# Patient Record
Sex: MALE | Race: WHITE | NOT HISPANIC OR LATINO | Employment: FULL TIME | URBAN - METROPOLITAN AREA
[De-identification: names, ages, dates, MRNs, and addresses within clinical notes are randomized per-mention and may not be internally consistent; named-entity substitution may affect disease eponyms.]

---

## 2018-12-10 ENCOUNTER — OFFICE VISIT (OUTPATIENT)
Dept: FAMILY MEDICINE | Facility: CLINIC | Age: 48
End: 2018-12-10
Payer: COMMERCIAL

## 2018-12-10 VITALS
TEMPERATURE: 97.6 F | HEART RATE: 84 BPM | SYSTOLIC BLOOD PRESSURE: 118 MMHG | HEIGHT: 73 IN | DIASTOLIC BLOOD PRESSURE: 72 MMHG | WEIGHT: 221 LBS | BODY MASS INDEX: 29.29 KG/M2 | OXYGEN SATURATION: 93 %

## 2018-12-10 DIAGNOSIS — Z11.4 ENCOUNTER FOR SCREENING FOR HIV: ICD-10-CM

## 2018-12-10 DIAGNOSIS — Z13.1 SCREENING FOR DIABETES MELLITUS: ICD-10-CM

## 2018-12-10 DIAGNOSIS — Z72.0 TOBACCO ABUSE: ICD-10-CM

## 2018-12-10 DIAGNOSIS — Z00.01 ENCOUNTER FOR ROUTINE ADULT HEALTH EXAMINATION WITH ABNORMAL FINDINGS: Primary | ICD-10-CM

## 2018-12-10 DIAGNOSIS — Z12.11 SPECIAL SCREENING FOR MALIGNANT NEOPLASMS, COLON: ICD-10-CM

## 2018-12-10 DIAGNOSIS — N50.89 LUMP IN SCROTUM: ICD-10-CM

## 2018-12-10 DIAGNOSIS — Z13.6 CARDIOVASCULAR SCREENING; LDL GOAL LESS THAN 130: ICD-10-CM

## 2018-12-10 DIAGNOSIS — Z12.5 SCREENING FOR PROSTATE CANCER: ICD-10-CM

## 2018-12-10 DIAGNOSIS — E66.3 OVERWEIGHT (BMI 25.0-29.9): ICD-10-CM

## 2018-12-10 DIAGNOSIS — Z23 NEED FOR PNEUMOCOCCAL VACCINE: ICD-10-CM

## 2018-12-10 PROCEDURE — 99386 PREV VISIT NEW AGE 40-64: CPT | Mod: 25 | Performed by: PHYSICIAN ASSISTANT

## 2018-12-10 PROCEDURE — 90471 IMMUNIZATION ADMIN: CPT | Performed by: PHYSICIAN ASSISTANT

## 2018-12-10 PROCEDURE — 80061 LIPID PANEL: CPT | Performed by: PHYSICIAN ASSISTANT

## 2018-12-10 PROCEDURE — 80053 COMPREHEN METABOLIC PANEL: CPT | Performed by: PHYSICIAN ASSISTANT

## 2018-12-10 PROCEDURE — 90732 PPSV23 VACC 2 YRS+ SUBQ/IM: CPT | Performed by: PHYSICIAN ASSISTANT

## 2018-12-10 PROCEDURE — G0103 PSA SCREENING: HCPCS | Performed by: PHYSICIAN ASSISTANT

## 2018-12-10 PROCEDURE — 99214 OFFICE O/P EST MOD 30 MIN: CPT | Mod: 25 | Performed by: PHYSICIAN ASSISTANT

## 2018-12-10 PROCEDURE — 36415 COLL VENOUS BLD VENIPUNCTURE: CPT | Performed by: PHYSICIAN ASSISTANT

## 2018-12-10 PROCEDURE — 87389 HIV-1 AG W/HIV-1&-2 AB AG IA: CPT | Performed by: PHYSICIAN ASSISTANT

## 2018-12-10 RX ORDER — NICOTINE 21 MG/24HR
1 PATCH, TRANSDERMAL 24 HOURS TRANSDERMAL EVERY 24 HOURS
Qty: 30 PATCH | Refills: 0 | Status: SHIPPED | OUTPATIENT
Start: 2018-12-10 | End: 2019-01-09

## 2018-12-10 RX ORDER — NICOTINE 21 MG/24HR
1 PATCH, TRANSDERMAL 24 HOURS TRANSDERMAL EVERY 24 HOURS
Qty: 30 PATCH | Refills: 0 | Status: SHIPPED | OUTPATIENT
Start: 2018-12-10 | End: 2019-12-10

## 2018-12-10 ASSESSMENT — MIFFLIN-ST. JEOR: SCORE: 1916.33

## 2018-12-10 NOTE — PROGRESS NOTES
SUBJECTIVE:   CC: Giacomo Castro is an 48 year old male who presents for preventive health visit.     Healthy Habits:    Do you get at least three servings of calcium containing foods daily (dairy, green leafy vegetables, etc.)? yes    Amount of exercise or daily activities, outside of work: 4 day(s) per week    Problems taking medications regularly No    Medication side effects: No    Have you had an eye exam in the past two years? no    Do you see a dentist twice per year? yes    Do you have sleep apnea, excessive snoring or daytime drowsiness? no    New pt today.  [facility]    1) Lump on right testicle  - not painful. Onset about 6 months. Noticed incidentally. Maybe a dull itch in that sight. No swelling, dysuria, or hematuria. Not enlarging. No injury. No hx of vasectomy.     2) Smoker; interested in quitting. Was on chantix in the past and didn't like it made him feel. Brother is interested in quitting so they are planning to do this together.  No trial of the patches.      Today's PHQ-2 Score:   PHQ-2 ( 1999 Pfizer) 12/10/2018   Q1: Little interest or pleasure in doing things 0   Q2: Feeling down, depressed or hopeless 0   PHQ-2 Score 0       Abuse: Current or Past(Physical, Sexual or Emotional)- No  Do you feel safe in your environment? Yes    Social History     Tobacco Use     Smoking status: Current Every Day Smoker     Packs/day: 1.00     Years: 30.00     Pack years: 30.00     Types: Cigarettes     Smokeless tobacco: Never Used   Substance Use Topics     Alcohol use: Yes     If you drink alcohol do you typically have >3 drinks per day or >7 drinks per week? No                      Last PSA: No results found for: PSA    Reviewed orders with patient. Reviewed health maintenance and updated orders accordingly - Yes  BP Readings from Last 3 Encounters:   12/10/18 118/72    Wt Readings from Last 3 Encounters:   12/10/18 100.2 kg (221 lb)                  There is no problem list on file for this  "patient.    History reviewed. No pertinent surgical history.    Social History     Tobacco Use     Smoking status: Current Every Day Smoker     Packs/day: 1.00     Years: 30.00     Pack years: 30.00     Types: Cigarettes     Smokeless tobacco: Never Used   Substance Use Topics     Alcohol use: Yes     History reviewed. No pertinent family history.      No current outpatient medications on file.     Allergies   Allergen Reactions     Penicillins Hives       Reviewed and updated as needed this visit by clinical staff  Tobacco  Allergies  Meds  Med Hx  Surg Hx  Fam Hx  Soc Hx        Reviewed and updated as needed this visit by Provider  Tobacco  Allergies  Meds  Med Hx  Surg Hx  Fam Hx  Soc Hx           ROS:  CONSTITUTIONAL: NEGATIVE for fever, chills, change in weight  INTEGUMENTARY/SKIN: NEGATIVE for worrisome rashes, moles or lesions  EYES: NEGATIVE for vision changes or irritation  ENT: NEGATIVE for ear, mouth and throat problems  RESP: NEGATIVE for significant cough or SOB  CV: NEGATIVE for chest pain, palpitations or peripheral edema  GI: NEGATIVE for nausea, abdominal pain, heartburn, or change in bowel habits   male: + for R testicular lump. See notes in HPI.   MUSCULOSKELETAL: NEGATIVE for significant arthralgias or myalgia  NEURO: NEGATIVE for weakness, dizziness or paresthesias  PSYCHIATRIC: NEGATIVE for changes in mood or affect    OBJECTIVE:   /72 (BP Location: Right arm, Cuff Size: Adult Large)   Pulse 84   Temp 97.6  F (36.4  C) (Oral)   Ht 1.854 m (6' 1\")   Wt 100.2 kg (221 lb)   SpO2 93%   BMI 29.16 kg/m    EXAM:  GENERAL: healthy, alert and no distress  EYES: Eyes grossly normal to inspection, PERRL and conjunctivae and sclerae normal  HENT: ear canals and TM's normal, nose and mouth without ulcers or lesions  NECK: no adenopathy, no asymmetry, masses, or scars and thyroid normal to palpation  RESP: lungs clear to auscultation - no rales, rhonchi or wheezes  CV: regular rate " and rhythm, normal S1 S2, no S3 or S4, no murmur, click or rub, no peripheral edema and peripheral pulses strong  ABDOMEN: soft, nontender, no hepatosplenomegaly, no masses and bowel sounds normal   (male): normal male genitalia without lesions or urethral discharge, no hernia. He does point out a small lump along the inferior aspect of his R testicle that actually seems to be more apparent within the inferior epididymis. There is not overlying redness, swelling or tenderness. No obvious testicular mass.   MS: no gross musculoskeletal defects noted, no edema  SKIN: no suspicious lesions or rashes  NEURO: Normal strength and tone, mentation intact and speech normal  PSYCH: mentation appears normal, affect normal/bright    Diagnostic Test Results:  none     ASSESSMENT/PLAN:       ICD-10-CM    1. Encounter for routine adult health examination with abnormal findings Z00.01    2. Lump in scrotum N50.9 US Testicular & Scrotum w Doppler Ltd   3. Tobacco abuse Z72.0 nicotine (NICODERM CQ) 21 MG/24HR 24 hr patch     nicotine (NICODERM CQ) 7 MG/24HR 24 hr patch     nicotine (NICODERM CQ) 14 MG/24HR 24 hr patch   4. Overweight (BMI 25.0-29.9) E66.3    5. Need for pneumococcal vaccine Z23 PNEUMOCOCCAL VACCINE,ADULT,SQ OR IM   6. Special screening for malignant neoplasms, colon Z12.11 GASTROENTEROLOGY ADULT REF PROCEDURE ONLY [facility] [phone]; (per pt insurance)   7. CARDIOVASCULAR SCREENING; LDL GOAL LESS THAN 130 Z13.6 Lipid panel reflex to direct LDL Fasting   8. Screening for prostate cancer Z12.5 PSA, screen   9. Screening for diabetes mellitus Z13.1 Comprehensive metabolic panel   10. Encounter for screening for HIV Z11.4 HIV Antigen Antibody Combo   New pt transfer care from [facility].   Complete fasting labs and notify of results when available.   Pt also mentioned testicular lump for past 6 months. This seems to be more c/w epididymis and external to testicle, but will obtain ultrasound for further  "evaluation.  Long-term risks of smoking reviewed and encouraged to quit smoking along with brother as planned. Cessation options reviewed. Pt would like to try nicoderm patches.    COUNSELING:  Reviewed preventive health counseling, as reflected in patient instructions       Regular exercise       Healthy diet/nutrition       Immunizations    Vaccinated for: Pneumococcal             HIV screeninx in teen years, 1x in adult years, and at intervals if high risk       Colon cancer screening       Prostate cancer screening       One time pneumovax for smokers    BP Readings from Last 1 Encounters:   12/10/18 118/72     Estimated body mass index is 29.16 kg/m  as calculated from the following:    Height as of this encounter: 1.854 m (6' 1\").    Weight as of this encounter: 100.2 kg (221 lb).      Weight management plan: Discussed healthy diet and exercise guidelines     reports that he has been smoking cigarettes.  He has a 30.00 pack-year smoking history. he has never used smokeless tobacco.  Tobacco Cessation Action Plan: Pharmacotherapies : Nicotine patch    Counseling Resources:  ATP IV Guidelines  Pooled Cohorts Equation Calculator  FRAX Risk Assessment  ICSI Preventive Guidelines  Dietary Guidelines for Americans, 2010  USDA's MyPlate  ASA Prophylaxis  Lung CA Screening    Valarie Garcia PA-C  [facility]  "

## 2018-12-11 LAB
ALBUMIN SERPL-MCNC: 4.3 G/DL (ref 3.4–5)
ALP SERPL-CCNC: 63 U/L (ref 40–150)
ALT SERPL W P-5'-P-CCNC: 31 U/L (ref 0–70)
ANION GAP SERPL CALCULATED.3IONS-SCNC: 7 MMOL/L (ref 3–14)
AST SERPL W P-5'-P-CCNC: 32 U/L (ref 0–45)
BILIRUB SERPL-MCNC: 0.8 MG/DL (ref 0.2–1.3)
BUN SERPL-MCNC: 10 MG/DL (ref 7–30)
CALCIUM SERPL-MCNC: 9.4 MG/DL (ref 8.5–10.1)
CHLORIDE SERPL-SCNC: 109 MMOL/L (ref 94–109)
CHOLEST SERPL-MCNC: 219 MG/DL
CO2 SERPL-SCNC: 23 MMOL/L (ref 20–32)
CREAT SERPL-MCNC: 0.83 MG/DL (ref 0.66–1.25)
GFR SERPL CREATININE-BSD FRML MDRD: >90 ML/MIN/1.7M2
GLUCOSE SERPL-MCNC: 86 MG/DL (ref 70–99)
HDLC SERPL-MCNC: 40 MG/DL
HIV 1+2 AB+HIV1 P24 AG SERPL QL IA: NONREACTIVE
LDLC SERPL CALC-MCNC: 145 MG/DL
NONHDLC SERPL-MCNC: 179 MG/DL
POTASSIUM SERPL-SCNC: 4.3 MMOL/L (ref 3.4–5.3)
PROT SERPL-MCNC: 7.2 G/DL (ref 6.8–8.8)
PSA SERPL-ACNC: 0.96 UG/L (ref 0–4)
SODIUM SERPL-SCNC: 139 MMOL/L (ref 133–144)
TRIGL SERPL-MCNC: 169 MG/DL

## 2018-12-13 NOTE — RESULT ENCOUNTER NOTE
Please CALL patient with the following results and help schedule a follow-up telephone visit:    Dear Giacomo,    Your recent lab results are noted below:    -PSA (prostate specific antigen) test is normal.  This indicates a low likelihood of prostate cancer.  ADVISE: rechecking this in 1 year.  -LDL(bad) cholesterol level is elevated, and your triglycerides are elevated which can increase your heart disease risk.  A diet high in fat and simple carbohydrates, genetics and being overweight can contribute to this. ADVISE: exercising 150 minutes of aerobic exercise per week (30 minutes for 5 days per week or 50 minutes for 3 days per week are options), eating a low saturated fat/low carbohydrate diet, and omega-3 fatty acids (fish oil) 9492-6344 mg daily are helpful to improve this. We additionally should consider starting you on a medication to lower your cholesterol given current guidelines recommend medication if your 10-year heart disease risk assessment is >7.5% and yours was 9.5% as noted below. Please schedule a telephone visit to discuss this further.    The 10-year ASCVD risk score (Willardmena FRANCISCO Jr., et al., 2013) is: 9.5%    Values used to calculate the score:      Age: 48 years      Sex: Male      Is Non- : No      Diabetic: No      Tobacco smoker: Yes      Systolic Blood Pressure: 118 mmHg      Is BP treated: No      HDL Cholesterol: 40 mg/dL      Total Cholesterol: 219 mg/dL    -Liver and gallbladder tests are normal (ALT,AST, Alk phos, bilirubin), kidney function is normal (Cr, GFR), sodium is normal, potassium is normal, calcium is normal, glucose is normal.  -HIV test is normal.    For additional lab test information, labtestsonline.org is an excellent reference.  Please contact the clinic at [phone] with any further questions or concerns.      Thank you,      Valarie Garcia PA-C  [facility]

## 2018-12-14 ENCOUNTER — HOSPITAL ENCOUNTER (OUTPATIENT)
Facility: CLINIC | Age: 48
End: 2018-12-14
Attending: INTERNAL MEDICINE | Admitting: INTERNAL MEDICINE
Payer: COMMERCIAL

## 2018-12-19 ENCOUNTER — HOSPITAL ENCOUNTER (OUTPATIENT)
Dept: ULTRASOUND IMAGING | Facility: CLINIC | Age: 48
Discharge: HOME | End: 2018-12-19
Attending: PHYSICIAN ASSISTANT | Admitting: PHYSICIAN ASSISTANT
Payer: COMMERCIAL

## 2018-12-19 ENCOUNTER — TELEPHONE (OUTPATIENT)
Dept: FAMILY MEDICINE | Facility: CLINIC | Age: 48
End: 2018-12-19

## 2018-12-19 DIAGNOSIS — N50.89 LUMP IN SCROTUM: ICD-10-CM

## 2018-12-19 PROCEDURE — 76870 US EXAM SCROTUM: CPT

## 2018-12-19 RX ORDER — LIDOCAINE 40 MG/G
CREAM TOPICAL
Status: CANCELLED | OUTPATIENT
Start: 2018-12-19

## 2018-12-19 RX ORDER — ONDANSETRON 2 MG/ML
4 INJECTION INTRAMUSCULAR; INTRAVENOUS
Status: CANCELLED | OUTPATIENT
Start: 2018-12-19

## 2018-12-19 NOTE — TELEPHONE ENCOUNTER
Clinic Action Needed:Yes, please return call  Reason for Call: Giacomo returned call from clinic today.  I read him the note from KATIE Garcia regarding his lab results from 12/10/18. He would like to speak to someone regarding setting up a telephone visit with her.  He is also awaiting his US results from today.  Please return call to discuss further, thank you.   Routed to:  Triage Pool    Verena Dee, RAJANI  [facility]

## 2018-12-20 NOTE — TELEPHONE ENCOUNTER
Please see note from patient below. Please assist with scheduling telephone visit with THIAGO GOMEZ. Per result note, this was to discuss elevated cholesterol labs and possibly starting medication. US results are not yet available. Once reviewed by THIAGO GOMEZ we will contact him again with those results.  Tracy Akers RN, BSN  [facility]

## 2018-12-21 ENCOUNTER — VIRTUAL VISIT (OUTPATIENT)
Dept: FAMILY MEDICINE | Facility: CLINIC | Age: 48
End: 2018-12-21
Payer: COMMERCIAL

## 2018-12-21 DIAGNOSIS — E78.2 MIXED HYPERLIPIDEMIA: Primary | ICD-10-CM

## 2018-12-21 PROCEDURE — 98967 PH1 ASSMT&MGMT NQHP 11-20: CPT | Performed by: PHYSICIAN ASSISTANT

## 2018-12-21 NOTE — PROGRESS NOTES
"Giacomo Castro is a 48 year old male who is being evaluated via a telephone visit.      The patient has been notified of following (by M.A) Edna Merida MA      \"We have found that certain health care needs can be provided without the need for a physical exam.  This service lets us provide the care you need with a short phone conversation.  If a prescription is necessary we can send it directly to your pharmacy.  If lab work is needed we can place an order for that and you can then stop by our lab to have the test done at a later time.    This telephone visit will be conducted via 3 way call with the you (the patient) , the physician/provider, and a me all on the line at the same time.  This allows your physician/provider to have the phone conversation with you while I will be taking notes for your medical record.  We will have full access to your [place] medical record during this entire phone call.    Since this is like an office visit,  will bill your insurance company for this service.  Please check with your medical insurance if this type of telephone/virtual is covered . You may be responsible for the cost of this service if insurance coverage is denied.  The typical cost is $30 (10min), $59(11-20min) and $85 (21-30min)     If during the course of the call the physician/provider feels a telephone visit is not appropriate, you will not be charged for this service\"    Consent has been obtained for this service by care team member: yes.  See the scanned image in the medical record.    -Calling to discuss cholesterol an scrotal US results:  Component      Latest Ref Rng & Units 12/10/2018   Cholesterol      <200 mg/dL 219 (H)   Triglycerides      <150 mg/dL 169 (H)   HDL Cholesterol      >39 mg/dL 40   LDL Cholesterol Calculated      <100 mg/dL 145 (H)   Non HDL Cholesterol      <130 mg/dL 179 (H)     Results for orders placed or performed during the hospital encounter of 12/19/18   US Testicular & Scrotum w " Doppler Ltd    Narrative    TESTICULAR ULTRASOUND 12/19/2018 9:13 AM     HISTORY: Inferior epididymal lump for 6 months. No pain, not  enlarging. Lump in scrotum.    COMPARISON: None.    FINDINGS: There is homogeneous normal echotexture throughout the  bilateral testes. The right testicle measures 4.2 x 3.2 x 3.2 cm. A  small testicular appendage present inferiorly measuring 0.6 x 0.3 x  0.3 cm. The left testicle measures 4.5 x 3.2 x 3.3 cm. Both the right  and left epididymides are unremarkable. Doppler evaluation shows  normal arterial waveforms to the testes bilaterally. There is no  hydrocele. There is no varicocele. There is no mass or abnormal  calcifications.      Impression    IMPRESSION: Small right-sided testicular appendage. Otherwise  unremarkable testicular ultrasound.    DEENA TOSCANO MD     Brother and he have picked tomorrow as their quit date for smoking cessation.  We discussed 10-yr heart disease risk assessment score would warrant further treatment with statin therapy. Risks/benefits reviewed. He would like to re-assess in 6 months after a trial of life-style changes, but would be amenable to med management if still indicated at that time.    The 10-year ASCVD risk score (Carlos MARYAM Jr., et al., 2013) is: 9.5%    Values used to calculate the score:      Age: 48 years      Sex: Male      Is Non- : No      Diabetic: No      Tobacco smoker: Yes      Systolic Blood Pressure: 118 mmHg      Is BP treated: No      HDL Cholesterol: 40 mg/dL      Total Cholesterol: 219 mg/dL      Assessment/Plan:    ICD-10-CM    1. Mixed hyperlipidemia E78.2 Lipid panel reflex to direct LDL Fasting   Reviewed 10-yr ASCVD risk model with him and current guidelines to initiate statin therapy.  Pt declines medication at this time, but would like to focus on life-style changes first. He and brother are planning to quit smoking together and this certainly will decrease his CV risk. Recheck lipids in 6  months and can follow-up with another phone visit again at that time.  Pt in agreement with plan.     I have reviewed the note as documented above.  This accurately captures the substance of my conversation with the patient.  Total time of call between patient and provider was 16 minutes     Electronically Signed By: Valarie Garcia PA-C    ===================================================

## 2018-12-23 NOTE — RESULT ENCOUNTER NOTE
Result(s) was/were reviewed with patient during telephone visit 12/21.  Electronically Signed By: Valarie Garcia PA-C

## 2019-10-03 ENCOUNTER — HEALTH MAINTENANCE LETTER (OUTPATIENT)
Age: 49
End: 2019-10-03

## 2020-11-07 ENCOUNTER — HEALTH MAINTENANCE LETTER (OUTPATIENT)
Age: 50
End: 2020-11-07

## 2021-09-05 ENCOUNTER — HEALTH MAINTENANCE LETTER (OUTPATIENT)
Age: 51
End: 2021-09-05

## 2021-12-26 ENCOUNTER — HEALTH MAINTENANCE LETTER (OUTPATIENT)
Age: 51
End: 2021-12-26

## 2022-10-23 ENCOUNTER — HEALTH MAINTENANCE LETTER (OUTPATIENT)
Age: 52
End: 2022-10-23

## 2023-01-01 ENCOUNTER — HEALTH MAINTENANCE LETTER (OUTPATIENT)
Age: 53
End: 2023-01-01

## 2024-01-01 ENCOUNTER — APPOINTMENT (OUTPATIENT)
Dept: GENERAL RADIOLOGY | Facility: CLINIC | Age: 54
DRG: 001 | End: 2024-01-01
Attending: STUDENT IN AN ORGANIZED HEALTH CARE EDUCATION/TRAINING PROGRAM
Payer: COMMERCIAL

## 2024-01-01 ENCOUNTER — APPOINTMENT (OUTPATIENT)
Dept: GENERAL RADIOLOGY | Facility: CLINIC | Age: 54
DRG: 001 | End: 2024-01-01
Attending: PHYSICIAN ASSISTANT
Payer: COMMERCIAL

## 2024-01-01 ENCOUNTER — APPOINTMENT (OUTPATIENT)
Dept: GENERAL RADIOLOGY | Facility: CLINIC | Age: 54
DRG: 001 | End: 2024-01-01
Attending: SURGERY
Payer: COMMERCIAL

## 2024-01-01 ENCOUNTER — APPOINTMENT (OUTPATIENT)
Dept: GENERAL RADIOLOGY | Facility: CLINIC | Age: 54
DRG: 001 | End: 2024-01-01
Attending: NURSE PRACTITIONER
Payer: COMMERCIAL

## 2024-01-01 ENCOUNTER — ANESTHESIA (OUTPATIENT)
Dept: SURGERY | Facility: CLINIC | Age: 54
DRG: 001 | End: 2024-01-01
Payer: COMMERCIAL

## 2024-01-01 ENCOUNTER — APPOINTMENT (OUTPATIENT)
Dept: CT IMAGING | Facility: CLINIC | Age: 54
DRG: 001 | End: 2024-01-01
Attending: INTERNAL MEDICINE
Payer: COMMERCIAL

## 2024-01-01 ENCOUNTER — ANESTHESIA EVENT (OUTPATIENT)
Dept: SURGERY | Facility: CLINIC | Age: 54
DRG: 001 | End: 2024-01-01
Payer: COMMERCIAL

## 2024-01-01 ENCOUNTER — APPOINTMENT (OUTPATIENT)
Dept: CT IMAGING | Facility: CLINIC | Age: 54
DRG: 001 | End: 2024-01-01
Attending: STUDENT IN AN ORGANIZED HEALTH CARE EDUCATION/TRAINING PROGRAM
Payer: COMMERCIAL

## 2024-01-01 ENCOUNTER — HOSPITAL ENCOUNTER (INPATIENT)
Facility: CLINIC | Age: 54
LOS: 11 days | DRG: 001 | End: 2024-04-02
Attending: INTERNAL MEDICINE | Admitting: INTERNAL MEDICINE
Payer: COMMERCIAL

## 2024-01-01 ENCOUNTER — APPOINTMENT (OUTPATIENT)
Dept: CARDIOLOGY | Facility: CLINIC | Age: 54
DRG: 001 | End: 2024-01-01
Attending: STUDENT IN AN ORGANIZED HEALTH CARE EDUCATION/TRAINING PROGRAM
Payer: COMMERCIAL

## 2024-01-01 ENCOUNTER — TRANSFERRED RECORDS (OUTPATIENT)
Dept: HEALTH INFORMATION MANAGEMENT | Facility: CLINIC | Age: 54
End: 2024-01-01
Payer: COMMERCIAL

## 2024-01-01 ENCOUNTER — APPOINTMENT (OUTPATIENT)
Dept: CARDIOLOGY | Facility: CLINIC | Age: 54
DRG: 001 | End: 2024-01-01
Attending: INTERNAL MEDICINE
Payer: COMMERCIAL

## 2024-01-01 ENCOUNTER — PREP FOR PROCEDURE (OUTPATIENT)
Dept: CARDIOLOGY | Facility: CLINIC | Age: 54
End: 2024-01-01

## 2024-01-01 ENCOUNTER — APPOINTMENT (OUTPATIENT)
Dept: ULTRASOUND IMAGING | Facility: CLINIC | Age: 54
DRG: 001 | End: 2024-01-01
Attending: STUDENT IN AN ORGANIZED HEALTH CARE EDUCATION/TRAINING PROGRAM
Payer: COMMERCIAL

## 2024-01-01 ENCOUNTER — APPOINTMENT (OUTPATIENT)
Dept: GENERAL RADIOLOGY | Facility: CLINIC | Age: 54
DRG: 001 | End: 2024-01-01
Attending: INTERNAL MEDICINE
Payer: COMMERCIAL

## 2024-01-01 ENCOUNTER — CARE COORDINATION (OUTPATIENT)
Dept: CARDIOLOGY | Facility: CLINIC | Age: 54
End: 2024-01-01

## 2024-01-01 VITALS
WEIGHT: 261.47 LBS | BODY MASS INDEX: 32.51 KG/M2 | TEMPERATURE: 98.6 F | HEIGHT: 75 IN | DIASTOLIC BLOOD PRESSURE: 82 MMHG | RESPIRATION RATE: 14 BRPM | SYSTOLIC BLOOD PRESSURE: 85 MMHG | OXYGEN SATURATION: 95 %

## 2024-01-01 DIAGNOSIS — I21.11 ACUTE ST ELEVATION MYOCARDIAL INFARCTION (STEMI) INVOLVING RIGHT CORONARY ARTERY (H): ICD-10-CM

## 2024-01-01 DIAGNOSIS — I34.0 NONRHEUMATIC MITRAL VALVE REGURGITATION: ICD-10-CM

## 2024-01-01 DIAGNOSIS — Z95.2 S/P MVR (MITRAL VALVE REPLACEMENT): ICD-10-CM

## 2024-01-01 DIAGNOSIS — Z91.89 AT HIGH RISK FOR IMPAIRED SKIN INTEGRITY: ICD-10-CM

## 2024-01-01 DIAGNOSIS — I34.0 NONRHEUMATIC MITRAL VALVE REGURGITATION: Primary | ICD-10-CM

## 2024-01-01 DIAGNOSIS — I25.2 HISTORY OF ST ELEVATION MYOCARDIAL INFARCTION (STEMI): Primary | ICD-10-CM

## 2024-01-01 DIAGNOSIS — R57.0 CARDIOGENIC SHOCK (H): ICD-10-CM

## 2024-01-01 DIAGNOSIS — I34.0: ICD-10-CM

## 2024-01-01 DIAGNOSIS — I50.9 DECOMPENSATED HEART FAILURE (H): Primary | ICD-10-CM

## 2024-01-01 LAB
ABO/RH(D): NORMAL
ACANTHOCYTES BLD QL SMEAR: ABNORMAL
ACT BLD: 120 SECONDS (ref 74–150)
ACT BLD: 127 SECONDS (ref 74–150)
ACT BLD: 135 SECONDS (ref 74–150)
ACT BLD: 139 SECONDS (ref 74–150)
ACT BLD: 139 SECONDS (ref 74–150)
ACT BLD: 147 SECONDS (ref 74–150)
ACT BLD: 147 SECONDS (ref 74–150)
ACT BLD: 151 SECONDS (ref 74–150)
ACT BLD: 151 SECONDS (ref 74–150)
ACT BLD: 154 SECONDS (ref 74–150)
ACT BLD: 154 SECONDS (ref 74–150)
ACT BLD: 155 SECONDS (ref 74–150)
ACT BLD: 155 SECONDS (ref 74–150)
ACT BLD: 158 SECONDS (ref 74–150)
ACT BLD: 162 SECONDS (ref 74–150)
ACT BLD: 162 SECONDS (ref 74–150)
ACT BLD: 166 SECONDS (ref 74–150)
ACT BLD: 170 SECONDS (ref 74–150)
ACT BLD: 174 SECONDS (ref 74–150)
ACT BLD: 178 SECONDS (ref 74–150)
ACT BLD: 178 SECONDS (ref 74–150)
ACT BLD: 182 SECONDS (ref 74–150)
ACT BLD: 186 SECONDS (ref 74–150)
ACT BLD: 189 SECONDS (ref 74–150)
ACT BLD: 193 SECONDS (ref 74–150)
ACT BLD: 197 SECONDS (ref 74–150)
ACT BLD: 201 SECONDS (ref 74–150)
ACT BLD: 259 SECONDS (ref 74–150)
ALBUMIN SERPL BCG-MCNC: 1.7 G/DL (ref 3.5–5.2)
ALBUMIN SERPL BCG-MCNC: 1.7 G/DL (ref 3.5–5.2)
ALBUMIN SERPL BCG-MCNC: 1.8 G/DL (ref 3.5–5.2)
ALBUMIN SERPL BCG-MCNC: 1.9 G/DL (ref 3.5–5.2)
ALBUMIN SERPL BCG-MCNC: 2 G/DL (ref 3.5–5.2)
ALBUMIN SERPL BCG-MCNC: 2.1 G/DL (ref 3.5–5.2)
ALBUMIN SERPL BCG-MCNC: 2.2 G/DL (ref 3.5–5.2)
ALBUMIN SERPL BCG-MCNC: 2.7 G/DL (ref 3.5–5.2)
ALBUMIN SERPL BCG-MCNC: 2.7 G/DL (ref 3.5–5.2)
ALBUMIN SERPL BCG-MCNC: 2.8 G/DL (ref 3.5–5.2)
ALBUMIN SERPL BCG-MCNC: 2.9 G/DL (ref 3.5–5.2)
ALBUMIN SERPL BCG-MCNC: 3 G/DL (ref 3.5–5.2)
ALBUMIN SERPL BCG-MCNC: 3.1 G/DL (ref 3.5–5.2)
ALBUMIN SERPL BCG-MCNC: 3.2 G/DL (ref 3.5–5.2)
ALBUMIN SERPL BCG-MCNC: 3.3 G/DL (ref 3.5–5.2)
ALBUMIN SERPL BCG-MCNC: 3.4 G/DL (ref 3.5–5.2)
ALBUMIN UR-MCNC: 20 MG/DL
ALBUMIN UR-MCNC: 50 MG/DL
ALBUMIN UR-MCNC: NEGATIVE MG/DL
ALLEN'S TEST: ABNORMAL
ALLEN'S TEST: NORMAL
ALP SERPL-CCNC: 30 U/L (ref 40–150)
ALP SERPL-CCNC: 31 U/L (ref 40–150)
ALP SERPL-CCNC: 31 U/L (ref 40–150)
ALP SERPL-CCNC: 32 U/L (ref 40–150)
ALP SERPL-CCNC: 33 U/L (ref 40–150)
ALP SERPL-CCNC: 34 U/L (ref 40–150)
ALP SERPL-CCNC: 44 U/L (ref 40–150)
ALP SERPL-CCNC: 45 U/L (ref 40–150)
ALP SERPL-CCNC: 47 U/L (ref 40–150)
ALP SERPL-CCNC: 49 U/L (ref 40–150)
ALP SERPL-CCNC: 50 U/L (ref 40–150)
ALP SERPL-CCNC: 51 U/L (ref 40–150)
ALP SERPL-CCNC: 52 U/L (ref 40–150)
ALP SERPL-CCNC: 53 U/L (ref 40–150)
ALP SERPL-CCNC: 54 U/L (ref 40–150)
ALP SERPL-CCNC: 54 U/L (ref 40–150)
ALP SERPL-CCNC: 55 U/L (ref 40–150)
ALP SERPL-CCNC: 56 U/L (ref 40–150)
ALP SERPL-CCNC: 57 U/L (ref 40–150)
ALP SERPL-CCNC: 57 U/L (ref 40–150)
ALP SERPL-CCNC: 58 U/L (ref 40–150)
ALP SERPL-CCNC: 58 U/L (ref 40–150)
ALP SERPL-CCNC: 59 U/L (ref 40–150)
ALP SERPL-CCNC: 59 U/L (ref 40–150)
ALP SERPL-CCNC: 60 U/L (ref 40–150)
ALP SERPL-CCNC: 60 U/L (ref 40–150)
ALP SERPL-CCNC: 61 U/L (ref 40–150)
ALP SERPL-CCNC: 61 U/L (ref 40–150)
ALP SERPL-CCNC: 93 U/L (ref 40–150)
ALP SERPL-CCNC: 98 U/L (ref 40–150)
ALP SERPL-CCNC: ABNORMAL U/L
ALT SERPL W P-5'-P-CCNC: 123 U/L (ref 0–70)
ALT SERPL W P-5'-P-CCNC: 136 U/L (ref 0–70)
ALT SERPL W P-5'-P-CCNC: 143 U/L (ref 0–70)
ALT SERPL W P-5'-P-CCNC: 160 U/L (ref 0–70)
ALT SERPL W P-5'-P-CCNC: 165 U/L (ref 0–70)
ALT SERPL W P-5'-P-CCNC: 173 U/L (ref 0–70)
ALT SERPL W P-5'-P-CCNC: 179 U/L (ref 0–70)
ALT SERPL W P-5'-P-CCNC: 190 U/L (ref 0–70)
ALT SERPL W P-5'-P-CCNC: 190 U/L (ref 0–70)
ALT SERPL W P-5'-P-CCNC: 214 U/L (ref 0–70)
ALT SERPL W P-5'-P-CCNC: 234 U/L (ref 0–70)
ALT SERPL W P-5'-P-CCNC: 235 U/L (ref 0–70)
ALT SERPL W P-5'-P-CCNC: 261 U/L (ref 0–70)
ALT SERPL W P-5'-P-CCNC: 281 U/L (ref 0–70)
ALT SERPL W P-5'-P-CCNC: 298 U/L (ref 0–70)
ALT SERPL W P-5'-P-CCNC: 299 U/L (ref 0–70)
ALT SERPL W P-5'-P-CCNC: 303 U/L (ref 0–70)
ALT SERPL W P-5'-P-CCNC: 304 U/L (ref 0–70)
ALT SERPL W P-5'-P-CCNC: 306 U/L (ref 0–70)
ALT SERPL W P-5'-P-CCNC: 309 U/L (ref 0–70)
ALT SERPL W P-5'-P-CCNC: 318 U/L (ref 0–70)
ALT SERPL W P-5'-P-CCNC: 357 U/L (ref 0–70)
ALT SERPL W P-5'-P-CCNC: 368 U/L (ref 0–70)
ALT SERPL W P-5'-P-CCNC: 41 U/L (ref 0–70)
ALT SERPL W P-5'-P-CCNC: 41 U/L (ref 0–70)
ALT SERPL W P-5'-P-CCNC: 43 U/L (ref 0–70)
ALT SERPL W P-5'-P-CCNC: 45 U/L (ref 0–70)
ALT SERPL W P-5'-P-CCNC: 48 U/L (ref 0–70)
ALT SERPL W P-5'-P-CCNC: 50 U/L (ref 0–70)
ALT SERPL W P-5'-P-CCNC: 51 U/L (ref 0–70)
ALT SERPL W P-5'-P-CCNC: 52 U/L (ref 0–70)
ALT SERPL W P-5'-P-CCNC: 52 U/L (ref 0–70)
ALT SERPL W P-5'-P-CCNC: 54 U/L (ref 0–70)
ALT SERPL W P-5'-P-CCNC: 55 U/L (ref 0–70)
ALT SERPL W P-5'-P-CCNC: 55 U/L (ref 0–70)
ALT SERPL W P-5'-P-CCNC: 56 U/L (ref 0–70)
ALT SERPL W P-5'-P-CCNC: <5 U/L (ref 0–70)
ALT SERPL W P-5'-P-CCNC: ABNORMAL U/L
AMORPH CRY #/AREA URNS HPF: ABNORMAL /HPF
AMPHETAMINES UR QL SCN: ABNORMAL
ANION GAP SERPL CALCULATED.3IONS-SCNC: 10 MMOL/L (ref 7–15)
ANION GAP SERPL CALCULATED.3IONS-SCNC: 11 MMOL/L (ref 7–15)
ANION GAP SERPL CALCULATED.3IONS-SCNC: 12 MMOL/L (ref 7–15)
ANION GAP SERPL CALCULATED.3IONS-SCNC: 13 MMOL/L (ref 7–15)
ANION GAP SERPL CALCULATED.3IONS-SCNC: 14 MMOL/L (ref 7–15)
ANION GAP SERPL CALCULATED.3IONS-SCNC: 15 MMOL/L (ref 7–15)
ANION GAP SERPL CALCULATED.3IONS-SCNC: 17 MMOL/L (ref 7–15)
ANION GAP SERPL CALCULATED.3IONS-SCNC: 19 MMOL/L (ref 7–15)
ANION GAP SERPL CALCULATED.3IONS-SCNC: 19 MMOL/L (ref 7–15)
ANION GAP SERPL CALCULATED.3IONS-SCNC: 20 MMOL/L (ref 7–15)
ANION GAP SERPL CALCULATED.3IONS-SCNC: 21 MMOL/L (ref 7–15)
ANION GAP SERPL CALCULATED.3IONS-SCNC: 7 MMOL/L (ref 7–15)
ANION GAP SERPL CALCULATED.3IONS-SCNC: 8 MMOL/L (ref 7–15)
ANION GAP SERPL CALCULATED.3IONS-SCNC: 9 MMOL/L (ref 7–15)
ANION GAP SERPL CALCULATED.3IONS-SCNC: ABNORMAL MMOL/L
ANTIBODY SCREEN: NEGATIVE
APPEARANCE UR: ABNORMAL
APPEARANCE UR: ABNORMAL
APPEARANCE UR: CLEAR
APTT PPP: 117 SECONDS (ref 22–38)
APTT PPP: 145 SECONDS (ref 22–38)
APTT PPP: 29 SECONDS (ref 22–38)
APTT PPP: 32 SECONDS (ref 22–38)
APTT PPP: 35 SECONDS (ref 22–38)
APTT PPP: 40 SECONDS (ref 22–38)
APTT PPP: 41 SECONDS (ref 22–38)
APTT PPP: 42 SECONDS (ref 22–38)
APTT PPP: 43 SECONDS (ref 22–38)
APTT PPP: 44 SECONDS (ref 22–38)
APTT PPP: 45 SECONDS (ref 22–38)
APTT PPP: 47 SECONDS (ref 22–38)
APTT PPP: 47 SECONDS (ref 22–38)
APTT PPP: 48 SECONDS (ref 22–38)
APTT PPP: 49 SECONDS (ref 22–38)
APTT PPP: 50 SECONDS (ref 22–38)
APTT PPP: 53 SECONDS (ref 22–38)
APTT PPP: 53 SECONDS (ref 22–38)
APTT PPP: 56 SECONDS (ref 22–38)
APTT PPP: 56 SECONDS (ref 22–38)
APTT PPP: 58 SECONDS (ref 22–38)
APTT PPP: 59 SECONDS (ref 22–38)
APTT PPP: 60 SECONDS (ref 22–38)
APTT PPP: 60 SECONDS (ref 22–38)
APTT PPP: 63 SECONDS (ref 22–38)
APTT PPP: 63 SECONDS (ref 22–38)
APTT PPP: 67 SECONDS (ref 22–38)
APTT PPP: 69 SECONDS (ref 22–38)
APTT PPP: 74 SECONDS (ref 22–38)
APTT PPP: 76 SECONDS (ref 22–38)
APTT PPP: 77 SECONDS (ref 22–38)
APTT PPP: 77 SECONDS (ref 22–38)
APTT PPP: 78 SECONDS (ref 22–38)
APTT PPP: 78 SECONDS (ref 22–38)
APTT PPP: 79 SECONDS (ref 22–38)
APTT PPP: 80 SECONDS (ref 22–38)
APTT PPP: 81 SECONDS (ref 22–38)
APTT PPP: 83 SECONDS (ref 22–38)
APTT PPP: >240 SECONDS (ref 22–38)
AST SERPL W P-5'-P-CCNC: 101 U/L (ref 0–45)
AST SERPL W P-5'-P-CCNC: 103 U/L (ref 0–45)
AST SERPL W P-5'-P-CCNC: 104 U/L (ref 0–45)
AST SERPL W P-5'-P-CCNC: 108 U/L (ref 0–45)
AST SERPL W P-5'-P-CCNC: 109 U/L (ref 0–45)
AST SERPL W P-5'-P-CCNC: 112 U/L (ref 0–45)
AST SERPL W P-5'-P-CCNC: 113 U/L (ref 0–45)
AST SERPL W P-5'-P-CCNC: 114 U/L (ref 0–45)
AST SERPL W P-5'-P-CCNC: 116 U/L (ref 0–45)
AST SERPL W P-5'-P-CCNC: 120 U/L (ref 0–45)
AST SERPL W P-5'-P-CCNC: 122 U/L (ref 0–45)
AST SERPL W P-5'-P-CCNC: 123 U/L (ref 0–45)
AST SERPL W P-5'-P-CCNC: 126 U/L (ref 0–45)
AST SERPL W P-5'-P-CCNC: 131 U/L (ref 0–45)
AST SERPL W P-5'-P-CCNC: 132 U/L (ref 0–45)
AST SERPL W P-5'-P-CCNC: 140 U/L (ref 0–45)
AST SERPL W P-5'-P-CCNC: 148 U/L (ref 0–45)
AST SERPL W P-5'-P-CCNC: 153 U/L (ref 0–45)
AST SERPL W P-5'-P-CCNC: 160 U/L (ref 0–45)
AST SERPL W P-5'-P-CCNC: 171 U/L (ref 0–45)
AST SERPL W P-5'-P-CCNC: 171 U/L (ref 0–45)
AST SERPL W P-5'-P-CCNC: 172 U/L (ref 0–45)
AST SERPL W P-5'-P-CCNC: 173 U/L (ref 0–45)
AST SERPL W P-5'-P-CCNC: 181 U/L (ref 0–45)
AST SERPL W P-5'-P-CCNC: 185 U/L (ref 0–45)
AST SERPL W P-5'-P-CCNC: 187 U/L (ref 0–45)
AST SERPL W P-5'-P-CCNC: 190 U/L (ref 0–45)
AST SERPL W P-5'-P-CCNC: 197 U/L (ref 0–45)
AST SERPL W P-5'-P-CCNC: 200 U/L (ref 0–45)
AST SERPL W P-5'-P-CCNC: 210 U/L (ref 0–45)
AST SERPL W P-5'-P-CCNC: 212 U/L (ref 0–45)
AST SERPL W P-5'-P-CCNC: 233 U/L (ref 0–45)
AST SERPL W P-5'-P-CCNC: 262 U/L (ref 0–45)
AST SERPL W P-5'-P-CCNC: 322 U/L (ref 0–45)
AST SERPL W P-5'-P-CCNC: 389 U/L (ref 0–45)
AST SERPL W P-5'-P-CCNC: 400 U/L (ref 0–45)
AST SERPL W P-5'-P-CCNC: ABNORMAL U/L
AT III ACT/NOR PPP CHRO: 46 % (ref 85–135)
AT III ACT/NOR PPP CHRO: 49 % (ref 85–135)
AT III ACT/NOR PPP CHRO: 49 % (ref 85–135)
AT III ACT/NOR PPP CHRO: 50 % (ref 85–135)
AT III ACT/NOR PPP CHRO: 52 % (ref 85–135)
AT III ACT/NOR PPP CHRO: 52 % (ref 85–135)
AT III ACT/NOR PPP CHRO: 53 % (ref 85–135)
AT III ACT/NOR PPP CHRO: 54 % (ref 85–135)
AT III ACT/NOR PPP CHRO: 68 % (ref 85–135)
AT III ACT/NOR PPP CHRO: 72 % (ref 85–135)
ATRIAL RATE - MUSE: 112 BPM
ATRIAL RATE - MUSE: 117 BPM
ATRIAL RATE - MUSE: 57 BPM
ATRIAL RATE - MUSE: 58 BPM
ATRIAL RATE - MUSE: 64 BPM
ATRIAL RATE - MUSE: 68 BPM
ATRIAL RATE - MUSE: 69 BPM
ATRIAL RATE - MUSE: 82 BPM
ATRIAL RATE - MUSE: 94 BPM
ATRIAL RATE - MUSE: 98 BPM
ATRIAL RATE - MUSE: NORMAL BPM
AUER BODIES BLD QL SMEAR: ABNORMAL
BACTERIA #/AREA URNS HPF: ABNORMAL /HPF
BACTERIA ASPIRATE CULT: NO GROWTH
BACTERIA BLD CULT: NO GROWTH
BACTERIA SPT CULT: ABNORMAL
BACTERIA SPT CULT: NO GROWTH
BACTERIA UR CULT: NO GROWTH
BARBITURATES UR QL SCN: ABNORMAL
BASE EXCESS BLDA CALC-SCNC: -0.1 MMOL/L (ref -3–3)
BASE EXCESS BLDA CALC-SCNC: -0.2 MMOL/L (ref -3–3)
BASE EXCESS BLDA CALC-SCNC: -0.4 MMOL/L (ref -3–3)
BASE EXCESS BLDA CALC-SCNC: -0.5 MMOL/L (ref -3–3)
BASE EXCESS BLDA CALC-SCNC: -0.6 MMOL/L (ref -3–3)
BASE EXCESS BLDA CALC-SCNC: -0.6 MMOL/L (ref -3–3)
BASE EXCESS BLDA CALC-SCNC: -0.7 MMOL/L (ref -3–3)
BASE EXCESS BLDA CALC-SCNC: -0.9 MMOL/L (ref -3–3)
BASE EXCESS BLDA CALC-SCNC: -1 MMOL/L (ref -3–3)
BASE EXCESS BLDA CALC-SCNC: -1.1 MMOL/L (ref -3–3)
BASE EXCESS BLDA CALC-SCNC: -1.1 MMOL/L (ref -3–3)
BASE EXCESS BLDA CALC-SCNC: -1.2 MMOL/L (ref -3–3)
BASE EXCESS BLDA CALC-SCNC: -1.3 MMOL/L (ref -3–3)
BASE EXCESS BLDA CALC-SCNC: -1.4 MMOL/L (ref -3–3)
BASE EXCESS BLDA CALC-SCNC: -1.6 MMOL/L (ref -3–3)
BASE EXCESS BLDA CALC-SCNC: -1.7 MMOL/L (ref -3–3)
BASE EXCESS BLDA CALC-SCNC: -1.8 MMOL/L (ref -3–3)
BASE EXCESS BLDA CALC-SCNC: -2 MMOL/L (ref -3–3)
BASE EXCESS BLDA CALC-SCNC: -2.1 MMOL/L (ref -3–3)
BASE EXCESS BLDA CALC-SCNC: -2.1 MMOL/L (ref -3–3)
BASE EXCESS BLDA CALC-SCNC: -2.3 MMOL/L (ref -3–3)
BASE EXCESS BLDA CALC-SCNC: -2.5 MMOL/L (ref -3–3)
BASE EXCESS BLDA CALC-SCNC: -2.6 MMOL/L (ref -3–3)
BASE EXCESS BLDA CALC-SCNC: -2.7 MMOL/L (ref -3–3)
BASE EXCESS BLDA CALC-SCNC: -2.8 MMOL/L (ref -3–3)
BASE EXCESS BLDA CALC-SCNC: -3.1 MMOL/L (ref -3–3)
BASE EXCESS BLDA CALC-SCNC: -3.3 MMOL/L (ref -3–3)
BASE EXCESS BLDA CALC-SCNC: -3.5 MMOL/L (ref -3–3)
BASE EXCESS BLDA CALC-SCNC: -3.6 MMOL/L (ref -3–3)
BASE EXCESS BLDA CALC-SCNC: -3.7 MMOL/L (ref -3–3)
BASE EXCESS BLDA CALC-SCNC: -3.7 MMOL/L (ref -3–3)
BASE EXCESS BLDA CALC-SCNC: -3.9 MMOL/L (ref -3–3)
BASE EXCESS BLDA CALC-SCNC: -4 MMOL/L (ref -3–3)
BASE EXCESS BLDA CALC-SCNC: -4.9 MMOL/L (ref -3–3)
BASE EXCESS BLDA CALC-SCNC: -5.2 MMOL/L (ref -3–3)
BASE EXCESS BLDA CALC-SCNC: -5.2 MMOL/L (ref -3–3)
BASE EXCESS BLDA CALC-SCNC: -6 MMOL/L (ref -3–3)
BASE EXCESS BLDA CALC-SCNC: -7.2 MMOL/L (ref -3–3)
BASE EXCESS BLDA CALC-SCNC: -7.3 MMOL/L (ref -3–3)
BASE EXCESS BLDA CALC-SCNC: -7.3 MMOL/L (ref -3–3)
BASE EXCESS BLDA CALC-SCNC: -7.8 MMOL/L (ref -3–3)
BASE EXCESS BLDA CALC-SCNC: -7.8 MMOL/L (ref -3–3)
BASE EXCESS BLDA CALC-SCNC: -7.9 MMOL/L (ref -3–3)
BASE EXCESS BLDA CALC-SCNC: -8 MMOL/L (ref -3–3)
BASE EXCESS BLDA CALC-SCNC: -8.1 MMOL/L (ref -3–3)
BASE EXCESS BLDA CALC-SCNC: -8.2 MMOL/L (ref -3–3)
BASE EXCESS BLDA CALC-SCNC: -8.8 MMOL/L (ref -3–3)
BASE EXCESS BLDA CALC-SCNC: 0 MMOL/L (ref -3–3)
BASE EXCESS BLDA CALC-SCNC: 0.1 MMOL/L (ref -3–3)
BASE EXCESS BLDA CALC-SCNC: 0.2 MMOL/L (ref -3–3)
BASE EXCESS BLDA CALC-SCNC: 0.2 MMOL/L (ref -3–3)
BASE EXCESS BLDA CALC-SCNC: 0.3 MMOL/L (ref -3–3)
BASE EXCESS BLDA CALC-SCNC: 1 MMOL/L (ref -3–3)
BASE EXCESS BLDA CALC-SCNC: 1.1 MMOL/L (ref -3–3)
BASE EXCESS BLDA CALC-SCNC: 1.2 MMOL/L (ref -3–3)
BASE EXCESS BLDA CALC-SCNC: 1.4 MMOL/L (ref -3–3)
BASE EXCESS BLDA CALC-SCNC: 1.4 MMOL/L (ref -3–3)
BASE EXCESS BLDA CALC-SCNC: 1.5 MMOL/L (ref -3–3)
BASE EXCESS BLDA CALC-SCNC: 1.9 MMOL/L (ref -3–3)
BASE EXCESS BLDA CALC-SCNC: 10.3 MMOL/L (ref -3–3)
BASE EXCESS BLDA CALC-SCNC: 10.6 MMOL/L (ref -3–3)
BASE EXCESS BLDA CALC-SCNC: 10.7 MMOL/L (ref -3–3)
BASE EXCESS BLDA CALC-SCNC: 10.8 MMOL/L (ref -3–3)
BASE EXCESS BLDA CALC-SCNC: 11.1 MMOL/L (ref -3–3)
BASE EXCESS BLDA CALC-SCNC: 2.2 MMOL/L (ref -3–3)
BASE EXCESS BLDA CALC-SCNC: 2.3 MMOL/L (ref -3–3)
BASE EXCESS BLDA CALC-SCNC: 2.7 MMOL/L (ref -3–3)
BASE EXCESS BLDA CALC-SCNC: 2.8 MMOL/L (ref -3–3)
BASE EXCESS BLDA CALC-SCNC: 3.2 MMOL/L (ref -3–3)
BASE EXCESS BLDA CALC-SCNC: 3.3 MMOL/L (ref -3–3)
BASE EXCESS BLDA CALC-SCNC: 3.3 MMOL/L (ref -3–3)
BASE EXCESS BLDA CALC-SCNC: 3.4 MMOL/L (ref -3–3)
BASE EXCESS BLDA CALC-SCNC: 3.6 MMOL/L (ref -3–3)
BASE EXCESS BLDA CALC-SCNC: 3.6 MMOL/L (ref -3–3)
BASE EXCESS BLDA CALC-SCNC: 3.7 MMOL/L (ref -3–3)
BASE EXCESS BLDA CALC-SCNC: 3.8 MMOL/L (ref -3–3)
BASE EXCESS BLDA CALC-SCNC: 3.9 MMOL/L (ref -3–3)
BASE EXCESS BLDA CALC-SCNC: 4 MMOL/L (ref -3–3)
BASE EXCESS BLDA CALC-SCNC: 4 MMOL/L (ref -3–3)
BASE EXCESS BLDA CALC-SCNC: 4.1 MMOL/L (ref -3–3)
BASE EXCESS BLDA CALC-SCNC: 4.1 MMOL/L (ref -3–3)
BASE EXCESS BLDA CALC-SCNC: 4.3 MMOL/L (ref -3–3)
BASE EXCESS BLDA CALC-SCNC: 4.3 MMOL/L (ref -3–3)
BASE EXCESS BLDA CALC-SCNC: 4.5 MMOL/L (ref -3–3)
BASE EXCESS BLDA CALC-SCNC: 4.6 MMOL/L (ref -3–3)
BASE EXCESS BLDA CALC-SCNC: 4.6 MMOL/L (ref -3–3)
BASE EXCESS BLDA CALC-SCNC: 4.7 MMOL/L (ref -3–3)
BASE EXCESS BLDA CALC-SCNC: 4.7 MMOL/L (ref -3–3)
BASE EXCESS BLDA CALC-SCNC: 4.8 MMOL/L (ref -3–3)
BASE EXCESS BLDA CALC-SCNC: 4.9 MMOL/L (ref -3–3)
BASE EXCESS BLDA CALC-SCNC: 5.1 MMOL/L (ref -3–3)
BASE EXCESS BLDA CALC-SCNC: 5.3 MMOL/L (ref -3–3)
BASE EXCESS BLDA CALC-SCNC: 5.7 MMOL/L (ref -3–3)
BASE EXCESS BLDA CALC-SCNC: 5.9 MMOL/L (ref -3–3)
BASE EXCESS BLDA CALC-SCNC: 6 MMOL/L (ref -3–3)
BASE EXCESS BLDA CALC-SCNC: 6.1 MMOL/L (ref -3–3)
BASE EXCESS BLDA CALC-SCNC: 6.1 MMOL/L (ref -3–3)
BASE EXCESS BLDA CALC-SCNC: 6.2 MMOL/L (ref -3–3)
BASE EXCESS BLDA CALC-SCNC: 6.3 MMOL/L (ref -3–3)
BASE EXCESS BLDA CALC-SCNC: 6.7 MMOL/L (ref -3–3)
BASE EXCESS BLDA CALC-SCNC: 6.7 MMOL/L (ref -3–3)
BASE EXCESS BLDA CALC-SCNC: 7.1 MMOL/L (ref -3–3)
BASE EXCESS BLDA CALC-SCNC: 7.5 MMOL/L (ref -3–3)
BASE EXCESS BLDA CALC-SCNC: 7.6 MMOL/L (ref -3–3)
BASE EXCESS BLDA CALC-SCNC: 8.1 MMOL/L (ref -3–3)
BASE EXCESS BLDA CALC-SCNC: 8.3 MMOL/L (ref -3–3)
BASE EXCESS BLDA CALC-SCNC: 8.6 MMOL/L (ref -3–3)
BASE EXCESS BLDA CALC-SCNC: 8.8 MMOL/L (ref -3–3)
BASE EXCESS BLDA CALC-SCNC: 8.8 MMOL/L (ref -3–3)
BASE EXCESS BLDA CALC-SCNC: 9 MMOL/L (ref -3–3)
BASE EXCESS BLDA CALC-SCNC: 9 MMOL/L (ref -3–3)
BASE EXCESS BLDA CALC-SCNC: 9.3 MMOL/L (ref -3–3)
BASE EXCESS BLDA CALC-SCNC: 9.3 MMOL/L (ref -3–3)
BASE EXCESS BLDA CALC-SCNC: 9.4 MMOL/L (ref -3–3)
BASE EXCESS BLDA CALC-SCNC: 9.5 MMOL/L (ref -3–3)
BASE EXCESS BLDA CALC-SCNC: 9.6 MMOL/L (ref -3–3)
BASE EXCESS BLDA CALC-SCNC: 9.7 MMOL/L (ref -3–3)
BASE EXCESS BLDA CALC-SCNC: 9.7 MMOL/L (ref -3–3)
BASE EXCESS BLDA CALC-SCNC: 9.8 MMOL/L (ref -3–3)
BASE EXCESS BLDA CALC-SCNC: 9.8 MMOL/L (ref -3–3)
BASE EXCESS BLDA CALC-SCNC: ABNORMAL MMOL/L
BASE EXCESS BLDV CALC-SCNC: -0.1 MMOL/L (ref -3–3)
BASE EXCESS BLDV CALC-SCNC: -0.2 MMOL/L (ref -3–3)
BASE EXCESS BLDV CALC-SCNC: -0.5 MMOL/L (ref -3–3)
BASE EXCESS BLDV CALC-SCNC: -2.5 MMOL/L (ref -3–3)
BASE EXCESS BLDV CALC-SCNC: -2.8 MMOL/L (ref -3–3)
BASE EXCESS BLDV CALC-SCNC: -2.9 MMOL/L (ref -3–3)
BASE EXCESS BLDV CALC-SCNC: -3.1 MMOL/L (ref -3–3)
BASE EXCESS BLDV CALC-SCNC: -3.3 MMOL/L (ref -3–3)
BASE EXCESS BLDV CALC-SCNC: -4.6 MMOL/L (ref -3–3)
BASE EXCESS BLDV CALC-SCNC: -5 MMOL/L (ref -3–3)
BASE EXCESS BLDV CALC-SCNC: -6.7 MMOL/L (ref -3–3)
BASE EXCESS BLDV CALC-SCNC: -7 MMOL/L (ref -3–3)
BASE EXCESS BLDV CALC-SCNC: 0 MMOL/L (ref -3–3)
BASE EXCESS BLDV CALC-SCNC: 0.4 MMOL/L (ref -3–3)
BASE EXCESS BLDV CALC-SCNC: 0.7 MMOL/L (ref -3–3)
BASE EXCESS BLDV CALC-SCNC: 1 MMOL/L (ref -3–3)
BASE EXCESS BLDV CALC-SCNC: 11.4 MMOL/L (ref -3–3)
BASE EXCESS BLDV CALC-SCNC: 3.3 MMOL/L (ref -3–3)
BASE EXCESS BLDV CALC-SCNC: 3.6 MMOL/L (ref -3–3)
BASE EXCESS BLDV CALC-SCNC: 3.8 MMOL/L (ref -3–3)
BASE EXCESS BLDV CALC-SCNC: 4 MMOL/L (ref -3–3)
BASE EXCESS BLDV CALC-SCNC: 4.6 MMOL/L (ref -3–3)
BASE EXCESS BLDV CALC-SCNC: 5 MMOL/L (ref -3–3)
BASE EXCESS BLDV CALC-SCNC: 5.1 MMOL/L (ref -3–3)
BASE EXCESS BLDV CALC-SCNC: 5.4 MMOL/L (ref -3–3)
BASE EXCESS BLDV CALC-SCNC: 5.5 MMOL/L (ref -3–3)
BASE EXCESS BLDV CALC-SCNC: 6 MMOL/L (ref -3–3)
BASE EXCESS BLDV CALC-SCNC: 6.3 MMOL/L (ref -3–3)
BASE EXCESS BLDV CALC-SCNC: 6.7 MMOL/L (ref -3–3)
BASE EXCESS BLDV CALC-SCNC: 6.8 MMOL/L (ref -3–3)
BASE EXCESS BLDV CALC-SCNC: 6.9 MMOL/L (ref -3–3)
BASE EXCESS BLDV CALC-SCNC: 8.2 MMOL/L (ref -3–3)
BASE EXCESS BLDV CALC-SCNC: 8.8 MMOL/L (ref -3–3)
BASE EXCESS BLDV CALC-SCNC: 9.3 MMOL/L (ref -3–3)
BASE EXCESS BLDV CALC-SCNC: 9.8 MMOL/L (ref -3–3)
BASO STIPL BLD QL SMEAR: ABNORMAL
BASOPHILS # BLD AUTO: 0.1 10E3/UL (ref 0–0.2)
BASOPHILS # BLD AUTO: 0.2 10E3/UL (ref 0–0.2)
BASOPHILS # BLD AUTO: ABNORMAL 10*3/UL
BASOPHILS # BLD MANUAL: 0 10E3/UL (ref 0–0.2)
BASOPHILS # BLD MANUAL: 0.2 10E3/UL (ref 0–0.2)
BASOPHILS # BLD MANUAL: 0.3 10E3/UL (ref 0–0.2)
BASOPHILS # BLD MANUAL: 0.4 10E3/UL (ref 0–0.2)
BASOPHILS # BLD MANUAL: 0.8 10E3/UL (ref 0–0.2)
BASOPHILS NFR BLD AUTO: 1 %
BASOPHILS NFR BLD AUTO: ABNORMAL %
BASOPHILS NFR BLD MANUAL: 0 %
BASOPHILS NFR BLD MANUAL: 1 %
BASOPHILS NFR BLD MANUAL: 2 %
BASOPHILS NFR BLD MANUAL: 3 %
BENZODIAZ UR QL SCN: ABNORMAL
BILIRUB DIRECT SERPL-MCNC: 0.91 MG/DL (ref 0–0.3)
BILIRUB DIRECT SERPL-MCNC: NORMAL MG/DL
BILIRUB SERPL-MCNC: 0.5 MG/DL
BILIRUB SERPL-MCNC: 0.6 MG/DL
BILIRUB SERPL-MCNC: 0.7 MG/DL
BILIRUB SERPL-MCNC: 0.8 MG/DL
BILIRUB SERPL-MCNC: 0.8 MG/DL
BILIRUB SERPL-MCNC: 0.9 MG/DL
BILIRUB SERPL-MCNC: 1.2 MG/DL
BILIRUB SERPL-MCNC: 1.2 MG/DL
BILIRUB SERPL-MCNC: 1.4 MG/DL
BILIRUB SERPL-MCNC: 1.5 MG/DL
BILIRUB SERPL-MCNC: 1.5 MG/DL
BILIRUB SERPL-MCNC: 1.6 MG/DL
BILIRUB SERPL-MCNC: 1.6 MG/DL
BILIRUB SERPL-MCNC: 1.7 MG/DL
BILIRUB SERPL-MCNC: 1.7 MG/DL
BILIRUB SERPL-MCNC: 2.1 MG/DL
BILIRUB SERPL-MCNC: 2.1 MG/DL
BILIRUB SERPL-MCNC: 2.5 MG/DL
BILIRUB SERPL-MCNC: 2.6 MG/DL
BILIRUB SERPL-MCNC: 2.6 MG/DL
BILIRUB SERPL-MCNC: 2.8 MG/DL
BILIRUB SERPL-MCNC: 2.9 MG/DL
BILIRUB SERPL-MCNC: 2.9 MG/DL
BILIRUB SERPL-MCNC: 3 MG/DL
BILIRUB SERPL-MCNC: 3.1 MG/DL
BILIRUB SERPL-MCNC: 3.1 MG/DL
BILIRUB SERPL-MCNC: 3.2 MG/DL
BILIRUB SERPL-MCNC: 3.4 MG/DL
BILIRUB SERPL-MCNC: 3.7 MG/DL
BILIRUB SERPL-MCNC: 3.8 MG/DL
BILIRUB SERPL-MCNC: 3.8 MG/DL
BILIRUB SERPL-MCNC: 4.1 MG/DL
BILIRUB UR QL STRIP: NEGATIVE
BITE CELLS BLD QL SMEAR: ABNORMAL
BLAIMP ISLT/SPM QL: NOT DETECTED
BLAKPC ISLT/SPM QL: NOT DETECTED
BLAOXA-48 ISLT/SPM QL: NOT DETECTED
BLAVIM ISLT/SPM QL: NOT DETECTED
BLD PROD TYP BPU: NORMAL
BLISTER CELLS BLD QL SMEAR: ABNORMAL
BLOOD COMPONENT TYPE: NORMAL
BUN SERPL-MCNC: 18.9 MG/DL (ref 6–20)
BUN SERPL-MCNC: 19.6 MG/DL (ref 6–20)
BUN SERPL-MCNC: 19.6 MG/DL (ref 6–20)
BUN SERPL-MCNC: 21.3 MG/DL (ref 6–20)
BUN SERPL-MCNC: 22.2 MG/DL (ref 6–20)
BUN SERPL-MCNC: 22.4 MG/DL (ref 6–20)
BUN SERPL-MCNC: 23 MG/DL (ref 6–20)
BUN SERPL-MCNC: 23.2 MG/DL (ref 6–20)
BUN SERPL-MCNC: 23.2 MG/DL (ref 6–20)
BUN SERPL-MCNC: 23.8 MG/DL (ref 6–20)
BUN SERPL-MCNC: 24 MG/DL (ref 6–20)
BUN SERPL-MCNC: 24.9 MG/DL (ref 6–20)
BUN SERPL-MCNC: 26.3 MG/DL (ref 6–20)
BUN SERPL-MCNC: 27.4 MG/DL (ref 6–20)
BUN SERPL-MCNC: 27.9 MG/DL (ref 6–20)
BUN SERPL-MCNC: 28.6 MG/DL (ref 6–20)
BUN SERPL-MCNC: 28.9 MG/DL (ref 6–20)
BUN SERPL-MCNC: 29.2 MG/DL (ref 6–20)
BUN SERPL-MCNC: 29.4 MG/DL (ref 6–20)
BUN SERPL-MCNC: 29.5 MG/DL (ref 6–20)
BUN SERPL-MCNC: 30 MG/DL (ref 6–20)
BUN SERPL-MCNC: 30.3 MG/DL (ref 6–20)
BUN SERPL-MCNC: 31 MG/DL (ref 6–20)
BUN SERPL-MCNC: 31.6 MG/DL (ref 6–20)
BUN SERPL-MCNC: 31.7 MG/DL (ref 6–20)
BUN SERPL-MCNC: 32.2 MG/DL (ref 6–20)
BUN SERPL-MCNC: 35.2 MG/DL (ref 6–20)
BUN SERPL-MCNC: 38.6 MG/DL (ref 6–20)
BUN SERPL-MCNC: 41.8 MG/DL (ref 6–20)
BUN SERPL-MCNC: 43 MG/DL (ref 6–20)
BUN SERPL-MCNC: 43.6 MG/DL (ref 6–20)
BUN SERPL-MCNC: 43.6 MG/DL (ref 6–20)
BUN SERPL-MCNC: 43.8 MG/DL (ref 6–20)
BUN SERPL-MCNC: 43.8 MG/DL (ref 6–20)
BUN SERPL-MCNC: 43.9 MG/DL (ref 6–20)
BUN SERPL-MCNC: 43.9 MG/DL (ref 6–20)
BUN SERPL-MCNC: 44.2 MG/DL (ref 6–20)
BUN SERPL-MCNC: 44.3 MG/DL (ref 6–20)
BUN SERPL-MCNC: 44.5 MG/DL (ref 6–20)
BUN SERPL-MCNC: 44.9 MG/DL (ref 6–20)
BUN SERPL-MCNC: 45.3 MG/DL (ref 6–20)
BUN SERPL-MCNC: 45.3 MG/DL (ref 6–20)
BUN SERPL-MCNC: 45.9 MG/DL (ref 6–20)
BUN SERPL-MCNC: 45.9 MG/DL (ref 6–20)
BUN SERPL-MCNC: 46.5 MG/DL (ref 6–20)
BUN SERPL-MCNC: 47 MG/DL (ref 6–20)
BUN SERPL-MCNC: 47.2 MG/DL (ref 6–20)
BUN SERPL-MCNC: 48.8 MG/DL (ref 6–20)
BUN SERPL-MCNC: 49.8 MG/DL (ref 6–20)
BUN SERPL-MCNC: 53.1 MG/DL (ref 6–20)
BUN SERPL-MCNC: 54 MG/DL (ref 6–20)
BURR CELLS BLD QL SMEAR: ABNORMAL
BURR CELLS BLD QL SMEAR: SLIGHT
BZE UR QL SCN: ABNORMAL
C DIFF TOX B STL QL: NEGATIVE
C PNEUM DNA SPEC QL NAA+PROBE: NOT DETECTED
CA-I BLD-MCNC: 3.9 MG/DL (ref 4.4–5.2)
CA-I BLD-MCNC: 4 MG/DL (ref 4.4–5.2)
CA-I BLD-MCNC: 4 MG/DL (ref 4.4–5.2)
CA-I BLD-MCNC: 4.1 MG/DL (ref 4.4–5.2)
CA-I BLD-MCNC: 4.2 MG/DL (ref 4.4–5.2)
CA-I BLD-MCNC: 4.5 MG/DL (ref 4.4–5.2)
CA-I BLD-MCNC: 4.6 MG/DL (ref 4.4–5.2)
CA-I BLD-MCNC: 4.7 MG/DL (ref 4.4–5.2)
CA-I BLD-MCNC: 4.8 MG/DL (ref 4.4–5.2)
CA-I BLD-MCNC: 4.9 MG/DL (ref 4.4–5.2)
CA-I BLD-MCNC: 5.1 MG/DL (ref 4.4–5.2)
CA-I BLD-MCNC: 5.1 MG/DL (ref 4.4–5.2)
CA-I BLD-MCNC: 6.4 MG/DL (ref 4.4–5.2)
CALCIUM SERPL-MCNC: 7.7 MG/DL (ref 8.6–10)
CALCIUM SERPL-MCNC: 7.8 MG/DL (ref 8.6–10)
CALCIUM SERPL-MCNC: 7.8 MG/DL (ref 8.6–10)
CALCIUM SERPL-MCNC: 7.9 MG/DL (ref 8.6–10)
CALCIUM SERPL-MCNC: 8 MG/DL (ref 8.6–10)
CALCIUM SERPL-MCNC: 8.1 MG/DL (ref 8.6–10)
CALCIUM SERPL-MCNC: 8.2 MG/DL (ref 8.6–10)
CALCIUM SERPL-MCNC: 8.3 MG/DL (ref 8.6–10)
CALCIUM SERPL-MCNC: 8.4 MG/DL (ref 8.6–10)
CALCIUM SERPL-MCNC: 8.5 MG/DL (ref 8.6–10)
CALCIUM SERPL-MCNC: 8.6 MG/DL (ref 8.6–10)
CALCIUM SERPL-MCNC: 8.8 MG/DL (ref 8.6–10)
CALCIUM SERPL-MCNC: 8.9 MG/DL (ref 8.6–10)
CALCIUM SERPL-MCNC: 9 MG/DL (ref 8.6–10)
CALCIUM SERPL-MCNC: 9 MG/DL (ref 8.6–10)
CANNABINOIDS UR QL SCN: ABNORMAL
CAOX CRY #/AREA URNS HPF: ABNORMAL /HPF
CF REDUC 30M P MA P HEP LENFR BLD TEG: 0 % (ref 0–8)
CF REDUC 30M P MA P HEP LENFR BLD TEG: 0.1 % (ref 0–8)
CF REDUC 60M P MA P HEPASE LENFR BLD TEG: 0.2 % (ref 0–15)
CF REDUC 60M P MA P HEPASE LENFR BLD TEG: 1.8 % (ref 0–15)
CFT P HPASE BLD TEG: 1.2 MINUTE (ref 1–3)
CFT P HPASE BLD TEG: 1.4 MINUTE (ref 1–3)
CHLORIDE SERPL-SCNC: 100 MMOL/L (ref 98–107)
CHLORIDE SERPL-SCNC: 101 MMOL/L (ref 98–107)
CHLORIDE SERPL-SCNC: 101 MMOL/L (ref 98–107)
CHLORIDE SERPL-SCNC: 102 MMOL/L (ref 98–107)
CHLORIDE SERPL-SCNC: 103 MMOL/L (ref 98–107)
CHLORIDE SERPL-SCNC: 104 MMOL/L (ref 98–107)
CHLORIDE SERPL-SCNC: 105 MMOL/L (ref 98–107)
CHLORIDE SERPL-SCNC: 106 MMOL/L (ref 98–107)
CHLORIDE SERPL-SCNC: 106 MMOL/L (ref 98–107)
CHLORIDE SERPL-SCNC: 107 MMOL/L (ref 98–107)
CHLORIDE SERPL-SCNC: 108 MMOL/L (ref 98–107)
CHLORIDE SERPL-SCNC: 109 MMOL/L (ref 98–107)
CHLORIDE SERPL-SCNC: 110 MMOL/L (ref 98–107)
CHLORIDE SERPL-SCNC: 111 MMOL/L (ref 98–107)
CHLORIDE SERPL-SCNC: 112 MMOL/L (ref 98–107)
CI (COAGULATION INDEX)(Z): 1.1 (ref -3–3)
CI (COAGULATION INDEX)(Z): 1.9 (ref -3–3)
CK SERPL-CCNC: 326 U/L (ref 39–308)
CLOT ANGLE P HPASE BLD TEG: 70.5 DEGREES (ref 53–72)
CLOT ANGLE P HPASE BLD TEG: 71.4 DEGREES (ref 53–72)
CLOT INIT KAOL IND TO POST HEP NEUT TRTO: 1 {RATIO}
CLOT INIT KAOL IND TO POST HEP NEUT TRTO: 1.3 {RATIO}
CLOT INIT KAOL IND TO POST HEP NEUT TRTO: ABNORMAL {RATIO}
CLOT INIT KAOLIN IND BLD US: 122 SEC (ref 113–166)
CLOT INIT KAOLIN IND BLD US: 190 SEC (ref 113–166)
CLOT INIT KAOLIN IND BLD US: ABNORMAL S
CLOT INIT KAOLIN IND P HEP NEUT BLD US: 123 SEC (ref 103–153)
CLOT INIT KAOLIN IND P HEP NEUT BLD US: 147 SEC (ref 103–153)
CLOT INIT KAOLIN IND P HEP NEUT BLD US: 172 SEC (ref 103–153)
CLOT INIT P HPASE BLD TEG: 5.9 MINUTE (ref 5–10)
CLOT INIT P HPASE BLD TEG: 7.4 MINUTE (ref 5–10)
CLOT STIFF PLT CONT BLD CALC: 15.7 HPA (ref 11.9–29.8)
CLOT STIFF PLT CONT BLD CALC: 16 HPA (ref 11.9–29.8)
CLOT STIFF PLT CONT BLD CALC: 20.9 HPA (ref 11.9–29.8)
CLOT STIFF TF IND P HEP NEUT BLD US: 20.9 HPA (ref 13–33.2)
CLOT STIFF TF IND P HEP NEUT BLD US: 21.9 HPA (ref 13–33.2)
CLOT STIFF TF IND P HEP NEUT BLD US: 25.6 HPA (ref 13–33.2)
CLOT STIFF TF IND+IIB-IIIA INH P HEP NEU: 4.7 HPA (ref 1–3.7)
CLOT STIFF TF IND+IIB-IIIA INH P HEP NEU: 5.2 HPA (ref 1–3.7)
CLOT STIFF TF IND+IIB-IIIA INH P HEP NEU: 5.9 HPA (ref 1–3.7)
CLOT STRENGTH P HPASE BLD TEG: 11.8 KD/SC (ref 4.5–11)
CLOT STRENGTH P HPASE BLD TEG: 13 KD/SC (ref 4.5–11)
CODING SYSTEM: NORMAL
COHGB MFR BLD: 0.9 % (ref 0–2)
COHGB MFR BLD: 100 % (ref 96–97)
COHGB MFR BLD: 87.9 % (ref 96–97)
COHGB MFR BLD: 89.8 % (ref 96–97)
COHGB MFR BLD: 91.1 % (ref 96–97)
COHGB MFR BLD: 92.6 % (ref 96–97)
COHGB MFR BLD: 94 % (ref 96–97)
COHGB MFR BLD: 94.2 % (ref 96–97)
COHGB MFR BLD: 95 % (ref 96–97)
COHGB MFR BLD: 95.2 % (ref 96–97)
COHGB MFR BLD: 95.3 % (ref 96–97)
COHGB MFR BLD: 95.9 % (ref 96–97)
COHGB MFR BLD: 95.9 % (ref 96–97)
COHGB MFR BLD: 96 % (ref 96–97)
COHGB MFR BLD: 96.3 % (ref 96–97)
COHGB MFR BLD: 96.5 % (ref 96–97)
COHGB MFR BLD: 96.6 % (ref 96–97)
COHGB MFR BLD: 96.9 % (ref 96–97)
COHGB MFR BLD: 97 % (ref 96–97)
COHGB MFR BLD: 97.2 % (ref 96–97)
COHGB MFR BLD: 97.2 % (ref 96–97)
COHGB MFR BLD: 97.3 % (ref 96–97)
COHGB MFR BLD: 97.3 % (ref 96–97)
COHGB MFR BLD: 97.7 % (ref 96–97)
COHGB MFR BLD: 97.8 % (ref 96–97)
COHGB MFR BLD: 97.9 % (ref 96–97)
COHGB MFR BLD: 97.9 % (ref 96–97)
COHGB MFR BLD: 98.3 % (ref 96–97)
COHGB MFR BLD: 98.3 % (ref 96–97)
COHGB MFR BLD: 98.4 % (ref 96–97)
COHGB MFR BLD: 98.7 % (ref 96–97)
COHGB MFR BLD: 98.9 % (ref 96–97)
COHGB MFR BLD: 99 % (ref 96–97)
COHGB MFR BLD: 99.1 % (ref 96–97)
COHGB MFR BLD: 99.2 % (ref 96–97)
COHGB MFR BLD: 99.3 % (ref 96–97)
COHGB MFR BLD: 99.4 % (ref 96–97)
COHGB MFR BLD: 99.5 % (ref 96–97)
COHGB MFR BLD: 99.5 % (ref 96–97)
COHGB MFR BLD: 99.7 % (ref 96–97)
COHGB MFR BLD: 99.8 % (ref 96–97)
COHGB MFR BLD: 99.9 % (ref 96–97)
COHGB MFR BLD: >100 % (ref 96–97)
COLOR UR AUTO: ABNORMAL
CORTIS SERPL-MCNC: 26.7 UG/DL
CPB POCT: NO
CREAT SERPL-MCNC: 0.7 MG/DL (ref 0.67–1.17)
CREAT SERPL-MCNC: 0.74 MG/DL (ref 0.67–1.17)
CREAT SERPL-MCNC: 0.74 MG/DL (ref 0.67–1.17)
CREAT SERPL-MCNC: 0.75 MG/DL (ref 0.67–1.17)
CREAT SERPL-MCNC: 0.76 MG/DL (ref 0.67–1.17)
CREAT SERPL-MCNC: 0.78 MG/DL (ref 0.67–1.17)
CREAT SERPL-MCNC: 0.79 MG/DL (ref 0.67–1.17)
CREAT SERPL-MCNC: 0.8 MG/DL (ref 0.67–1.17)
CREAT SERPL-MCNC: 0.81 MG/DL (ref 0.67–1.17)
CREAT SERPL-MCNC: 0.83 MG/DL (ref 0.67–1.17)
CREAT SERPL-MCNC: 0.84 MG/DL (ref 0.67–1.17)
CREAT SERPL-MCNC: 0.85 MG/DL (ref 0.67–1.17)
CREAT SERPL-MCNC: 0.88 MG/DL (ref 0.67–1.17)
CREAT SERPL-MCNC: 0.88 MG/DL (ref 0.67–1.17)
CREAT SERPL-MCNC: 1 MG/DL (ref 0.67–1.17)
CREAT SERPL-MCNC: 1.03 MG/DL (ref 0.67–1.17)
CREAT SERPL-MCNC: 1.11 MG/DL (ref 0.67–1.17)
CREAT SERPL-MCNC: 1.11 MG/DL (ref 0.67–1.17)
CREAT SERPL-MCNC: 1.17 MG/DL (ref 0.67–1.17)
CREAT SERPL-MCNC: 1.18 MG/DL (ref 0.67–1.17)
CREAT SERPL-MCNC: 1.18 MG/DL (ref 0.67–1.17)
CREAT SERPL-MCNC: 1.19 MG/DL (ref 0.67–1.17)
CREAT SERPL-MCNC: 1.21 MG/DL (ref 0.67–1.17)
CREAT SERPL-MCNC: 1.24 MG/DL (ref 0.67–1.17)
CREAT SERPL-MCNC: 1.27 MG/DL (ref 0.67–1.17)
CREAT SERPL-MCNC: 1.28 MG/DL (ref 0.67–1.17)
CREAT SERPL-MCNC: 1.41 MG/DL (ref 0.67–1.17)
CREAT SERPL-MCNC: 1.57 MG/DL (ref 0.67–1.17)
CREAT SERPL-MCNC: 1.62 MG/DL (ref 0.67–1.17)
CREAT SERPL-MCNC: 1.73 MG/DL (ref 0.67–1.17)
CREAT SERPL-MCNC: 1.73 MG/DL (ref 0.67–1.17)
CREAT SERPL-MCNC: 1.74 MG/DL (ref 0.67–1.17)
CREAT SERPL-MCNC: 1.76 MG/DL (ref 0.67–1.17)
CREAT SERPL-MCNC: 1.79 MG/DL (ref 0.67–1.17)
CREAT SERPL-MCNC: 1.8 MG/DL (ref 0.67–1.17)
CREAT SERPL-MCNC: 1.8 MG/DL (ref 0.67–1.17)
CREAT SERPL-MCNC: 1.81 MG/DL (ref 0.67–1.17)
CREAT SERPL-MCNC: 1.84 MG/DL (ref 0.67–1.17)
CREAT SERPL-MCNC: 1.86 MG/DL (ref 0.67–1.17)
CREAT SERPL-MCNC: 1.86 MG/DL (ref 0.67–1.17)
CREAT SERPL-MCNC: 1.87 MG/DL (ref 0.67–1.17)
CREAT SERPL-MCNC: 1.87 MG/DL (ref 0.67–1.17)
CREAT SERPL-MCNC: 1.88 MG/DL (ref 0.67–1.17)
CREAT SERPL-MCNC: 1.93 MG/DL (ref 0.67–1.17)
CREAT SERPL-MCNC: 1.98 MG/DL (ref 0.67–1.17)
CREAT SERPL-MCNC: 2.01 MG/DL (ref 0.67–1.17)
CREAT SERPL-MCNC: 2.02 MG/DL (ref 0.67–1.17)
CREAT SERPL-MCNC: 2.17 MG/DL (ref 0.67–1.17)
CREAT SERPL-MCNC: 2.22 MG/DL (ref 0.67–1.17)
CROSSMATCH: NORMAL
CRP SERPL-MCNC: 102 MG/L
CRP SERPL-MCNC: 108 MG/L
CRP SERPL-MCNC: 110 MG/L
CRP SERPL-MCNC: 122 MG/L
CRP SERPL-MCNC: 134 MG/L
CRP SERPL-MCNC: 150 MG/L
CRP SERPL-MCNC: 357 MG/L
CRP SERPL-MCNC: 379 MG/L
CRP SERPL-MCNC: 390 MG/L
CRP SERPL-MCNC: 402 MG/L
CRP SERPL-MCNC: 471 MG/L
CRP SERPL-MCNC: 79.9 MG/L
D DIMER PPP FEU-MCNC: 10.8 UG/ML FEU (ref 0–0.5)
D DIMER PPP FEU-MCNC: 16.15 UG/ML FEU (ref 0–0.5)
D DIMER PPP FEU-MCNC: 17.59 UG/ML FEU (ref 0–0.5)
D DIMER PPP FEU-MCNC: 4.02 UG/ML FEU (ref 0–0.5)
D DIMER PPP FEU-MCNC: 4.15 UG/ML FEU (ref 0–0.5)
D DIMER PPP FEU-MCNC: 4.97 UG/ML FEU (ref 0–0.5)
D DIMER PPP FEU-MCNC: 5.81 UG/ML FEU (ref 0–0.5)
D DIMER PPP FEU-MCNC: 6.79 UG/ML FEU (ref 0–0.5)
D DIMER PPP FEU-MCNC: 7.85 UG/ML FEU (ref 0–0.5)
D DIMER PPP FEU-MCNC: 8.72 UG/ML FEU (ref 0–0.5)
D DIMER PPP FEU-MCNC: 8.77 UG/ML FEU (ref 0–0.5)
D DIMER PPP FEU-MCNC: >20 UG/ML FEU (ref 0–0.5)
DACRYOCYTES BLD QL SMEAR: ABNORMAL
DEPRECATED HCO3 PLAS-SCNC: 15 MMOL/L (ref 22–29)
DEPRECATED HCO3 PLAS-SCNC: 16 MMOL/L (ref 22–29)
DEPRECATED HCO3 PLAS-SCNC: 19 MMOL/L (ref 22–29)
DEPRECATED HCO3 PLAS-SCNC: 20 MMOL/L (ref 22–29)
DEPRECATED HCO3 PLAS-SCNC: 21 MMOL/L (ref 22–29)
DEPRECATED HCO3 PLAS-SCNC: 22 MMOL/L (ref 22–29)
DEPRECATED HCO3 PLAS-SCNC: 23 MMOL/L (ref 22–29)
DEPRECATED HCO3 PLAS-SCNC: 24 MMOL/L (ref 22–29)
DEPRECATED HCO3 PLAS-SCNC: 24 MMOL/L (ref 22–29)
DEPRECATED HCO3 PLAS-SCNC: 25 MMOL/L (ref 22–29)
DEPRECATED HCO3 PLAS-SCNC: 26 MMOL/L (ref 22–29)
DEPRECATED HCO3 PLAS-SCNC: 26 MMOL/L (ref 22–29)
DEPRECATED HCO3 PLAS-SCNC: 27 MMOL/L (ref 22–29)
DEPRECATED HCO3 PLAS-SCNC: 28 MMOL/L (ref 22–29)
DEPRECATED HCO3 PLAS-SCNC: 29 MMOL/L (ref 22–29)
DEPRECATED HCO3 PLAS-SCNC: 30 MMOL/L (ref 22–29)
DEPRECATED HCO3 PLAS-SCNC: 30 MMOL/L (ref 22–29)
DEPRECATED HCO3 PLAS-SCNC: 31 MMOL/L (ref 22–29)
DEPRECATED HCO3 PLAS-SCNC: 31 MMOL/L (ref 22–29)
DEPRECATED HCO3 PLAS-SCNC: 32 MMOL/L (ref 22–29)
DEPRECATED HCO3 PLAS-SCNC: ABNORMAL MMOL/L
DIASTOLIC BLOOD PRESSURE - MUSE: NORMAL MMHG
EGFRCR SERPLBLD CKD-EPI 2021: 34 ML/MIN/1.73M2
EGFRCR SERPLBLD CKD-EPI 2021: 35 ML/MIN/1.73M2
EGFRCR SERPLBLD CKD-EPI 2021: 38 ML/MIN/1.73M2
EGFRCR SERPLBLD CKD-EPI 2021: 38 ML/MIN/1.73M2
EGFRCR SERPLBLD CKD-EPI 2021: 39 ML/MIN/1.73M2
EGFRCR SERPLBLD CKD-EPI 2021: 40 ML/MIN/1.73M2
EGFRCR SERPLBLD CKD-EPI 2021: 41 ML/MIN/1.73M2
EGFRCR SERPLBLD CKD-EPI 2021: 42 ML/MIN/1.73M2
EGFRCR SERPLBLD CKD-EPI 2021: 43 ML/MIN/1.73M2
EGFRCR SERPLBLD CKD-EPI 2021: 44 ML/MIN/1.73M2
EGFRCR SERPLBLD CKD-EPI 2021: 45 ML/MIN/1.73M2
EGFRCR SERPLBLD CKD-EPI 2021: 45 ML/MIN/1.73M2
EGFRCR SERPLBLD CKD-EPI 2021: 46 ML/MIN/1.73M2
EGFRCR SERPLBLD CKD-EPI 2021: 46 ML/MIN/1.73M2
EGFRCR SERPLBLD CKD-EPI 2021: 50 ML/MIN/1.73M2
EGFRCR SERPLBLD CKD-EPI 2021: 51 ML/MIN/1.73M2
EGFRCR SERPLBLD CKD-EPI 2021: 58 ML/MIN/1.73M2
EGFRCR SERPLBLD CKD-EPI 2021: 66 ML/MIN/1.73M2
EGFRCR SERPLBLD CKD-EPI 2021: 66 ML/MIN/1.73M2
EGFRCR SERPLBLD CKD-EPI 2021: 68 ML/MIN/1.73M2
EGFRCR SERPLBLD CKD-EPI 2021: 70 ML/MIN/1.73M2
EGFRCR SERPLBLD CKD-EPI 2021: 72 ML/MIN/1.73M2
EGFRCR SERPLBLD CKD-EPI 2021: 73 ML/MIN/1.73M2
EGFRCR SERPLBLD CKD-EPI 2021: 78 ML/MIN/1.73M2
EGFRCR SERPLBLD CKD-EPI 2021: 78 ML/MIN/1.73M2
EGFRCR SERPLBLD CKD-EPI 2021: 85 ML/MIN/1.73M2
EGFRCR SERPLBLD CKD-EPI 2021: 88 ML/MIN/1.73M2
EGFRCR SERPLBLD CKD-EPI 2021: >90 ML/MIN/1.73M2
ELLIPTOCYTES BLD QL SMEAR: ABNORMAL
EOSINOPHIL # BLD AUTO: 0.2 10E3/UL (ref 0–0.7)
EOSINOPHIL # BLD AUTO: 0.3 10E3/UL (ref 0–0.7)
EOSINOPHIL # BLD AUTO: 0.4 10E3/UL (ref 0–0.7)
EOSINOPHIL # BLD AUTO: ABNORMAL 10*3/UL
EOSINOPHIL # BLD MANUAL: 0 10E3/UL (ref 0–0.7)
EOSINOPHIL # BLD MANUAL: 0.2 10E3/UL (ref 0–0.7)
EOSINOPHIL # BLD MANUAL: 0.3 10E3/UL (ref 0–0.7)
EOSINOPHIL # BLD MANUAL: 0.3 10E3/UL (ref 0–0.7)
EOSINOPHIL # BLD MANUAL: 0.8 10E3/UL (ref 0–0.7)
EOSINOPHIL NFR BLD AUTO: 1 %
EOSINOPHIL NFR BLD AUTO: 2 %
EOSINOPHIL NFR BLD AUTO: 3 %
EOSINOPHIL NFR BLD AUTO: ABNORMAL %
EOSINOPHIL NFR BLD MANUAL: 0 %
EOSINOPHIL NFR BLD MANUAL: 1 %
EOSINOPHIL NFR BLD MANUAL: 2 %
ERYTHROCYTE [DISTWIDTH] IN BLOOD BY AUTOMATED COUNT: 12.7 % (ref 10–15)
ERYTHROCYTE [DISTWIDTH] IN BLOOD BY AUTOMATED COUNT: 12.7 % (ref 10–15)
ERYTHROCYTE [DISTWIDTH] IN BLOOD BY AUTOMATED COUNT: 12.8 % (ref 10–15)
ERYTHROCYTE [DISTWIDTH] IN BLOOD BY AUTOMATED COUNT: 12.8 % (ref 10–15)
ERYTHROCYTE [DISTWIDTH] IN BLOOD BY AUTOMATED COUNT: 12.9 % (ref 10–15)
ERYTHROCYTE [DISTWIDTH] IN BLOOD BY AUTOMATED COUNT: 12.9 % (ref 10–15)
ERYTHROCYTE [DISTWIDTH] IN BLOOD BY AUTOMATED COUNT: 13.1 % (ref 10–15)
ERYTHROCYTE [DISTWIDTH] IN BLOOD BY AUTOMATED COUNT: 13.2 % (ref 10–15)
ERYTHROCYTE [DISTWIDTH] IN BLOOD BY AUTOMATED COUNT: 13.3 % (ref 10–15)
ERYTHROCYTE [DISTWIDTH] IN BLOOD BY AUTOMATED COUNT: 13.7 % (ref 10–15)
ERYTHROCYTE [DISTWIDTH] IN BLOOD BY AUTOMATED COUNT: 13.7 % (ref 10–15)
ERYTHROCYTE [DISTWIDTH] IN BLOOD BY AUTOMATED COUNT: 13.8 % (ref 10–15)
ERYTHROCYTE [DISTWIDTH] IN BLOOD BY AUTOMATED COUNT: 13.9 % (ref 10–15)
ERYTHROCYTE [DISTWIDTH] IN BLOOD BY AUTOMATED COUNT: 13.9 % (ref 10–15)
ERYTHROCYTE [DISTWIDTH] IN BLOOD BY AUTOMATED COUNT: 14.1 % (ref 10–15)
ERYTHROCYTE [DISTWIDTH] IN BLOOD BY AUTOMATED COUNT: 14.3 % (ref 10–15)
ERYTHROCYTE [DISTWIDTH] IN BLOOD BY AUTOMATED COUNT: 14.4 % (ref 10–15)
ERYTHROCYTE [DISTWIDTH] IN BLOOD BY AUTOMATED COUNT: 14.4 % (ref 10–15)
ERYTHROCYTE [DISTWIDTH] IN BLOOD BY AUTOMATED COUNT: 14.5 % (ref 10–15)
ERYTHROCYTE [DISTWIDTH] IN BLOOD BY AUTOMATED COUNT: 15.7 % (ref 10–15)
ERYTHROCYTE [DISTWIDTH] IN BLOOD BY AUTOMATED COUNT: 16 % (ref 10–15)
ERYTHROCYTE [DISTWIDTH] IN BLOOD BY AUTOMATED COUNT: 16.5 % (ref 10–15)
ERYTHROCYTE [DISTWIDTH] IN BLOOD BY AUTOMATED COUNT: 17 % (ref 10–15)
ERYTHROCYTE [DISTWIDTH] IN BLOOD BY AUTOMATED COUNT: 17.5 % (ref 10–15)
ERYTHROCYTE [DISTWIDTH] IN BLOOD BY AUTOMATED COUNT: 18.1 % (ref 10–15)
ERYTHROCYTE [DISTWIDTH] IN BLOOD BY AUTOMATED COUNT: 18.3 % (ref 10–15)
ERYTHROCYTE [DISTWIDTH] IN BLOOD BY AUTOMATED COUNT: 18.5 % (ref 10–15)
ERYTHROCYTE [DISTWIDTH] IN BLOOD BY AUTOMATED COUNT: 18.6 % (ref 10–15)
ERYTHROCYTE [DISTWIDTH] IN BLOOD BY AUTOMATED COUNT: 18.8 % (ref 10–15)
ERYTHROCYTE [DISTWIDTH] IN BLOOD BY AUTOMATED COUNT: 18.8 % (ref 10–15)
ERYTHROCYTE [DISTWIDTH] IN BLOOD BY AUTOMATED COUNT: 18.9 % (ref 10–15)
ERYTHROCYTE [DISTWIDTH] IN BLOOD BY AUTOMATED COUNT: 19 % (ref 10–15)
ERYTHROCYTE [DISTWIDTH] IN BLOOD BY AUTOMATED COUNT: 19.1 % (ref 10–15)
ERYTHROCYTE [DISTWIDTH] IN BLOOD BY AUTOMATED COUNT: 19.4 % (ref 10–15)
ERYTHROCYTE [DISTWIDTH] IN BLOOD BY AUTOMATED COUNT: 19.4 % (ref 10–15)
ERYTHROCYTE [DISTWIDTH] IN BLOOD BY AUTOMATED COUNT: 19.6 % (ref 10–15)
ERYTHROCYTE [DISTWIDTH] IN BLOOD BY AUTOMATED COUNT: 19.7 % (ref 10–15)
ERYTHROCYTE [DISTWIDTH] IN BLOOD BY AUTOMATED COUNT: 19.8 % (ref 10–15)
ERYTHROCYTE [DISTWIDTH] IN BLOOD BY AUTOMATED COUNT: 19.9 % (ref 10–15)
ERYTHROCYTE [DISTWIDTH] IN BLOOD BY AUTOMATED COUNT: 19.9 % (ref 10–15)
ERYTHROCYTE [DISTWIDTH] IN BLOOD BY AUTOMATED COUNT: 20 % (ref 10–15)
ERYTHROCYTE [SEDIMENTATION RATE] IN BLOOD BY WESTERGREN METHOD: 12 MM/HR (ref 0–20)
ERYTHROCYTE [SEDIMENTATION RATE] IN BLOOD BY WESTERGREN METHOD: 19 MM/HR (ref 0–20)
ERYTHROCYTE [SEDIMENTATION RATE] IN BLOOD BY WESTERGREN METHOD: 26 MM/HR (ref 0–20)
ERYTHROCYTE [SEDIMENTATION RATE] IN BLOOD BY WESTERGREN METHOD: 27 MM/HR (ref 0–20)
ERYTHROCYTE [SEDIMENTATION RATE] IN BLOOD BY WESTERGREN METHOD: 27 MM/HR (ref 0–20)
ERYTHROCYTE [SEDIMENTATION RATE] IN BLOOD BY WESTERGREN METHOD: 28 MM/HR (ref 0–20)
ERYTHROCYTE [SEDIMENTATION RATE] IN BLOOD BY WESTERGREN METHOD: 29 MM/HR (ref 0–20)
ERYTHROCYTE [SEDIMENTATION RATE] IN BLOOD BY WESTERGREN METHOD: 31 MM/HR (ref 0–20)
ERYTHROCYTE [SEDIMENTATION RATE] IN BLOOD BY WESTERGREN METHOD: 38 MM/HR (ref 0–20)
ERYTHROCYTE [SEDIMENTATION RATE] IN BLOOD BY WESTERGREN METHOD: 40 MM/HR (ref 0–20)
ERYTHROCYTE [SEDIMENTATION RATE] IN BLOOD BY WESTERGREN METHOD: 42 MM/HR (ref 0–20)
FENTANYL UR QL: ABNORMAL
FIBRINOGEN PPP-MCNC: 265 MG/DL (ref 170–490)
FIBRINOGEN PPP-MCNC: 324 MG/DL (ref 170–490)
FIBRINOGEN PPP-MCNC: 331 MG/DL (ref 170–490)
FIBRINOGEN PPP-MCNC: 387 MG/DL (ref 170–490)
FIBRINOGEN PPP-MCNC: 393 MG/DL (ref 170–490)
FIBRINOGEN PPP-MCNC: 394 MG/DL (ref 170–490)
FIBRINOGEN PPP-MCNC: 418 MG/DL (ref 170–490)
FIBRINOGEN PPP-MCNC: 420 MG/DL (ref 170–490)
FIBRINOGEN PPP-MCNC: 426 MG/DL (ref 170–490)
FIBRINOGEN PPP-MCNC: 445 MG/DL (ref 170–490)
FIBRINOGEN PPP-MCNC: 454 MG/DL (ref 170–490)
FIBRINOGEN PPP-MCNC: 474 MG/DL (ref 170–490)
FIBRINOGEN PPP-MCNC: 476 MG/DL (ref 170–490)
FIBRINOGEN PPP-MCNC: 493 MG/DL (ref 170–490)
FIBRINOGEN PPP-MCNC: 493 MG/DL (ref 170–490)
FIBRINOGEN PPP-MCNC: 508 MG/DL (ref 170–490)
FIBRINOGEN PPP-MCNC: 532 MG/DL (ref 170–490)
FIBRINOGEN PPP-MCNC: 536 MG/DL (ref 170–490)
FIBRINOGEN PPP-MCNC: 553 MG/DL (ref 170–490)
FIBRINOGEN PPP-MCNC: 554 MG/DL (ref 170–490)
FIBRINOGEN PPP-MCNC: 558 MG/DL (ref 170–490)
FIBRINOGEN PPP-MCNC: 592 MG/DL (ref 170–490)
FIBRINOGEN PPP-MCNC: 603 MG/DL (ref 170–490)
FIBRINOGEN PPP-MCNC: 620 MG/DL (ref 170–490)
FIBRINOGEN PPP-MCNC: 639 MG/DL (ref 170–490)
FLUAV H1 2009 PAND RNA SPEC QL NAA+PROBE: NOT DETECTED
FLUAV H1 RNA SPEC QL NAA+PROBE: NOT DETECTED
FLUAV H3 RNA SPEC QL NAA+PROBE: NOT DETECTED
FLUAV RNA SPEC QL NAA+PROBE: NOT DETECTED
FLUBV RNA SPEC QL NAA+PROBE: NOT DETECTED
FRAGMENTS BLD QL SMEAR: ABNORMAL
FRAGMENTS BLD QL SMEAR: ABNORMAL
FRAGMENTS BLD QL SMEAR: SLIGHT
GLUCOSE BLD-MCNC: 100 MG/DL (ref 70–99)
GLUCOSE BLD-MCNC: 102 MG/DL (ref 70–99)
GLUCOSE BLD-MCNC: 103 MG/DL (ref 70–99)
GLUCOSE BLD-MCNC: 105 MG/DL (ref 70–99)
GLUCOSE BLD-MCNC: 105 MG/DL (ref 70–99)
GLUCOSE BLD-MCNC: 116 MG/DL (ref 70–99)
GLUCOSE BLD-MCNC: 117 MG/DL (ref 70–99)
GLUCOSE BLD-MCNC: 119 MG/DL (ref 70–99)
GLUCOSE BLD-MCNC: 119 MG/DL (ref 70–99)
GLUCOSE BLD-MCNC: 120 MG/DL (ref 70–99)
GLUCOSE BLD-MCNC: 122 MG/DL (ref 70–99)
GLUCOSE BLD-MCNC: 124 MG/DL (ref 70–99)
GLUCOSE BLD-MCNC: 125 MG/DL (ref 70–99)
GLUCOSE BLD-MCNC: 125 MG/DL (ref 70–99)
GLUCOSE BLD-MCNC: 126 MG/DL (ref 70–99)
GLUCOSE BLD-MCNC: 132 MG/DL (ref 70–99)
GLUCOSE BLD-MCNC: 133 MG/DL (ref 70–99)
GLUCOSE BLD-MCNC: 135 MG/DL (ref 70–99)
GLUCOSE BLD-MCNC: 138 MG/DL (ref 70–99)
GLUCOSE BLD-MCNC: 142 MG/DL (ref 70–99)
GLUCOSE BLD-MCNC: 143 MG/DL (ref 70–99)
GLUCOSE BLD-MCNC: 144 MG/DL (ref 70–99)
GLUCOSE BLD-MCNC: 144 MG/DL (ref 70–99)
GLUCOSE BLD-MCNC: 145 MG/DL (ref 70–99)
GLUCOSE BLD-MCNC: 146 MG/DL (ref 70–99)
GLUCOSE BLD-MCNC: 147 MG/DL (ref 70–99)
GLUCOSE BLD-MCNC: 147 MG/DL (ref 70–99)
GLUCOSE BLD-MCNC: 149 MG/DL (ref 70–99)
GLUCOSE BLD-MCNC: 150 MG/DL (ref 70–99)
GLUCOSE BLD-MCNC: 150 MG/DL (ref 70–99)
GLUCOSE BLD-MCNC: 152 MG/DL (ref 70–99)
GLUCOSE BLD-MCNC: 152 MG/DL (ref 70–99)
GLUCOSE BLD-MCNC: 153 MG/DL (ref 70–99)
GLUCOSE BLD-MCNC: 154 MG/DL (ref 70–99)
GLUCOSE BLD-MCNC: 155 MG/DL (ref 70–99)
GLUCOSE BLD-MCNC: 156 MG/DL (ref 70–99)
GLUCOSE BLD-MCNC: 158 MG/DL (ref 70–99)
GLUCOSE BLD-MCNC: 158 MG/DL (ref 70–99)
GLUCOSE BLD-MCNC: 159 MG/DL (ref 70–99)
GLUCOSE BLD-MCNC: 160 MG/DL (ref 70–99)
GLUCOSE BLD-MCNC: 162 MG/DL (ref 70–99)
GLUCOSE BLD-MCNC: 162 MG/DL (ref 70–99)
GLUCOSE BLD-MCNC: 165 MG/DL (ref 70–99)
GLUCOSE BLD-MCNC: 168 MG/DL (ref 70–99)
GLUCOSE BLD-MCNC: 169 MG/DL (ref 70–99)
GLUCOSE BLD-MCNC: 169 MG/DL (ref 70–99)
GLUCOSE BLD-MCNC: 173 MG/DL (ref 70–99)
GLUCOSE BLD-MCNC: 179 MG/DL (ref 70–99)
GLUCOSE BLD-MCNC: 182 MG/DL (ref 70–99)
GLUCOSE BLD-MCNC: 96 MG/DL (ref 70–99)
GLUCOSE BLDC GLUCOMTR-MCNC: 100 MG/DL (ref 70–99)
GLUCOSE BLDC GLUCOMTR-MCNC: 103 MG/DL (ref 70–99)
GLUCOSE BLDC GLUCOMTR-MCNC: 103 MG/DL (ref 70–99)
GLUCOSE BLDC GLUCOMTR-MCNC: 105 MG/DL (ref 70–99)
GLUCOSE BLDC GLUCOMTR-MCNC: 107 MG/DL (ref 70–99)
GLUCOSE BLDC GLUCOMTR-MCNC: 110 MG/DL (ref 70–99)
GLUCOSE BLDC GLUCOMTR-MCNC: 112 MG/DL (ref 70–99)
GLUCOSE BLDC GLUCOMTR-MCNC: 114 MG/DL (ref 70–99)
GLUCOSE BLDC GLUCOMTR-MCNC: 116 MG/DL (ref 70–99)
GLUCOSE BLDC GLUCOMTR-MCNC: 118 MG/DL (ref 70–99)
GLUCOSE BLDC GLUCOMTR-MCNC: 121 MG/DL (ref 70–99)
GLUCOSE BLDC GLUCOMTR-MCNC: 122 MG/DL (ref 70–99)
GLUCOSE BLDC GLUCOMTR-MCNC: 122 MG/DL (ref 70–99)
GLUCOSE BLDC GLUCOMTR-MCNC: 124 MG/DL (ref 70–99)
GLUCOSE BLDC GLUCOMTR-MCNC: 125 MG/DL (ref 70–99)
GLUCOSE BLDC GLUCOMTR-MCNC: 126 MG/DL (ref 70–99)
GLUCOSE BLDC GLUCOMTR-MCNC: 126 MG/DL (ref 70–99)
GLUCOSE BLDC GLUCOMTR-MCNC: 127 MG/DL (ref 70–99)
GLUCOSE BLDC GLUCOMTR-MCNC: 127 MG/DL (ref 70–99)
GLUCOSE BLDC GLUCOMTR-MCNC: 128 MG/DL (ref 70–99)
GLUCOSE BLDC GLUCOMTR-MCNC: 130 MG/DL (ref 70–99)
GLUCOSE BLDC GLUCOMTR-MCNC: 130 MG/DL (ref 70–99)
GLUCOSE BLDC GLUCOMTR-MCNC: 131 MG/DL (ref 70–99)
GLUCOSE BLDC GLUCOMTR-MCNC: 131 MG/DL (ref 70–99)
GLUCOSE BLDC GLUCOMTR-MCNC: 133 MG/DL (ref 70–99)
GLUCOSE BLDC GLUCOMTR-MCNC: 134 MG/DL (ref 70–99)
GLUCOSE BLDC GLUCOMTR-MCNC: 135 MG/DL (ref 70–99)
GLUCOSE BLDC GLUCOMTR-MCNC: 136 MG/DL (ref 70–99)
GLUCOSE BLDC GLUCOMTR-MCNC: 136 MG/DL (ref 70–99)
GLUCOSE BLDC GLUCOMTR-MCNC: 137 MG/DL (ref 70–99)
GLUCOSE BLDC GLUCOMTR-MCNC: 138 MG/DL (ref 70–99)
GLUCOSE BLDC GLUCOMTR-MCNC: 139 MG/DL (ref 70–99)
GLUCOSE BLDC GLUCOMTR-MCNC: 141 MG/DL (ref 70–99)
GLUCOSE BLDC GLUCOMTR-MCNC: 142 MG/DL (ref 70–99)
GLUCOSE BLDC GLUCOMTR-MCNC: 142 MG/DL (ref 70–99)
GLUCOSE BLDC GLUCOMTR-MCNC: 143 MG/DL (ref 70–99)
GLUCOSE BLDC GLUCOMTR-MCNC: 144 MG/DL (ref 70–99)
GLUCOSE BLDC GLUCOMTR-MCNC: 145 MG/DL (ref 70–99)
GLUCOSE BLDC GLUCOMTR-MCNC: 145 MG/DL (ref 70–99)
GLUCOSE BLDC GLUCOMTR-MCNC: 146 MG/DL (ref 70–99)
GLUCOSE BLDC GLUCOMTR-MCNC: 146 MG/DL (ref 70–99)
GLUCOSE BLDC GLUCOMTR-MCNC: 147 MG/DL (ref 70–99)
GLUCOSE BLDC GLUCOMTR-MCNC: 148 MG/DL (ref 70–99)
GLUCOSE BLDC GLUCOMTR-MCNC: 148 MG/DL (ref 70–99)
GLUCOSE BLDC GLUCOMTR-MCNC: 149 MG/DL (ref 70–99)
GLUCOSE BLDC GLUCOMTR-MCNC: 150 MG/DL (ref 70–99)
GLUCOSE BLDC GLUCOMTR-MCNC: 151 MG/DL (ref 70–99)
GLUCOSE BLDC GLUCOMTR-MCNC: 152 MG/DL (ref 70–99)
GLUCOSE BLDC GLUCOMTR-MCNC: 153 MG/DL (ref 70–99)
GLUCOSE BLDC GLUCOMTR-MCNC: 154 MG/DL (ref 70–99)
GLUCOSE BLDC GLUCOMTR-MCNC: 154 MG/DL (ref 70–99)
GLUCOSE BLDC GLUCOMTR-MCNC: 155 MG/DL (ref 70–99)
GLUCOSE BLDC GLUCOMTR-MCNC: 155 MG/DL (ref 70–99)
GLUCOSE BLDC GLUCOMTR-MCNC: 156 MG/DL (ref 70–99)
GLUCOSE BLDC GLUCOMTR-MCNC: 157 MG/DL (ref 70–99)
GLUCOSE BLDC GLUCOMTR-MCNC: 157 MG/DL (ref 70–99)
GLUCOSE BLDC GLUCOMTR-MCNC: 158 MG/DL (ref 70–99)
GLUCOSE BLDC GLUCOMTR-MCNC: 159 MG/DL (ref 70–99)
GLUCOSE BLDC GLUCOMTR-MCNC: 159 MG/DL (ref 70–99)
GLUCOSE BLDC GLUCOMTR-MCNC: 160 MG/DL (ref 70–99)
GLUCOSE BLDC GLUCOMTR-MCNC: 160 MG/DL (ref 70–99)
GLUCOSE BLDC GLUCOMTR-MCNC: 161 MG/DL (ref 70–99)
GLUCOSE BLDC GLUCOMTR-MCNC: 162 MG/DL (ref 70–99)
GLUCOSE BLDC GLUCOMTR-MCNC: 165 MG/DL (ref 70–99)
GLUCOSE BLDC GLUCOMTR-MCNC: 167 MG/DL (ref 70–99)
GLUCOSE BLDC GLUCOMTR-MCNC: 169 MG/DL (ref 70–99)
GLUCOSE BLDC GLUCOMTR-MCNC: 181 MG/DL (ref 70–99)
GLUCOSE BLDC GLUCOMTR-MCNC: 191 MG/DL (ref 70–99)
GLUCOSE BLDC GLUCOMTR-MCNC: 216 MG/DL (ref 70–99)
GLUCOSE BLDC GLUCOMTR-MCNC: 237 MG/DL (ref 70–99)
GLUCOSE BLDC GLUCOMTR-MCNC: 76 MG/DL (ref 70–99)
GLUCOSE BLDC GLUCOMTR-MCNC: 91 MG/DL (ref 70–99)
GLUCOSE BLDC GLUCOMTR-MCNC: 94 MG/DL (ref 70–99)
GLUCOSE BLDC GLUCOMTR-MCNC: 95 MG/DL (ref 70–99)
GLUCOSE BLDC GLUCOMTR-MCNC: 97 MG/DL (ref 70–99)
GLUCOSE BLDC GLUCOMTR-MCNC: 97 MG/DL (ref 70–99)
GLUCOSE BLDC GLUCOMTR-MCNC: 98 MG/DL (ref 70–99)
GLUCOSE SERPL-MCNC: 109 MG/DL (ref 70–99)
GLUCOSE SERPL-MCNC: 122 MG/DL (ref 70–99)
GLUCOSE SERPL-MCNC: 124 MG/DL (ref 70–99)
GLUCOSE SERPL-MCNC: 124 MG/DL (ref 70–99)
GLUCOSE SERPL-MCNC: 125 MG/DL (ref 70–99)
GLUCOSE SERPL-MCNC: 126 MG/DL (ref 70–99)
GLUCOSE SERPL-MCNC: 129 MG/DL (ref 70–99)
GLUCOSE SERPL-MCNC: 133 MG/DL (ref 70–99)
GLUCOSE SERPL-MCNC: 134 MG/DL (ref 70–99)
GLUCOSE SERPL-MCNC: 137 MG/DL (ref 70–99)
GLUCOSE SERPL-MCNC: 139 MG/DL (ref 70–99)
GLUCOSE SERPL-MCNC: 142 MG/DL (ref 70–99)
GLUCOSE SERPL-MCNC: 142 MG/DL (ref 70–99)
GLUCOSE SERPL-MCNC: 145 MG/DL (ref 70–99)
GLUCOSE SERPL-MCNC: 146 MG/DL (ref 70–99)
GLUCOSE SERPL-MCNC: 146 MG/DL (ref 70–99)
GLUCOSE SERPL-MCNC: 147 MG/DL (ref 70–99)
GLUCOSE SERPL-MCNC: 148 MG/DL (ref 70–99)
GLUCOSE SERPL-MCNC: 149 MG/DL (ref 70–99)
GLUCOSE SERPL-MCNC: 149 MG/DL (ref 70–99)
GLUCOSE SERPL-MCNC: 150 MG/DL (ref 70–99)
GLUCOSE SERPL-MCNC: 151 MG/DL (ref 70–99)
GLUCOSE SERPL-MCNC: 152 MG/DL (ref 70–99)
GLUCOSE SERPL-MCNC: 152 MG/DL (ref 70–99)
GLUCOSE SERPL-MCNC: 153 MG/DL (ref 70–99)
GLUCOSE SERPL-MCNC: 154 MG/DL (ref 70–99)
GLUCOSE SERPL-MCNC: 154 MG/DL (ref 70–99)
GLUCOSE SERPL-MCNC: 155 MG/DL (ref 70–99)
GLUCOSE SERPL-MCNC: 158 MG/DL (ref 70–99)
GLUCOSE SERPL-MCNC: 160 MG/DL (ref 70–99)
GLUCOSE SERPL-MCNC: 161 MG/DL (ref 70–99)
GLUCOSE SERPL-MCNC: 166 MG/DL (ref 70–99)
GLUCOSE SERPL-MCNC: 169 MG/DL (ref 70–99)
GLUCOSE SERPL-MCNC: 171 MG/DL (ref 70–99)
GLUCOSE SERPL-MCNC: 175 MG/DL (ref 70–99)
GLUCOSE SERPL-MCNC: 180 MG/DL (ref 70–99)
GLUCOSE SERPL-MCNC: 180 MG/DL (ref 70–99)
GLUCOSE SERPL-MCNC: 181 MG/DL (ref 70–99)
GLUCOSE SERPL-MCNC: 184 MG/DL (ref 70–99)
GLUCOSE UR STRIP-MCNC: NEGATIVE MG/DL
GRAM STAIN RESULT: ABNORMAL
GRAM STAIN RESULT: ABNORMAL
GRAM STAIN RESULT: NORMAL
HADV DNA SPEC QL NAA+PROBE: NOT DETECTED
HBA1C MFR BLD: 5.9 %
HCO3 BLD-SCNC: 17 MMOL/L (ref 21–28)
HCO3 BLD-SCNC: 17 MMOL/L (ref 21–28)
HCO3 BLD-SCNC: 18 MMOL/L (ref 21–28)
HCO3 BLD-SCNC: 19 MMOL/L (ref 21–28)
HCO3 BLD-SCNC: 20 MMOL/L (ref 21–28)
HCO3 BLD-SCNC: 21 MMOL/L (ref 21–28)
HCO3 BLD-SCNC: 22 MMOL/L (ref 21–28)
HCO3 BLD-SCNC: 23 MMOL/L (ref 21–28)
HCO3 BLD-SCNC: 24 MMOL/L (ref 21–28)
HCO3 BLD-SCNC: 25 MMOL/L (ref 21–28)
HCO3 BLD-SCNC: 26 MMOL/L (ref 21–28)
HCO3 BLD-SCNC: 27 MMOL/L (ref 21–28)
HCO3 BLD-SCNC: 28 MMOL/L (ref 21–28)
HCO3 BLD-SCNC: 29 MMOL/L (ref 21–28)
HCO3 BLD-SCNC: 30 MMOL/L (ref 21–28)
HCO3 BLD-SCNC: 31 MMOL/L (ref 21–28)
HCO3 BLD-SCNC: 32 MMOL/L (ref 21–28)
HCO3 BLD-SCNC: 33 MMOL/L (ref 21–28)
HCO3 BLD-SCNC: 34 MMOL/L (ref 21–28)
HCO3 BLD-SCNC: 35 MMOL/L (ref 21–28)
HCO3 BLD-SCNC: 36 MMOL/L (ref 21–28)
HCO3 BLDA-SCNC: 19 MMOL/L (ref 21–28)
HCO3 BLDA-SCNC: 20 MMOL/L (ref 21–28)
HCO3 BLDA-SCNC: 20 MMOL/L (ref 21–28)
HCO3 BLDA-SCNC: 21 MMOL/L (ref 21–28)
HCO3 BLDA-SCNC: 22 MMOL/L (ref 21–28)
HCO3 BLDA-SCNC: 24 MMOL/L (ref 21–28)
HCO3 BLDA-SCNC: 25 MMOL/L (ref 21–28)
HCO3 BLDA-SCNC: 26 MMOL/L (ref 21–28)
HCO3 BLDA-SCNC: 27 MMOL/L (ref 21–28)
HCO3 BLDA-SCNC: 28 MMOL/L (ref 21–28)
HCO3 BLDA-SCNC: 28 MMOL/L (ref 21–28)
HCO3 BLDA-SCNC: 30 MMOL/L (ref 21–28)
HCO3 BLDA-SCNC: 31 MMOL/L (ref 21–28)
HCO3 BLDA-SCNC: 32 MMOL/L (ref 21–28)
HCO3 BLDA-SCNC: 33 MMOL/L (ref 21–28)
HCO3 BLDA-SCNC: 34 MMOL/L (ref 21–28)
HCO3 BLDA-SCNC: 35 MMOL/L (ref 21–28)
HCO3 BLDA-SCNC: 36 MMOL/L (ref 21–28)
HCO3 BLDA-SCNC: 36 MMOL/L (ref 21–28)
HCO3 BLDV-SCNC: 20 MMOL/L (ref 21–28)
HCO3 BLDV-SCNC: 20 MMOL/L (ref 21–28)
HCO3 BLDV-SCNC: 21 MMOL/L (ref 21–28)
HCO3 BLDV-SCNC: 22 MMOL/L (ref 21–28)
HCO3 BLDV-SCNC: 23 MMOL/L (ref 21–28)
HCO3 BLDV-SCNC: 24 MMOL/L (ref 21–28)
HCO3 BLDV-SCNC: 25 MMOL/L (ref 21–28)
HCO3 BLDV-SCNC: 26 MMOL/L (ref 21–28)
HCO3 BLDV-SCNC: 27 MMOL/L (ref 21–28)
HCO3 BLDV-SCNC: 28 MMOL/L (ref 21–28)
HCO3 BLDV-SCNC: 29 MMOL/L (ref 21–28)
HCO3 BLDV-SCNC: 30 MMOL/L (ref 21–28)
HCO3 BLDV-SCNC: 31 MMOL/L (ref 21–28)
HCO3 BLDV-SCNC: 32 MMOL/L (ref 21–28)
HCO3 BLDV-SCNC: 33 MMOL/L (ref 21–28)
HCO3 BLDV-SCNC: 34 MMOL/L (ref 21–28)
HCO3 BLDV-SCNC: 35 MMOL/L (ref 21–28)
HCO3 BLDV-SCNC: 35 MMOL/L (ref 21–28)
HCO3 BLDV-SCNC: 36 MMOL/L (ref 21–28)
HCO3 BLDV-SCNC: 36 MMOL/L (ref 21–28)
HCO3 BLDV-SCNC: 37 MMOL/L (ref 21–28)
HCO3 BLDV-SCNC: 37 MMOL/L (ref 21–28)
HCOV PNL SPEC NAA+PROBE: NOT DETECTED
HCT VFR BLD AUTO: 19.9 % (ref 40–53)
HCT VFR BLD AUTO: 20 % (ref 40–53)
HCT VFR BLD AUTO: 20.1 % (ref 40–53)
HCT VFR BLD AUTO: 20.7 % (ref 40–53)
HCT VFR BLD AUTO: 21 % (ref 40–53)
HCT VFR BLD AUTO: 21.3 % (ref 40–53)
HCT VFR BLD AUTO: 21.4 % (ref 40–53)
HCT VFR BLD AUTO: 21.6 % (ref 40–53)
HCT VFR BLD AUTO: 21.6 % (ref 40–53)
HCT VFR BLD AUTO: 21.9 % (ref 40–53)
HCT VFR BLD AUTO: 22.1 % (ref 40–53)
HCT VFR BLD AUTO: 22.4 % (ref 40–53)
HCT VFR BLD AUTO: 22.7 % (ref 40–53)
HCT VFR BLD AUTO: 22.8 % (ref 40–53)
HCT VFR BLD AUTO: 22.8 % (ref 40–53)
HCT VFR BLD AUTO: 22.9 % (ref 40–53)
HCT VFR BLD AUTO: 23 % (ref 40–53)
HCT VFR BLD AUTO: 23.8 % (ref 40–53)
HCT VFR BLD AUTO: 24 % (ref 40–53)
HCT VFR BLD AUTO: 24.3 % (ref 40–53)
HCT VFR BLD AUTO: 24.4 % (ref 40–53)
HCT VFR BLD AUTO: 24.6 % (ref 40–53)
HCT VFR BLD AUTO: 24.6 % (ref 40–53)
HCT VFR BLD AUTO: 24.8 % (ref 40–53)
HCT VFR BLD AUTO: 25.2 % (ref 40–53)
HCT VFR BLD AUTO: 26.2 % (ref 40–53)
HCT VFR BLD AUTO: 26.2 % (ref 40–53)
HCT VFR BLD AUTO: 31.8 % (ref 40–53)
HCT VFR BLD AUTO: 31.9 % (ref 40–53)
HCT VFR BLD AUTO: 31.9 % (ref 40–53)
HCT VFR BLD AUTO: 32.1 % (ref 40–53)
HCT VFR BLD AUTO: 32.3 % (ref 40–53)
HCT VFR BLD AUTO: 32.6 % (ref 40–53)
HCT VFR BLD AUTO: 32.8 % (ref 40–53)
HCT VFR BLD AUTO: 33.1 % (ref 40–53)
HCT VFR BLD AUTO: 33.1 % (ref 40–53)
HCT VFR BLD AUTO: 33.4 % (ref 40–53)
HCT VFR BLD AUTO: 33.5 % (ref 40–53)
HCT VFR BLD AUTO: 33.8 % (ref 40–53)
HCT VFR BLD AUTO: 33.9 % (ref 40–53)
HCT VFR BLD AUTO: 34.3 % (ref 40–53)
HCT VFR BLD AUTO: 35.9 % (ref 40–53)
HCT VFR BLD AUTO: 37.1 % (ref 40–53)
HCT VFR BLD AUTO: 37.1 % (ref 40–53)
HCT VFR BLD AUTO: 37.4 % (ref 40–53)
HCT VFR BLD AUTO: 38 % (ref 40–53)
HCT VFR BLD AUTO: 39.1 % (ref 40–53)
HCT VFR BLD AUTO: 39.1 % (ref 40–53)
HCT VFR BLD CALC: 19 % (ref 40–53)
HCT VFR BLD CALC: 20 % (ref 40–53)
HCT VFR BLD CALC: 21 % (ref 40–53)
HCT VFR BLD CALC: 23 % (ref 40–53)
HGB BLD-MCNC: 10.1 G/DL (ref 13.3–17.7)
HGB BLD-MCNC: 10.3 G/DL (ref 13.3–17.7)
HGB BLD-MCNC: 10.4 G/DL (ref 13.3–17.7)
HGB BLD-MCNC: 10.5 G/DL (ref 13.3–17.7)
HGB BLD-MCNC: 10.7 G/DL (ref 13.3–17.7)
HGB BLD-MCNC: 10.8 G/DL (ref 13.3–17.7)
HGB BLD-MCNC: 10.8 G/DL (ref 13.3–17.7)
HGB BLD-MCNC: 10.9 G/DL (ref 13.3–17.7)
HGB BLD-MCNC: 10.9 G/DL (ref 13.3–17.7)
HGB BLD-MCNC: 11 G/DL (ref 13.3–17.7)
HGB BLD-MCNC: 11 G/DL (ref 13.3–17.7)
HGB BLD-MCNC: 11.2 G/DL (ref 13.3–17.7)
HGB BLD-MCNC: 11.6 G/DL (ref 13.3–17.7)
HGB BLD-MCNC: 11.7 G/DL (ref 13.3–17.7)
HGB BLD-MCNC: 11.9 G/DL (ref 13.3–17.7)
HGB BLD-MCNC: 12.2 G/DL (ref 13.3–17.7)
HGB BLD-MCNC: 12.7 G/DL (ref 13.3–17.7)
HGB BLD-MCNC: 12.7 G/DL (ref 13.3–17.7)
HGB BLD-MCNC: 12.9 G/DL (ref 13.3–17.7)
HGB BLD-MCNC: 13 G/DL (ref 13.3–17.7)
HGB BLD-MCNC: 13.1 G/DL (ref 13.3–17.7)
HGB BLD-MCNC: 13.2 G/DL (ref 13.3–17.7)
HGB BLD-MCNC: 13.6 G/DL (ref 13.3–17.7)
HGB BLD-MCNC: 13.6 G/DL (ref 13.3–17.7)
HGB BLD-MCNC: 6.5 G/DL (ref 13.3–17.7)
HGB BLD-MCNC: 6.8 G/DL (ref 13.3–17.7)
HGB BLD-MCNC: 7.1 G/DL (ref 13.3–17.7)
HGB BLD-MCNC: 7.1 G/DL (ref 13.3–17.7)
HGB BLD-MCNC: 7.2 G/DL (ref 13.3–17.7)
HGB BLD-MCNC: 7.3 G/DL (ref 13.3–17.7)
HGB BLD-MCNC: 7.3 G/DL (ref 13.3–17.7)
HGB BLD-MCNC: 7.4 G/DL (ref 13.3–17.7)
HGB BLD-MCNC: 7.5 G/DL (ref 13.3–17.7)
HGB BLD-MCNC: 7.6 G/DL (ref 13.3–17.7)
HGB BLD-MCNC: 7.7 G/DL (ref 13.3–17.7)
HGB BLD-MCNC: 7.8 G/DL (ref 13.3–17.7)
HGB BLD-MCNC: 7.9 G/DL (ref 13.3–17.7)
HGB BLD-MCNC: 7.9 G/DL (ref 13.3–17.7)
HGB BLD-MCNC: 8 G/DL (ref 13.3–17.7)
HGB BLD-MCNC: 8.1 G/DL (ref 13.3–17.7)
HGB BLD-MCNC: 8.1 G/DL (ref 13.3–17.7)
HGB BLD-MCNC: 8.2 G/DL (ref 13.3–17.7)
HGB BLD-MCNC: 8.2 G/DL (ref 13.3–17.7)
HGB BLD-MCNC: 8.3 G/DL (ref 13.3–17.7)
HGB BLD-MCNC: 8.4 G/DL (ref 13.3–17.7)
HGB BLD-MCNC: 8.5 G/DL (ref 13.3–17.7)
HGB BLD-MCNC: 8.6 G/DL (ref 13.3–17.7)
HGB BLD-MCNC: 8.7 G/DL (ref 13.3–17.7)
HGB BLD-MCNC: 8.8 G/DL (ref 13.3–17.7)
HGB BLD-MCNC: 8.9 G/DL (ref 13.3–17.7)
HGB BLD-MCNC: 9 G/DL (ref 13.3–17.7)
HGB BLD-MCNC: 9.9 G/DL (ref 13.3–17.7)
HGB BLD-MCNC: ABNORMAL G/DL
HGB C CRYSTALS: ABNORMAL
HGB FREE PLAS-MCNC: 100 MG/DL
HGB FREE PLAS-MCNC: 110 MG/DL
HGB FREE PLAS-MCNC: 30 MG/DL
HGB FREE PLAS-MCNC: 300 MG/DL
HGB FREE PLAS-MCNC: 40 MG/DL
HGB FREE PLAS-MCNC: 690 MG/DL
HGB FREE PLAS-MCNC: 850 MG/DL
HGB FREE PLAS-MCNC: 90 MG/DL
HGB FREE PLAS-MCNC: <30 MG/DL
HGB FREE PLAS-MCNC: <30 MG/DL
HGB UR QL STRIP: ABNORMAL
HMPV RNA SPEC QL NAA+PROBE: NOT DETECTED
HOLD SPECIMEN HIT: NORMAL
HOWELL-JOLLY BOD BLD QL SMEAR: ABNORMAL
HPIV1 RNA SPEC QL NAA+PROBE: NOT DETECTED
HPIV2 RNA SPEC QL NAA+PROBE: NOT DETECTED
HPIV3 RNA SPEC QL NAA+PROBE: NOT DETECTED
HPIV4 RNA SPEC QL NAA+PROBE: NOT DETECTED
HYALINE CASTS: 5 /LPF
HYALINE CASTS: 6 /LPF
IMM GRANULOCYTES # BLD: 0.4 10E3/UL
IMM GRANULOCYTES # BLD: 0.5 10E3/UL
IMM GRANULOCYTES # BLD: 0.5 10E3/UL
IMM GRANULOCYTES # BLD: 0.6 10E3/UL
IMM GRANULOCYTES # BLD: 0.6 10E3/UL
IMM GRANULOCYTES # BLD: 1 10E3/UL
IMM GRANULOCYTES # BLD: ABNORMAL 10*3/UL
IMM GRANULOCYTES NFR BLD: 3 %
IMM GRANULOCYTES NFR BLD: 4 %
IMM GRANULOCYTES NFR BLD: ABNORMAL %
INR PPP: 1.34 (ref 0.85–1.15)
INR PPP: 1.47 (ref 0.85–1.15)
INR PPP: 1.5 (ref 0.85–1.15)
INR PPP: 1.55 (ref 0.85–1.15)
INR PPP: 1.56 (ref 0.85–1.15)
INR PPP: 1.57 (ref 0.85–1.15)
INR PPP: 1.6 (ref 0.85–1.15)
INR PPP: 1.61 (ref 0.85–1.15)
INR PPP: 1.64 (ref 0.85–1.15)
INR PPP: 1.65 (ref 0.85–1.15)
INR PPP: 1.66 (ref 0.85–1.15)
INR PPP: 1.67 (ref 0.85–1.15)
INR PPP: 1.69 (ref 0.85–1.15)
INR PPP: 1.7 (ref 0.85–1.15)
INR PPP: 1.71 (ref 0.85–1.15)
INR PPP: 1.73 (ref 0.85–1.15)
INR PPP: 1.75 (ref 0.85–1.15)
INR PPP: 1.82 (ref 0.85–1.15)
INR PPP: 1.86 (ref 0.85–1.15)
INR PPP: 1.86 (ref 0.85–1.15)
INR PPP: 1.87 (ref 0.85–1.15)
INR PPP: 1.88 (ref 0.85–1.15)
INR PPP: 1.89 (ref 0.85–1.15)
INR PPP: 1.89 (ref 0.85–1.15)
INR PPP: 1.9 (ref 0.85–1.15)
INR PPP: 1.9 (ref 0.85–1.15)
INR PPP: 1.91 (ref 0.85–1.15)
INR PPP: 1.93 (ref 0.85–1.15)
INR PPP: 1.96 (ref 0.85–1.15)
INR PPP: 1.98 (ref 0.85–1.15)
INR PPP: 2.04 (ref 0.85–1.15)
INR PPP: 2.1 (ref 0.85–1.15)
INR PPP: 2.12 (ref 0.85–1.15)
INR PPP: 2.44 (ref 0.85–1.15)
INR PPP: 2.54 (ref 0.85–1.15)
INR PPP: 2.85 (ref 0.85–1.15)
INR PPP: 2.9 (ref 0.85–1.15)
INR PPP: 2.96 (ref 0.85–1.15)
INR PPP: 3.3 (ref 0.85–1.15)
INR PPP: 3.57 (ref 0.85–1.15)
INR PPP: 4.01 (ref 0.85–1.15)
INR PPP: 4.45 (ref 0.85–1.15)
INR PPP: 5.02 (ref 0.85–1.15)
INTERPRETATION ECG - MUSE: NORMAL
INTERPRETATION TEGPIA: NORMAL
ISSUE DATE AND TIME: NORMAL
KETONES UR STRIP-MCNC: NEGATIVE MG/DL
LACTATE BLD-SCNC: 1.7 MMOL/L (ref 0.7–2)
LACTATE BLD-SCNC: 1.7 MMOL/L (ref 0.7–2)
LACTATE BLD-SCNC: 1.9 MMOL/L (ref 0.7–2)
LACTATE BLD-SCNC: 2 MMOL/L (ref 0.7–2)
LACTATE BLD-SCNC: 2.1 MMOL/L (ref 0.7–2)
LACTATE BLD-SCNC: 2.1 MMOL/L (ref 0.7–2)
LACTATE BLD-SCNC: 2.6 MMOL/L (ref 0.7–2)
LACTATE BLD-SCNC: 2.7 MMOL/L (ref 0.7–2)
LACTATE BLD-SCNC: 3.2 MMOL/L (ref 0.7–2)
LACTATE BLD-SCNC: 3.7 MMOL/L (ref 0.7–2)
LACTATE BLD-SCNC: 3.9 MMOL/L (ref 0.7–2)
LACTATE BLD-SCNC: 3.9 MMOL/L (ref 0.7–2)
LACTATE BLD-SCNC: 4.7 MMOL/L (ref 0.7–2)
LACTATE BLD-SCNC: 4.8 MMOL/L (ref 0.7–2)
LACTATE BLD-SCNC: 5 MMOL/L (ref 0.7–2)
LACTATE SERPL-SCNC: 0.9 MMOL/L (ref 0.7–2)
LACTATE SERPL-SCNC: 0.9 MMOL/L (ref 0.7–2)
LACTATE SERPL-SCNC: 1 MMOL/L (ref 0.7–2)
LACTATE SERPL-SCNC: 1.2 MMOL/L (ref 0.7–2)
LACTATE SERPL-SCNC: 1.3 MMOL/L (ref 0.7–2)
LACTATE SERPL-SCNC: 1.4 MMOL/L (ref 0.7–2)
LACTATE SERPL-SCNC: 1.5 MMOL/L (ref 0.7–2)
LACTATE SERPL-SCNC: 1.5 MMOL/L (ref 0.7–2)
LACTATE SERPL-SCNC: 1.7 MMOL/L (ref 0.7–2)
LACTATE SERPL-SCNC: 1.8 MMOL/L (ref 0.7–2)
LACTATE SERPL-SCNC: 1.8 MMOL/L (ref 0.7–2)
LACTATE SERPL-SCNC: 1.9 MMOL/L (ref 0.7–2)
LACTATE SERPL-SCNC: 2.1 MMOL/L (ref 0.7–2)
LACTATE SERPL-SCNC: 2.1 MMOL/L (ref 0.7–2)
LACTATE SERPL-SCNC: 2.3 MMOL/L (ref 0.7–2)
LACTATE SERPL-SCNC: 2.4 MMOL/L (ref 0.7–2)
LACTATE SERPL-SCNC: 2.6 MMOL/L (ref 0.7–2)
LACTATE SERPL-SCNC: 2.6 MMOL/L (ref 0.7–2)
LACTATE SERPL-SCNC: 2.8 MMOL/L (ref 0.7–2)
LACTATE SERPL-SCNC: 2.9 MMOL/L (ref 0.7–2)
LACTATE SERPL-SCNC: 2.9 MMOL/L (ref 0.7–2)
LACTATE SERPL-SCNC: 3 MMOL/L (ref 0.7–2)
LACTATE SERPL-SCNC: 3.1 MMOL/L (ref 0.7–2)
LACTATE SERPL-SCNC: 3.1 MMOL/L (ref 0.7–2)
LACTATE SERPL-SCNC: 3.2 MMOL/L (ref 0.7–2)
LACTATE SERPL-SCNC: 3.3 MMOL/L (ref 0.7–2)
LACTATE SERPL-SCNC: 3.4 MMOL/L (ref 0.7–2)
LACTATE SERPL-SCNC: 3.5 MMOL/L (ref 0.7–2)
LACTATE SERPL-SCNC: 3.6 MMOL/L (ref 0.7–2)
LACTATE SERPL-SCNC: 3.7 MMOL/L (ref 0.7–2)
LACTATE SERPL-SCNC: 3.8 MMOL/L (ref 0.7–2)
LACTATE SERPL-SCNC: 3.9 MMOL/L (ref 0.7–2)
LACTATE SERPL-SCNC: 3.9 MMOL/L (ref 0.7–2)
LACTATE SERPL-SCNC: 4 MMOL/L (ref 0.7–2)
LACTATE SERPL-SCNC: 4 MMOL/L (ref 0.7–2)
LACTATE SERPL-SCNC: 4.1 MMOL/L (ref 0.7–2)
LACTATE SERPL-SCNC: 4.2 MMOL/L (ref 0.7–2)
LACTATE SERPL-SCNC: 4.3 MMOL/L (ref 0.7–2)
LACTATE SERPL-SCNC: 4.3 MMOL/L (ref 0.7–2)
LACTATE SERPL-SCNC: 5 MMOL/L (ref 0.7–2)
LACTATE SERPL-SCNC: 5 MMOL/L (ref 0.7–2)
LACTATE SERPL-SCNC: 5.2 MMOL/L (ref 0.7–2)
LACTATE SERPL-SCNC: 5.3 MMOL/L (ref 0.7–2)
LACTATE SERPL-SCNC: 5.5 MMOL/L (ref 0.7–2)
LACTATE SERPL-SCNC: 5.6 MMOL/L (ref 0.7–2)
LACTATE SERPL-SCNC: 5.7 MMOL/L (ref 0.7–2)
LACTATE SERPL-SCNC: 5.7 MMOL/L (ref 0.7–2)
LACTATE SERPL-SCNC: 5.8 MMOL/L (ref 0.7–2)
LACTATE SERPL-SCNC: 6.3 MMOL/L (ref 0.7–2)
LACTATE SERPL-SCNC: 6.8 MMOL/L (ref 0.7–2)
LACTATE SERPL-SCNC: 6.9 MMOL/L (ref 0.7–2)
LACTATE SERPL-SCNC: 8 MMOL/L (ref 0.7–2)
LACTATE SERPL-SCNC: 8.1 MMOL/L (ref 0.7–2)
LACTATE SERPL-SCNC: 8.4 MMOL/L (ref 0.7–2)
LACTATE SERPL-SCNC: 8.6 MMOL/L (ref 0.7–2)
LACTATE SERPL-SCNC: 8.7 MMOL/L (ref 0.7–2)
LACTATE SERPL-SCNC: 8.9 MMOL/L (ref 0.7–2)
LACTATE SERPL-SCNC: 9 MMOL/L (ref 0.7–2)
LACTATE SERPL-SCNC: 9.1 MMOL/L (ref 0.7–2)
LDH SERPL L TO P-CCNC: 1135 U/L (ref 0–250)
LDH SERPL L TO P-CCNC: 1391 U/L (ref 0–250)
LDH SERPL L TO P-CCNC: 431 U/L (ref 0–250)
LDH SERPL L TO P-CCNC: 464 U/L (ref 0–250)
LDH SERPL L TO P-CCNC: 636 U/L (ref 0–250)
LDH SERPL L TO P-CCNC: 738 U/L (ref 0–250)
LDH SERPL L TO P-CCNC: 775 U/L (ref 0–250)
LDH SERPL L TO P-CCNC: 883 U/L (ref 0–250)
LDH SERPL L TO P-CCNC: NORMAL U/L
LEUKOCYTE ESTERASE UR QL STRIP: ABNORMAL
LEUKOCYTE ESTERASE UR QL STRIP: NEGATIVE
LEUKOCYTE ESTERASE UR QL STRIP: NEGATIVE
LVEF ECHO: NORMAL
LYMPHOCYTES # BLD AUTO: 1.4 10E3/UL (ref 0.8–5.3)
LYMPHOCYTES # BLD AUTO: 1.4 10E3/UL (ref 0.8–5.3)
LYMPHOCYTES # BLD AUTO: 1.7 10E3/UL (ref 0.8–5.3)
LYMPHOCYTES # BLD AUTO: 1.9 10E3/UL (ref 0.8–5.3)
LYMPHOCYTES # BLD AUTO: 2.1 10E3/UL (ref 0.8–5.3)
LYMPHOCYTES # BLD AUTO: 2.9 10E3/UL (ref 0.8–5.3)
LYMPHOCYTES # BLD AUTO: ABNORMAL 10*3/UL
LYMPHOCYTES # BLD MANUAL: 0 10E3/UL (ref 0.8–5.3)
LYMPHOCYTES # BLD MANUAL: 0.3 10E3/UL (ref 0.8–5.3)
LYMPHOCYTES # BLD MANUAL: 0.8 10E3/UL (ref 0.8–5.3)
LYMPHOCYTES # BLD MANUAL: 0.8 10E3/UL (ref 0.8–5.3)
LYMPHOCYTES # BLD MANUAL: 0.9 10E3/UL (ref 0.8–5.3)
LYMPHOCYTES # BLD MANUAL: 1 10E3/UL (ref 0.8–5.3)
LYMPHOCYTES # BLD MANUAL: 1.2 10E3/UL (ref 0.8–5.3)
LYMPHOCYTES # BLD MANUAL: 1.6 10E3/UL (ref 0.8–5.3)
LYMPHOCYTES # BLD MANUAL: 2.2 10E3/UL (ref 0.8–5.3)
LYMPHOCYTES # BLD MANUAL: 2.2 10E3/UL (ref 0.8–5.3)
LYMPHOCYTES # BLD MANUAL: 2.7 10E3/UL (ref 0.8–5.3)
LYMPHOCYTES # BLD MANUAL: 2.8 10E3/UL (ref 0.8–5.3)
LYMPHOCYTES # BLD MANUAL: 3.7 10E3/UL (ref 0.8–5.3)
LYMPHOCYTES # BLD MANUAL: 3.8 10E3/UL (ref 0.8–5.3)
LYMPHOCYTES # BLD MANUAL: 4.1 10E3/UL (ref 0.8–5.3)
LYMPHOCYTES # BLD MANUAL: 5.2 10E3/UL (ref 0.8–5.3)
LYMPHOCYTES # BLD MANUAL: 6.2 10E3/UL (ref 0.8–5.3)
LYMPHOCYTES NFR BLD AUTO: 10 %
LYMPHOCYTES NFR BLD AUTO: 11 %
LYMPHOCYTES NFR BLD AUTO: 12 %
LYMPHOCYTES NFR BLD AUTO: 12 %
LYMPHOCYTES NFR BLD AUTO: 8 %
LYMPHOCYTES NFR BLD AUTO: 9 %
LYMPHOCYTES NFR BLD AUTO: ABNORMAL %
LYMPHOCYTES NFR BLD MANUAL: 0 %
LYMPHOCYTES NFR BLD MANUAL: 10 %
LYMPHOCYTES NFR BLD MANUAL: 12 %
LYMPHOCYTES NFR BLD MANUAL: 12 %
LYMPHOCYTES NFR BLD MANUAL: 15 %
LYMPHOCYTES NFR BLD MANUAL: 15 %
LYMPHOCYTES NFR BLD MANUAL: 2 %
LYMPHOCYTES NFR BLD MANUAL: 20 %
LYMPHOCYTES NFR BLD MANUAL: 3 %
LYMPHOCYTES NFR BLD MANUAL: 3 %
LYMPHOCYTES NFR BLD MANUAL: 4 %
LYMPHOCYTES NFR BLD MANUAL: 9 %
M PNEUMO DNA SPEC QL NAA+PROBE: NOT DETECTED
MAGNESIUM SERPL-MCNC: 1.8 MG/DL (ref 1.7–2.3)
MAGNESIUM SERPL-MCNC: 2 MG/DL (ref 1.7–2.3)
MAGNESIUM SERPL-MCNC: 2.1 MG/DL (ref 1.7–2.3)
MAGNESIUM SERPL-MCNC: 2.1 MG/DL (ref 1.7–2.3)
MAGNESIUM SERPL-MCNC: 2.2 MG/DL (ref 1.7–2.3)
MAGNESIUM SERPL-MCNC: 2.3 MG/DL (ref 1.7–2.3)
MAGNESIUM SERPL-MCNC: 2.4 MG/DL (ref 1.7–2.3)
MAGNESIUM SERPL-MCNC: 2.5 MG/DL (ref 1.7–2.3)
MAGNESIUM SERPL-MCNC: 2.6 MG/DL (ref 1.7–2.3)
MAGNESIUM SERPL-MCNC: 2.7 MG/DL (ref 1.7–2.3)
MCF P HPASE BLD TEG: 70.2 MM (ref 50–70)
MCF P HPASE BLD TEG: 72.2 MM (ref 50–70)
MCH RBC QN AUTO: 29.9 PG (ref 26.5–33)
MCH RBC QN AUTO: 30 PG (ref 26.5–33)
MCH RBC QN AUTO: 30.3 PG (ref 26.5–33)
MCH RBC QN AUTO: 30.5 PG (ref 26.5–33)
MCH RBC QN AUTO: 30.5 PG (ref 26.5–33)
MCH RBC QN AUTO: 30.8 PG (ref 26.5–33)
MCH RBC QN AUTO: 30.9 PG (ref 26.5–33)
MCH RBC QN AUTO: 30.9 PG (ref 26.5–33)
MCH RBC QN AUTO: 31 PG (ref 26.5–33)
MCH RBC QN AUTO: 31.1 PG (ref 26.5–33)
MCH RBC QN AUTO: 31.2 PG (ref 26.5–33)
MCH RBC QN AUTO: 31.2 PG (ref 26.5–33)
MCH RBC QN AUTO: 31.3 PG (ref 26.5–33)
MCH RBC QN AUTO: 31.6 PG (ref 26.5–33)
MCH RBC QN AUTO: 31.6 PG (ref 26.5–33)
MCH RBC QN AUTO: 31.7 PG (ref 26.5–33)
MCH RBC QN AUTO: 31.8 PG (ref 26.5–33)
MCH RBC QN AUTO: 31.8 PG (ref 26.5–33)
MCH RBC QN AUTO: 32 PG (ref 26.5–33)
MCH RBC QN AUTO: 32.1 PG (ref 26.5–33)
MCH RBC QN AUTO: 32.2 PG (ref 26.5–33)
MCH RBC QN AUTO: 32.3 PG (ref 26.5–33)
MCH RBC QN AUTO: 32.4 PG (ref 26.5–33)
MCH RBC QN AUTO: 32.6 PG (ref 26.5–33)
MCH RBC QN AUTO: 32.7 PG (ref 26.5–33)
MCH RBC QN AUTO: 33 PG (ref 26.5–33)
MCH RBC QN AUTO: 33 PG (ref 26.5–33)
MCH RBC QN AUTO: 33.1 PG (ref 26.5–33)
MCH RBC QN AUTO: 33.2 PG (ref 26.5–33)
MCH RBC QN AUTO: 33.2 PG (ref 26.5–33)
MCH RBC QN AUTO: 33.3 PG (ref 26.5–33)
MCH RBC QN AUTO: 33.3 PG (ref 26.5–33)
MCH RBC QN AUTO: 33.6 PG (ref 26.5–33)
MCH RBC QN AUTO: 33.6 PG (ref 26.5–33)
MCH RBC QN AUTO: ABNORMAL PG
MCHC RBC AUTO-ENTMCNC: 32.2 G/DL (ref 31.5–36.5)
MCHC RBC AUTO-ENTMCNC: 32.3 G/DL (ref 31.5–36.5)
MCHC RBC AUTO-ENTMCNC: 32.3 G/DL (ref 31.5–36.5)
MCHC RBC AUTO-ENTMCNC: 32.5 G/DL (ref 31.5–36.5)
MCHC RBC AUTO-ENTMCNC: 32.6 G/DL (ref 31.5–36.5)
MCHC RBC AUTO-ENTMCNC: 32.7 G/DL (ref 31.5–36.5)
MCHC RBC AUTO-ENTMCNC: 32.8 G/DL (ref 31.5–36.5)
MCHC RBC AUTO-ENTMCNC: 32.9 G/DL (ref 31.5–36.5)
MCHC RBC AUTO-ENTMCNC: 33 G/DL (ref 31.5–36.5)
MCHC RBC AUTO-ENTMCNC: 33.1 G/DL (ref 31.5–36.5)
MCHC RBC AUTO-ENTMCNC: 33.3 G/DL (ref 31.5–36.5)
MCHC RBC AUTO-ENTMCNC: 33.3 G/DL (ref 31.5–36.5)
MCHC RBC AUTO-ENTMCNC: 33.4 G/DL (ref 31.5–36.5)
MCHC RBC AUTO-ENTMCNC: 33.4 G/DL (ref 31.5–36.5)
MCHC RBC AUTO-ENTMCNC: 33.5 G/DL (ref 31.5–36.5)
MCHC RBC AUTO-ENTMCNC: 33.6 G/DL (ref 31.5–36.5)
MCHC RBC AUTO-ENTMCNC: 33.6 G/DL (ref 31.5–36.5)
MCHC RBC AUTO-ENTMCNC: 33.7 G/DL (ref 31.5–36.5)
MCHC RBC AUTO-ENTMCNC: 33.8 G/DL (ref 31.5–36.5)
MCHC RBC AUTO-ENTMCNC: 34 G/DL (ref 31.5–36.5)
MCHC RBC AUTO-ENTMCNC: 34 G/DL (ref 31.5–36.5)
MCHC RBC AUTO-ENTMCNC: 34.1 G/DL (ref 31.5–36.5)
MCHC RBC AUTO-ENTMCNC: 34.1 G/DL (ref 31.5–36.5)
MCHC RBC AUTO-ENTMCNC: 34.2 G/DL (ref 31.5–36.5)
MCHC RBC AUTO-ENTMCNC: 34.2 G/DL (ref 31.5–36.5)
MCHC RBC AUTO-ENTMCNC: 34.4 G/DL (ref 31.5–36.5)
MCHC RBC AUTO-ENTMCNC: 34.4 G/DL (ref 31.5–36.5)
MCHC RBC AUTO-ENTMCNC: 34.5 G/DL (ref 31.5–36.5)
MCHC RBC AUTO-ENTMCNC: 34.5 G/DL (ref 31.5–36.5)
MCHC RBC AUTO-ENTMCNC: 34.7 G/DL (ref 31.5–36.5)
MCHC RBC AUTO-ENTMCNC: 34.8 G/DL (ref 31.5–36.5)
MCHC RBC AUTO-ENTMCNC: 35.5 G/DL (ref 31.5–36.5)
MCHC RBC AUTO-ENTMCNC: 35.7 G/DL (ref 31.5–36.5)
MCHC RBC AUTO-ENTMCNC: 36.8 G/DL (ref 31.5–36.5)
MCHC RBC AUTO-ENTMCNC: 37.4 G/DL (ref 31.5–36.5)
MCHC RBC AUTO-ENTMCNC: ABNORMAL G/DL
MCV RBC AUTO: 100 FL (ref 78–100)
MCV RBC AUTO: 84 FL (ref 78–100)
MCV RBC AUTO: 85 FL (ref 78–100)
MCV RBC AUTO: 86 FL (ref 78–100)
MCV RBC AUTO: 87 FL (ref 78–100)
MCV RBC AUTO: 90 FL (ref 78–100)
MCV RBC AUTO: 91 FL (ref 78–100)
MCV RBC AUTO: 91 FL (ref 78–100)
MCV RBC AUTO: 92 FL (ref 78–100)
MCV RBC AUTO: 92 FL (ref 78–100)
MCV RBC AUTO: 93 FL (ref 78–100)
MCV RBC AUTO: 94 FL (ref 78–100)
MCV RBC AUTO: 95 FL (ref 78–100)
MCV RBC AUTO: 95 FL (ref 78–100)
MCV RBC AUTO: 96 FL (ref 78–100)
MCV RBC AUTO: 97 FL (ref 78–100)
MCV RBC AUTO: 97 FL (ref 78–100)
MCV RBC AUTO: 98 FL (ref 78–100)
MCV RBC AUTO: 99 FL (ref 78–100)
METAMYELOCYTES # BLD MANUAL: 0.2 10E3/UL
METAMYELOCYTES # BLD MANUAL: 0.3 10E3/UL
METAMYELOCYTES # BLD MANUAL: 0.4 10E3/UL
METAMYELOCYTES # BLD MANUAL: 0.5 10E3/UL
METAMYELOCYTES # BLD MANUAL: 0.5 10E3/UL
METAMYELOCYTES # BLD MANUAL: 0.8 10E3/UL
METAMYELOCYTES # BLD MANUAL: 2.4 10E3/UL
METAMYELOCYTES # BLD MANUAL: 2.9 10E3/UL
METAMYELOCYTES # BLD MANUAL: 4.2 10E3/UL
METAMYELOCYTES NFR BLD MANUAL: 1 %
METAMYELOCYTES NFR BLD MANUAL: 2 %
METAMYELOCYTES NFR BLD MANUAL: 2 %
METAMYELOCYTES NFR BLD MANUAL: 3 %
METAMYELOCYTES NFR BLD MANUAL: 7 %
METAMYELOCYTES NFR BLD MANUAL: 8 %
METAMYELOCYTES NFR BLD MANUAL: 8 %
METHGB MFR BLD: 0.1 % (ref 0–3)
MONOCYTES # BLD AUTO: 1.2 10E3/UL (ref 0–1.3)
MONOCYTES # BLD AUTO: 1.2 10E3/UL (ref 0–1.3)
MONOCYTES # BLD AUTO: 1.4 10E3/UL (ref 0–1.3)
MONOCYTES # BLD AUTO: 1.4 10E3/UL (ref 0–1.3)
MONOCYTES # BLD AUTO: 1.5 10E3/UL (ref 0–1.3)
MONOCYTES # BLD AUTO: 1.8 10E3/UL (ref 0–1.3)
MONOCYTES # BLD AUTO: ABNORMAL 10*3/UL
MONOCYTES # BLD MANUAL: 0 10E3/UL (ref 0–1.3)
MONOCYTES # BLD MANUAL: 0.3 10E3/UL (ref 0–1.3)
MONOCYTES # BLD MANUAL: 0.6 10E3/UL (ref 0–1.3)
MONOCYTES # BLD MANUAL: 0.7 10E3/UL (ref 0–1.3)
MONOCYTES # BLD MANUAL: 0.8 10E3/UL (ref 0–1.3)
MONOCYTES # BLD MANUAL: 1 10E3/UL (ref 0–1.3)
MONOCYTES # BLD MANUAL: 1.2 10E3/UL (ref 0–1.3)
MONOCYTES # BLD MANUAL: 1.3 10E3/UL (ref 0–1.3)
MONOCYTES # BLD MANUAL: 1.4 10E3/UL (ref 0–1.3)
MONOCYTES # BLD MANUAL: 1.5 10E3/UL (ref 0–1.3)
MONOCYTES # BLD MANUAL: 1.9 10E3/UL (ref 0–1.3)
MONOCYTES # BLD MANUAL: 2 10E3/UL (ref 0–1.3)
MONOCYTES # BLD MANUAL: 2.2 10E3/UL (ref 0–1.3)
MONOCYTES # BLD MANUAL: 3.2 10E3/UL (ref 0–1.3)
MONOCYTES # BLD MANUAL: 4.1 10E3/UL (ref 0–1.3)
MONOCYTES NFR BLD AUTO: 7 %
MONOCYTES NFR BLD AUTO: 7 %
MONOCYTES NFR BLD AUTO: 8 %
MONOCYTES NFR BLD AUTO: 9 %
MONOCYTES NFR BLD AUTO: ABNORMAL %
MONOCYTES NFR BLD MANUAL: 0 %
MONOCYTES NFR BLD MANUAL: 1 %
MONOCYTES NFR BLD MANUAL: 10 %
MONOCYTES NFR BLD MANUAL: 3 %
MONOCYTES NFR BLD MANUAL: 4 %
MONOCYTES NFR BLD MANUAL: 5 %
MONOCYTES NFR BLD MANUAL: 5 %
MONOCYTES NFR BLD MANUAL: 6 %
MONOCYTES NFR BLD MANUAL: 7 %
MONOCYTES NFR BLD MANUAL: 7 %
MONOCYTES NFR BLD MANUAL: 8 %
MRSA DNA SPEC QL NAA+PROBE: NEGATIVE
MRSA DNA SPEC QL NAA+PROBE: NEGATIVE
MUCOUS THREADS #/AREA URNS LPF: PRESENT /LPF
MYELOCYTES # BLD MANUAL: 0.2 10E3/UL
MYELOCYTES # BLD MANUAL: 0.2 10E3/UL
MYELOCYTES # BLD MANUAL: 0.3 10E3/UL
MYELOCYTES # BLD MANUAL: 0.3 10E3/UL
MYELOCYTES # BLD MANUAL: 0.6 10E3/UL
MYELOCYTES # BLD MANUAL: 0.8 10E3/UL
MYELOCYTES # BLD MANUAL: 1.6 10E3/UL
MYELOCYTES # BLD MANUAL: 2.6 10E3/UL
MYELOCYTES # BLD MANUAL: 4.3 10E3/UL
MYELOCYTES # BLD MANUAL: 4.7 10E3/UL
MYELOCYTES # BLD MANUAL: 7 10E3/UL
MYELOCYTES NFR BLD MANUAL: 1 %
MYELOCYTES NFR BLD MANUAL: 14 %
MYELOCYTES NFR BLD MANUAL: 17 %
MYELOCYTES NFR BLD MANUAL: 2 %
MYELOCYTES NFR BLD MANUAL: 2 %
MYELOCYTES NFR BLD MANUAL: 3 %
MYELOCYTES NFR BLD MANUAL: 5 %
MYELOCYTES NFR BLD MANUAL: 6 %
MYELOCYTES NFR BLD MANUAL: 9 %
NDM TARGET DNA: NOT DETECTED
NEUTROPHILS # BLD AUTO: 12.7 10E3/UL (ref 1.6–8.3)
NEUTROPHILS # BLD AUTO: 12.9 10E3/UL (ref 1.6–8.3)
NEUTROPHILS # BLD AUTO: 12.9 10E3/UL (ref 1.6–8.3)
NEUTROPHILS # BLD AUTO: 13 10E3/UL (ref 1.6–8.3)
NEUTROPHILS # BLD AUTO: 13.1 10E3/UL (ref 1.6–8.3)
NEUTROPHILS # BLD AUTO: 17.1 10E3/UL (ref 1.6–8.3)
NEUTROPHILS # BLD AUTO: ABNORMAL 10*3/UL
NEUTROPHILS # BLD MANUAL: 14.4 10E3/UL (ref 1.6–8.3)
NEUTROPHILS # BLD MANUAL: 15 10E3/UL (ref 1.6–8.3)
NEUTROPHILS # BLD MANUAL: 18.9 10E3/UL (ref 1.6–8.3)
NEUTROPHILS # BLD MANUAL: 18.9 10E3/UL (ref 1.6–8.3)
NEUTROPHILS # BLD MANUAL: 19.2 10E3/UL (ref 1.6–8.3)
NEUTROPHILS # BLD MANUAL: 19.4 10E3/UL (ref 1.6–8.3)
NEUTROPHILS # BLD MANUAL: 19.8 10E3/UL (ref 1.6–8.3)
NEUTROPHILS # BLD MANUAL: 20.4 10E3/UL (ref 1.6–8.3)
NEUTROPHILS # BLD MANUAL: 20.4 10E3/UL (ref 1.6–8.3)
NEUTROPHILS # BLD MANUAL: 22.2 10E3/UL (ref 1.6–8.3)
NEUTROPHILS # BLD MANUAL: 22.2 10E3/UL (ref 1.6–8.3)
NEUTROPHILS # BLD MANUAL: 22.9 10E3/UL (ref 1.6–8.3)
NEUTROPHILS # BLD MANUAL: 24.3 10E3/UL (ref 1.6–8.3)
NEUTROPHILS # BLD MANUAL: 25.3 10E3/UL (ref 1.6–8.3)
NEUTROPHILS # BLD MANUAL: 25.9 10E3/UL (ref 1.6–8.3)
NEUTROPHILS # BLD MANUAL: 38.9 10E3/UL (ref 1.6–8.3)
NEUTROPHILS # BLD MANUAL: 39.5 10E3/UL (ref 1.6–8.3)
NEUTROPHILS NFR BLD AUTO: 73 %
NEUTROPHILS NFR BLD AUTO: 74 %
NEUTROPHILS NFR BLD AUTO: 74 %
NEUTROPHILS NFR BLD AUTO: 77 %
NEUTROPHILS NFR BLD AUTO: 78 %
NEUTROPHILS NFR BLD AUTO: 78 %
NEUTROPHILS NFR BLD AUTO: ABNORMAL %
NEUTROPHILS NFR BLD MANUAL: 54 %
NEUTROPHILS NFR BLD MANUAL: 65 %
NEUTROPHILS NFR BLD MANUAL: 72 %
NEUTROPHILS NFR BLD MANUAL: 74 %
NEUTROPHILS NFR BLD MANUAL: 75 %
NEUTROPHILS NFR BLD MANUAL: 76 %
NEUTROPHILS NFR BLD MANUAL: 77 %
NEUTROPHILS NFR BLD MANUAL: 77 %
NEUTROPHILS NFR BLD MANUAL: 80 %
NEUTROPHILS NFR BLD MANUAL: 84 %
NEUTROPHILS NFR BLD MANUAL: 88 %
NEUTROPHILS NFR BLD MANUAL: 89 %
NEUTROPHILS NFR BLD MANUAL: 90 %
NEUTROPHILS NFR BLD MANUAL: 92 %
NEUTROPHILS NFR BLD MANUAL: 93 %
NEUTS HYPERSEG BLD QL SMEAR: ABNORMAL
NITRATE UR QL: NEGATIVE
NRBC # BLD AUTO: 0 10E3/UL
NRBC # BLD AUTO: 0.1 10E3/UL
NRBC # BLD AUTO: 0.2 10E3/UL
NRBC # BLD AUTO: 0.3 10E3/UL
NRBC # BLD AUTO: 0.3 10E3/UL
NRBC # BLD AUTO: 0.5 10E3/UL
NRBC # BLD AUTO: 0.6 10E3/UL
NRBC # BLD AUTO: 0.8 10E3/UL
NRBC # BLD AUTO: 0.9 10E3/UL
NRBC # BLD AUTO: 1 10E3/UL
NRBC # BLD AUTO: 11.9 10E3/UL
NRBC # BLD AUTO: 2.2 10E3/UL
NRBC # BLD AUTO: 2.5 10E3/UL
NRBC # BLD AUTO: 3.3 10E3/UL
NRBC # BLD AUTO: 4.4 10E3/UL
NRBC # BLD AUTO: 5 10E3/UL
NRBC # BLD AUTO: 6.7 10E3/UL
NRBC # BLD AUTO: 8 10E3/UL
NRBC # BLD AUTO: 8.4 10E3/UL
NRBC # BLD AUTO: 9.2 10E3/UL
NRBC # BLD AUTO: 9.5 10E3/UL
NRBC BLD AUTO-RTO: 0 /100
NRBC BLD AUTO-RTO: 1 /100
NRBC BLD AUTO-RTO: 16 /100
NRBC BLD AUTO-RTO: 17 /100
NRBC BLD AUTO-RTO: 17 /100
NRBC BLD AUTO-RTO: 18 /100
NRBC BLD AUTO-RTO: 2 /100
NRBC BLD AUTO-RTO: 20 /100
NRBC BLD AUTO-RTO: 3 /100
NRBC BLD AUTO-RTO: 8 /100
NRBC BLD MANUAL-RTO: 1 %
NRBC BLD MANUAL-RTO: 11 %
NRBC BLD MANUAL-RTO: 13 %
NRBC BLD MANUAL-RTO: 18 %
NRBC BLD MANUAL-RTO: 2 %
NRBC BLD MANUAL-RTO: 29 %
NRBC BLD MANUAL-RTO: 3 %
NRBC BLD MANUAL-RTO: 4 %
NRBC BLD MANUAL-RTO: 9 %
NSE SERPL IA-MCNC: 20 NG/ML
NSE SERPL IA-MCNC: 24.8 NG/ML
NSE SERPL IA-MCNC: 33.4 NG/ML
NT-PROBNP SERPL-MCNC: 3980 PG/ML (ref 0–900)
O2/TOTAL GAS SETTING VFR VENT: 100 %
O2/TOTAL GAS SETTING VFR VENT: 40 %
O2/TOTAL GAS SETTING VFR VENT: 45 %
O2/TOTAL GAS SETTING VFR VENT: 50 %
O2/TOTAL GAS SETTING VFR VENT: 60 %
O2/TOTAL GAS SETTING VFR VENT: 70 %
O2/TOTAL GAS SETTING VFR VENT: 75 %
O2/TOTAL GAS SETTING VFR VENT: 80 %
O2/TOTAL GAS SETTING VFR VENT: 85 %
O2/TOTAL GAS SETTING VFR VENT: 90 %
O2/TOTAL GAS SETTING VFR VENT: 92 %
OPIATES UR QL SCN: ABNORMAL
OXYHGB MFR BLDA: 100 % (ref 75–100)
OXYHGB MFR BLDA: 88 % (ref 75–100)
OXYHGB MFR BLDA: 88 % (ref 75–100)
OXYHGB MFR BLDA: 89 % (ref 75–100)
OXYHGB MFR BLDA: 91 % (ref 75–100)
OXYHGB MFR BLDA: 94 % (ref 75–100)
OXYHGB MFR BLDA: 95 % (ref 75–100)
OXYHGB MFR BLDA: 95 % (ref 92–100)
OXYHGB MFR BLDA: 96 % (ref 75–100)
OXYHGB MFR BLDA: 96 % (ref 75–100)
OXYHGB MFR BLDA: 96 % (ref 92–100)
OXYHGB MFR BLDA: 96 % (ref 92–100)
OXYHGB MFR BLDA: 97 % (ref 92–100)
OXYHGB MFR BLDA: 98 % (ref 75–100)
OXYHGB MFR BLDA: 98 % (ref 75–100)
OXYHGB MFR BLDA: 98 % (ref 92–100)
OXYHGB MFR BLDA: 99 % (ref 75–100)
OXYHGB MFR BLDV: 40 %
OXYHGB MFR BLDV: 42 %
OXYHGB MFR BLDV: 43 %
OXYHGB MFR BLDV: 45 %
OXYHGB MFR BLDV: 48 %
OXYHGB MFR BLDV: 48 %
OXYHGB MFR BLDV: 48 % (ref 70–75)
OXYHGB MFR BLDV: 49 % (ref 70–75)
OXYHGB MFR BLDV: 50 %
OXYHGB MFR BLDV: 53 %
OXYHGB MFR BLDV: 54 %
OXYHGB MFR BLDV: 55 % (ref 70–75)
OXYHGB MFR BLDV: 57 %
OXYHGB MFR BLDV: 57 % (ref 70–75)
OXYHGB MFR BLDV: 58 %
OXYHGB MFR BLDV: 58 % (ref 70–75)
OXYHGB MFR BLDV: 60 % (ref 70–75)
OXYHGB MFR BLDV: 61 % (ref 70–75)
OXYHGB MFR BLDV: 62 % (ref 70–75)
OXYHGB MFR BLDV: 64 % (ref 70–75)
OXYHGB MFR BLDV: 64 % (ref 70–75)
OXYHGB MFR BLDV: 65 %
OXYHGB MFR BLDV: 65 %
OXYHGB MFR BLDV: 66 % (ref 70–75)
OXYHGB MFR BLDV: 68 %
OXYHGB MFR BLDV: 69 %
OXYHGB MFR BLDV: 69 %
OXYHGB MFR BLDV: 70 % (ref 70–75)
OXYHGB MFR BLDV: 71 %
OXYHGB MFR BLDV: 71 %
OXYHGB MFR BLDV: 71 % (ref 70–75)
OXYHGB MFR BLDV: 74 %
OXYHGB MFR BLDV: 76 % (ref 70–75)
OXYHGB MFR BLDV: 88 %
OXYHGB MFR BLDV: 89 % (ref 70–75)
P AXIS - MUSE: 44 DEGREES
P AXIS - MUSE: 44 DEGREES
P AXIS - MUSE: 50 DEGREES
P AXIS - MUSE: 51 DEGREES
P AXIS - MUSE: 53 DEGREES
P AXIS - MUSE: 54 DEGREES
P AXIS - MUSE: 64 DEGREES
P AXIS - MUSE: 65 DEGREES
P AXIS - MUSE: NORMAL DEGREES
PA AA BLD-ACNC: 45 %
PA ADP BLD-ACNC: 28 %
PATH REPORT.COMMENTS IMP SPEC: NORMAL
PATH REPORT.COMMENTS IMP SPEC: NORMAL
PATH REPORT.FINAL DX SPEC: NORMAL
PATH REPORT.GROSS SPEC: NORMAL
PATH REPORT.MICROSCOPIC SPEC OTHER STN: NORMAL
PATH REPORT.RELEVANT HX SPEC: NORMAL
PCO2 BLD: 31 MM HG (ref 35–45)
PCO2 BLD: 32 MM HG (ref 35–45)
PCO2 BLD: 34 MM HG (ref 35–45)
PCO2 BLD: 35 MM HG (ref 35–45)
PCO2 BLD: 36 MM HG (ref 35–45)
PCO2 BLD: 37 MM HG (ref 35–45)
PCO2 BLD: 38 MM HG (ref 35–45)
PCO2 BLD: 39 MM HG (ref 35–45)
PCO2 BLD: 40 MM HG (ref 35–45)
PCO2 BLD: 41 MM HG (ref 35–45)
PCO2 BLD: 42 MM HG (ref 35–45)
PCO2 BLD: 43 MM HG (ref 35–45)
PCO2 BLD: 44 MM HG (ref 35–45)
PCO2 BLD: 45 MM HG (ref 35–45)
PCO2 BLD: 46 MM HG (ref 35–45)
PCO2 BLD: 47 MM HG (ref 35–45)
PCO2 BLD: 48 MM HG (ref 35–45)
PCO2 BLD: 49 MM HG (ref 35–45)
PCO2 BLD: 50 MM HG (ref 35–45)
PCO2 BLD: 50 MM HG (ref 35–45)
PCO2 BLD: 51 MM HG (ref 35–45)
PCO2 BLD: 51 MM HG (ref 35–45)
PCO2 BLD: 52 MM HG (ref 35–45)
PCO2 BLD: 53 MM HG (ref 35–45)
PCO2 BLD: 53 MM HG (ref 35–45)
PCO2 BLD: 54 MM HG (ref 35–45)
PCO2 BLD: 55 MM HG (ref 35–45)
PCO2 BLD: 58 MM HG (ref 35–45)
PCO2 BLDA: 34 MM HG (ref 35–45)
PCO2 BLDA: 36 MM HG (ref 35–45)
PCO2 BLDA: 37 MM HG (ref 35–45)
PCO2 BLDA: 37 MM HG (ref 35–45)
PCO2 BLDA: 38 MM HG (ref 35–45)
PCO2 BLDA: 38 MM HG (ref 35–45)
PCO2 BLDA: 39 MM HG (ref 35–45)
PCO2 BLDA: 39 MM HG (ref 35–45)
PCO2 BLDA: 40 MM HG (ref 35–45)
PCO2 BLDA: 40 MM HG (ref 35–45)
PCO2 BLDA: 41 MM HG (ref 35–45)
PCO2 BLDA: 42 MM HG (ref 35–45)
PCO2 BLDA: 43 MM HG (ref 35–45)
PCO2 BLDA: 44 MM HG (ref 35–45)
PCO2 BLDA: 44 MM HG (ref 35–45)
PCO2 BLDA: 45 MM HG (ref 35–45)
PCO2 BLDA: 45 MM HG (ref 35–45)
PCO2 BLDA: 47 MM HG (ref 35–45)
PCO2 BLDA: 49 MM HG (ref 35–45)
PCO2 BLDA: 50 MM HG (ref 35–45)
PCO2 BLDA: 51 MM HG (ref 35–45)
PCO2 BLDA: 51 MM HG (ref 35–45)
PCO2 BLDA: 54 MM HG (ref 35–45)
PCO2 BLDA: 59 MM HG (ref 35–45)
PCO2 BLDA: 59 MM HG (ref 35–45)
PCO2 BLDA: 65 MM HG (ref 35–45)
PCO2 BLDA: 67 MM HG (ref 35–45)
PCO2 BLDA: 69 MM HG (ref 35–45)
PCO2 BLDV: 40 MM HG (ref 40–50)
PCO2 BLDV: 42 MM HG (ref 40–50)
PCO2 BLDV: 42 MM HG (ref 40–50)
PCO2 BLDV: 43 MM HG (ref 40–50)
PCO2 BLDV: 44 MM HG (ref 40–50)
PCO2 BLDV: 45 MM HG (ref 40–50)
PCO2 BLDV: 46 MM HG (ref 40–50)
PCO2 BLDV: 46 MM HG (ref 40–50)
PCO2 BLDV: 47 MM HG (ref 40–50)
PCO2 BLDV: 48 MM HG (ref 40–50)
PCO2 BLDV: 49 MM HG (ref 40–50)
PCO2 BLDV: 50 MM HG (ref 40–50)
PCO2 BLDV: 52 MM HG (ref 40–50)
PCO2 BLDV: 53 MM HG (ref 40–50)
PCO2 BLDV: 55 MM HG (ref 40–50)
PCO2 BLDV: 55 MM HG (ref 40–50)
PCO2 BLDV: 57 MM HG (ref 40–50)
PCO2 BLDV: 57 MM HG (ref 40–50)
PCO2 BLDV: 58 MM HG (ref 40–50)
PCO2 BLDV: 60 MM HG (ref 40–50)
PCO2 BLDV: 61 MM HG (ref 40–50)
PCO2 BLDV: 64 MM HG (ref 40–50)
PCO2 BLDV: 67 MM HG (ref 40–50)
PCO2 BLDV: 70 MM HG (ref 40–50)
PCO2 BLDV: 71 MM HG (ref 40–50)
PCO2 BLDV: 71 MM HG (ref 40–50)
PCP QUAL URINE (ROCHE): ABNORMAL
PEEP: 10 CM H2O
PEEP: 12 CM H2O
PEEP: 14 CM H2O
PEEP: 14 CM H2O
PEEP: 5 CM H2O
PEEP: 8 CM H2O
PF4 HEPARIN CMPLX AB SER QL: POSITIVE
PH BLD: 7.28 [PH] (ref 7.35–7.45)
PH BLD: 7.29 [PH] (ref 7.35–7.45)
PH BLD: 7.29 [PH] (ref 7.35–7.45)
PH BLD: 7.3 [PH] (ref 7.35–7.45)
PH BLD: 7.3 [PH] (ref 7.35–7.45)
PH BLD: 7.31 [PH] (ref 7.35–7.45)
PH BLD: 7.32 [PH] (ref 7.35–7.45)
PH BLD: 7.33 [PH] (ref 7.35–7.45)
PH BLD: 7.34 [PH] (ref 7.35–7.45)
PH BLD: 7.35 [PH] (ref 7.35–7.45)
PH BLD: 7.36 [PH] (ref 7.35–7.45)
PH BLD: 7.37 [PH] (ref 7.35–7.45)
PH BLD: 7.38 [PH] (ref 7.35–7.45)
PH BLD: 7.39 [PH] (ref 7.35–7.45)
PH BLD: 7.4 [PH] (ref 7.35–7.45)
PH BLD: 7.41 [PH] (ref 7.35–7.45)
PH BLD: 7.42 [PH] (ref 7.35–7.45)
PH BLD: 7.43 [PH] (ref 7.35–7.45)
PH BLD: 7.44 [PH] (ref 7.35–7.45)
PH BLD: 7.45 [PH] (ref 7.35–7.45)
PH BLD: 7.46 [PH] (ref 7.35–7.45)
PH BLD: 7.47 [PH] (ref 7.35–7.45)
PH BLD: 7.47 [PH] (ref 7.35–7.45)
PH BLD: 7.48 [PH] (ref 7.35–7.45)
PH BLD: 7.49 [PH] (ref 7.35–7.45)
PH BLD: 7.5 [PH] (ref 7.35–7.45)
PH BLD: 7.5 [PH] (ref 7.35–7.45)
PH BLD: 7.51 [PH] (ref 7.35–7.45)
PH BLD: 7.52 [PH] (ref 7.35–7.45)
PH BLD: 7.53 [PH] (ref 7.35–7.45)
PH BLD: 7.55 [PH] (ref 7.35–7.45)
PH BLDA: 7.29 [PH] (ref 7.35–7.45)
PH BLDA: 7.3 [PH] (ref 7.35–7.45)
PH BLDA: 7.31 [PH] (ref 7.35–7.45)
PH BLDA: 7.31 [PH] (ref 7.35–7.45)
PH BLDA: 7.32 [PH] (ref 7.35–7.45)
PH BLDA: 7.33 [PH] (ref 7.35–7.45)
PH BLDA: 7.34 [PH] (ref 7.35–7.45)
PH BLDA: 7.35 [PH] (ref 7.35–7.45)
PH BLDA: 7.35 [PH] (ref 7.35–7.45)
PH BLDA: 7.36 [PH] (ref 7.35–7.45)
PH BLDA: 7.36 [PH] (ref 7.35–7.45)
PH BLDA: 7.37 [PH] (ref 7.35–7.45)
PH BLDA: 7.37 [PH] (ref 7.35–7.45)
PH BLDA: 7.38 [PH] (ref 7.35–7.45)
PH BLDA: 7.39 [PH] (ref 7.35–7.45)
PH BLDA: 7.4 [PH] (ref 7.35–7.45)
PH BLDA: 7.4 [PH] (ref 7.35–7.45)
PH BLDA: 7.41 [PH] (ref 7.35–7.45)
PH BLDA: 7.43 [PH] (ref 7.35–7.45)
PH BLDA: 7.43 [PH] (ref 7.35–7.45)
PH BLDA: 7.45 [PH] (ref 7.35–7.45)
PH BLDA: 7.45 [PH] (ref 7.35–7.45)
PH BLDA: 7.47 [PH] (ref 7.35–7.45)
PH BLDA: 7.47 [PH] (ref 7.35–7.45)
PH BLDA: 7.48 [PH] (ref 7.35–7.45)
PH BLDA: 7.48 [PH] (ref 7.35–7.45)
PH BLDA: 7.49 [PH] (ref 7.35–7.45)
PH BLDA: 7.53 [PH] (ref 7.35–7.45)
PH BLDA: 7.53 [PH] (ref 7.35–7.45)
PH BLDV: 7.24 [PH] (ref 7.32–7.43)
PH BLDV: 7.24 [PH] (ref 7.32–7.43)
PH BLDV: 7.25 [PH] (ref 7.32–7.43)
PH BLDV: 7.26 [PH] (ref 7.32–7.43)
PH BLDV: 7.27 [PH] (ref 7.32–7.43)
PH BLDV: 7.29 [PH] (ref 7.32–7.43)
PH BLDV: 7.3 [PH] (ref 7.32–7.43)
PH BLDV: 7.31 [PH] (ref 7.32–7.43)
PH BLDV: 7.32 [PH] (ref 7.32–7.43)
PH BLDV: 7.32 [PH] (ref 7.32–7.43)
PH BLDV: 7.33 [PH] (ref 7.32–7.43)
PH BLDV: 7.33 [PH] (ref 7.32–7.43)
PH BLDV: 7.34 [PH] (ref 7.32–7.43)
PH BLDV: 7.35 [PH] (ref 7.32–7.43)
PH BLDV: 7.35 [PH] (ref 7.32–7.43)
PH BLDV: 7.36 [PH] (ref 7.32–7.43)
PH BLDV: 7.37 [PH] (ref 7.32–7.43)
PH BLDV: 7.38 [PH] (ref 7.32–7.43)
PH BLDV: 7.39 [PH] (ref 7.32–7.43)
PH BLDV: 7.41 [PH] (ref 7.32–7.43)
PH BLDV: 7.41 [PH] (ref 7.32–7.43)
PH BLDV: 7.42 [PH] (ref 7.32–7.43)
PH BLDV: 7.43 [PH] (ref 7.32–7.43)
PH BLDV: 7.44 [PH] (ref 7.32–7.43)
PH BLDV: 7.44 [PH] (ref 7.32–7.43)
PH BLDV: 7.47 [PH] (ref 7.32–7.43)
PH BLDV: 7.48 [PH] (ref 7.32–7.43)
PH UR STRIP: 5 [PH] (ref 5–7)
PHOSPHATE SERPL-MCNC: 1.6 MG/DL (ref 2.5–4.5)
PHOSPHATE SERPL-MCNC: 2.1 MG/DL (ref 2.5–4.5)
PHOSPHATE SERPL-MCNC: 2.2 MG/DL (ref 2.5–4.5)
PHOSPHATE SERPL-MCNC: 2.3 MG/DL (ref 2.5–4.5)
PHOSPHATE SERPL-MCNC: 2.4 MG/DL (ref 2.5–4.5)
PHOSPHATE SERPL-MCNC: 2.5 MG/DL (ref 2.5–4.5)
PHOSPHATE SERPL-MCNC: 2.6 MG/DL (ref 2.5–4.5)
PHOSPHATE SERPL-MCNC: 2.6 MG/DL (ref 2.5–4.5)
PHOSPHATE SERPL-MCNC: 2.7 MG/DL (ref 2.5–4.5)
PHOSPHATE SERPL-MCNC: 2.7 MG/DL (ref 2.5–4.5)
PHOSPHATE SERPL-MCNC: 2.9 MG/DL (ref 2.5–4.5)
PHOSPHATE SERPL-MCNC: 2.9 MG/DL (ref 2.5–4.5)
PHOSPHATE SERPL-MCNC: 3 MG/DL (ref 2.5–4.5)
PHOSPHATE SERPL-MCNC: 3.2 MG/DL (ref 2.5–4.5)
PHOSPHATE SERPL-MCNC: 3.4 MG/DL (ref 2.5–4.5)
PHOSPHATE SERPL-MCNC: 3.6 MG/DL (ref 2.5–4.5)
PHOSPHATE SERPL-MCNC: 3.7 MG/DL (ref 2.5–4.5)
PHOSPHATE SERPL-MCNC: 3.8 MG/DL (ref 2.5–4.5)
PHOSPHATE SERPL-MCNC: 3.9 MG/DL (ref 2.5–4.5)
PHOSPHATE SERPL-MCNC: 3.9 MG/DL (ref 2.5–4.5)
PHOSPHATE SERPL-MCNC: 4 MG/DL (ref 2.5–4.5)
PHOSPHATE SERPL-MCNC: 4 MG/DL (ref 2.5–4.5)
PHOSPHATE SERPL-MCNC: 4.2 MG/DL (ref 2.5–4.5)
PHOSPHATE SERPL-MCNC: 4.2 MG/DL (ref 2.5–4.5)
PHOSPHATE SERPL-MCNC: 4.3 MG/DL (ref 2.5–4.5)
PHOSPHATE SERPL-MCNC: 4.3 MG/DL (ref 2.5–4.5)
PHOSPHATE SERPL-MCNC: 4.6 MG/DL (ref 2.5–4.5)
PHOSPHATE SERPL-MCNC: 4.7 MG/DL (ref 2.5–4.5)
PHOSPHATE SERPL-MCNC: 4.8 MG/DL (ref 2.5–4.5)
PHOSPHATE SERPL-MCNC: ABNORMAL MG/DL
PHOSPHATE SERPL-MCNC: NORMAL MG/DL
PHOTO IMAGE: NORMAL
PLAT MORPH BLD: ABNORMAL
PLATELET # BLD AUTO: 105 10E3/UL (ref 150–450)
PLATELET # BLD AUTO: 114 10E3/UL (ref 150–450)
PLATELET # BLD AUTO: 123 10E3/UL (ref 150–450)
PLATELET # BLD AUTO: 130 10E3/UL (ref 150–450)
PLATELET # BLD AUTO: 134 10E3/UL (ref 150–450)
PLATELET # BLD AUTO: 141 10E3/UL (ref 150–450)
PLATELET # BLD AUTO: 147 10E3/UL (ref 150–450)
PLATELET # BLD AUTO: 155 10E3/UL (ref 150–450)
PLATELET # BLD AUTO: 166 10E3/UL (ref 150–450)
PLATELET # BLD AUTO: 172 10E3/UL (ref 150–450)
PLATELET # BLD AUTO: 184 10E3/UL (ref 150–450)
PLATELET # BLD AUTO: 195 10E3/UL (ref 150–450)
PLATELET # BLD AUTO: 219 10E3/UL (ref 150–450)
PLATELET # BLD AUTO: 256 10E3/UL (ref 150–450)
PLATELET # BLD AUTO: 285 10E3/UL (ref 150–450)
PLATELET # BLD AUTO: 295 10E3/UL (ref 150–450)
PLATELET # BLD AUTO: 336 10E3/UL (ref 150–450)
PLATELET # BLD AUTO: 389 10E3/UL (ref 150–450)
PLATELET # BLD AUTO: 39 10E3/UL (ref 150–450)
PLATELET # BLD AUTO: 48 10E3/UL (ref 150–450)
PLATELET # BLD AUTO: 481 10E3/UL (ref 150–450)
PLATELET # BLD AUTO: 487 10E3/UL (ref 150–450)
PLATELET # BLD AUTO: 49 10E3/UL (ref 150–450)
PLATELET # BLD AUTO: 491 10E3/UL (ref 150–450)
PLATELET # BLD AUTO: 499 10E3/UL (ref 150–450)
PLATELET # BLD AUTO: 50 10E3/UL (ref 150–450)
PLATELET # BLD AUTO: 51 10E3/UL (ref 150–450)
PLATELET # BLD AUTO: 510 10E3/UL (ref 150–450)
PLATELET # BLD AUTO: 510 10E3/UL (ref 150–450)
PLATELET # BLD AUTO: 513 10E3/UL (ref 150–450)
PLATELET # BLD AUTO: 57 10E3/UL (ref 150–450)
PLATELET # BLD AUTO: 57 10E3/UL (ref 150–450)
PLATELET # BLD AUTO: 59 10E3/UL (ref 150–450)
PLATELET # BLD AUTO: 59 10E3/UL (ref 150–450)
PLATELET # BLD AUTO: 63 10E3/UL (ref 150–450)
PLATELET # BLD AUTO: 64 10E3/UL (ref 150–450)
PLATELET # BLD AUTO: 67 10E3/UL (ref 150–450)
PLATELET # BLD AUTO: 67 10E3/UL (ref 150–450)
PLATELET # BLD AUTO: 71 10E3/UL (ref 150–450)
PLATELET # BLD AUTO: 73 10E3/UL (ref 150–450)
PLATELET # BLD AUTO: 73 10E3/UL (ref 150–450)
PLATELET # BLD AUTO: 75 10E3/UL (ref 150–450)
PLATELET # BLD AUTO: 75 10E3/UL (ref 150–450)
PLATELET # BLD AUTO: 77 10E3/UL (ref 150–450)
PLATELET # BLD AUTO: 80 10E3/UL (ref 150–450)
PLATELET # BLD AUTO: 88 10E3/UL (ref 150–450)
PO2 BLD: 102 MM HG (ref 80–105)
PO2 BLD: 104 MM HG (ref 80–105)
PO2 BLD: 105 MM HG (ref 80–105)
PO2 BLD: 106 MM HG (ref 80–105)
PO2 BLD: 106 MM HG (ref 80–105)
PO2 BLD: 107 MM HG (ref 80–105)
PO2 BLD: 109 MM HG (ref 80–105)
PO2 BLD: 110 MM HG (ref 80–105)
PO2 BLD: 112 MM HG (ref 80–105)
PO2 BLD: 112 MM HG (ref 80–105)
PO2 BLD: 116 MM HG (ref 80–105)
PO2 BLD: 118 MM HG (ref 80–105)
PO2 BLD: 119 MM HG (ref 80–105)
PO2 BLD: 120 MM HG (ref 80–105)
PO2 BLD: 121 MM HG (ref 80–105)
PO2 BLD: 124 MM HG (ref 80–105)
PO2 BLD: 125 MM HG (ref 80–105)
PO2 BLD: 128 MM HG (ref 80–105)
PO2 BLD: 130 MM HG (ref 80–105)
PO2 BLD: 131 MM HG (ref 80–105)
PO2 BLD: 131 MM HG (ref 80–105)
PO2 BLD: 132 MM HG (ref 80–105)
PO2 BLD: 135 MM HG (ref 80–105)
PO2 BLD: 137 MM HG (ref 80–105)
PO2 BLD: 138 MM HG (ref 80–105)
PO2 BLD: 138 MM HG (ref 80–105)
PO2 BLD: 139 MM HG (ref 80–105)
PO2 BLD: 144 MM HG (ref 80–105)
PO2 BLD: 144 MM HG (ref 80–105)
PO2 BLD: 146 MM HG (ref 80–105)
PO2 BLD: 152 MM HG (ref 80–105)
PO2 BLD: 157 MM HG (ref 80–105)
PO2 BLD: 165 MM HG (ref 80–105)
PO2 BLD: 168 MM HG (ref 80–105)
PO2 BLD: 173 MM HG (ref 80–105)
PO2 BLD: 177 MM HG (ref 80–105)
PO2 BLD: 179 MM HG (ref 80–105)
PO2 BLD: 190 MM HG (ref 80–105)
PO2 BLD: 203 MM HG (ref 80–105)
PO2 BLD: 206 MM HG (ref 80–105)
PO2 BLD: 211 MM HG (ref 80–105)
PO2 BLD: 218 MM HG (ref 80–105)
PO2 BLD: 220 MM HG (ref 80–105)
PO2 BLD: 224 MM HG (ref 80–105)
PO2 BLD: 224 MM HG (ref 80–105)
PO2 BLD: 227 MM HG (ref 80–105)
PO2 BLD: 227 MM HG (ref 80–105)
PO2 BLD: 229 MM HG (ref 80–105)
PO2 BLD: 231 MM HG (ref 80–105)
PO2 BLD: 235 MM HG (ref 80–105)
PO2 BLD: 238 MM HG (ref 80–105)
PO2 BLD: 239 MM HG (ref 80–105)
PO2 BLD: 244 MM HG (ref 80–105)
PO2 BLD: 244 MM HG (ref 80–105)
PO2 BLD: 245 MM HG (ref 80–105)
PO2 BLD: 246 MM HG (ref 80–105)
PO2 BLD: 248 MM HG (ref 80–105)
PO2 BLD: 249 MM HG (ref 80–105)
PO2 BLD: 249 MM HG (ref 80–105)
PO2 BLD: 252 MM HG (ref 80–105)
PO2 BLD: 253 MM HG (ref 80–105)
PO2 BLD: 255 MM HG (ref 80–105)
PO2 BLD: 256 MM HG (ref 80–105)
PO2 BLD: 264 MM HG (ref 80–105)
PO2 BLD: 269 MM HG (ref 80–105)
PO2 BLD: 271 MM HG (ref 80–105)
PO2 BLD: 279 MM HG (ref 80–105)
PO2 BLD: 281 MM HG (ref 80–105)
PO2 BLD: 283 MM HG (ref 80–105)
PO2 BLD: 298 MM HG (ref 80–105)
PO2 BLD: 299 MM HG (ref 80–105)
PO2 BLD: 304 MM HG (ref 80–105)
PO2 BLD: 305 MM HG (ref 80–105)
PO2 BLD: 314 MM HG (ref 80–105)
PO2 BLD: 320 MM HG (ref 80–105)
PO2 BLD: 321 MM HG (ref 80–105)
PO2 BLD: 335 MM HG (ref 80–105)
PO2 BLD: 374 MM HG (ref 80–105)
PO2 BLD: 376 MM HG (ref 80–105)
PO2 BLD: 51 MM HG (ref 80–105)
PO2 BLD: 55 MM HG (ref 80–105)
PO2 BLD: 59 MM HG (ref 80–105)
PO2 BLD: 61 MM HG (ref 80–105)
PO2 BLD: 64 MM HG (ref 80–105)
PO2 BLD: 67 MM HG (ref 80–105)
PO2 BLD: 68 MM HG (ref 80–105)
PO2 BLD: 70 MM HG (ref 80–105)
PO2 BLD: 70 MM HG (ref 80–105)
PO2 BLD: 72 MM HG (ref 80–105)
PO2 BLD: 74 MM HG (ref 80–105)
PO2 BLD: 77 MM HG (ref 80–105)
PO2 BLD: 79 MM HG (ref 80–105)
PO2 BLD: 81 MM HG (ref 80–105)
PO2 BLD: 81 MM HG (ref 80–105)
PO2 BLD: 82 MM HG (ref 80–105)
PO2 BLD: 83 MM HG (ref 80–105)
PO2 BLD: 85 MM HG (ref 80–105)
PO2 BLD: 85 MM HG (ref 80–105)
PO2 BLD: 87 MM HG (ref 80–105)
PO2 BLD: 87 MM HG (ref 80–105)
PO2 BLD: 88 MM HG (ref 80–105)
PO2 BLD: 89 MM HG (ref 80–105)
PO2 BLD: 91 MM HG (ref 80–105)
PO2 BLD: 91 MM HG (ref 80–105)
PO2 BLD: 94 MM HG (ref 80–105)
PO2 BLD: 94 MM HG (ref 80–105)
PO2 BLD: 96 MM HG (ref 80–105)
PO2 BLD: 98 MM HG (ref 80–105)
PO2 BLD: 99 MM HG (ref 80–105)
PO2 BLDA: 130 MM HG (ref 80–105)
PO2 BLDA: 134 MM HG (ref 80–105)
PO2 BLDA: 137 MM HG (ref 80–105)
PO2 BLDA: 141 MM HG (ref 80–105)
PO2 BLDA: 194 MM HG (ref 80–105)
PO2 BLDA: 195 MM HG (ref 80–105)
PO2 BLDA: 196 MM HG (ref 80–105)
PO2 BLDA: 206 MM HG (ref 80–105)
PO2 BLDA: 225 MM HG (ref 80–105)
PO2 BLDA: 231 MM HG (ref 80–105)
PO2 BLDA: 232 MM HG (ref 80–105)
PO2 BLDA: 235 MM HG (ref 80–105)
PO2 BLDA: 239 MM HG (ref 80–105)
PO2 BLDA: 263 MM HG (ref 80–105)
PO2 BLDA: 281 MM HG (ref 80–105)
PO2 BLDA: 284 MM HG (ref 80–105)
PO2 BLDA: 297 MM HG (ref 80–105)
PO2 BLDA: 298 MM HG (ref 80–105)
PO2 BLDA: 299 MM HG (ref 80–105)
PO2 BLDA: 300 MM HG (ref 80–105)
PO2 BLDA: 310 MM HG (ref 80–105)
PO2 BLDA: 321 MM HG (ref 80–105)
PO2 BLDA: 339 MM HG (ref 80–105)
PO2 BLDA: 341 MM HG (ref 80–105)
PO2 BLDA: 343 MM HG (ref 80–105)
PO2 BLDA: 352 MM HG (ref 80–105)
PO2 BLDA: 357 MM HG (ref 80–105)
PO2 BLDA: 357 MM HG (ref 80–105)
PO2 BLDA: 372 MM HG (ref 80–105)
PO2 BLDA: 394 MM HG (ref 80–105)
PO2 BLDA: 415 MM HG (ref 80–105)
PO2 BLDA: 417 MM HG (ref 80–105)
PO2 BLDA: 438 MM HG (ref 80–105)
PO2 BLDA: 447 MM HG (ref 80–105)
PO2 BLDA: 466 MM HG (ref 80–105)
PO2 BLDA: 483 MM HG (ref 80–105)
PO2 BLDA: 498 MM HG (ref 80–105)
PO2 BLDA: 68 MM HG (ref 80–105)
PO2 BLDA: 99 MM HG (ref 80–105)
PO2 BLDV: 101 MM HG (ref 25–47)
PO2 BLDV: 23 MM HG (ref 25–47)
PO2 BLDV: 25 MM HG (ref 25–47)
PO2 BLDV: 25 MM HG (ref 25–47)
PO2 BLDV: 26 MM HG (ref 25–47)
PO2 BLDV: 26 MM HG (ref 25–47)
PO2 BLDV: 27 MM HG (ref 25–47)
PO2 BLDV: 28 MM HG (ref 25–47)
PO2 BLDV: 29 MM HG (ref 25–47)
PO2 BLDV: 29 MM HG (ref 25–47)
PO2 BLDV: 30 MM HG (ref 25–47)
PO2 BLDV: 31 MM HG (ref 25–47)
PO2 BLDV: 31 MM HG (ref 25–47)
PO2 BLDV: 32 MM HG (ref 25–47)
PO2 BLDV: 33 MM HG (ref 25–47)
PO2 BLDV: 34 MM HG (ref 25–47)
PO2 BLDV: 37 MM HG (ref 25–47)
PO2 BLDV: 37 MM HG (ref 25–47)
PO2 BLDV: 38 MM HG (ref 25–47)
PO2 BLDV: 39 MM HG (ref 25–47)
PO2 BLDV: 41 MM HG (ref 25–47)
PO2 BLDV: 42 MM HG (ref 25–47)
PO2 BLDV: 43 MM HG (ref 25–47)
PO2 BLDV: 44 MM HG (ref 25–47)
PO2 BLDV: 44 MM HG (ref 25–47)
PO2 BLDV: 45 MM HG (ref 25–47)
PO2 BLDV: 45 MM HG (ref 25–47)
PO2 BLDV: 58 MM HG (ref 25–47)
PO2 BLDV: 61 MM HG (ref 25–47)
POLYCHROMASIA BLD QL SMEAR: ABNORMAL
POLYCHROMASIA BLD QL SMEAR: SLIGHT
POTASSIUM BLD-SCNC: 3.2 MMOL/L (ref 3.4–5.3)
POTASSIUM BLD-SCNC: 3.3 MMOL/L (ref 3.4–5.3)
POTASSIUM BLD-SCNC: 3.6 MMOL/L (ref 3.4–5.3)
POTASSIUM BLD-SCNC: 3.6 MMOL/L (ref 3.4–5.3)
POTASSIUM BLD-SCNC: 3.8 MMOL/L (ref 3.4–5.3)
POTASSIUM BLD-SCNC: 4 MMOL/L (ref 3.4–5.3)
POTASSIUM BLD-SCNC: 4 MMOL/L (ref 3.4–5.3)
POTASSIUM BLD-SCNC: 4.1 MMOL/L (ref 3.4–5.3)
POTASSIUM BLD-SCNC: 4.2 MMOL/L (ref 3.4–5.3)
POTASSIUM BLD-SCNC: 4.3 MMOL/L (ref 3.4–5.3)
POTASSIUM BLD-SCNC: 4.6 MMOL/L (ref 3.4–5.3)
POTASSIUM BLD-SCNC: 4.6 MMOL/L (ref 3.4–5.3)
POTASSIUM BLD-SCNC: 4.7 MMOL/L (ref 3.4–5.3)
POTASSIUM BLD-SCNC: 4.7 MMOL/L (ref 3.4–5.3)
POTASSIUM BLD-SCNC: 4.8 MMOL/L (ref 3.4–5.3)
POTASSIUM BLD-SCNC: 4.9 MMOL/L (ref 3.4–5.3)
POTASSIUM BLD-SCNC: 5 MMOL/L (ref 3.4–5.3)
POTASSIUM BLD-SCNC: 5.2 MMOL/L (ref 3.4–5.3)
POTASSIUM BLD-SCNC: 5.3 MMOL/L (ref 3.4–5.3)
POTASSIUM BLD-SCNC: 5.3 MMOL/L (ref 3.4–5.3)
POTASSIUM BLD-SCNC: 5.5 MMOL/L (ref 3.4–5.3)
POTASSIUM SERPL-SCNC: 3 MMOL/L (ref 3.4–5.3)
POTASSIUM SERPL-SCNC: 3.4 MMOL/L (ref 3.4–5.3)
POTASSIUM SERPL-SCNC: 3.7 MMOL/L (ref 3.4–5.3)
POTASSIUM SERPL-SCNC: 3.8 MMOL/L (ref 3.4–5.3)
POTASSIUM SERPL-SCNC: 3.9 MMOL/L (ref 3.4–5.3)
POTASSIUM SERPL-SCNC: 4 MMOL/L (ref 3.4–5.3)
POTASSIUM SERPL-SCNC: 4.1 MMOL/L (ref 3.4–5.3)
POTASSIUM SERPL-SCNC: 4.2 MMOL/L (ref 3.4–5.3)
POTASSIUM SERPL-SCNC: 4.3 MMOL/L (ref 3.4–5.3)
POTASSIUM SERPL-SCNC: 4.4 MMOL/L (ref 3.4–5.3)
POTASSIUM SERPL-SCNC: 4.5 MMOL/L (ref 3.4–5.3)
POTASSIUM SERPL-SCNC: 4.6 MMOL/L (ref 3.4–5.3)
POTASSIUM SERPL-SCNC: 4.7 MMOL/L (ref 3.4–5.3)
POTASSIUM SERPL-SCNC: 4.8 MMOL/L (ref 3.4–5.3)
POTASSIUM SERPL-SCNC: 4.9 MMOL/L (ref 3.4–5.3)
POTASSIUM SERPL-SCNC: 4.9 MMOL/L (ref 3.4–5.3)
POTASSIUM SERPL-SCNC: 5 MMOL/L (ref 3.4–5.3)
POTASSIUM SERPL-SCNC: 5.1 MMOL/L (ref 3.4–5.3)
POTASSIUM SERPL-SCNC: 5.2 MMOL/L (ref 3.4–5.3)
POTASSIUM SERPL-SCNC: 5.4 MMOL/L (ref 3.4–5.3)
POTASSIUM SERPL-SCNC: 5.5 MMOL/L (ref 3.4–5.3)
POTASSIUM SERPL-SCNC: 5.5 MMOL/L (ref 3.4–5.3)
PR INTERVAL - MUSE: 172 MS
PR INTERVAL - MUSE: 174 MS
PR INTERVAL - MUSE: 180 MS
PR INTERVAL - MUSE: 180 MS
PR INTERVAL - MUSE: 184 MS
PR INTERVAL - MUSE: 186 MS
PR INTERVAL - MUSE: 192 MS
PR INTERVAL - MUSE: 192 MS
PR INTERVAL - MUSE: NORMAL MS
PREALB SERPL-MCNC: 6.2 MG/DL (ref 20–40)
PROCALCITONIN SERPL IA-MCNC: 0.09 NG/ML
PROCALCITONIN SERPL IA-MCNC: 0.12 NG/ML
PROCALCITONIN SERPL IA-MCNC: 0.17 NG/ML
PROMYELOCYTES # BLD MANUAL: 0.2 10E3/UL
PROMYELOCYTES # BLD MANUAL: 0.3 10E3/UL
PROMYELOCYTES # BLD MANUAL: 0.3 10E3/UL
PROMYELOCYTES # BLD MANUAL: 0.4 10E3/UL
PROMYELOCYTES # BLD MANUAL: 1 10E3/UL
PROMYELOCYTES NFR BLD MANUAL: 1 %
PROMYELOCYTES NFR BLD MANUAL: 2 %
PROT SERPL-MCNC: 3.2 G/DL (ref 6.4–8.3)
PROT SERPL-MCNC: 3.7 G/DL (ref 6.4–8.3)
PROT SERPL-MCNC: 3.9 G/DL (ref 6.4–8.3)
PROT SERPL-MCNC: 4.1 G/DL (ref 6.4–8.3)
PROT SERPL-MCNC: 4.2 G/DL (ref 6.4–8.3)
PROT SERPL-MCNC: 4.3 G/DL (ref 6.4–8.3)
PROT SERPL-MCNC: 4.3 G/DL (ref 6.4–8.3)
PROT SERPL-MCNC: 4.4 G/DL (ref 6.4–8.3)
PROT SERPL-MCNC: 4.5 G/DL (ref 6.4–8.3)
PROT SERPL-MCNC: 4.6 G/DL (ref 6.4–8.3)
PROT SERPL-MCNC: 4.6 G/DL (ref 6.4–8.3)
PROT SERPL-MCNC: 4.7 G/DL (ref 6.4–8.3)
PROT SERPL-MCNC: 4.8 G/DL (ref 6.4–8.3)
PROT SERPL-MCNC: 5.1 G/DL (ref 6.4–8.3)
PROT SERPL-MCNC: 5.1 G/DL (ref 6.4–8.3)
PROT SERPL-MCNC: 5.2 G/DL (ref 6.4–8.3)
PROT SERPL-MCNC: 5.3 G/DL (ref 6.4–8.3)
PROT SERPL-MCNC: 5.3 G/DL (ref 6.4–8.3)
PROT SERPL-MCNC: 5.4 G/DL (ref 6.4–8.3)
PROT SERPL-MCNC: 5.6 G/DL (ref 6.4–8.3)
PROT SERPL-MCNC: 5.6 G/DL (ref 6.4–8.3)
PROT SERPL-MCNC: 5.7 G/DL (ref 6.4–8.3)
PROT SERPL-MCNC: 5.7 G/DL (ref 6.4–8.3)
PROT SERPL-MCNC: 5.9 G/DL (ref 6.4–8.3)
PROT SERPL-MCNC: 5.9 G/DL (ref 6.4–8.3)
PROT SERPL-MCNC: 6 G/DL (ref 6.4–8.3)
PROT SERPL-MCNC: 6.1 G/DL (ref 6.4–8.3)
PROT SERPL-MCNC: 6.2 G/DL (ref 6.4–8.3)
PROT SERPL-MCNC: 6.3 G/DL (ref 6.4–8.3)
PROT SERPL-MCNC: 6.3 G/DL (ref 6.4–8.3)
PROT SERPL-MCNC: 6.4 G/DL (ref 6.4–8.3)
PROT SERPL-MCNC: ABNORMAL G/DL
QRS DURATION - MUSE: 102 MS
QRS DURATION - MUSE: 102 MS
QRS DURATION - MUSE: 122 MS
QRS DURATION - MUSE: 150 MS
QRS DURATION - MUSE: 160 MS
QRS DURATION - MUSE: 86 MS
QRS DURATION - MUSE: 86 MS
QRS DURATION - MUSE: 88 MS
QRS DURATION - MUSE: 88 MS
QRS DURATION - MUSE: 96 MS
QRS DURATION - MUSE: 98 MS
QT - MUSE: 316 MS
QT - MUSE: 336 MS
QT - MUSE: 388 MS
QT - MUSE: 410 MS
QT - MUSE: 422 MS
QT - MUSE: 518 MS
QT - MUSE: 520 MS
QT - MUSE: 524 MS
QT - MUSE: 528 MS
QT - MUSE: 532 MS
QT - MUSE: 622 MS
QTC - MUSE: 440 MS
QTC - MUSE: 458 MS
QTC - MUSE: 466 MS
QTC - MUSE: 523 MS
QTC - MUSE: 527 MS
QTC - MUSE: 536 MS
QTC - MUSE: 557 MS
QTC - MUSE: 570 MS
QTC - MUSE: 597 MS
QTC - MUSE: 605 MS
QTC - MUSE: 608 MS
R AXIS - MUSE: -47 DEGREES
R AXIS - MUSE: -47 DEGREES
R AXIS - MUSE: -48 DEGREES
R AXIS - MUSE: -51 DEGREES
R AXIS - MUSE: -52 DEGREES
R AXIS - MUSE: -52 DEGREES
R AXIS - MUSE: -64 DEGREES
R AXIS - MUSE: -65 DEGREES
R AXIS - MUSE: -71 DEGREES
R AXIS - MUSE: 104 DEGREES
R AXIS - MUSE: 79 DEGREES
RADIOLOGIST FLAGS: ABNORMAL
RADIOLOGIST FLAGS: ABNORMAL
RBC # BLD AUTO: 2.17 10E6/UL (ref 4.4–5.9)
RBC # BLD AUTO: 2.28 10E6/UL (ref 4.4–5.9)
RBC # BLD AUTO: 2.33 10E6/UL (ref 4.4–5.9)
RBC # BLD AUTO: 2.36 10E6/UL (ref 4.4–5.9)
RBC # BLD AUTO: 2.37 10E6/UL (ref 4.4–5.9)
RBC # BLD AUTO: 2.37 10E6/UL (ref 4.4–5.9)
RBC # BLD AUTO: 2.42 10E6/UL (ref 4.4–5.9)
RBC # BLD AUTO: 2.43 10E6/UL (ref 4.4–5.9)
RBC # BLD AUTO: 2.46 10E6/UL (ref 4.4–5.9)
RBC # BLD AUTO: 2.47 10E6/UL (ref 4.4–5.9)
RBC # BLD AUTO: 2.47 10E6/UL (ref 4.4–5.9)
RBC # BLD AUTO: 2.48 10E6/UL (ref 4.4–5.9)
RBC # BLD AUTO: 2.49 10E6/UL (ref 4.4–5.9)
RBC # BLD AUTO: 2.49 10E6/UL (ref 4.4–5.9)
RBC # BLD AUTO: 2.5 10E6/UL (ref 4.4–5.9)
RBC # BLD AUTO: 2.53 10E6/UL (ref 4.4–5.9)
RBC # BLD AUTO: 2.53 10E6/UL (ref 4.4–5.9)
RBC # BLD AUTO: 2.59 10E6/UL (ref 4.4–5.9)
RBC # BLD AUTO: 2.62 10E6/UL (ref 4.4–5.9)
RBC # BLD AUTO: 2.64 10E6/UL (ref 4.4–5.9)
RBC # BLD AUTO: 2.67 10E6/UL (ref 4.4–5.9)
RBC # BLD AUTO: 2.69 10E6/UL (ref 4.4–5.9)
RBC # BLD AUTO: 2.78 10E6/UL (ref 4.4–5.9)
RBC # BLD AUTO: 2.81 10E6/UL (ref 4.4–5.9)
RBC # BLD AUTO: 2.81 10E6/UL (ref 4.4–5.9)
RBC # BLD AUTO: 2.82 10E6/UL (ref 4.4–5.9)
RBC # BLD AUTO: 2.85 10E6/UL (ref 4.4–5.9)
RBC # BLD AUTO: 3.18 10E6/UL (ref 4.4–5.9)
RBC # BLD AUTO: 3.18 10E6/UL (ref 4.4–5.9)
RBC # BLD AUTO: 3.22 10E6/UL (ref 4.4–5.9)
RBC # BLD AUTO: 3.31 10E6/UL (ref 4.4–5.9)
RBC # BLD AUTO: 3.31 10E6/UL (ref 4.4–5.9)
RBC # BLD AUTO: 3.33 10E6/UL (ref 4.4–5.9)
RBC # BLD AUTO: 3.33 10E6/UL (ref 4.4–5.9)
RBC # BLD AUTO: 3.34 10E6/UL (ref 4.4–5.9)
RBC # BLD AUTO: 3.39 10E6/UL (ref 4.4–5.9)
RBC # BLD AUTO: 3.43 10E6/UL (ref 4.4–5.9)
RBC # BLD AUTO: 3.48 10E6/UL (ref 4.4–5.9)
RBC # BLD AUTO: 3.59 10E6/UL (ref 4.4–5.9)
RBC # BLD AUTO: 3.76 10E6/UL (ref 4.4–5.9)
RBC # BLD AUTO: 3.84 10E6/UL (ref 4.4–5.9)
RBC # BLD AUTO: 3.94 10E6/UL (ref 4.4–5.9)
RBC # BLD AUTO: 3.98 10E6/UL (ref 4.4–5.9)
RBC # BLD AUTO: 4.02 10E6/UL (ref 4.4–5.9)
RBC # BLD AUTO: 4.09 10E6/UL (ref 4.4–5.9)
RBC # BLD AUTO: 4.09 10E6/UL (ref 4.4–5.9)
RBC AGGLUT BLD QL: ABNORMAL
RBC MORPH BLD: ABNORMAL
RBC URINE: 4 /HPF
RBC URINE: 88 /HPF
RBC URINE: >182 /HPF
ROULEAUX BLD QL SMEAR: ABNORMAL
RSV RNA SPEC QL NAA+PROBE: NOT DETECTED
RSV RNA SPEC QL NAA+PROBE: NOT DETECTED
RV+EV RNA SPEC QL NAA+PROBE: NOT DETECTED
S100 CA BINDING PROTEIN B SER-MCNC: 44 NG/L
S100 CA BINDING PROTEIN B SER-MCNC: 61 NG/L
S100 CA BINDING PROTEIN B SER-MCNC: 67 NG/L
SA TARGET DNA: NEGATIVE
SA TARGET DNA: NEGATIVE
SAO2 % BLDA: 100 % (ref 92–100)
SAO2 % BLDA: 100 % (ref 96–97)
SAO2 % BLDA: 84 % (ref 92–100)
SAO2 % BLDA: 85 % (ref 92–100)
SAO2 % BLDA: 86 % (ref 92–100)
SAO2 % BLDA: 88 % (ref 92–100)
SAO2 % BLDA: 89 % (ref 92–100)
SAO2 % BLDA: 90 % (ref 92–100)
SAO2 % BLDA: 91 % (ref 92–100)
SAO2 % BLDA: 92 % (ref 92–100)
SAO2 % BLDA: 93 % (ref 92–100)
SAO2 % BLDA: 94 % (ref 92–100)
SAO2 % BLDA: 95 % (ref 92–100)
SAO2 % BLDA: 95 % (ref 96–97)
SAO2 % BLDA: 96 % (ref 92–100)
SAO2 % BLDA: 97 % (ref 92–100)
SAO2 % BLDA: 98 % (ref 92–100)
SAO2 % BLDA: 99 % (ref 92–100)
SAO2 % BLDA: 99.7 % (ref 96–97)
SAO2 % BLDA: >100 % (ref 96–97)
SAO2 % BLDV: 31 % (ref 70–75)
SAO2 % BLDV: 46.3 % (ref 70–75)
SAO2 % BLDV: 46.9 % (ref 70–75)
SAO2 % BLDV: 48.3 % (ref 70–75)
SAO2 % BLDV: 48.8 % (ref 70–75)
SAO2 % BLDV: 49.5 % (ref 70–75)
SAO2 % BLDV: 49.7 % (ref 70–75)
SAO2 % BLDV: 51.1 % (ref 70–75)
SAO2 % BLDV: 52.5 % (ref 70–75)
SAO2 % BLDV: 52.8 % (ref 70–75)
SAO2 % BLDV: 55.5 % (ref 70–75)
SAO2 % BLDV: 56 % (ref 70–75)
SAO2 % BLDV: 58.2 % (ref 70–75)
SAO2 % BLDV: 58.5 % (ref 70–75)
SAO2 % BLDV: 59.8 % (ref 70–75)
SAO2 % BLDV: 60.5 % (ref 70–75)
SAO2 % BLDV: 60.9 % (ref 70–75)
SAO2 % BLDV: 61.1 % (ref 70–75)
SAO2 % BLDV: 62 % (ref 70–75)
SAO2 % BLDV: 62.9 % (ref 70–75)
SAO2 % BLDV: 65.8 % (ref 70–75)
SAO2 % BLDV: 66.6 % (ref 70–75)
SAO2 % BLDV: 67 % (ref 70–75)
SAO2 % BLDV: 67 % (ref 70–75)
SAO2 % BLDV: 67.5 % (ref 70–75)
SAO2 % BLDV: 69.1 % (ref 70–75)
SAO2 % BLDV: 69.8 % (ref 70–75)
SAO2 % BLDV: 70.6 % (ref 70–75)
SAO2 % BLDV: 70.9 % (ref 70–75)
SAO2 % BLDV: 71.7 % (ref 70–75)
SAO2 % BLDV: 72 % (ref 70–75)
SAO2 % BLDV: 72.5 % (ref 70–75)
SAO2 % BLDV: 75.6 % (ref 70–75)
SAO2 % BLDV: 77 % (ref 70–75)
SAO2 % BLDV: 89.3 % (ref 70–75)
SAO2 % BLDV: 97 % (ref 70–75)
SARS-COV-2 RNA RESP QL NAA+PROBE: NEGATIVE
SARS-COV-2 RNA RESP QL NAA+PROBE: NEGATIVE
SICKLE CELLS BLD QL SMEAR: ABNORMAL
SMUDGE CELLS BLD QL SMEAR: ABNORMAL
SODIUM BLD-SCNC: 135 MMOL/L (ref 135–145)
SODIUM BLD-SCNC: 136 MMOL/L (ref 135–145)
SODIUM BLD-SCNC: 140 MMOL/L (ref 135–145)
SODIUM BLD-SCNC: 142 MMOL/L (ref 135–145)
SODIUM BLD-SCNC: 143 MMOL/L (ref 135–145)
SODIUM BLD-SCNC: 144 MMOL/L (ref 135–145)
SODIUM BLD-SCNC: 145 MMOL/L (ref 135–145)
SODIUM BLD-SCNC: 145 MMOL/L (ref 135–145)
SODIUM SERPL-SCNC: 135 MMOL/L (ref 135–145)
SODIUM SERPL-SCNC: 136 MMOL/L (ref 135–145)
SODIUM SERPL-SCNC: 137 MMOL/L (ref 135–145)
SODIUM SERPL-SCNC: 138 MMOL/L (ref 135–145)
SODIUM SERPL-SCNC: 139 MMOL/L (ref 135–145)
SODIUM SERPL-SCNC: 140 MMOL/L (ref 135–145)
SODIUM SERPL-SCNC: 141 MMOL/L (ref 135–145)
SODIUM SERPL-SCNC: 142 MMOL/L (ref 135–145)
SODIUM SERPL-SCNC: 143 MMOL/L (ref 135–145)
SODIUM SERPL-SCNC: 144 MMOL/L (ref 135–145)
SODIUM SERPL-SCNC: 145 MMOL/L (ref 135–145)
SODIUM SERPL-SCNC: 146 MMOL/L (ref 135–145)
SP GR UR STRIP: 1.01 (ref 1–1.03)
SP GR UR STRIP: 1.01 (ref 1–1.03)
SP GR UR STRIP: 1.03 (ref 1–1.03)
SPECIMEN EXPIRATION DATE: NORMAL
SPHEROCYTES BLD QL SMEAR: ABNORMAL
SQUAMOUS EPITHELIAL: 1 /HPF
SQUAMOUS EPITHELIAL: <1 /HPF
STOMATOCYTES BLD QL SMEAR: ABNORMAL
SYSTOLIC BLOOD PRESSURE - MUSE: NORMAL MMHG
T AXIS - MUSE: -18 DEGREES
T AXIS - MUSE: -23 DEGREES
T AXIS - MUSE: -30 DEGREES
T AXIS - MUSE: -38 DEGREES
T AXIS - MUSE: -51 DEGREES
T AXIS - MUSE: -56 DEGREES
T AXIS - MUSE: -67 DEGREES
T AXIS - MUSE: -72 DEGREES
T AXIS - MUSE: -80 DEGREES
T AXIS - MUSE: 37 DEGREES
T AXIS - MUSE: 54 DEGREES
TARGETS BLD QL SMEAR: ABNORMAL
TARGETS BLD QL SMEAR: SLIGHT
TARGETS BLD QL SMEAR: SLIGHT
TOXIC GRANULES BLD QL SMEAR: ABNORMAL
TRANSITIONAL EPI: 2 /HPF
TRANSITIONAL EPI: <1 /HPF
TRIGL SERPL-MCNC: 124 MG/DL
TRIGL SERPL-MCNC: 304 MG/DL
TROPONIN T SERPL HS-MCNC: 1002 NG/L
TROPONIN T SERPL HS-MCNC: 1329 NG/L
TROPONIN T SERPL HS-MCNC: 1386 NG/L
TROPONIN T SERPL HS-MCNC: 1422 NG/L
TROPONIN T SERPL HS-MCNC: 1606 NG/L
TROPONIN T SERPL HS-MCNC: 1637 NG/L
TROPONIN T SERPL HS-MCNC: 1657 NG/L
TROPONIN T SERPL HS-MCNC: 1688 NG/L
TROPONIN T SERPL HS-MCNC: 641 NG/L
TROPONIN T SERPL HS-MCNC: 741 NG/L
TROPONIN T SERPL HS-MCNC: 781 NG/L
TROPONIN T SERPL HS-MCNC: 784 NG/L
TROPONIN T SERPL HS-MCNC: 784 NG/L
TROPONIN T SERPL HS-MCNC: 844 NG/L
TROPONIN T SERPL HS-MCNC: 864 NG/L
TROPONIN T SERPL HS-MCNC: 866 NG/L
TROPONIN T SERPL HS-MCNC: 888 NG/L
TROPONIN T SERPL HS-MCNC: 895 NG/L
TROPONIN T SERPL HS-MCNC: 916 NG/L
TROPONIN T SERPL HS-MCNC: 919 NG/L
TROPONIN T SERPL HS-MCNC: 934 NG/L
UFH PPP CHRO-ACNC: 0.1 IU/ML
UFH PPP CHRO-ACNC: 0.14 IU/ML
UFH PPP CHRO-ACNC: 0.16 IU/ML
UFH PPP CHRO-ACNC: 0.19 IU/ML
UFH PPP CHRO-ACNC: 0.2 IU/ML
UFH PPP CHRO-ACNC: 0.3 IU/ML
UFH PPP CHRO-ACNC: 0.33 IU/ML
UFH PPP CHRO-ACNC: 0.35 IU/ML
UFH PPP CHRO-ACNC: 0.68 IU/ML
UFH PPP CHRO-ACNC: <0.1 IU/ML
UFH PPP CHRO-ACNC: >1.1 IU/ML
UNIT ABO/RH: NORMAL
UNIT NUMBER: NORMAL
UNIT STATUS: NORMAL
UNIT TYPE ISBT: 600
UNIT TYPE ISBT: 600
UNIT TYPE ISBT: 6200
UROBILINOGEN UR STRIP-MCNC: NORMAL MG/DL
VANCOMYCIN SERPL-MCNC: 18.3 UG/ML
VANCOMYCIN SERPL-MCNC: <4 UG/ML
VARIANT LYMPHS BLD QL SMEAR: ABNORMAL
VENTRICULAR RATE- MUSE: 112 BPM
VENTRICULAR RATE- MUSE: 117 BPM
VENTRICULAR RATE- MUSE: 57 BPM
VENTRICULAR RATE- MUSE: 64 BPM
VENTRICULAR RATE- MUSE: 68 BPM
VENTRICULAR RATE- MUSE: 69 BPM
VENTRICULAR RATE- MUSE: 80 BPM
VENTRICULAR RATE- MUSE: 80 BPM
VENTRICULAR RATE- MUSE: 87 BPM
VENTRICULAR RATE- MUSE: 94 BPM
VENTRICULAR RATE- MUSE: 98 BPM
WBC # BLD AUTO: 15.4 10E3/UL (ref 4–11)
WBC # BLD AUTO: 15.8 10E3/UL (ref 4–11)
WBC # BLD AUTO: 16.4 10E3/UL (ref 4–11)
WBC # BLD AUTO: 16.6 10E3/UL (ref 4–11)
WBC # BLD AUTO: 16.7 10E3/UL (ref 4–11)
WBC # BLD AUTO: 16.9 10E3/UL (ref 4–11)
WBC # BLD AUTO: 17 10E3/UL (ref 4–11)
WBC # BLD AUTO: 17.1 10E3/UL (ref 4–11)
WBC # BLD AUTO: 17.5 10E3/UL (ref 4–11)
WBC # BLD AUTO: 17.6 10E3/UL (ref 4–11)
WBC # BLD AUTO: 17.6 10E3/UL (ref 4–11)
WBC # BLD AUTO: 17.7 10E3/UL (ref 4–11)
WBC # BLD AUTO: 18 10E3/UL (ref 4–11)
WBC # BLD AUTO: 18.2 10E3/UL (ref 4–11)
WBC # BLD AUTO: 18.7 10E3/UL (ref 4–11)
WBC # BLD AUTO: 21.2 10E3/UL (ref 4–11)
WBC # BLD AUTO: 22.1 10E3/UL (ref 4–11)
WBC # BLD AUTO: 22.2 10E3/UL (ref 4–11)
WBC # BLD AUTO: 22.6 10E3/UL (ref 4–11)
WBC # BLD AUTO: 23.2 10E3/UL (ref 4–11)
WBC # BLD AUTO: 24.3 10E3/UL (ref 4–11)
WBC # BLD AUTO: 24.6 10E3/UL (ref 4–11)
WBC # BLD AUTO: 25.3 10E3/UL (ref 4–11)
WBC # BLD AUTO: 25.3 10E3/UL (ref 4–11)
WBC # BLD AUTO: 26.2 10E3/UL (ref 4–11)
WBC # BLD AUTO: 26.3 10E3/UL (ref 4–11)
WBC # BLD AUTO: 27.1 10E3/UL (ref 4–11)
WBC # BLD AUTO: 27.2 10E3/UL (ref 4–11)
WBC # BLD AUTO: 27.6 10E3/UL (ref 4–11)
WBC # BLD AUTO: 27.7 10E3/UL (ref 4–11)
WBC # BLD AUTO: 27.7 10E3/UL (ref 4–11)
WBC # BLD AUTO: 27.8 10E3/UL (ref 4–11)
WBC # BLD AUTO: 27.8 10E3/UL (ref 4–11)
WBC # BLD AUTO: 28.1 10E3/UL (ref 4–11)
WBC # BLD AUTO: 28.8 10E3/UL (ref 4–11)
WBC # BLD AUTO: 29.1 10E3/UL (ref 4–11)
WBC # BLD AUTO: 30 10E3/UL (ref 4–11)
WBC # BLD AUTO: 30.4 10E3/UL (ref 4–11)
WBC # BLD AUTO: 37 10E3/UL (ref 4–11)
WBC # BLD AUTO: 41.2 10E3/UL (ref 4–11)
WBC # BLD AUTO: 43.2 10E3/UL (ref 4–11)
WBC # BLD AUTO: 47.8 10E3/UL (ref 4–11)
WBC # BLD AUTO: 50.7 10E3/UL (ref 4–11)
WBC # BLD AUTO: 51.3 10E3/UL (ref 4–11)
WBC # BLD AUTO: 52.5 10E3/UL (ref 4–11)
WBC # BLD AUTO: 54.4 10E3/UL (ref 4–11)
WBC URINE: 4 /HPF
WBC URINE: 6 /HPF
WBC URINE: 66 /HPF

## 2024-01-01 PROCEDURE — 250N000011 HC RX IP 250 OP 636

## 2024-01-01 PROCEDURE — 82805 BLOOD GASES W/O2 SATURATION: CPT | Performed by: STUDENT IN AN ORGANIZED HEALTH CARE EDUCATION/TRAINING PROGRAM

## 2024-01-01 PROCEDURE — 250N000009 HC RX 250: Performed by: STUDENT IN AN ORGANIZED HEALTH CARE EDUCATION/TRAINING PROGRAM

## 2024-01-01 PROCEDURE — 85014 HEMATOCRIT: CPT | Performed by: STUDENT IN AN ORGANIZED HEALTH CARE EDUCATION/TRAINING PROGRAM

## 2024-01-01 PROCEDURE — 200N000002 HC R&B ICU UMMC

## 2024-01-01 PROCEDURE — 82947 ASSAY GLUCOSE BLOOD QUANT: CPT | Performed by: STUDENT IN AN ORGANIZED HEALTH CARE EDUCATION/TRAINING PROGRAM

## 2024-01-01 PROCEDURE — 85384 FIBRINOGEN ACTIVITY: CPT | Performed by: STUDENT IN AN ORGANIZED HEALTH CARE EDUCATION/TRAINING PROGRAM

## 2024-01-01 PROCEDURE — 85379 FIBRIN DEGRADATION QUANT: CPT | Performed by: STUDENT IN AN ORGANIZED HEALTH CARE EDUCATION/TRAINING PROGRAM

## 2024-01-01 PROCEDURE — 94003 VENT MGMT INPAT SUBQ DAY: CPT

## 2024-01-01 PROCEDURE — 71045 X-RAY EXAM CHEST 1 VIEW: CPT | Mod: 26 | Performed by: STUDENT IN AN ORGANIZED HEALTH CARE EDUCATION/TRAINING PROGRAM

## 2024-01-01 PROCEDURE — 83735 ASSAY OF MAGNESIUM: CPT | Performed by: STUDENT IN AN ORGANIZED HEALTH CARE EDUCATION/TRAINING PROGRAM

## 2024-01-01 PROCEDURE — 33967 INSERT I-AORT PERCUT DEVICE: CPT | Performed by: INTERNAL MEDICINE

## 2024-01-01 PROCEDURE — 250N000009 HC RX 250: Performed by: INTERNAL MEDICINE

## 2024-01-01 PROCEDURE — 87493 C DIFF AMPLIFIED PROBE: CPT

## 2024-01-01 PROCEDURE — 82330 ASSAY OF CALCIUM: CPT | Performed by: STUDENT IN AN ORGANIZED HEALTH CARE EDUCATION/TRAINING PROGRAM

## 2024-01-01 PROCEDURE — 250N000011 HC RX IP 250 OP 636: Mod: JZ | Performed by: INTERNAL MEDICINE

## 2024-01-01 PROCEDURE — 85520 HEPARIN ASSAY: CPT | Performed by: STUDENT IN AN ORGANIZED HEALTH CARE EDUCATION/TRAINING PROGRAM

## 2024-01-01 PROCEDURE — 272N000057 HC CATH BALLOON IABP

## 2024-01-01 PROCEDURE — 85396 CLOTTING ASSAY WHOLE BLOOD: CPT | Performed by: STUDENT IN AN ORGANIZED HEALTH CARE EDUCATION/TRAINING PROGRAM

## 2024-01-01 PROCEDURE — 87070 CULTURE OTHR SPECIMN AEROBIC: CPT

## 2024-01-01 PROCEDURE — 85730 THROMBOPLASTIN TIME PARTIAL: CPT | Performed by: STUDENT IN AN ORGANIZED HEALTH CARE EDUCATION/TRAINING PROGRAM

## 2024-01-01 PROCEDURE — 258N000003 HC RX IP 258 OP 636: Performed by: STUDENT IN AN ORGANIZED HEALTH CARE EDUCATION/TRAINING PROGRAM

## 2024-01-01 PROCEDURE — P9016 RBC LEUKOCYTES REDUCED: HCPCS | Performed by: STUDENT IN AN ORGANIZED HEALTH CARE EDUCATION/TRAINING PROGRAM

## 2024-01-01 PROCEDURE — 83615 LACTATE (LD) (LDH) ENZYME: CPT | Performed by: STUDENT IN AN ORGANIZED HEALTH CARE EDUCATION/TRAINING PROGRAM

## 2024-01-01 PROCEDURE — 99232 SBSQ HOSP IP/OBS MODERATE 35: CPT | Mod: 24 | Performed by: INTERNAL MEDICINE

## 2024-01-01 PROCEDURE — 85347 COAGULATION TIME ACTIVATED: CPT

## 2024-01-01 PROCEDURE — 80053 COMPREHEN METABOLIC PANEL: CPT | Performed by: STUDENT IN AN ORGANIZED HEALTH CARE EDUCATION/TRAINING PROGRAM

## 2024-01-01 PROCEDURE — 33986 ECMO/ECLS RMVL CTR CANNULA: CPT | Performed by: STUDENT IN AN ORGANIZED HEALTH CARE EDUCATION/TRAINING PROGRAM

## 2024-01-01 PROCEDURE — 84155 ASSAY OF PROTEIN SERUM: CPT | Performed by: STUDENT IN AN ORGANIZED HEALTH CARE EDUCATION/TRAINING PROGRAM

## 2024-01-01 PROCEDURE — 85027 COMPLETE CBC AUTOMATED: CPT | Performed by: STUDENT IN AN ORGANIZED HEALTH CARE EDUCATION/TRAINING PROGRAM

## 2024-01-01 PROCEDURE — 250N000009 HC RX 250

## 2024-01-01 PROCEDURE — 85300 ANTITHROMBIN III ACTIVITY: CPT | Performed by: STUDENT IN AN ORGANIZED HEALTH CARE EDUCATION/TRAINING PROGRAM

## 2024-01-01 PROCEDURE — 82247 BILIRUBIN TOTAL: CPT | Performed by: STUDENT IN AN ORGANIZED HEALTH CARE EDUCATION/TRAINING PROGRAM

## 2024-01-01 PROCEDURE — 87106 FUNGI IDENTIFICATION YEAST: CPT | Performed by: STUDENT IN AN ORGANIZED HEALTH CARE EDUCATION/TRAINING PROGRAM

## 2024-01-01 PROCEDURE — 258N000003 HC RX IP 258 OP 636

## 2024-01-01 PROCEDURE — 250N000011 HC RX IP 250 OP 636: Performed by: PHYSICIAN ASSISTANT

## 2024-01-01 PROCEDURE — 86900 BLOOD TYPING SEROLOGIC ABO: CPT | Performed by: STUDENT IN AN ORGANIZED HEALTH CARE EDUCATION/TRAINING PROGRAM

## 2024-01-01 PROCEDURE — 370N000017 HC ANESTHESIA TECHNICAL FEE, PER MIN: Performed by: STUDENT IN AN ORGANIZED HEALTH CARE EDUCATION/TRAINING PROGRAM

## 2024-01-01 PROCEDURE — 999N000157 HC STATISTIC RCP TIME EA 10 MIN

## 2024-01-01 PROCEDURE — 99291 CRITICAL CARE FIRST HOUR: CPT | Mod: 24 | Performed by: INTERNAL MEDICINE

## 2024-01-01 PROCEDURE — 33949 ECMO/ECLS DAILY MGMT ARTERY: CPT

## 2024-01-01 PROCEDURE — 272N000088 HC PUMP APP ADULT PERFUSION: Performed by: STUDENT IN AN ORGANIZED HEALTH CARE EDUCATION/TRAINING PROGRAM

## 2024-01-01 PROCEDURE — C9113 INJ PANTOPRAZOLE SODIUM, VIA: HCPCS

## 2024-01-01 PROCEDURE — 999N000065 XR CHEST PORT 1 VIEW

## 2024-01-01 PROCEDURE — 85730 THROMBOPLASTIN TIME PARTIAL: CPT | Performed by: INTERNAL MEDICINE

## 2024-01-01 PROCEDURE — C1768 GRAFT, VASCULAR: HCPCS | Performed by: STUDENT IN AN ORGANIZED HEALTH CARE EDUCATION/TRAINING PROGRAM

## 2024-01-01 PROCEDURE — 272N000001 HC OR GENERAL SUPPLY STERILE: Performed by: INTERNAL MEDICINE

## 2024-01-01 PROCEDURE — 82805 BLOOD GASES W/O2 SATURATION: CPT

## 2024-01-01 PROCEDURE — 83051 HEMOGLOBIN PLASMA: CPT | Performed by: STUDENT IN AN ORGANIZED HEALTH CARE EDUCATION/TRAINING PROGRAM

## 2024-01-01 PROCEDURE — 82330 ASSAY OF CALCIUM: CPT | Performed by: INTERNAL MEDICINE

## 2024-01-01 PROCEDURE — 93010 ELECTROCARDIOGRAM REPORT: CPT | Mod: 76 | Performed by: INTERNAL MEDICINE

## 2024-01-01 PROCEDURE — 0WU80JZ SUPPLEMENT CHEST WALL WITH SYNTHETIC SUBSTITUTE, OPEN APPROACH: ICD-10-PCS | Performed by: STUDENT IN AN ORGANIZED HEALTH CARE EDUCATION/TRAINING PROGRAM

## 2024-01-01 PROCEDURE — 5A02210 ASSISTANCE WITH CARDIAC OUTPUT USING BALLOON PUMP, CONTINUOUS: ICD-10-PCS | Performed by: INTERNAL MEDICINE

## 2024-01-01 PROCEDURE — 84100 ASSAY OF PHOSPHORUS: CPT | Performed by: INTERNAL MEDICINE

## 2024-01-01 PROCEDURE — 83605 ASSAY OF LACTIC ACID: CPT | Performed by: STUDENT IN AN ORGANIZED HEALTH CARE EDUCATION/TRAINING PROGRAM

## 2024-01-01 PROCEDURE — 250N000011 HC RX IP 250 OP 636: Performed by: STUDENT IN AN ORGANIZED HEALTH CARE EDUCATION/TRAINING PROGRAM

## 2024-01-01 PROCEDURE — 85025 COMPLETE CBC W/AUTO DIFF WBC: CPT | Performed by: STUDENT IN AN ORGANIZED HEALTH CARE EDUCATION/TRAINING PROGRAM

## 2024-01-01 PROCEDURE — 86900 BLOOD TYPING SEROLOGIC ABO: CPT | Performed by: PHYSICIAN ASSISTANT

## 2024-01-01 PROCEDURE — 85730 THROMBOPLASTIN TIME PARTIAL: CPT

## 2024-01-01 PROCEDURE — 85610 PROTHROMBIN TIME: CPT | Performed by: STUDENT IN AN ORGANIZED HEALTH CARE EDUCATION/TRAINING PROGRAM

## 2024-01-01 PROCEDURE — 85027 COMPLETE CBC AUTOMATED: CPT | Performed by: INTERNAL MEDICINE

## 2024-01-01 PROCEDURE — 81001 URINALYSIS AUTO W/SCOPE: CPT | Performed by: STUDENT IN AN ORGANIZED HEALTH CARE EDUCATION/TRAINING PROGRAM

## 2024-01-01 PROCEDURE — 258N000003 HC RX IP 258 OP 636: Performed by: PHYSICIAN ASSISTANT

## 2024-01-01 PROCEDURE — 250N000013 HC RX MED GY IP 250 OP 250 PS 637: Performed by: STUDENT IN AN ORGANIZED HEALTH CARE EDUCATION/TRAINING PROGRAM

## 2024-01-01 PROCEDURE — 85652 RBC SED RATE AUTOMATED: CPT | Performed by: STUDENT IN AN ORGANIZED HEALTH CARE EDUCATION/TRAINING PROGRAM

## 2024-01-01 PROCEDURE — 86140 C-REACTIVE PROTEIN: CPT | Performed by: STUDENT IN AN ORGANIZED HEALTH CARE EDUCATION/TRAINING PROGRAM

## 2024-01-01 PROCEDURE — 250N000012 HC RX MED GY IP 250 OP 636 PS 637

## 2024-01-01 PROCEDURE — P9037 PLATE PHERES LEUKOREDU IRRAD: HCPCS

## 2024-01-01 PROCEDURE — 84100 ASSAY OF PHOSPHORUS: CPT | Performed by: STUDENT IN AN ORGANIZED HEALTH CARE EDUCATION/TRAINING PROGRAM

## 2024-01-01 PROCEDURE — 93451 RIGHT HEART CATH: CPT | Mod: 26 | Performed by: INTERNAL MEDICINE

## 2024-01-01 PROCEDURE — 83735 ASSAY OF MAGNESIUM: CPT | Performed by: SURGERY

## 2024-01-01 PROCEDURE — 87641 MR-STAPH DNA AMP PROBE: CPT

## 2024-01-01 PROCEDURE — 85048 AUTOMATED LEUKOCYTE COUNT: CPT | Performed by: STUDENT IN AN ORGANIZED HEALTH CARE EDUCATION/TRAINING PROGRAM

## 2024-01-01 PROCEDURE — 999N000065 XR ABDOMEN PORT 1 VIEW

## 2024-01-01 PROCEDURE — 87640 STAPH A DNA AMP PROBE: CPT

## 2024-01-01 PROCEDURE — 87635 SARS-COV-2 COVID-19 AMP PRB: CPT | Performed by: STUDENT IN AN ORGANIZED HEALTH CARE EDUCATION/TRAINING PROGRAM

## 2024-01-01 PROCEDURE — 250N000011 HC RX IP 250 OP 636: Performed by: INTERNAL MEDICINE

## 2024-01-01 PROCEDURE — 85610 PROTHROMBIN TIME: CPT | Performed by: INTERNAL MEDICINE

## 2024-01-01 PROCEDURE — P9016 RBC LEUKOCYTES REDUCED: HCPCS

## 2024-01-01 PROCEDURE — 85384 FIBRINOGEN ACTIVITY: CPT | Performed by: PHYSICIAN ASSISTANT

## 2024-01-01 PROCEDURE — 250N000013 HC RX MED GY IP 250 OP 250 PS 637

## 2024-01-01 PROCEDURE — 258N000003 HC RX IP 258 OP 636: Performed by: INTERNAL MEDICINE

## 2024-01-01 PROCEDURE — 250N000011 HC RX IP 250 OP 636: Mod: JZ

## 2024-01-01 PROCEDURE — 71045 X-RAY EXAM CHEST 1 VIEW: CPT

## 2024-01-01 PROCEDURE — 85007 BL SMEAR W/DIFF WBC COUNT: CPT | Performed by: STUDENT IN AN ORGANIZED HEALTH CARE EDUCATION/TRAINING PROGRAM

## 2024-01-01 PROCEDURE — 33986 ECMO/ECLS RMVL CTR CANNULA: CPT | Performed by: NURSE ANESTHETIST, CERTIFIED REGISTERED

## 2024-01-01 PROCEDURE — 85396 CLOTTING ASSAY WHOLE BLOOD: CPT

## 2024-01-01 PROCEDURE — 33949 ECMO/ECLS DAILY MGMT ARTERY: CPT | Performed by: INTERNAL MEDICINE

## 2024-01-01 PROCEDURE — 93325 DOPPLER ECHO COLOR FLOW MAPG: CPT

## 2024-01-01 PROCEDURE — 5A1522G EXTRACORPOREAL OXYGENATION, MEMBRANE, PERIPHERAL VENO-ARTERIAL: ICD-10-PCS | Performed by: STUDENT IN AN ORGANIZED HEALTH CARE EDUCATION/TRAINING PROGRAM

## 2024-01-01 PROCEDURE — 83605 ASSAY OF LACTIC ACID: CPT | Performed by: SURGERY

## 2024-01-01 PROCEDURE — 71045 X-RAY EXAM CHEST 1 VIEW: CPT | Mod: 26 | Performed by: RADIOLOGY

## 2024-01-01 PROCEDURE — 250N000011 HC RX IP 250 OP 636: Performed by: NURSE PRACTITIONER

## 2024-01-01 PROCEDURE — 82330 ASSAY OF CALCIUM: CPT

## 2024-01-01 PROCEDURE — 84134 ASSAY OF PREALBUMIN: CPT

## 2024-01-01 PROCEDURE — 99292 CRITICAL CARE ADDL 30 MIN: CPT | Mod: 24 | Performed by: INTERNAL MEDICINE

## 2024-01-01 PROCEDURE — 272N000001 HC OR GENERAL SUPPLY STERILE: Performed by: STUDENT IN AN ORGANIZED HEALTH CARE EDUCATION/TRAINING PROGRAM

## 2024-01-01 PROCEDURE — 93306 TTE W/DOPPLER COMPLETE: CPT

## 2024-01-01 PROCEDURE — 5A1955Z RESPIRATORY VENTILATION, GREATER THAN 96 CONSECUTIVE HOURS: ICD-10-PCS | Performed by: INTERNAL MEDICINE

## 2024-01-01 PROCEDURE — C1769 GUIDE WIRE: HCPCS | Performed by: INTERNAL MEDICINE

## 2024-01-01 PROCEDURE — 90947 DIALYSIS REPEATED EVAL: CPT

## 2024-01-01 PROCEDURE — 250N000013 HC RX MED GY IP 250 OP 250 PS 637: Performed by: SURGERY

## 2024-01-01 PROCEDURE — 272N000085 HC PACK CELL SAVER CSP: Performed by: STUDENT IN AN ORGANIZED HEALTH CARE EDUCATION/TRAINING PROGRAM

## 2024-01-01 PROCEDURE — 74018 RADEX ABDOMEN 1 VIEW: CPT | Mod: 26 | Performed by: STUDENT IN AN ORGANIZED HEALTH CARE EDUCATION/TRAINING PROGRAM

## 2024-01-01 PROCEDURE — 99255 IP/OBS CONSLTJ NEW/EST HI 80: CPT | Performed by: PHYSICIAN ASSISTANT

## 2024-01-01 PROCEDURE — 82330 ASSAY OF CALCIUM: CPT | Performed by: SURGERY

## 2024-01-01 PROCEDURE — 85018 HEMOGLOBIN: CPT | Performed by: STUDENT IN AN ORGANIZED HEALTH CARE EDUCATION/TRAINING PROGRAM

## 2024-01-01 PROCEDURE — 99152 MOD SED SAME PHYS/QHP 5/>YRS: CPT | Performed by: INTERNAL MEDICINE

## 2024-01-01 PROCEDURE — 93320 DOPPLER ECHO COMPLETE: CPT | Mod: 26 | Performed by: INTERNAL MEDICINE

## 2024-01-01 PROCEDURE — 87205 SMEAR GRAM STAIN: CPT

## 2024-01-01 PROCEDURE — 36415 COLL VENOUS BLD VENIPUNCTURE: CPT | Performed by: INTERNAL MEDICINE

## 2024-01-01 PROCEDURE — 360N000076 HC SURGERY LEVEL 3, PER MIN: Performed by: STUDENT IN AN ORGANIZED HEALTH CARE EDUCATION/TRAINING PROGRAM

## 2024-01-01 PROCEDURE — 71250 CT THORAX DX C-: CPT

## 2024-01-01 PROCEDURE — 86316 IMMUNOASSAY TUMOR OTHER: CPT | Performed by: STUDENT IN AN ORGANIZED HEALTH CARE EDUCATION/TRAINING PROGRAM

## 2024-01-01 PROCEDURE — 86923 COMPATIBILITY TEST ELECTRIC: CPT | Performed by: STUDENT IN AN ORGANIZED HEALTH CARE EDUCATION/TRAINING PROGRAM

## 2024-01-01 PROCEDURE — 86022 PLATELET ANTIBODIES: CPT

## 2024-01-01 PROCEDURE — 87635 SARS-COV-2 COVID-19 AMP PRB: CPT

## 2024-01-01 PROCEDURE — 82248 BILIRUBIN DIRECT: CPT | Performed by: INTERNAL MEDICINE

## 2024-01-01 PROCEDURE — 84450 TRANSFERASE (AST) (SGOT): CPT | Performed by: STUDENT IN AN ORGANIZED HEALTH CARE EDUCATION/TRAINING PROGRAM

## 2024-01-01 PROCEDURE — 86923 COMPATIBILITY TEST ELECTRIC: CPT

## 2024-01-01 PROCEDURE — 250N000009 HC RX 250: Performed by: PHYSICIAN ASSISTANT

## 2024-01-01 PROCEDURE — 4A023N6 MEASUREMENT OF CARDIAC SAMPLING AND PRESSURE, RIGHT HEART, PERCUTANEOUS APPROACH: ICD-10-PCS | Performed by: INTERNAL MEDICINE

## 2024-01-01 PROCEDURE — C1894 INTRO/SHEATH, NON-LASER: HCPCS | Performed by: INTERNAL MEDICINE

## 2024-01-01 PROCEDURE — 250N000009 HC RX 250: Performed by: SURGERY

## 2024-01-01 PROCEDURE — 82040 ASSAY OF SERUM ALBUMIN: CPT | Performed by: STUDENT IN AN ORGANIZED HEALTH CARE EDUCATION/TRAINING PROGRAM

## 2024-01-01 PROCEDURE — 999N000185 HC STATISTIC TRANSPORT TIME EA 15 MIN

## 2024-01-01 PROCEDURE — X2HX0F9 INSERTION OF CONDUIT TO SHORT-TERM EXTERNAL HEART ASSIST SYSTEM INTO THORACIC AORTA, ASCENDING, OPEN APPROACH, NEW TECHNOLOGY GROUP 9: ICD-10-PCS | Performed by: SURGERY

## 2024-01-01 PROCEDURE — 87040 BLOOD CULTURE FOR BACTERIA: CPT | Performed by: INTERNAL MEDICINE

## 2024-01-01 PROCEDURE — 99291 CRITICAL CARE FIRST HOUR: CPT | Mod: GC | Performed by: INTERNAL MEDICINE

## 2024-01-01 PROCEDURE — 5A1522G EXTRACORPOREAL OXYGENATION, MEMBRANE, PERIPHERAL VENO-ARTERIAL: ICD-10-PCS | Performed by: INTERNAL MEDICINE

## 2024-01-01 PROCEDURE — 272N000231 HC CANNULA, VENOUS CENTRAL

## 2024-01-01 PROCEDURE — 999N000063 XR CHEST PORT 1 VIEW

## 2024-01-01 PROCEDURE — 33949 ECMO/ECLS DAILY MGMT ARTERY: CPT | Performed by: STUDENT IN AN ORGANIZED HEALTH CARE EDUCATION/TRAINING PROGRAM

## 2024-01-01 PROCEDURE — 250N000024 HC ISOFLURANE, PER MIN: Performed by: STUDENT IN AN ORGANIZED HEALTH CARE EDUCATION/TRAINING PROGRAM

## 2024-01-01 PROCEDURE — 93005 ELECTROCARDIOGRAM TRACING: CPT

## 2024-01-01 PROCEDURE — 02RG08Z REPLACEMENT OF MITRAL VALVE WITH ZOOPLASTIC TISSUE, OPEN APPROACH: ICD-10-PCS | Performed by: STUDENT IN AN ORGANIZED HEALTH CARE EDUCATION/TRAINING PROGRAM

## 2024-01-01 PROCEDURE — 82375 ASSAY CARBOXYHB QUANT: CPT

## 2024-01-01 PROCEDURE — 84478 ASSAY OF TRIGLYCERIDES: CPT

## 2024-01-01 PROCEDURE — 83735 ASSAY OF MAGNESIUM: CPT | Performed by: INTERNAL MEDICINE

## 2024-01-01 PROCEDURE — 250N000011 HC RX IP 250 OP 636: Mod: JZ | Performed by: PHYSICIAN ASSISTANT

## 2024-01-01 PROCEDURE — 99291 CRITICAL CARE FIRST HOUR: CPT | Mod: FS | Performed by: NURSE PRACTITIONER

## 2024-01-01 PROCEDURE — 99291 CRITICAL CARE FIRST HOUR: CPT

## 2024-01-01 PROCEDURE — 85384 FIBRINOGEN ACTIVITY: CPT

## 2024-01-01 PROCEDURE — 99233 SBSQ HOSP IP/OBS HIGH 50: CPT | Mod: 24 | Performed by: STUDENT IN AN ORGANIZED HEALTH CARE EDUCATION/TRAINING PROGRAM

## 2024-01-01 PROCEDURE — 93306 TTE W/DOPPLER COMPLETE: CPT | Mod: 26 | Performed by: INTERNAL MEDICINE

## 2024-01-01 PROCEDURE — 999N000075 HC STATISTIC IABP MONITORING

## 2024-01-01 PROCEDURE — 88305 TISSUE EXAM BY PATHOLOGIST: CPT | Mod: TC | Performed by: STUDENT IN AN ORGANIZED HEALTH CARE EDUCATION/TRAINING PROGRAM

## 2024-01-01 PROCEDURE — 33947 ECMO/ECLS INITIATION ARTERY: CPT | Performed by: STUDENT IN AN ORGANIZED HEALTH CARE EDUCATION/TRAINING PROGRAM

## 2024-01-01 PROCEDURE — 99291 CRITICAL CARE FIRST HOUR: CPT | Mod: 24 | Performed by: NURSE PRACTITIONER

## 2024-01-01 PROCEDURE — 250N000011 HC RX IP 250 OP 636: Mod: JZ | Performed by: STUDENT IN AN ORGANIZED HEALTH CARE EDUCATION/TRAINING PROGRAM

## 2024-01-01 PROCEDURE — 83880 ASSAY OF NATRIURETIC PEPTIDE: CPT | Performed by: STUDENT IN AN ORGANIZED HEALTH CARE EDUCATION/TRAINING PROGRAM

## 2024-01-01 PROCEDURE — 360N000087 HC SURGERY LEVEL 7 W/ FLUORO, PER MIN: Performed by: STUDENT IN AN ORGANIZED HEALTH CARE EDUCATION/TRAINING PROGRAM

## 2024-01-01 PROCEDURE — 80048 BASIC METABOLIC PNL TOTAL CA: CPT | Performed by: INTERNAL MEDICINE

## 2024-01-01 PROCEDURE — 80202 ASSAY OF VANCOMYCIN: CPT | Performed by: STUDENT IN AN ORGANIZED HEALTH CARE EDUCATION/TRAINING PROGRAM

## 2024-01-01 PROCEDURE — 93925 LOWER EXTREMITY STUDY: CPT | Mod: 26 | Performed by: RADIOLOGY

## 2024-01-01 PROCEDURE — 410N000004: Performed by: STUDENT IN AN ORGANIZED HEALTH CARE EDUCATION/TRAINING PROGRAM

## 2024-01-01 PROCEDURE — 84484 ASSAY OF TROPONIN QUANT: CPT | Performed by: STUDENT IN AN ORGANIZED HEALTH CARE EDUCATION/TRAINING PROGRAM

## 2024-01-01 PROCEDURE — 70450 CT HEAD/BRAIN W/O DYE: CPT

## 2024-01-01 PROCEDURE — 85041 AUTOMATED RBC COUNT: CPT | Performed by: STUDENT IN AN ORGANIZED HEALTH CARE EDUCATION/TRAINING PROGRAM

## 2024-01-01 PROCEDURE — 99223 1ST HOSP IP/OBS HIGH 75: CPT | Mod: 25 | Performed by: INTERNAL MEDICINE

## 2024-01-01 PROCEDURE — 93925 LOWER EXTREMITY STUDY: CPT

## 2024-01-01 PROCEDURE — 5A1D90Z PERFORMANCE OF URINARY FILTRATION, CONTINUOUS, GREATER THAN 18 HOURS PER DAY: ICD-10-PCS | Performed by: INTERNAL MEDICINE

## 2024-01-01 PROCEDURE — 85041 AUTOMATED RBC COUNT: CPT | Performed by: INTERNAL MEDICINE

## 2024-01-01 PROCEDURE — 99291 CRITICAL CARE FIRST HOUR: CPT | Mod: 25 | Performed by: INTERNAL MEDICINE

## 2024-01-01 PROCEDURE — 87086 URINE CULTURE/COLONY COUNT: CPT

## 2024-01-01 PROCEDURE — 87040 BLOOD CULTURE FOR BACTERIA: CPT | Performed by: PHYSICIAN ASSISTANT

## 2024-01-01 PROCEDURE — G0463 HOSPITAL OUTPT CLINIC VISIT: HCPCS

## 2024-01-01 PROCEDURE — 86900 BLOOD TYPING SEROLOGIC ABO: CPT

## 2024-01-01 PROCEDURE — 4A1239Z MONITORING OF CARDIAC OUTPUT, PERCUTANEOUS APPROACH: ICD-10-PCS | Performed by: INTERNAL MEDICINE

## 2024-01-01 PROCEDURE — 84145 PROCALCITONIN (PCT): CPT | Performed by: STUDENT IN AN ORGANIZED HEALTH CARE EDUCATION/TRAINING PROGRAM

## 2024-01-01 PROCEDURE — 82550 ASSAY OF CK (CPK): CPT | Performed by: INTERNAL MEDICINE

## 2024-01-01 PROCEDURE — C1760 CLOSURE DEV, VASC: HCPCS | Performed by: STUDENT IN AN ORGANIZED HEALTH CARE EDUCATION/TRAINING PROGRAM

## 2024-01-01 PROCEDURE — 84132 ASSAY OF SERUM POTASSIUM: CPT | Performed by: INTERNAL MEDICINE

## 2024-01-01 PROCEDURE — 85730 THROMBOPLASTIN TIME PARTIAL: CPT | Performed by: PHYSICIAN ASSISTANT

## 2024-01-01 PROCEDURE — 87040 BLOOD CULTURE FOR BACTERIA: CPT | Performed by: STUDENT IN AN ORGANIZED HEALTH CARE EDUCATION/TRAINING PROGRAM

## 2024-01-01 PROCEDURE — 71260 CT THORAX DX C+: CPT | Mod: 26 | Performed by: RADIOLOGY

## 2024-01-01 PROCEDURE — 74018 RADEX ABDOMEN 1 VIEW: CPT | Mod: 26 | Performed by: RADIOLOGY

## 2024-01-01 PROCEDURE — 99291 CRITICAL CARE FIRST HOUR: CPT | Mod: 25 | Performed by: NURSE PRACTITIONER

## 2024-01-01 PROCEDURE — P9059 PLASMA, FRZ BETWEEN 8-24HOUR: HCPCS | Performed by: STUDENT IN AN ORGANIZED HEALTH CARE EDUCATION/TRAINING PROGRAM

## 2024-01-01 PROCEDURE — 82962 GLUCOSE BLOOD TEST: CPT

## 2024-01-01 PROCEDURE — 02HA0RZ INSERTION OF SHORT-TERM EXTERNAL HEART ASSIST SYSTEM INTO HEART, OPEN APPROACH: ICD-10-PCS | Performed by: SURGERY

## 2024-01-01 PROCEDURE — 82040 ASSAY OF SERUM ALBUMIN: CPT

## 2024-01-01 PROCEDURE — 87798 DETECT AGENT NOS DNA AMP: CPT | Performed by: INTERNAL MEDICINE

## 2024-01-01 PROCEDURE — 99292 CRITICAL CARE ADDL 30 MIN: CPT | Mod: 24 | Performed by: NURSE PRACTITIONER

## 2024-01-01 PROCEDURE — 99233 SBSQ HOSP IP/OBS HIGH 50: CPT | Mod: 24 | Performed by: INTERNAL MEDICINE

## 2024-01-01 PROCEDURE — 85018 HEMOGLOBIN: CPT | Performed by: SURGERY

## 2024-01-01 PROCEDURE — 410N000003 HC PER-PERFUSION 1ST 30 MIN: Performed by: INTERNAL MEDICINE

## 2024-01-01 PROCEDURE — 258N000001 HC RX 258: Performed by: STUDENT IN AN ORGANIZED HEALTH CARE EDUCATION/TRAINING PROGRAM

## 2024-01-01 PROCEDURE — 272N000237 HC CARDIOHELP CIRCUIT

## 2024-01-01 PROCEDURE — 33990 INSJ PERQ VAD L HRT ARTERIAL: CPT | Performed by: ANESTHESIOLOGY

## 2024-01-01 PROCEDURE — 33947 ECMO/ECLS INITIATION ARTERY: CPT

## 2024-01-01 PROCEDURE — 71260 CT THORAX DX C+: CPT

## 2024-01-01 PROCEDURE — 999N000122

## 2024-01-01 PROCEDURE — 74177 CT ABD & PELVIS W/CONTRAST: CPT | Mod: 26 | Performed by: RADIOLOGY

## 2024-01-01 PROCEDURE — P9047 ALBUMIN (HUMAN), 25%, 50ML: HCPCS

## 2024-01-01 PROCEDURE — 5A1221Z PERFORMANCE OF CARDIAC OUTPUT, CONTINUOUS: ICD-10-PCS | Performed by: STUDENT IN AN ORGANIZED HEALTH CARE EDUCATION/TRAINING PROGRAM

## 2024-01-01 PROCEDURE — 3E043XZ INTRODUCTION OF VASOPRESSOR INTO CENTRAL VEIN, PERCUTANEOUS APPROACH: ICD-10-PCS | Performed by: INTERNAL MEDICINE

## 2024-01-01 PROCEDURE — 272N000555 HC SENSOR NIRS OXIMETER, ADULT

## 2024-01-01 PROCEDURE — 87581 M.PNEUMON DNA AMP PROBE: CPT

## 2024-01-01 PROCEDURE — C1887 CATHETER, GUIDING: HCPCS | Performed by: STUDENT IN AN ORGANIZED HEALTH CARE EDUCATION/TRAINING PROGRAM

## 2024-01-01 PROCEDURE — 93308 TTE F-UP OR LMTD: CPT | Mod: 26 | Performed by: STUDENT IN AN ORGANIZED HEALTH CARE EDUCATION/TRAINING PROGRAM

## 2024-01-01 PROCEDURE — 99291 CRITICAL CARE FIRST HOUR: CPT | Mod: 25

## 2024-01-01 PROCEDURE — 99222 1ST HOSP IP/OBS MODERATE 55: CPT | Mod: FS | Performed by: NURSE PRACTITIONER

## 2024-01-01 PROCEDURE — 93010 ELECTROCARDIOGRAM REPORT: CPT | Performed by: INTERNAL MEDICINE

## 2024-01-01 PROCEDURE — 80307 DRUG TEST PRSMV CHEM ANLYZR: CPT | Performed by: STUDENT IN AN ORGANIZED HEALTH CARE EDUCATION/TRAINING PROGRAM

## 2024-01-01 PROCEDURE — 87205 SMEAR GRAM STAIN: CPT | Performed by: STUDENT IN AN ORGANIZED HEALTH CARE EDUCATION/TRAINING PROGRAM

## 2024-01-01 PROCEDURE — 410N000003 HC PER-PERFUSION 1ST 30 MIN: Performed by: STUDENT IN AN ORGANIZED HEALTH CARE EDUCATION/TRAINING PROGRAM

## 2024-01-01 PROCEDURE — 258N000001 HC RX 258

## 2024-01-01 PROCEDURE — C1763 CONN TISS, NON-HUMAN: HCPCS | Performed by: STUDENT IN AN ORGANIZED HEALTH CARE EDUCATION/TRAINING PROGRAM

## 2024-01-01 PROCEDURE — 33430 REPLACEMENT OF MITRAL VALVE: CPT | Mod: GC | Performed by: STUDENT IN AN ORGANIZED HEALTH CARE EDUCATION/TRAINING PROGRAM

## 2024-01-01 PROCEDURE — 85018 HEMOGLOBIN: CPT

## 2024-01-01 PROCEDURE — 250N000013 HC RX MED GY IP 250 OP 250 PS 637: Performed by: NURSE PRACTITIONER

## 2024-01-01 PROCEDURE — 258N000003 HC RX IP 258 OP 636: Performed by: NURSE PRACTITIONER

## 2024-01-01 PROCEDURE — 272N000002 HC OR SUPPLY OTHER OPNP: Performed by: STUDENT IN AN ORGANIZED HEALTH CARE EDUCATION/TRAINING PROGRAM

## 2024-01-01 PROCEDURE — 250N000009 HC RX 250: Performed by: NURSE ANESTHETIST, CERTIFIED REGISTERED

## 2024-01-01 PROCEDURE — 84100 ASSAY OF PHOSPHORUS: CPT | Performed by: SURGERY

## 2024-01-01 PROCEDURE — 82803 BLOOD GASES ANY COMBINATION: CPT

## 2024-01-01 PROCEDURE — 82805 BLOOD GASES W/O2 SATURATION: CPT | Performed by: INTERNAL MEDICINE

## 2024-01-01 PROCEDURE — 36415 COLL VENOUS BLD VENIPUNCTURE: CPT | Performed by: STUDENT IN AN ORGANIZED HEALTH CARE EDUCATION/TRAINING PROGRAM

## 2024-01-01 PROCEDURE — 99222 1ST HOSP IP/OBS MODERATE 55: CPT | Mod: 25 | Performed by: INTERNAL MEDICINE

## 2024-01-01 PROCEDURE — 999N000077 HC STATISTIC INSERT IABP

## 2024-01-01 PROCEDURE — 85520 HEPARIN ASSAY: CPT | Performed by: INTERNAL MEDICINE

## 2024-01-01 PROCEDURE — 93325 DOPPLER ECHO COLOR FLOW MAPG: CPT | Mod: 26 | Performed by: INTERNAL MEDICINE

## 2024-01-01 PROCEDURE — 88305 TISSUE EXAM BY PATHOLOGIST: CPT | Mod: 26 | Performed by: STUDENT IN AN ORGANIZED HEALTH CARE EDUCATION/TRAINING PROGRAM

## 2024-01-01 PROCEDURE — 84478 ASSAY OF TRIGLYCERIDES: CPT | Performed by: STUDENT IN AN ORGANIZED HEALTH CARE EDUCATION/TRAINING PROGRAM

## 2024-01-01 PROCEDURE — 71045 X-RAY EXAM CHEST 1 VIEW: CPT | Mod: 77

## 2024-01-01 PROCEDURE — 02UX0JZ SUPPLEMENT THORACIC AORTA, ASCENDING/ARCH WITH SYNTHETIC SUBSTITUTE, OPEN APPROACH: ICD-10-PCS | Performed by: SURGERY

## 2024-01-01 PROCEDURE — 31622 DX BRONCHOSCOPE/WASH: CPT

## 2024-01-01 PROCEDURE — 99153 MOD SED SAME PHYS/QHP EA: CPT | Performed by: INTERNAL MEDICINE

## 2024-01-01 PROCEDURE — 85014 HEMATOCRIT: CPT | Performed by: SURGERY

## 2024-01-01 PROCEDURE — 83050 HGB METHEMOGLOBIN QUAN: CPT

## 2024-01-01 PROCEDURE — 99232 SBSQ HOSP IP/OBS MODERATE 35: CPT | Performed by: PHYSICIAN ASSISTANT

## 2024-01-01 PROCEDURE — 33952 ECMO/ECLS INSJ PRPH CANNULA: CPT | Performed by: INTERNAL MEDICINE

## 2024-01-01 PROCEDURE — P9037 PLATE PHERES LEUKOREDU IRRAD: HCPCS | Performed by: STUDENT IN AN ORGANIZED HEALTH CARE EDUCATION/TRAINING PROGRAM

## 2024-01-01 PROCEDURE — 99292 CRITICAL CARE ADDL 30 MIN: CPT | Mod: 25 | Performed by: NURSE PRACTITIONER

## 2024-01-01 PROCEDURE — 999N000259 HC STATISTIC EXTUBATION

## 2024-01-01 PROCEDURE — 04PY03Z REMOVAL OF INFUSION DEVICE FROM LOWER ARTERY, OPEN APPROACH: ICD-10-PCS | Performed by: STUDENT IN AN ORGANIZED HEALTH CARE EDUCATION/TRAINING PROGRAM

## 2024-01-01 PROCEDURE — 84155 ASSAY OF PROTEIN SERUM: CPT | Performed by: SURGERY

## 2024-01-01 PROCEDURE — 2894A VOIDCORRECT: CPT | Mod: 24

## 2024-01-01 PROCEDURE — 33335 INSERT MAJOR VESSEL GRAFT: CPT | Mod: GC | Performed by: STUDENT IN AN ORGANIZED HEALTH CARE EDUCATION/TRAINING PROGRAM

## 2024-01-01 PROCEDURE — 86140 C-REACTIVE PROTEIN: CPT

## 2024-01-01 PROCEDURE — 84132 ASSAY OF SERUM POTASSIUM: CPT

## 2024-01-01 PROCEDURE — 94640 AIRWAY INHALATION TREATMENT: CPT | Mod: 76

## 2024-01-01 PROCEDURE — 93306 TTE W/DOPPLER COMPLETE: CPT | Mod: 26 | Performed by: STUDENT IN AN ORGANIZED HEALTH CARE EDUCATION/TRAINING PROGRAM

## 2024-01-01 PROCEDURE — 84155 ASSAY OF PROTEIN SERUM: CPT | Performed by: PHYSICIAN ASSISTANT

## 2024-01-01 PROCEDURE — 83605 ASSAY OF LACTIC ACID: CPT

## 2024-01-01 PROCEDURE — 0B978ZZ DRAINAGE OF LEFT MAIN BRONCHUS, VIA NATURAL OR ARTIFICIAL OPENING ENDOSCOPIC: ICD-10-PCS | Performed by: INTERNAL MEDICINE

## 2024-01-01 PROCEDURE — 85520 HEPARIN ASSAY: CPT | Performed by: PHYSICIAN ASSISTANT

## 2024-01-01 PROCEDURE — 83036 HEMOGLOBIN GLYCOSYLATED A1C: CPT

## 2024-01-01 PROCEDURE — C1751 CATH, INF, PER/CENT/MIDLINE: HCPCS | Performed by: INTERNAL MEDICINE

## 2024-01-01 PROCEDURE — 99207 PR NO CHARGE LOS: CPT | Performed by: STUDENT IN AN ORGANIZED HEALTH CARE EDUCATION/TRAINING PROGRAM

## 2024-01-01 PROCEDURE — 33968 REMOVE AORTIC ASSIST DEVICE: CPT

## 2024-01-01 PROCEDURE — 93321 DOPPLER ECHO F-UP/LMTD STD: CPT | Mod: 26 | Performed by: STUDENT IN AN ORGANIZED HEALTH CARE EDUCATION/TRAINING PROGRAM

## 2024-01-01 PROCEDURE — 99233 SBSQ HOSP IP/OBS HIGH 50: CPT | Performed by: PHYSICIAN ASSISTANT

## 2024-01-01 PROCEDURE — 99207 INSERT ARTERIAL LINE: CPT | Performed by: STUDENT IN AN ORGANIZED HEALTH CARE EDUCATION/TRAINING PROGRAM

## 2024-01-01 PROCEDURE — 84155 ASSAY OF PROTEIN SERUM: CPT

## 2024-01-01 PROCEDURE — 99292 CRITICAL CARE ADDL 30 MIN: CPT | Mod: 25 | Performed by: INTERNAL MEDICINE

## 2024-01-01 PROCEDURE — 84450 TRANSFERASE (AST) (SGOT): CPT | Performed by: SURGERY

## 2024-01-01 PROCEDURE — 87070 CULTURE OTHR SPECIMN AEROBIC: CPT | Performed by: STUDENT IN AN ORGANIZED HEALTH CARE EDUCATION/TRAINING PROGRAM

## 2024-01-01 PROCEDURE — C1898 LEAD, PMKR, OTHER THAN TRANS: HCPCS | Performed by: STUDENT IN AN ORGANIZED HEALTH CARE EDUCATION/TRAINING PROGRAM

## 2024-01-01 PROCEDURE — A7035 POS AIRWAY PRESS HEADGEAR: HCPCS

## 2024-01-01 PROCEDURE — 80202 ASSAY OF VANCOMYCIN: CPT | Performed by: INTERNAL MEDICINE

## 2024-01-01 PROCEDURE — 81001 URINALYSIS AUTO W/SCOPE: CPT

## 2024-01-01 PROCEDURE — 74176 CT ABD & PELVIS W/O CONTRAST: CPT | Mod: 26 | Performed by: RADIOLOGY

## 2024-01-01 PROCEDURE — 02UL08Z SUPPLEMENT LEFT VENTRICLE WITH ZOOPLASTIC TISSUE, OPEN APPROACH: ICD-10-PCS | Performed by: SURGERY

## 2024-01-01 PROCEDURE — C1889 IMPLANT/INSERT DEVICE, NOC: HCPCS | Performed by: STUDENT IN AN ORGANIZED HEALTH CARE EDUCATION/TRAINING PROGRAM

## 2024-01-01 PROCEDURE — 99233 SBSQ HOSP IP/OBS HIGH 50: CPT | Mod: 24 | Performed by: PHYSICIAN ASSISTANT

## 2024-01-01 PROCEDURE — 85384 FIBRINOGEN ACTIVITY: CPT | Performed by: INTERNAL MEDICINE

## 2024-01-01 PROCEDURE — 99207 PR NO CHARGE LOS: CPT | Mod: GC | Performed by: INTERNAL MEDICINE

## 2024-01-01 PROCEDURE — 99291 CRITICAL CARE FIRST HOUR: CPT | Performed by: STUDENT IN AN ORGANIZED HEALTH CARE EDUCATION/TRAINING PROGRAM

## 2024-01-01 PROCEDURE — 85610 PROTHROMBIN TIME: CPT | Performed by: PHYSICIAN ASSISTANT

## 2024-01-01 PROCEDURE — 93312 ECHO TRANSESOPHAGEAL: CPT | Mod: 26 | Performed by: INTERNAL MEDICINE

## 2024-01-01 PROCEDURE — 99292 CRITICAL CARE ADDL 30 MIN: CPT | Mod: 25

## 2024-01-01 PROCEDURE — 85049 AUTOMATED PLATELET COUNT: CPT | Performed by: STUDENT IN AN ORGANIZED HEALTH CARE EDUCATION/TRAINING PROGRAM

## 2024-01-01 PROCEDURE — 71250 CT THORAX DX C-: CPT | Mod: 26 | Performed by: RADIOLOGY

## 2024-01-01 PROCEDURE — 82310 ASSAY OF CALCIUM: CPT | Performed by: STUDENT IN AN ORGANIZED HEALTH CARE EDUCATION/TRAINING PROGRAM

## 2024-01-01 PROCEDURE — C1769 GUIDE WIRE: HCPCS | Performed by: STUDENT IN AN ORGANIZED HEALTH CARE EDUCATION/TRAINING PROGRAM

## 2024-01-01 PROCEDURE — 0B938ZZ DRAINAGE OF RIGHT MAIN BRONCHUS, VIA NATURAL OR ARTIFICIAL OPENING ENDOSCOPIC: ICD-10-PCS | Performed by: INTERNAL MEDICINE

## 2024-01-01 PROCEDURE — 272N000225 HC CANNULA, ARTERIAL CENTRAL

## 2024-01-01 PROCEDURE — 82565 ASSAY OF CREATININE: CPT | Performed by: INTERNAL MEDICINE

## 2024-01-01 PROCEDURE — 06PY03Z REMOVAL OF INFUSION DEVICE FROM LOWER VEIN, OPEN APPROACH: ICD-10-PCS | Performed by: STUDENT IN AN ORGANIZED HEALTH CARE EDUCATION/TRAINING PROGRAM

## 2024-01-01 PROCEDURE — 36415 COLL VENOUS BLD VENIPUNCTURE: CPT | Performed by: PHYSICIAN ASSISTANT

## 2024-01-01 PROCEDURE — 94002 VENT MGMT INPAT INIT DAY: CPT

## 2024-01-01 PROCEDURE — 70450 CT HEAD/BRAIN W/O DYE: CPT | Mod: 26 | Performed by: STUDENT IN AN ORGANIZED HEALTH CARE EDUCATION/TRAINING PROGRAM

## 2024-01-01 PROCEDURE — 93451 RIGHT HEART CATH: CPT | Performed by: INTERNAL MEDICINE

## 2024-01-01 PROCEDURE — 33990 INSJ PERQ VAD L HRT ARTERIAL: CPT | Mod: GC | Performed by: STUDENT IN AN ORGANIZED HEALTH CARE EDUCATION/TRAINING PROGRAM

## 2024-01-01 PROCEDURE — 5A1522H EXTRACORPOREAL OXYGENATION, MEMBRANE, PERIPHERAL VENO-VENOUS: ICD-10-PCS | Performed by: STUDENT IN AN ORGANIZED HEALTH CARE EDUCATION/TRAINING PROGRAM

## 2024-01-01 PROCEDURE — 70450 CT HEAD/BRAIN W/O DYE: CPT | Mod: 26 | Performed by: RADIOLOGY

## 2024-01-01 PROCEDURE — 93010 ELECTROCARDIOGRAM REPORT: CPT | Mod: XU | Performed by: INTERNAL MEDICINE

## 2024-01-01 PROCEDURE — 83605 ASSAY OF LACTIC ACID: CPT | Performed by: PHYSICIAN ASSISTANT

## 2024-01-01 PROCEDURE — 33947 ECMO/ECLS INITIATION ARTERY: CPT | Performed by: INTERNAL MEDICINE

## 2024-01-01 PROCEDURE — 02HP32Z INSERTION OF MONITORING DEVICE INTO PULMONARY TRUNK, PERCUTANEOUS APPROACH: ICD-10-PCS | Performed by: INTERNAL MEDICINE

## 2024-01-01 PROCEDURE — 82533 TOTAL CORTISOL: CPT | Performed by: STUDENT IN AN ORGANIZED HEALTH CARE EDUCATION/TRAINING PROGRAM

## 2024-01-01 PROCEDURE — 93325 DOPPLER ECHO COLOR FLOW MAPG: CPT | Mod: 26 | Performed by: STUDENT IN AN ORGANIZED HEALTH CARE EDUCATION/TRAINING PROGRAM

## 2024-01-01 DEVICE — GRAFT PERICARDIUM 6X8CM BOVINE E6P8: Type: IMPLANTABLE DEVICE | Site: HEART | Status: FUNCTIONAL

## 2024-01-01 DEVICE — IMPLANTABLE DEVICE: Type: IMPLANTABLE DEVICE | Site: CHEST | Status: FUNCTIONAL

## 2024-01-01 DEVICE — GELWEAVE GELATIN IMPREGNATED WOVEN VASCULAR PROSTHESIS STRAIGHT
Type: IMPLANTABLE DEVICE | Site: CHEST | Status: FUNCTIONAL
Brand: GELWEAVE™

## 2024-01-01 DEVICE — GRAFT PATCH VASC XENOSURE BIOLOGIC 8CMX14CM E8P14: Type: IMPLANTABLE DEVICE | Site: HEART | Status: FUNCTIONAL

## 2024-01-01 RX ORDER — HEPARIN SODIUM 1000 [USP'U]/ML
INJECTION, SOLUTION INTRAVENOUS; SUBCUTANEOUS
Status: DISCONTINUED | OUTPATIENT
Start: 2024-01-01 | End: 2024-01-01 | Stop reason: HOSPADM

## 2024-01-01 RX ORDER — BUMETANIDE 0.25 MG/ML
0.5 INJECTION INTRAMUSCULAR; INTRAVENOUS ONCE
Status: CANCELLED | OUTPATIENT
Start: 2024-01-01 | End: 2024-01-01

## 2024-01-01 RX ORDER — CALCIUM GLUCONATE 94 MG/ML
1 INJECTION, SOLUTION INTRAVENOUS ONCE
Status: CANCELLED | OUTPATIENT
Start: 2024-01-01 | End: 2024-01-01

## 2024-01-01 RX ORDER — MAGNESIUM SULFATE HEPTAHYDRATE 40 MG/ML
4 INJECTION, SOLUTION INTRAVENOUS EVERY 4 HOURS PRN
Status: DISCONTINUED | OUTPATIENT
Start: 2024-01-01 | End: 2024-01-01

## 2024-01-01 RX ORDER — NICOTINE POLACRILEX 4 MG
15-30 LOZENGE BUCCAL
Status: CANCELLED | OUTPATIENT
Start: 2024-01-01

## 2024-01-01 RX ORDER — MAGNESIUM SULFATE HEPTAHYDRATE 40 MG/ML
2 INJECTION, SOLUTION INTRAVENOUS EVERY 8 HOURS PRN
Status: DISCONTINUED | OUTPATIENT
Start: 2024-01-01 | End: 2024-01-01

## 2024-01-01 RX ORDER — LIDOCAINE HYDROCHLORIDE 20 MG/ML
INJECTION, SOLUTION INFILTRATION; PERINEURAL
Status: COMPLETED | OUTPATIENT
Start: 2024-01-01 | End: 2024-01-01

## 2024-01-01 RX ORDER — BUMETANIDE 0.25 MG/ML
4 INJECTION INTRAMUSCULAR; INTRAVENOUS ONCE
Status: COMPLETED | OUTPATIENT
Start: 2024-01-01 | End: 2024-01-01

## 2024-01-01 RX ORDER — POLYETHYLENE GLYCOL 3350 17 G/17G
17 POWDER, FOR SOLUTION ORAL 3 TIMES DAILY
Status: DISCONTINUED | OUTPATIENT
Start: 2024-01-01 | End: 2024-01-01

## 2024-01-01 RX ORDER — VANCOMYCIN HYDROCHLORIDE 1 G/20ML
INJECTION, POWDER, LYOPHILIZED, FOR SOLUTION INTRAVENOUS PRN
Status: DISCONTINUED | OUTPATIENT
Start: 2024-01-01 | End: 2024-01-01 | Stop reason: HOSPADM

## 2024-01-01 RX ORDER — POTASSIUM CHLORIDE 29.8 MG/ML
20 INJECTION INTRAVENOUS ONCE
Status: DISCONTINUED | OUTPATIENT
Start: 2024-01-01 | End: 2024-01-01 | Stop reason: HOSPADM

## 2024-01-01 RX ORDER — MAGNESIUM SULFATE HEPTAHYDRATE 40 MG/ML
2 INJECTION, SOLUTION INTRAVENOUS DAILY PRN
Status: DISCONTINUED | OUTPATIENT
Start: 2024-01-01 | End: 2024-01-01

## 2024-01-01 RX ORDER — ONDANSETRON 2 MG/ML
4 INJECTION INTRAMUSCULAR; INTRAVENOUS EVERY 6 HOURS PRN
Status: DISCONTINUED | OUTPATIENT
Start: 2024-01-01 | End: 2024-01-01 | Stop reason: HOSPADM

## 2024-01-01 RX ORDER — GLYCOPYRROLATE 0.2 MG/ML
0.2 INJECTION, SOLUTION INTRAMUSCULAR; INTRAVENOUS ONCE
Status: COMPLETED | OUTPATIENT
Start: 2024-01-01 | End: 2024-01-01

## 2024-01-01 RX ORDER — ASPIRIN 81 MG/1
81 TABLET, CHEWABLE ORAL DAILY
Status: DISCONTINUED | OUTPATIENT
Start: 2024-01-01 | End: 2024-01-01 | Stop reason: HOSPADM

## 2024-01-01 RX ORDER — VASOPRESSIN IN 0.9 % NACL 2 UNIT/2ML
SYRINGE (ML) INTRAVENOUS PRN
Status: DISCONTINUED | OUTPATIENT
Start: 2024-01-01 | End: 2024-01-01

## 2024-01-01 RX ORDER — HEPARIN SODIUM 10000 [USP'U]/100ML
100-1000 INJECTION, SOLUTION INTRAVENOUS CONTINUOUS PRN
Status: DISCONTINUED | OUTPATIENT
Start: 2024-01-01 | End: 2024-01-01 | Stop reason: HOSPADM

## 2024-01-01 RX ORDER — GABAPENTIN 300 MG/1
300 CAPSULE ORAL
Status: DISCONTINUED | OUTPATIENT
Start: 2024-01-01 | End: 2024-01-01 | Stop reason: HOSPADM

## 2024-01-01 RX ORDER — MILRINONE LACTATE 0.2 MG/ML
0.5 INJECTION, SOLUTION INTRAVENOUS CONTINUOUS
Status: DISCONTINUED | OUTPATIENT
Start: 2024-01-01 | End: 2024-01-01

## 2024-01-01 RX ORDER — HEPARIN SODIUM 10000 [USP'U]/100ML
10-50 INJECTION, SOLUTION INTRAVENOUS CONTINUOUS
Status: DISCONTINUED | OUTPATIENT
Start: 2024-01-01 | End: 2024-01-01

## 2024-01-01 RX ORDER — NALOXONE HYDROCHLORIDE 0.4 MG/ML
0.2 INJECTION, SOLUTION INTRAMUSCULAR; INTRAVENOUS; SUBCUTANEOUS
Status: DISCONTINUED | OUTPATIENT
Start: 2024-01-01 | End: 2024-01-01 | Stop reason: HOSPADM

## 2024-01-01 RX ORDER — GUAIFENESIN 600 MG/1
15 TABLET, EXTENDED RELEASE ORAL DAILY
Status: DISCONTINUED | OUTPATIENT
Start: 2024-01-01 | End: 2024-01-01 | Stop reason: HOSPADM

## 2024-01-01 RX ORDER — FUROSEMIDE 10 MG/ML
INJECTION INTRAMUSCULAR; INTRAVENOUS
Status: DISCONTINUED
Start: 2024-01-01 | End: 2024-01-01 | Stop reason: HOSPADM

## 2024-01-01 RX ORDER — POTASSIUM CHLORIDE 29.8 MG/ML
20 INJECTION INTRAVENOUS
Status: COMPLETED | OUTPATIENT
Start: 2024-01-01 | End: 2024-01-01

## 2024-01-01 RX ORDER — DEXTROSE MONOHYDRATE 25 G/50ML
25-50 INJECTION, SOLUTION INTRAVENOUS
Status: DISCONTINUED | OUTPATIENT
Start: 2024-01-01 | End: 2024-01-01

## 2024-01-01 RX ORDER — CALCIUM CHLORIDE, MAGNESIUM CHLORIDE, SODIUM CHLORIDE, SODIUM BICARBONATE, POTASSIUM CHLORIDE AND SODIUM PHOSPHATE DIBASIC DIHYDRATE 3.68; 3.05; 6.34; 3.09; .314; .187 G/L; G/L; G/L; G/L; G/L; G/L
INJECTION INTRAVENOUS CONTINUOUS
Status: DISCONTINUED | OUTPATIENT
Start: 2024-01-01 | End: 2024-01-01

## 2024-01-01 RX ORDER — POTASSIUM PHOS IN 0.9 % NACL 15MMOL/250
15 PLASTIC BAG, INJECTION (ML) INTRAVENOUS
Status: COMPLETED | OUTPATIENT
Start: 2024-01-01 | End: 2024-01-01

## 2024-01-01 RX ORDER — ONDANSETRON 4 MG/1
4 TABLET, ORALLY DISINTEGRATING ORAL EVERY 6 HOURS PRN
Status: DISCONTINUED | OUTPATIENT
Start: 2024-01-01 | End: 2024-01-01 | Stop reason: HOSPADM

## 2024-01-01 RX ORDER — CEFEPIME HYDROCHLORIDE 2 G/1
2 INJECTION, POWDER, FOR SOLUTION INTRAVENOUS EVERY 8 HOURS
Status: DISCONTINUED | OUTPATIENT
Start: 2024-01-01 | End: 2024-01-01

## 2024-01-01 RX ORDER — FIBRINOGEN (HUMAN) 700-1300MG
1350 KIT INTRAVENOUS ONCE
Status: COMPLETED | OUTPATIENT
Start: 2024-01-01 | End: 2024-01-01

## 2024-01-01 RX ORDER — DEXMEDETOMIDINE HYDROCHLORIDE 4 UG/ML
.2-.7 INJECTION, SOLUTION INTRAVENOUS CONTINUOUS
Status: DISCONTINUED | OUTPATIENT
Start: 2024-01-01 | End: 2024-01-01

## 2024-01-01 RX ORDER — FENTANYL CITRATE 50 UG/ML
INJECTION, SOLUTION INTRAMUSCULAR; INTRAVENOUS
Status: DISCONTINUED | OUTPATIENT
Start: 2024-01-01 | End: 2024-01-01 | Stop reason: HOSPADM

## 2024-01-01 RX ORDER — PANTOPRAZOLE SODIUM 40 MG/1
40 TABLET, DELAYED RELEASE ORAL DAILY
Status: DISCONTINUED | OUTPATIENT
Start: 2024-01-01 | End: 2024-01-01

## 2024-01-01 RX ORDER — NICOTINE POLACRILEX 4 MG
15-30 LOZENGE BUCCAL
Status: DISCONTINUED | OUTPATIENT
Start: 2024-01-01 | End: 2024-01-01

## 2024-01-01 RX ORDER — SENNOSIDES 8.6 MG
8.6 TABLET ORAL 2 TIMES DAILY PRN
Status: DISCONTINUED | OUTPATIENT
Start: 2024-01-01 | End: 2024-01-01

## 2024-01-01 RX ORDER — PHENYLEPHRINE HCL IN 0.9% NACL 50MG/250ML
.1-6 PLASTIC BAG, INJECTION (ML) INTRAVENOUS CONTINUOUS
Status: DISCONTINUED | OUTPATIENT
Start: 2024-01-01 | End: 2024-01-01 | Stop reason: HOSPADM

## 2024-01-01 RX ORDER — FENTANYL CITRATE 50 UG/ML
INJECTION, SOLUTION INTRAMUSCULAR; INTRAVENOUS PRN
Status: DISCONTINUED | OUTPATIENT
Start: 2024-01-01 | End: 2024-01-01

## 2024-01-01 RX ORDER — ASPIRIN 81 MG/1
81 TABLET, CHEWABLE ORAL
Status: DISCONTINUED | OUTPATIENT
Start: 2024-01-01 | End: 2024-01-01 | Stop reason: HOSPADM

## 2024-01-01 RX ORDER — DEXTROSE MONOHYDRATE 25 G/50ML
25-50 INJECTION, SOLUTION INTRAVENOUS
Status: DISCONTINUED | OUTPATIENT
Start: 2024-01-01 | End: 2024-01-01 | Stop reason: HOSPADM

## 2024-01-01 RX ORDER — FIBRINOGEN (HUMAN) 700-1300MG
1350 KIT INTRAVENOUS ONCE
Status: DISCONTINUED | OUTPATIENT
Start: 2024-01-01 | End: 2024-01-01

## 2024-01-01 RX ORDER — CALCIUM GLUCONATE 20 MG/ML
2 INJECTION, SOLUTION INTRAVENOUS EVERY 8 HOURS PRN
Status: DISCONTINUED | OUTPATIENT
Start: 2024-01-01 | End: 2024-01-01

## 2024-01-01 RX ORDER — CLINDAMYCIN PHOSPHATE 900 MG/50ML
900 INJECTION, SOLUTION INTRAVENOUS EVERY 8 HOURS
Status: DISCONTINUED | OUTPATIENT
Start: 2024-01-01 | End: 2024-01-01

## 2024-01-01 RX ORDER — DEXTROSE MONOHYDRATE 25 G/50ML
25-50 INJECTION, SOLUTION INTRAVENOUS
Status: CANCELLED | OUTPATIENT
Start: 2024-01-01

## 2024-01-01 RX ORDER — NOREPINEPHRINE BITARTRATE 0.06 MG/ML
.01-.1 INJECTION, SOLUTION INTRAVENOUS CONTINUOUS
Status: DISCONTINUED | OUTPATIENT
Start: 2024-01-01 | End: 2024-01-01

## 2024-01-01 RX ORDER — METRONIDAZOLE 500 MG/100ML
500 INJECTION, SOLUTION INTRAVENOUS EVERY 12 HOURS
Status: DISCONTINUED | OUTPATIENT
Start: 2024-01-01 | End: 2024-01-01 | Stop reason: HOSPADM

## 2024-01-01 RX ORDER — MAGNESIUM SULFATE HEPTAHYDRATE 40 MG/ML
2 INJECTION, SOLUTION INTRAVENOUS ONCE
Status: COMPLETED | OUTPATIENT
Start: 2024-01-01 | End: 2024-01-01

## 2024-01-01 RX ORDER — OXYCODONE HYDROCHLORIDE 5 MG/1
5 TABLET ORAL EVERY 4 HOURS PRN
Status: DISCONTINUED | OUTPATIENT
Start: 2024-01-01 | End: 2024-01-01 | Stop reason: HOSPADM

## 2024-01-01 RX ORDER — SENNOSIDES 8.6 MG
8.6 TABLET ORAL 2 TIMES DAILY
Status: DISCONTINUED | OUTPATIENT
Start: 2024-01-01 | End: 2024-01-01

## 2024-01-01 RX ORDER — POTASSIUM CHLORIDE 29.8 MG/ML
20 INJECTION INTRAVENOUS
Status: DISCONTINUED | OUTPATIENT
Start: 2024-01-01 | End: 2024-01-01

## 2024-01-01 RX ORDER — ASPIRIN 81 MG/1
162 TABLET, CHEWABLE ORAL
Status: DISCONTINUED | OUTPATIENT
Start: 2024-01-01 | End: 2024-01-01 | Stop reason: HOSPADM

## 2024-01-01 RX ORDER — HYDROMORPHONE HCL IN WATER/PF 6 MG/30 ML
0.4 PATIENT CONTROLLED ANALGESIA SYRINGE INTRAVENOUS
Status: DISCONTINUED | OUTPATIENT
Start: 2024-01-01 | End: 2024-01-01 | Stop reason: HOSPADM

## 2024-01-01 RX ORDER — CHLORHEXIDINE GLUCONATE ORAL RINSE 1.2 MG/ML
10 SOLUTION DENTAL ONCE
Status: DISCONTINUED | OUTPATIENT
Start: 2024-01-01 | End: 2024-01-01 | Stop reason: HOSPADM

## 2024-01-01 RX ORDER — CHLORHEXIDINE GLUCONATE ORAL RINSE 1.2 MG/ML
15 SOLUTION DENTAL 2 TIMES DAILY
Status: DISCONTINUED | OUTPATIENT
Start: 2024-01-01 | End: 2024-01-01 | Stop reason: HOSPADM

## 2024-01-01 RX ORDER — LIDOCAINE 40 MG/G
CREAM TOPICAL
Status: DISCONTINUED | OUTPATIENT
Start: 2024-01-01 | End: 2024-01-01 | Stop reason: HOSPADM

## 2024-01-01 RX ORDER — CALCIUM GLUCONATE 20 MG/ML
1 INJECTION, SOLUTION INTRAVENOUS
Status: DISCONTINUED | OUTPATIENT
Start: 2024-01-01 | End: 2024-01-01

## 2024-01-01 RX ORDER — MAGNESIUM CARB/ALUMINUM HYDROX 105-160MG
296 TABLET,CHEWABLE ORAL ONCE
Status: COMPLETED | OUTPATIENT
Start: 2024-01-01 | End: 2024-01-01

## 2024-01-01 RX ORDER — HYDRALAZINE HYDROCHLORIDE 20 MG/ML
10 INJECTION INTRAMUSCULAR; INTRAVENOUS EVERY 30 MIN PRN
Status: DISCONTINUED | OUTPATIENT
Start: 2024-01-01 | End: 2024-01-01 | Stop reason: HOSPADM

## 2024-01-01 RX ORDER — POTASSIUM CHLORIDE 29.8 MG/ML
20 INJECTION INTRAVENOUS ONCE
Status: COMPLETED | OUTPATIENT
Start: 2024-01-01 | End: 2024-01-01

## 2024-01-01 RX ORDER — CHLOROTHIAZIDE SODIUM 500 MG/1
1000 INJECTION INTRAVENOUS ONCE
Status: COMPLETED | OUTPATIENT
Start: 2024-01-01 | End: 2024-01-01

## 2024-01-01 RX ORDER — GLYCOPYRROLATE 0.2 MG/ML
0.1 INJECTION, SOLUTION INTRAMUSCULAR; INTRAVENOUS EVERY 4 HOURS PRN
Status: DISCONTINUED | OUTPATIENT
Start: 2024-01-01 | End: 2024-01-01 | Stop reason: HOSPADM

## 2024-01-01 RX ORDER — NOREPINEPHRINE BITARTRATE 0.06 MG/ML
.01-.6 INJECTION, SOLUTION INTRAVENOUS CONTINUOUS
Status: DISCONTINUED | OUTPATIENT
Start: 2024-01-01 | End: 2024-01-01 | Stop reason: HOSPADM

## 2024-01-01 RX ORDER — AMOXICILLIN 250 MG
1 CAPSULE ORAL 2 TIMES DAILY
Status: DISCONTINUED | OUTPATIENT
Start: 2024-01-01 | End: 2024-01-01

## 2024-01-01 RX ORDER — MIDAZOLAM HCL IN 0.9 % NACL/PF 1 MG/ML
1-8 PLASTIC BAG, INJECTION (ML) INTRAVENOUS CONTINUOUS
Status: DISCONTINUED | OUTPATIENT
Start: 2024-01-01 | End: 2024-01-01

## 2024-01-01 RX ORDER — CALCIUM CHLORIDE, MAGNESIUM CHLORIDE, DEXTROSE MONOHYDRATE, LACTIC ACID, SODIUM CHLORIDE, SODIUM BICARBONATE AND POTASSIUM CHLORIDE 5.15; 2.03; 22; 5.4; 6.46; 3.09; .157 G/L; G/L; G/L; G/L; G/L; G/L; G/L
8 INJECTION INTRAVENOUS CONTINUOUS
Status: DISCONTINUED | OUTPATIENT
Start: 2024-01-01 | End: 2024-01-01

## 2024-01-01 RX ORDER — HEPARIN SODIUM 1000 [USP'U]/ML
INJECTION, SOLUTION INTRAVENOUS; SUBCUTANEOUS PRN
Status: DISCONTINUED | OUTPATIENT
Start: 2024-01-01 | End: 2024-01-01

## 2024-01-01 RX ORDER — BISACODYL 10 MG
10 SUPPOSITORY, RECTAL RECTAL DAILY PRN
Status: DISCONTINUED | OUTPATIENT
Start: 2024-01-01 | End: 2024-01-01 | Stop reason: HOSPADM

## 2024-01-01 RX ORDER — ACETAMINOPHEN 325 MG/1
975 TABLET ORAL ONCE
Status: COMPLETED | OUTPATIENT
Start: 2024-01-01 | End: 2024-01-01

## 2024-01-01 RX ORDER — CEFEPIME HYDROCHLORIDE 2 G/1
2 INJECTION, POWDER, FOR SOLUTION INTRAVENOUS EVERY 8 HOURS
Status: DISPENSED | OUTPATIENT
Start: 2024-01-01 | End: 2024-01-01

## 2024-01-01 RX ORDER — ATROPINE SULFATE 10 MG/ML
1 SOLUTION/ DROPS OPHTHALMIC
Status: DISCONTINUED | OUTPATIENT
Start: 2024-01-01 | End: 2024-01-01 | Stop reason: HOSPADM

## 2024-01-01 RX ORDER — ARGATROBAN 1 MG/ML
350 INJECTION, SOLUTION INTRAVENOUS
Status: DISCONTINUED | OUTPATIENT
Start: 2024-01-01 | End: 2024-01-01 | Stop reason: HOSPADM

## 2024-01-01 RX ORDER — LIDOCAINE HYDROCHLORIDE 10 MG/ML
INJECTION, SOLUTION EPIDURAL; INFILTRATION; INTRACAUDAL; PERINEURAL
Status: COMPLETED
Start: 2024-01-01 | End: 2024-01-01

## 2024-01-01 RX ORDER — BUMETANIDE 0.25 MG/ML
4 INJECTION INTRAMUSCULAR; INTRAVENOUS ONCE
Status: DISCONTINUED | OUTPATIENT
Start: 2024-01-01 | End: 2024-01-01

## 2024-01-01 RX ORDER — ACETAMINOPHEN 325 MG/1
650 TABLET ORAL EVERY 4 HOURS PRN
Status: DISCONTINUED | OUTPATIENT
Start: 2024-01-01 | End: 2024-01-01 | Stop reason: HOSPADM

## 2024-01-01 RX ORDER — ACETAZOLAMIDE 500 MG/5ML
500 INJECTION, POWDER, LYOPHILIZED, FOR SOLUTION INTRAVENOUS ONCE
Qty: 5 ML | Refills: 0 | Status: COMPLETED | OUTPATIENT
Start: 2024-01-01 | End: 2024-01-01

## 2024-01-01 RX ORDER — CALCIUM CHLORIDE, MAGNESIUM CHLORIDE, DEXTROSE MONOHYDRATE, LACTIC ACID, SODIUM CHLORIDE, SODIUM BICARBONATE AND POTASSIUM CHLORIDE 5.15; 2.03; 22; 5.4; 6.46; 3.09; .157 G/L; G/L; G/L; G/L; G/L; G/L; G/L
17.5 INJECTION INTRAVENOUS CONTINUOUS
Status: DISCONTINUED | OUTPATIENT
Start: 2024-01-01 | End: 2024-01-01

## 2024-01-01 RX ORDER — IOPAMIDOL 755 MG/ML
135 INJECTION, SOLUTION INTRAVASCULAR ONCE
Status: COMPLETED | OUTPATIENT
Start: 2024-01-01 | End: 2024-01-01

## 2024-01-01 RX ORDER — CEFEPIME HYDROCHLORIDE 2 G/1
2 INJECTION, POWDER, FOR SOLUTION INTRAVENOUS EVERY 12 HOURS
Status: DISCONTINUED | OUTPATIENT
Start: 2024-01-01 | End: 2024-01-01

## 2024-01-01 RX ORDER — VANCOMYCIN HYDROCHLORIDE 1 G/200ML
1000 INJECTION, SOLUTION INTRAVENOUS EVERY 12 HOURS
Status: DISCONTINUED | OUTPATIENT
Start: 2024-01-01 | End: 2024-01-01

## 2024-01-01 RX ORDER — SODIUM CHLORIDE, SODIUM GLUCONATE, SODIUM ACETATE, POTASSIUM CHLORIDE AND MAGNESIUM CHLORIDE 526; 502; 368; 37; 30 MG/100ML; MG/100ML; MG/100ML; MG/100ML; MG/100ML
INJECTION, SOLUTION INTRAVENOUS CONTINUOUS PRN
Status: DISCONTINUED | OUTPATIENT
Start: 2024-01-01 | End: 2024-01-01

## 2024-01-01 RX ORDER — NALOXONE HYDROCHLORIDE 0.4 MG/ML
0.4 INJECTION, SOLUTION INTRAMUSCULAR; INTRAVENOUS; SUBCUTANEOUS
Status: DISCONTINUED | OUTPATIENT
Start: 2024-01-01 | End: 2024-01-01 | Stop reason: HOSPADM

## 2024-01-01 RX ORDER — DEXTROSE MONOHYDRATE 100 MG/ML
INJECTION, SOLUTION INTRAVENOUS CONTINUOUS
Status: DISCONTINUED | OUTPATIENT
Start: 2024-01-01 | End: 2024-01-01 | Stop reason: HOSPADM

## 2024-01-01 RX ORDER — ARGATROBAN 1 MG/ML
150 INJECTION, SOLUTION INTRAVENOUS
Status: DISCONTINUED | OUTPATIENT
Start: 2024-01-01 | End: 2024-01-01 | Stop reason: HOSPADM

## 2024-01-01 RX ORDER — SENNOSIDES 8.6 MG
8.6 TABLET ORAL 2 TIMES DAILY PRN
Status: DISCONTINUED | OUTPATIENT
Start: 2024-01-01 | End: 2024-01-01 | Stop reason: HOSPADM

## 2024-01-01 RX ORDER — DEXTROSE MONOHYDRATE 100 MG/ML
INJECTION, SOLUTION INTRAVENOUS CONTINUOUS PRN
Status: DISCONTINUED | OUTPATIENT
Start: 2024-01-01 | End: 2024-01-01 | Stop reason: HOSPADM

## 2024-01-01 RX ORDER — CALCIUM GLUCONATE 20 MG/ML
2 INJECTION, SOLUTION INTRAVENOUS
Status: DISCONTINUED | OUTPATIENT
Start: 2024-01-01 | End: 2024-01-01

## 2024-01-01 RX ORDER — PROPOFOL 10 MG/ML
5-75 INJECTION, EMULSION INTRAVENOUS CONTINUOUS
Status: DISCONTINUED | OUTPATIENT
Start: 2024-01-01 | End: 2024-01-01

## 2024-01-01 RX ORDER — GABAPENTIN 100 MG/1
100 CAPSULE ORAL 3 TIMES DAILY
Status: DISCONTINUED | OUTPATIENT
Start: 2024-01-01 | End: 2024-01-01 | Stop reason: HOSPADM

## 2024-01-01 RX ORDER — PROTAMINE SULFATE 10 MG/ML
INJECTION, SOLUTION INTRAVENOUS PRN
Status: DISCONTINUED | OUTPATIENT
Start: 2024-01-01 | End: 2024-01-01

## 2024-01-01 RX ORDER — HEPARIN SODIUM 200 [USP'U]/100ML
3 INJECTION, SOLUTION INTRAVENOUS CONTINUOUS
Status: DISCONTINUED | OUTPATIENT
Start: 2024-01-01 | End: 2024-01-01

## 2024-01-01 RX ORDER — AMIODARONE HYDROCHLORIDE 200 MG/1
400 TABLET ORAL DAILY
Status: DISCONTINUED | OUTPATIENT
Start: 2024-01-01 | End: 2024-01-01 | Stop reason: HOSPADM

## 2024-01-01 RX ORDER — CEFEPIME HYDROCHLORIDE 2 G/1
2 INJECTION, POWDER, FOR SOLUTION INTRAVENOUS EVERY 8 HOURS
Status: DISCONTINUED | OUTPATIENT
Start: 2024-01-01 | End: 2024-01-01 | Stop reason: HOSPADM

## 2024-01-01 RX ORDER — CHLOROTHIAZIDE SODIUM 500 MG/1
500 INJECTION INTRAVENOUS ONCE
Status: DISCONTINUED | OUTPATIENT
Start: 2024-01-01 | End: 2024-01-01

## 2024-01-01 RX ORDER — ACETAMINOPHEN 325 MG/1
975 TABLET ORAL EVERY 8 HOURS
Qty: 27 TABLET | Refills: 0 | Status: DISPENSED | OUTPATIENT
Start: 2024-01-01 | End: 2024-01-01

## 2024-01-01 RX ORDER — HEPARIN SODIUM (PORCINE) LOCK FLUSH IV SOLN 100 UNIT/ML 100 UNIT/ML
5-10 SOLUTION INTRAVENOUS EVERY 30 MIN PRN
Status: DISCONTINUED | OUTPATIENT
Start: 2024-01-01 | End: 2024-01-01

## 2024-01-01 RX ORDER — HEPARIN SODIUM 10000 [USP'U]/100ML
600 INJECTION, SOLUTION INTRAVENOUS CONTINUOUS
Status: DISCONTINUED | OUTPATIENT
Start: 2024-01-01 | End: 2024-01-01

## 2024-01-01 RX ORDER — POTASSIUM CHLORIDE 29.8 MG/ML
20 INJECTION INTRAVENOUS EVERY 8 HOURS PRN
Status: DISCONTINUED | OUTPATIENT
Start: 2024-01-01 | End: 2024-01-01

## 2024-01-01 RX ORDER — BISACODYL 10 MG
10 SUPPOSITORY, RECTAL RECTAL ONCE
Status: COMPLETED | OUTPATIENT
Start: 2024-01-01 | End: 2024-01-01

## 2024-01-01 RX ORDER — OXYCODONE HYDROCHLORIDE 10 MG/1
10 TABLET ORAL EVERY 4 HOURS PRN
Status: DISCONTINUED | OUTPATIENT
Start: 2024-01-01 | End: 2024-01-01 | Stop reason: HOSPADM

## 2024-01-01 RX ORDER — PROPOFOL 10 MG/ML
5-75 INJECTION, EMULSION INTRAVENOUS CONTINUOUS
Status: DISCONTINUED | OUTPATIENT
Start: 2024-01-01 | End: 2024-01-01 | Stop reason: HOSPADM

## 2024-01-01 RX ORDER — PIPERACILLIN SODIUM, TAZOBACTAM SODIUM 4; .5 G/20ML; G/20ML
4.5 INJECTION, POWDER, LYOPHILIZED, FOR SOLUTION INTRAVENOUS EVERY 8 HOURS
Status: DISCONTINUED | OUTPATIENT
Start: 2024-01-01 | End: 2024-01-01

## 2024-01-01 RX ORDER — PROCHLORPERAZINE MALEATE 5 MG
10 TABLET ORAL EVERY 6 HOURS PRN
Status: ACTIVE | OUTPATIENT
Start: 2024-01-01 | End: 2024-01-01

## 2024-01-01 RX ORDER — POLYETHYLENE GLYCOL 3350 17 G/17G
17 POWDER, FOR SOLUTION ORAL DAILY
Status: DISCONTINUED | OUTPATIENT
Start: 2024-01-01 | End: 2024-01-01

## 2024-01-01 RX ORDER — LIDOCAINE HYDROCHLORIDE ANHYDROUS AND DEXTROSE MONOHYDRATE .8; 5 G/100ML; G/100ML
1-4 INJECTION, SOLUTION INTRAVENOUS CONTINUOUS
Status: DISCONTINUED | OUTPATIENT
Start: 2024-01-01 | End: 2024-01-01 | Stop reason: HOSPADM

## 2024-01-01 RX ORDER — CHLOROTHIAZIDE SODIUM 500 MG/1
500 INJECTION INTRAVENOUS ONCE
Status: COMPLETED | OUTPATIENT
Start: 2024-01-01 | End: 2024-01-01

## 2024-01-01 RX ORDER — EPINEPHRINE 0.1 MG/ML
INJECTION INTRAVENOUS PRN
Status: DISCONTINUED | OUTPATIENT
Start: 2024-01-01 | End: 2024-01-01

## 2024-01-01 RX ORDER — ASPIRIN 81 MG/1
81 TABLET, CHEWABLE ORAL DAILY
Status: DISCONTINUED | OUTPATIENT
Start: 2024-01-01 | End: 2024-01-01

## 2024-01-01 RX ORDER — BUMETANIDE 0.25 MG/ML
4 INJECTION INTRAMUSCULAR; INTRAVENOUS EVERY 12 HOURS
Status: DISCONTINUED | OUTPATIENT
Start: 2024-01-01 | End: 2024-01-01

## 2024-01-01 RX ORDER — CALCIUM GLUCONATE 94 MG/ML
1 INJECTION, SOLUTION INTRAVENOUS ONCE
Status: COMPLETED | OUTPATIENT
Start: 2024-01-01 | End: 2024-01-01

## 2024-01-01 RX ORDER — ACETAMINOPHEN 325 MG/1
650 TABLET ORAL ONCE
Status: COMPLETED | OUTPATIENT
Start: 2024-01-01 | End: 2024-01-01

## 2024-01-01 RX ORDER — NOREPINEPHRINE BITARTRATE 0.06 MG/ML
.01-.6 INJECTION, SOLUTION INTRAVENOUS CONTINUOUS
Status: DISCONTINUED | OUTPATIENT
Start: 2024-01-01 | End: 2024-01-01

## 2024-01-01 RX ORDER — DEXMEDETOMIDINE HYDROCHLORIDE 4 UG/ML
.1-1.2 INJECTION, SOLUTION INTRAVENOUS CONTINUOUS
Status: DISCONTINUED | OUTPATIENT
Start: 2024-01-01 | End: 2024-01-01 | Stop reason: HOSPADM

## 2024-01-01 RX ORDER — CALCIUM CHLORIDE, MAGNESIUM CHLORIDE, DEXTROSE MONOHYDRATE, LACTIC ACID, SODIUM CHLORIDE, SODIUM BICARBONATE AND POTASSIUM CHLORIDE 5.15; 2.03; 22; 5.4; 6.46; 3.09; .157 G/L; G/L; G/L; G/L; G/L; G/L; G/L
16 INJECTION INTRAVENOUS CONTINUOUS
Status: DISCONTINUED | OUTPATIENT
Start: 2024-01-01 | End: 2024-01-01

## 2024-01-01 RX ORDER — LIDOCAINE HYDROCHLORIDE 20 MG/ML
5 SOLUTION OROPHARYNGEAL ONCE
Status: DISCONTINUED | OUTPATIENT
Start: 2024-01-01 | End: 2024-01-01

## 2024-01-01 RX ORDER — DEXTROSE MONOHYDRATE 25 G/50ML
25 INJECTION, SOLUTION INTRAVENOUS ONCE
Status: COMPLETED | OUTPATIENT
Start: 2024-01-01 | End: 2024-01-01

## 2024-01-01 RX ORDER — METRONIDAZOLE 500 MG/100ML
500 INJECTION, SOLUTION INTRAVENOUS EVERY 8 HOURS
Status: COMPLETED | OUTPATIENT
Start: 2024-01-01 | End: 2024-01-01

## 2024-01-01 RX ORDER — MEROPENEM 1 G/1
1 INJECTION, POWDER, FOR SOLUTION INTRAVENOUS EVERY 8 HOURS
Status: DISCONTINUED | OUTPATIENT
Start: 2024-01-01 | End: 2024-01-01

## 2024-01-01 RX ORDER — DEXTROSE MONOHYDRATE 25 G/50ML
25 INJECTION, SOLUTION INTRAVENOUS ONCE
Status: CANCELLED | OUTPATIENT
Start: 2024-01-01 | End: 2024-01-01

## 2024-01-01 RX ORDER — NICOTINE POLACRILEX 4 MG
15-30 LOZENGE BUCCAL
Status: DISCONTINUED | OUTPATIENT
Start: 2024-01-01 | End: 2024-01-01 | Stop reason: HOSPADM

## 2024-01-01 RX ORDER — CALCIUM CHLORIDE 100 MG/ML
INJECTION INTRAVENOUS; INTRAVENTRICULAR PRN
Status: DISCONTINUED | OUTPATIENT
Start: 2024-01-01 | End: 2024-01-01

## 2024-01-01 RX ORDER — LIDOCAINE 4 G/G
2 PATCH TOPICAL EVERY 24 HOURS
Status: DISCONTINUED | OUTPATIENT
Start: 2024-01-01 | End: 2024-01-01

## 2024-01-01 RX ORDER — AMINO ACIDS/PROTEIN HYDROLYS 11G-40/45
2 LIQUID IN PACKET (ML) ORAL 3 TIMES DAILY
Status: DISCONTINUED | OUTPATIENT
Start: 2024-01-01 | End: 2024-01-01

## 2024-01-01 RX ORDER — HYDROMORPHONE HCL IN WATER/PF 6 MG/30 ML
0.2 PATIENT CONTROLLED ANALGESIA SYRINGE INTRAVENOUS
Status: DISCONTINUED | OUTPATIENT
Start: 2024-01-01 | End: 2024-01-01 | Stop reason: HOSPADM

## 2024-01-01 RX ORDER — CLINDAMYCIN PHOSPHATE 900 MG/50ML
900 INJECTION, SOLUTION INTRAVENOUS
Status: COMPLETED | OUTPATIENT
Start: 2024-01-01 | End: 2024-01-01

## 2024-01-01 RX ORDER — PROPOFOL 10 MG/ML
INJECTION, EMULSION INTRAVENOUS CONTINUOUS PRN
Status: DISCONTINUED | OUTPATIENT
Start: 2024-01-01 | End: 2024-01-01

## 2024-01-01 RX ORDER — POLYETHYLENE GLYCOL 3350 17 G/17G
17 POWDER, FOR SOLUTION ORAL DAILY PRN
Status: DISCONTINUED | OUTPATIENT
Start: 2024-01-01 | End: 2024-01-01 | Stop reason: HOSPADM

## 2024-01-01 RX ORDER — CLINDAMYCIN PHOSPHATE 900 MG/50ML
900 INJECTION, SOLUTION INTRAVENOUS SEE ADMIN INSTRUCTIONS
Status: DISCONTINUED | OUTPATIENT
Start: 2024-01-01 | End: 2024-01-01 | Stop reason: HOSPADM

## 2024-01-01 RX ORDER — HEPARIN SODIUM 5000 [USP'U]/.5ML
5000 INJECTION, SOLUTION INTRAVENOUS; SUBCUTANEOUS EVERY 8 HOURS
Status: DISCONTINUED | OUTPATIENT
Start: 2024-01-01 | End: 2024-01-01

## 2024-01-01 RX ORDER — CALCIUM CHLORIDE, MAGNESIUM CHLORIDE, SODIUM CHLORIDE, SODIUM BICARBONATE, POTASSIUM CHLORIDE AND SODIUM PHOSPHATE DIBASIC DIHYDRATE 3.68; 3.05; 6.34; 3.09; .314; .187 G/L; G/L; G/L; G/L; G/L; G/L
12.5 INJECTION INTRAVENOUS CONTINUOUS
Status: DISCONTINUED | OUTPATIENT
Start: 2024-01-01 | End: 2024-01-01

## 2024-01-01 RX ORDER — HEPARIN SODIUM 10000 [USP'U]/100ML
0-5000 INJECTION, SOLUTION INTRAVENOUS CONTINUOUS
Status: DISCONTINUED | OUTPATIENT
Start: 2024-01-01 | End: 2024-01-01

## 2024-01-01 RX ORDER — SODIUM CHLORIDE, SODIUM LACTATE, POTASSIUM CHLORIDE, CALCIUM CHLORIDE 600; 310; 30; 20 MG/100ML; MG/100ML; MG/100ML; MG/100ML
INJECTION, SOLUTION INTRAVENOUS CONTINUOUS PRN
Status: DISCONTINUED | OUTPATIENT
Start: 2024-01-01 | End: 2024-01-01

## 2024-01-01 RX ORDER — FAMOTIDINE 20 MG/1
20 TABLET, FILM COATED ORAL
Status: DISCONTINUED | OUTPATIENT
Start: 2024-01-01 | End: 2024-01-01 | Stop reason: HOSPADM

## 2024-01-01 RX ORDER — NITROGLYCERIN 10 MG/100ML
INJECTION INTRAVENOUS PRN
Status: DISCONTINUED | OUTPATIENT
Start: 2024-01-01 | End: 2024-01-01

## 2024-01-01 RX ADMIN — METRONIDAZOLE 500 MG: 5 INJECTION, SOLUTION INTRAVENOUS at 17:54

## 2024-01-01 RX ADMIN — CEFEPIME HYDROCHLORIDE 2 G: 2 INJECTION, POWDER, FOR SOLUTION INTRAVENOUS at 05:24

## 2024-01-01 RX ADMIN — MIDAZOLAM HYDROCHLORIDE 2 MG: 1 INJECTION, SOLUTION INTRAMUSCULAR; INTRAVENOUS at 15:59

## 2024-01-01 RX ADMIN — GABAPENTIN 100 MG: 100 CAPSULE ORAL at 20:16

## 2024-01-01 RX ADMIN — VANCOMYCIN HYDROCHLORIDE 2000 MG: 1 INJECTION, POWDER, LYOPHILIZED, FOR SOLUTION INTRAVENOUS at 23:48

## 2024-01-01 RX ADMIN — POTASSIUM CHLORIDE 20 MEQ: 29.8 INJECTION, SOLUTION INTRAVENOUS at 13:16

## 2024-01-01 RX ADMIN — Medication 2 PACKET: at 20:36

## 2024-01-01 RX ADMIN — FENTANYL CITRATE 150 MCG: 50 INJECTION INTRAMUSCULAR; INTRAVENOUS at 14:11

## 2024-01-01 RX ADMIN — INSULIN ASPART 1 UNITS: 100 INJECTION, SOLUTION INTRAVENOUS; SUBCUTANEOUS at 12:04

## 2024-01-01 RX ADMIN — Medication 15 ML: at 07:54

## 2024-01-01 RX ADMIN — INSULIN ASPART 1 UNITS: 100 INJECTION, SOLUTION INTRAVENOUS; SUBCUTANEOUS at 08:39

## 2024-01-01 RX ADMIN — GABAPENTIN 100 MG: 100 CAPSULE ORAL at 14:07

## 2024-01-01 RX ADMIN — ASPIRIN 81 MG CHEWABLE TABLET 81 MG: 81 TABLET CHEWABLE at 07:53

## 2024-01-01 RX ADMIN — CALCIUM CHLORIDE, MAGNESIUM CHLORIDE, SODIUM CHLORIDE, SODIUM BICARBONATE, POTASSIUM CHLORIDE AND SODIUM PHOSPHATE DIBASIC DIHYDRATE 12.5 ML/KG/HR: 3.68; 3.05; 6.34; 3.09; .314; .187 INJECTION INTRAVENOUS at 16:55

## 2024-01-01 RX ADMIN — Medication 2 PACKET: at 14:07

## 2024-01-01 RX ADMIN — SODIUM CHLORIDE 0.5 MG/MIN: 9 INJECTION, SOLUTION INTRAVENOUS at 20:55

## 2024-01-01 RX ADMIN — MEROPENEM 1 G: 1 INJECTION, POWDER, FOR SOLUTION INTRAVENOUS at 01:10

## 2024-01-01 RX ADMIN — HYDROCORTISONE SODIUM SUCCINATE 100 MG: 100 INJECTION, POWDER, FOR SOLUTION INTRAMUSCULAR; INTRAVENOUS at 18:11

## 2024-01-01 RX ADMIN — Medication 3 UNITS/HR: at 14:51

## 2024-01-01 RX ADMIN — SODIUM CHLORIDE, POTASSIUM CHLORIDE, SODIUM LACTATE AND CALCIUM CHLORIDE 500 ML: 600; 310; 30; 20 INJECTION, SOLUTION INTRAVENOUS at 22:55

## 2024-01-01 RX ADMIN — CALCIUM CHLORIDE, MAGNESIUM CHLORIDE, SODIUM CHLORIDE, SODIUM BICARBONATE, POTASSIUM CHLORIDE AND SODIUM PHOSPHATE DIBASIC DIHYDRATE 12.5 ML/KG/HR: 3.68; 3.05; 6.34; 3.09; .314; .187 INJECTION INTRAVENOUS at 19:54

## 2024-01-01 RX ADMIN — MINERAL OIL AND WHITE PETROLATUM: 150; 830 OINTMENT OPHTHALMIC at 08:02

## 2024-01-01 RX ADMIN — POTASSIUM CHLORIDE 20 MEQ: 29.8 INJECTION, SOLUTION INTRAVENOUS at 17:33

## 2024-01-01 RX ADMIN — GABAPENTIN 100 MG: 100 CAPSULE ORAL at 21:09

## 2024-01-01 RX ADMIN — POTASSIUM CHLORIDE 20 MEQ: 29.8 INJECTION, SOLUTION INTRAVENOUS at 18:53

## 2024-01-01 RX ADMIN — BIVALIRUDIN 0.08 MG/KG/HR: 250 INJECTION, POWDER, LYOPHILIZED, FOR SOLUTION INTRAVENOUS at 02:20

## 2024-01-01 RX ADMIN — NOREPINEPHRINE BITARTRATE 0.18 MCG/KG/MIN: 0.06 INJECTION, SOLUTION INTRAVENOUS at 10:21

## 2024-01-01 RX ADMIN — ASPIRIN 81 MG CHEWABLE TABLET 81 MG: 81 TABLET CHEWABLE at 09:03

## 2024-01-01 RX ADMIN — SODIUM ZIRCONIUM CYCLOSILICATE 10 G: 10 POWDER, FOR SUSPENSION ORAL at 14:41

## 2024-01-01 RX ADMIN — HYDROCORTISONE SODIUM SUCCINATE 50 MG: 100 INJECTION, POWDER, FOR SOLUTION INTRAMUSCULAR; INTRAVENOUS at 19:35

## 2024-01-01 RX ADMIN — HYDROMORPHONE HYDROCHLORIDE 0.5 MG: 1 INJECTION, SOLUTION INTRAMUSCULAR; INTRAVENOUS; SUBCUTANEOUS at 13:21

## 2024-01-01 RX ADMIN — METRONIDAZOLE 500 MG: 5 INJECTION, SOLUTION INTRAVENOUS at 18:15

## 2024-01-01 RX ADMIN — METRONIDAZOLE 500 MG: 500 INJECTION, SOLUTION INTRAVENOUS at 07:32

## 2024-01-01 RX ADMIN — Medication 50 MG: at 09:30

## 2024-01-01 RX ADMIN — LIDOCAINE HYDROCHLORIDE 30 MG: 10 INJECTION, SOLUTION EPIDURAL; INFILTRATION; INTRACAUDAL; PERINEURAL at 14:59

## 2024-01-01 RX ADMIN — CALCIUM CHLORIDE, MAGNESIUM CHLORIDE, DEXTROSE MONOHYDRATE, LACTIC ACID, SODIUM CHLORIDE, SODIUM BICARBONATE AND POTASSIUM CHLORIDE 16 ML/KG/HR: 5.15; 2.03; 22; 5.4; 6.46; 3.09; .157 INJECTION INTRAVENOUS at 11:39

## 2024-01-01 RX ADMIN — Medication 2 PACKET: at 14:32

## 2024-01-01 RX ADMIN — SODIUM BICARBONATE 9.6 ML/HR: 84 INJECTION, SOLUTION INTRAVENOUS at 20:04

## 2024-01-01 RX ADMIN — Medication 50 MCG/HR: at 01:46

## 2024-01-01 RX ADMIN — MIDAZOLAM HYDROCHLORIDE 2 MG: 1 INJECTION, SOLUTION INTRAMUSCULAR; INTRAVENOUS at 16:29

## 2024-01-01 RX ADMIN — CEFEPIME HYDROCHLORIDE 2 G: 2 INJECTION, POWDER, FOR SOLUTION INTRAVENOUS at 08:59

## 2024-01-01 RX ADMIN — Medication 2 PACKET: at 07:32

## 2024-01-01 RX ADMIN — POTASSIUM CHLORIDE 20 MEQ: 29.8 INJECTION, SOLUTION INTRAVENOUS at 07:48

## 2024-01-01 RX ADMIN — DEXTROSE MONOHYDRATE 25 G: 25 INJECTION, SOLUTION INTRAVENOUS at 13:30

## 2024-01-01 RX ADMIN — SUGAMMADEX 200 MG: 100 INJECTION, SOLUTION INTRAVENOUS at 11:09

## 2024-01-01 RX ADMIN — SODIUM CHLORIDE, POTASSIUM CHLORIDE, SODIUM LACTATE AND CALCIUM CHLORIDE 500 ML: 600; 310; 30; 20 INJECTION, SOLUTION INTRAVENOUS at 03:18

## 2024-01-01 RX ADMIN — Medication 50 MG: at 16:06

## 2024-01-01 RX ADMIN — CEFEPIME HYDROCHLORIDE 2 G: 2 INJECTION, POWDER, FOR SOLUTION INTRAVENOUS at 07:32

## 2024-01-01 RX ADMIN — SENNOSIDES 8.6 MG: 8.6 TABLET, FILM COATED ORAL at 20:37

## 2024-01-01 RX ADMIN — BIVALIRUDIN 0.08 MG/KG/HR: 250 INJECTION, POWDER, LYOPHILIZED, FOR SOLUTION INTRAVENOUS at 03:08

## 2024-01-01 RX ADMIN — HYDROCORTISONE SODIUM SUCCINATE 50 MG: 100 INJECTION, POWDER, FOR SOLUTION INTRAMUSCULAR; INTRAVENOUS at 00:52

## 2024-01-01 RX ADMIN — PANTOPRAZOLE SODIUM 40 MG: 40 INJECTION, POWDER, FOR SOLUTION INTRAVENOUS at 07:54

## 2024-01-01 RX ADMIN — CALCIUM CHLORIDE, MAGNESIUM CHLORIDE, DEXTROSE MONOHYDRATE, LACTIC ACID, SODIUM CHLORIDE, SODIUM BICARBONATE AND POTASSIUM CHLORIDE 8 ML/KG/HR: 5.15; 2.03; 22; 5.4; 6.46; 3.09; .157 INJECTION INTRAVENOUS at 03:10

## 2024-01-01 RX ADMIN — FENTANYL CITRATE 250 MCG: 50 INJECTION INTRAMUSCULAR; INTRAVENOUS at 08:04

## 2024-01-01 RX ADMIN — HEPARIN SODIUM 845 UNITS/HR: 10000 INJECTION, SOLUTION INTRAVENOUS at 19:41

## 2024-01-01 RX ADMIN — MAGNESIUM SULFATE HEPTAHYDRATE 2 G: 2 INJECTION, SOLUTION INTRAVENOUS at 12:35

## 2024-01-01 RX ADMIN — ASPIRIN 81 MG CHEWABLE TABLET 81 MG: 81 TABLET CHEWABLE at 17:00

## 2024-01-01 RX ADMIN — HEPARIN SODIUM 3 ML/HR: 200 INJECTION, SOLUTION INTRAVENOUS at 17:32

## 2024-01-01 RX ADMIN — CEFEPIME HYDROCHLORIDE 2 G: 2 INJECTION, POWDER, FOR SOLUTION INTRAVENOUS at 22:36

## 2024-01-01 RX ADMIN — Medication 40 MG: at 09:03

## 2024-01-01 RX ADMIN — CALCIUM CHLORIDE, MAGNESIUM CHLORIDE, DEXTROSE MONOHYDRATE, LACTIC ACID, SODIUM CHLORIDE, SODIUM BICARBONATE AND POTASSIUM CHLORIDE 12.5 ML/KG/HR: 5.15; 2.03; 22; 5.4; 6.46; 3.09; .157 INJECTION INTRAVENOUS at 19:27

## 2024-01-01 RX ADMIN — MILRINONE LACTATE IN DEXTROSE 0.5 MCG/KG/MIN: 200 INJECTION, SOLUTION INTRAVENOUS at 22:53

## 2024-01-01 RX ADMIN — MILRINONE LACTATE IN DEXTROSE 0.5 MCG/KG/MIN: 200 INJECTION, SOLUTION INTRAVENOUS at 10:47

## 2024-01-01 RX ADMIN — CALCIUM CHLORIDE INJECTION 0.5 G: 100 INJECTION, SOLUTION INTRAVENOUS at 13:18

## 2024-01-01 RX ADMIN — MIDAZOLAM 3 MG: 1 INJECTION INTRAMUSCULAR; INTRAVENOUS at 07:25

## 2024-01-01 RX ADMIN — MEROPENEM 1 G: 1 INJECTION, POWDER, FOR SOLUTION INTRAVENOUS at 01:09

## 2024-01-01 RX ADMIN — PROPOFOL 50 MCG/KG/MIN: 10 INJECTION, EMULSION INTRAVENOUS at 18:47

## 2024-01-01 RX ADMIN — POLYETHYLENE GLYCOL 3350 17 G: 17 POWDER, FOR SOLUTION ORAL at 20:16

## 2024-01-01 RX ADMIN — VASOPRESSIN 2.4 UNITS/HR: 20 INJECTION, SOLUTION INTRAMUSCULAR; SUBCUTANEOUS at 03:08

## 2024-01-01 RX ADMIN — CHLORHEXIDINE GLUCONATE 0.12% ORAL RINSE 15 ML: 1.2 LIQUID ORAL at 07:53

## 2024-01-01 RX ADMIN — ACETAMINOPHEN 975 MG: 325 TABLET, FILM COATED ORAL at 05:24

## 2024-01-01 RX ADMIN — MEROPENEM 1 G: 1 INJECTION, POWDER, FOR SOLUTION INTRAVENOUS at 04:45

## 2024-01-01 RX ADMIN — CALCIUM CHLORIDE, MAGNESIUM CHLORIDE, DEXTROSE MONOHYDRATE, LACTIC ACID, SODIUM CHLORIDE, SODIUM BICARBONATE AND POTASSIUM CHLORIDE 12.5 ML/KG/HR: 5.15; 2.03; 22; 5.4; 6.46; 3.09; .157 INJECTION INTRAVENOUS at 23:48

## 2024-01-01 RX ADMIN — EPINEPHRINE 0.08 MCG/KG/MIN: 1 INJECTION INTRAMUSCULAR; INTRAVENOUS; SUBCUTANEOUS at 16:05

## 2024-01-01 RX ADMIN — SENNOSIDES 8.6 MG: 8.6 TABLET, FILM COATED ORAL at 09:58

## 2024-01-01 RX ADMIN — CALCIUM CHLORIDE, MAGNESIUM CHLORIDE, SODIUM CHLORIDE, SODIUM BICARBONATE, POTASSIUM CHLORIDE AND SODIUM PHOSPHATE DIBASIC DIHYDRATE: 3.68; 3.05; 6.34; 3.09; .314; .187 INJECTION INTRAVENOUS at 20:22

## 2024-01-01 RX ADMIN — POTASSIUM PHOSPHATE, MONOBASIC POTASSIUM PHOSPHATE, DIBASIC 15 MMOL: 224; 236 INJECTION, SOLUTION, CONCENTRATE INTRAVENOUS at 02:17

## 2024-01-01 RX ADMIN — MILRINONE LACTATE IN DEXTROSE 0.5 MCG/KG/MIN: 200 INJECTION, SOLUTION INTRAVENOUS at 11:00

## 2024-01-01 RX ADMIN — MILRINONE LACTATE IN DEXTROSE 0.5 MCG/KG/MIN: 200 INJECTION, SOLUTION INTRAVENOUS at 02:17

## 2024-01-01 RX ADMIN — CALCIUM CHLORIDE, MAGNESIUM CHLORIDE, SODIUM CHLORIDE, SODIUM BICARBONATE, POTASSIUM CHLORIDE AND SODIUM PHOSPHATE DIBASIC DIHYDRATE 12.5 ML/KG/HR: 3.68; 3.05; 6.34; 3.09; .314; .187 INJECTION INTRAVENOUS at 22:11

## 2024-01-01 RX ADMIN — POTASSIUM CHLORIDE 20 MEQ: 29.8 INJECTION, SOLUTION INTRAVENOUS at 20:03

## 2024-01-01 RX ADMIN — FIBRINOGEN (HUMAN) 1350 MG: KIT INTRAVENOUS at 14:44

## 2024-01-01 RX ADMIN — SODIUM CHLORIDE 0.5 MG/MIN: 9 INJECTION, SOLUTION INTRAVENOUS at 03:07

## 2024-01-01 RX ADMIN — SODIUM CHLORIDE, POTASSIUM CHLORIDE, SODIUM LACTATE AND CALCIUM CHLORIDE 750 ML: 600; 310; 30; 20 INJECTION, SOLUTION INTRAVENOUS at 06:00

## 2024-01-01 RX ADMIN — Medication 2 UNITS/HR: at 08:26

## 2024-01-01 RX ADMIN — MIDAZOLAM HYDROCHLORIDE 4 MG/HR: 1 INJECTION, SOLUTION INTRAVENOUS at 10:58

## 2024-01-01 RX ADMIN — Medication 50 MG: at 08:13

## 2024-01-01 RX ADMIN — MINERAL OIL AND WHITE PETROLATUM: 150; 830 OINTMENT OPHTHALMIC at 07:31

## 2024-01-01 RX ADMIN — ANGIOTENSIN II 20 NG/KG/MIN: 2.5 INJECTION INTRAVENOUS at 08:25

## 2024-01-01 RX ADMIN — METRONIDAZOLE 500 MG: 5 INJECTION, SOLUTION INTRAVENOUS at 17:44

## 2024-01-01 RX ADMIN — SODIUM CHLORIDE, POTASSIUM CHLORIDE, SODIUM LACTATE AND CALCIUM CHLORIDE 250 ML: 600; 310; 30; 20 INJECTION, SOLUTION INTRAVENOUS at 05:23

## 2024-01-01 RX ADMIN — EPINEPHRINE 40 MCG: 0.1 INJECTION INTRACARDIAC; INTRAVENOUS at 10:11

## 2024-01-01 RX ADMIN — GABAPENTIN 100 MG: 100 CAPSULE ORAL at 08:00

## 2024-01-01 RX ADMIN — MAGNESIUM CITRATE 296 ML: 1.75 LIQUID ORAL at 00:26

## 2024-01-01 RX ADMIN — HEPARIN SODIUM 1500 UNITS/HR: 10000 INJECTION, SOLUTION INTRAVENOUS at 13:50

## 2024-01-01 RX ADMIN — MIDAZOLAM HYDROCHLORIDE 4 MG: 1 INJECTION, SOLUTION INTRAMUSCULAR; INTRAVENOUS at 13:41

## 2024-01-01 RX ADMIN — BUMETANIDE 4 MG: 0.25 INJECTION, SOLUTION INTRAMUSCULAR; INTRAVENOUS at 03:43

## 2024-01-01 RX ADMIN — CALCIUM CHLORIDE, MAGNESIUM CHLORIDE, DEXTROSE MONOHYDRATE, LACTIC ACID, SODIUM CHLORIDE, SODIUM BICARBONATE AND POTASSIUM CHLORIDE 16 ML/KG/HR: 5.15; 2.03; 22; 5.4; 6.46; 3.09; .157 INJECTION INTRAVENOUS at 14:38

## 2024-01-01 RX ADMIN — SODIUM CHLORIDE 1 MG/MIN: 9 INJECTION, SOLUTION INTRAVENOUS at 17:29

## 2024-01-01 RX ADMIN — ASPIRIN 81 MG CHEWABLE TABLET 81 MG: 81 TABLET CHEWABLE at 07:44

## 2024-01-01 RX ADMIN — PROPOFOL 10 MCG/KG/MIN: 10 INJECTION, EMULSION INTRAVENOUS at 02:21

## 2024-01-01 RX ADMIN — Medication 100 MCG/HR: at 06:18

## 2024-01-01 RX ADMIN — GABAPENTIN 100 MG: 100 CAPSULE ORAL at 07:44

## 2024-01-01 RX ADMIN — CALCIUM CHLORIDE, MAGNESIUM CHLORIDE, DEXTROSE MONOHYDRATE, LACTIC ACID, SODIUM CHLORIDE, SODIUM BICARBONATE AND POTASSIUM CHLORIDE 8 ML/KG/HR: 5.15; 2.03; 22; 5.4; 6.46; 3.09; .157 INJECTION INTRAVENOUS at 22:15

## 2024-01-01 RX ADMIN — AMIODARONE HYDROCHLORIDE 400 MG: 200 TABLET ORAL at 09:03

## 2024-01-01 RX ADMIN — CHLOROTHIAZIDE SODIUM 500 MG: 500 INJECTION, POWDER, LYOPHILIZED, FOR SOLUTION INTRAVENOUS at 04:00

## 2024-01-01 RX ADMIN — CEFEPIME HYDROCHLORIDE 2 G: 2 INJECTION, POWDER, FOR SOLUTION INTRAVENOUS at 11:36

## 2024-01-01 RX ADMIN — HEPARIN SODIUM 600 UNITS/HR: 10000 INJECTION, SOLUTION INTRAVENOUS at 00:27

## 2024-01-01 RX ADMIN — METRONIDAZOLE 500 MG: 5 INJECTION, SOLUTION INTRAVENOUS at 18:11

## 2024-01-01 RX ADMIN — MICAFUNGIN SODIUM 150 MG: 50 INJECTION, POWDER, LYOPHILIZED, FOR SOLUTION INTRAVENOUS at 20:35

## 2024-01-01 RX ADMIN — METRONIDAZOLE 500 MG: 5 INJECTION, SOLUTION INTRAVENOUS at 09:44

## 2024-01-01 RX ADMIN — CEFEPIME HYDROCHLORIDE 2 G: 2 INJECTION, POWDER, FOR SOLUTION INTRAVENOUS at 05:35

## 2024-01-01 RX ADMIN — MINERAL OIL AND WHITE PETROLATUM: 150; 830 OINTMENT OPHTHALMIC at 16:15

## 2024-01-01 RX ADMIN — MEROPENEM 1 G: 1 INJECTION, POWDER, FOR SOLUTION INTRAVENOUS at 17:53

## 2024-01-01 RX ADMIN — CHLORHEXIDINE GLUCONATE 0.12% ORAL RINSE 15 ML: 1.2 LIQUID ORAL at 07:32

## 2024-01-01 RX ADMIN — SODIUM CHLORIDE 0.5 MG/MIN: 9 INJECTION, SOLUTION INTRAVENOUS at 05:05

## 2024-01-01 RX ADMIN — CEFEPIME HYDROCHLORIDE 2 G: 2 INJECTION, POWDER, FOR SOLUTION INTRAVENOUS at 22:37

## 2024-01-01 RX ADMIN — NOREPINEPHRINE BITARTRATE 0.3 MCG/KG/MIN: 0.06 INJECTION, SOLUTION INTRAVENOUS at 02:18

## 2024-01-01 RX ADMIN — CEFEPIME HYDROCHLORIDE 2 G: 2 INJECTION, POWDER, FOR SOLUTION INTRAVENOUS at 13:49

## 2024-01-01 RX ADMIN — CALCIUM CHLORIDE, MAGNESIUM CHLORIDE, SODIUM CHLORIDE, SODIUM BICARBONATE, POTASSIUM CHLORIDE AND SODIUM PHOSPHATE DIBASIC DIHYDRATE 12.5 ML/KG/HR: 3.68; 3.05; 6.34; 3.09; .314; .187 INJECTION INTRAVENOUS at 02:35

## 2024-01-01 RX ADMIN — BUMETANIDE 4 MG: 0.25 INJECTION INTRAMUSCULAR; INTRAVENOUS at 07:58

## 2024-01-01 RX ADMIN — HYDROCORTISONE SODIUM SUCCINATE 50 MG: 100 INJECTION, POWDER, FOR SOLUTION INTRAMUSCULAR; INTRAVENOUS at 09:02

## 2024-01-01 RX ADMIN — CHLORHEXIDINE GLUCONATE 0.12% ORAL RINSE 15 ML: 1.2 LIQUID ORAL at 07:54

## 2024-01-01 RX ADMIN — HEPARIN SODIUM 2500 UNITS/HR: 10000 INJECTION, SOLUTION INTRAVENOUS at 07:22

## 2024-01-01 RX ADMIN — MIDAZOLAM HYDROCHLORIDE 8 MG/HR: 1 INJECTION, SOLUTION INTRAVENOUS at 00:08

## 2024-01-01 RX ADMIN — POTASSIUM PHOSPHATE, MONOBASIC POTASSIUM PHOSPHATE, DIBASIC 15 MMOL: 224; 236 INJECTION, SOLUTION, CONCENTRATE INTRAVENOUS at 23:36

## 2024-01-01 RX ADMIN — HYDROCORTISONE SODIUM SUCCINATE 100 MG: 100 INJECTION, POWDER, FOR SOLUTION INTRAMUSCULAR; INTRAVENOUS at 10:54

## 2024-01-01 RX ADMIN — CALCIUM CHLORIDE INJECTION 0.5 G: 100 INJECTION, SOLUTION INTRAVENOUS at 15:36

## 2024-01-01 RX ADMIN — CLINDAMYCIN PHOSPHATE 900 MG: 900 INJECTION, SOLUTION INTRAVENOUS at 13:28

## 2024-01-01 RX ADMIN — CALCIUM CHLORIDE, MAGNESIUM CHLORIDE, SODIUM CHLORIDE, SODIUM BICARBONATE, POTASSIUM CHLORIDE AND SODIUM PHOSPHATE DIBASIC DIHYDRATE 12.5 ML/KG/HR: 3.68; 3.05; 6.34; 3.09; .314; .187 INJECTION INTRAVENOUS at 12:44

## 2024-01-01 RX ADMIN — CALCIUM CHLORIDE, MAGNESIUM CHLORIDE, DEXTROSE MONOHYDRATE, LACTIC ACID, SODIUM CHLORIDE, SODIUM BICARBONATE AND POTASSIUM CHLORIDE 12.5 ML/KG/HR: 5.15; 2.03; 22; 5.4; 6.46; 3.09; .157 INJECTION INTRAVENOUS at 02:35

## 2024-01-01 RX ADMIN — CALCIUM CHLORIDE, MAGNESIUM CHLORIDE, DEXTROSE MONOHYDRATE, LACTIC ACID, SODIUM CHLORIDE, SODIUM BICARBONATE AND POTASSIUM CHLORIDE 17.5 ML/KG/HR: 5.15; 2.03; 22; 5.4; 6.46; 3.09; .157 INJECTION INTRAVENOUS at 12:44

## 2024-01-01 RX ADMIN — Medication 100 MCG/HR: at 21:50

## 2024-01-01 RX ADMIN — CEFEPIME HYDROCHLORIDE 2 G: 2 INJECTION, POWDER, FOR SOLUTION INTRAVENOUS at 13:28

## 2024-01-01 RX ADMIN — MINERAL OIL AND WHITE PETROLATUM: 150; 830 OINTMENT OPHTHALMIC at 23:33

## 2024-01-01 RX ADMIN — BIVALIRUDIN 0.15 MG/KG/HR: 250 INJECTION, POWDER, LYOPHILIZED, FOR SOLUTION INTRAVENOUS at 12:21

## 2024-01-01 RX ADMIN — FENTANYL CITRATE 250 MCG: 50 INJECTION INTRAMUSCULAR; INTRAVENOUS at 09:30

## 2024-01-01 RX ADMIN — Medication 2.5 UNITS/HR: at 01:09

## 2024-01-01 RX ADMIN — POLYETHYLENE GLYCOL 3350 17 G: 17 POWDER, FOR SOLUTION ORAL at 07:54

## 2024-01-01 RX ADMIN — NITROGLYCERIN 50 MCG: 10 INJECTION INTRAVENOUS at 10:12

## 2024-01-01 RX ADMIN — MILRINONE LACTATE IN DEXTROSE 0.5 MCG/KG/MIN: 200 INJECTION, SOLUTION INTRAVENOUS at 05:38

## 2024-01-01 RX ADMIN — PANTOPRAZOLE SODIUM 40 MG: 40 INJECTION, POWDER, FOR SOLUTION INTRAVENOUS at 07:53

## 2024-01-01 RX ADMIN — EPINEPHRINE 0.1 MCG/KG/MIN: 1 INJECTION INTRAMUSCULAR; INTRAVENOUS; SUBCUTANEOUS at 18:50

## 2024-01-01 RX ADMIN — Medication 2 UNITS: at 10:11

## 2024-01-01 RX ADMIN — Medication 40 MG: at 07:34

## 2024-01-01 RX ADMIN — Medication 200 MCG/HR: at 06:05

## 2024-01-01 RX ADMIN — GABAPENTIN 100 MG: 100 CAPSULE ORAL at 20:32

## 2024-01-01 RX ADMIN — CALCIUM CHLORIDE INJECTION 0.5 G: 100 INJECTION, SOLUTION INTRAVENOUS at 14:47

## 2024-01-01 RX ADMIN — IOPAMIDOL 135 ML: 755 INJECTION, SOLUTION INTRAVENOUS at 17:48

## 2024-01-01 RX ADMIN — METRONIDAZOLE 500 MG: 500 INJECTION, SOLUTION INTRAVENOUS at 09:21

## 2024-01-01 RX ADMIN — ASPIRIN 81 MG CHEWABLE TABLET 81 MG: 81 TABLET CHEWABLE at 08:18

## 2024-01-01 RX ADMIN — CALCIUM CHLORIDE, MAGNESIUM CHLORIDE, SODIUM CHLORIDE, SODIUM BICARBONATE, POTASSIUM CHLORIDE AND SODIUM PHOSPHATE DIBASIC DIHYDRATE 12.5 ML/KG/HR: 3.68; 3.05; 6.34; 3.09; .314; .187 INJECTION INTRAVENOUS at 06:59

## 2024-01-01 RX ADMIN — ACETAMINOPHEN 650 MG: 325 TABLET, FILM COATED ORAL at 11:06

## 2024-01-01 RX ADMIN — ASPIRIN 81 MG CHEWABLE TABLET 81 MG: 81 TABLET CHEWABLE at 07:54

## 2024-01-01 RX ADMIN — SODIUM CHLORIDE 10 UNITS: 9 INJECTION, SOLUTION INTRAVENOUS at 13:29

## 2024-01-01 RX ADMIN — BUMETANIDE 2 MG/HR: 0.25 INJECTION INTRAMUSCULAR; INTRAVENOUS at 02:35

## 2024-01-01 RX ADMIN — MEROPENEM 1 G: 1 INJECTION, POWDER, FOR SOLUTION INTRAVENOUS at 09:48

## 2024-01-01 RX ADMIN — CHLORHEXIDINE GLUCONATE 0.12% ORAL RINSE 15 ML: 1.2 LIQUID ORAL at 20:00

## 2024-01-01 RX ADMIN — PANTOPRAZOLE SODIUM 40 MG: 40 INJECTION, POWDER, FOR SOLUTION INTRAVENOUS at 07:48

## 2024-01-01 RX ADMIN — SENNOSIDES 8.6 MG: 8.6 TABLET, FILM COATED ORAL at 19:55

## 2024-01-01 RX ADMIN — METRONIDAZOLE 500 MG: 5 INJECTION, SOLUTION INTRAVENOUS at 18:47

## 2024-01-01 RX ADMIN — CEFEPIME HYDROCHLORIDE 2 G: 2 INJECTION, POWDER, FOR SOLUTION INTRAVENOUS at 19:36

## 2024-01-01 RX ADMIN — CALCIUM CHLORIDE, MAGNESIUM CHLORIDE, DEXTROSE MONOHYDRATE, LACTIC ACID, SODIUM CHLORIDE, SODIUM BICARBONATE AND POTASSIUM CHLORIDE 8 ML/KG/HR: 5.15; 2.03; 22; 5.4; 6.46; 3.09; .157 INJECTION INTRAVENOUS at 11:39

## 2024-01-01 RX ADMIN — VANCOMYCIN HYDROCHLORIDE 1750 MG: 1 INJECTION, POWDER, LYOPHILIZED, FOR SOLUTION INTRAVENOUS at 08:48

## 2024-01-01 RX ADMIN — ACETAMINOPHEN 975 MG: 325 TABLET, FILM COATED ORAL at 22:39

## 2024-01-01 RX ADMIN — CALCIUM CHLORIDE, MAGNESIUM CHLORIDE, DEXTROSE MONOHYDRATE, LACTIC ACID, SODIUM CHLORIDE, SODIUM BICARBONATE AND POTASSIUM CHLORIDE 8 ML/KG/HR: 5.15; 2.03; 22; 5.4; 6.46; 3.09; .157 INJECTION INTRAVENOUS at 17:12

## 2024-01-01 RX ADMIN — DEXTROSE MONOHYDRATE: 100 INJECTION, SOLUTION INTRAVENOUS at 06:09

## 2024-01-01 RX ADMIN — HYDROCORTISONE SODIUM SUCCINATE 100 MG: 100 INJECTION, POWDER, FOR SOLUTION INTRAMUSCULAR; INTRAVENOUS at 17:54

## 2024-01-01 RX ADMIN — SODIUM CHLORIDE, POTASSIUM CHLORIDE, SODIUM LACTATE AND CALCIUM CHLORIDE 500 ML: 600; 310; 30; 20 INJECTION, SOLUTION INTRAVENOUS at 18:32

## 2024-01-01 RX ADMIN — MICAFUNGIN SODIUM 150 MG: 50 INJECTION, POWDER, LYOPHILIZED, FOR SOLUTION INTRAVENOUS at 21:18

## 2024-01-01 RX ADMIN — POLYETHYLENE GLYCOL 3350 17 G: 17 POWDER, FOR SOLUTION ORAL at 20:10

## 2024-01-01 RX ADMIN — CALCIUM CHLORIDE, MAGNESIUM CHLORIDE, DEXTROSE MONOHYDRATE, LACTIC ACID, SODIUM CHLORIDE, SODIUM BICARBONATE AND POTASSIUM CHLORIDE 12.5 ML/KG/HR: 5.15; 2.03; 22; 5.4; 6.46; 3.09; .157 INJECTION INTRAVENOUS at 18:04

## 2024-01-01 RX ADMIN — METRONIDAZOLE 500 MG: 5 INJECTION, SOLUTION INTRAVENOUS at 01:39

## 2024-01-01 RX ADMIN — POLYETHYLENE GLYCOL 3350 17 G: 17 POWDER, FOR SOLUTION ORAL at 09:58

## 2024-01-01 RX ADMIN — HYDROCORTISONE SODIUM SUCCINATE 100 MG: 100 INJECTION, POWDER, FOR SOLUTION INTRAMUSCULAR; INTRAVENOUS at 10:39

## 2024-01-01 RX ADMIN — EPINEPHRINE 0.03 MCG/KG/MIN: 1 INJECTION INTRAMUSCULAR; INTRAVENOUS; SUBCUTANEOUS at 12:16

## 2024-01-01 RX ADMIN — METRONIDAZOLE 500 MG: 500 INJECTION, SOLUTION INTRAVENOUS at 19:35

## 2024-01-01 RX ADMIN — CALCIUM CHLORIDE, MAGNESIUM CHLORIDE, DEXTROSE MONOHYDRATE, LACTIC ACID, SODIUM CHLORIDE, SODIUM BICARBONATE AND POTASSIUM CHLORIDE 12.5 ML/KG/HR: 5.15; 2.03; 22; 5.4; 6.46; 3.09; .157 INJECTION INTRAVENOUS at 23:08

## 2024-01-01 RX ADMIN — CALCIUM CHLORIDE, MAGNESIUM CHLORIDE, DEXTROSE MONOHYDRATE, LACTIC ACID, SODIUM CHLORIDE, SODIUM BICARBONATE AND POTASSIUM CHLORIDE 12.5 ML/KG/HR: 5.15; 2.03; 22; 5.4; 6.46; 3.09; .157 INJECTION INTRAVENOUS at 12:49

## 2024-01-01 RX ADMIN — CHLORHEXIDINE GLUCONATE 0.12% ORAL RINSE 15 ML: 1.2 LIQUID ORAL at 20:33

## 2024-01-01 RX ADMIN — NOREPINEPHRINE BITARTRATE 0.18 MCG/KG/MIN: 0.06 INJECTION, SOLUTION INTRAVENOUS at 19:13

## 2024-01-01 RX ADMIN — CHLORHEXIDINE GLUCONATE 0.12% ORAL RINSE 15 ML: 1.2 LIQUID ORAL at 09:03

## 2024-01-01 RX ADMIN — FENTANYL CITRATE 150 MCG: 50 INJECTION INTRAMUSCULAR; INTRAVENOUS at 08:16

## 2024-01-01 RX ADMIN — NOREPINEPHRINE BITARTRATE 6.4 MCG: 1 INJECTION, SOLUTION, CONCENTRATE INTRAVENOUS at 09:23

## 2024-01-01 RX ADMIN — GABAPENTIN 100 MG: 100 CAPSULE ORAL at 07:30

## 2024-01-01 RX ADMIN — MINERAL OIL AND WHITE PETROLATUM: 150; 830 OINTMENT OPHTHALMIC at 00:25

## 2024-01-01 RX ADMIN — CALCIUM CHLORIDE, MAGNESIUM CHLORIDE, SODIUM CHLORIDE, SODIUM BICARBONATE, POTASSIUM CHLORIDE AND SODIUM PHOSPHATE DIBASIC DIHYDRATE 12.5 ML/KG/HR: 3.68; 3.05; 6.34; 3.09; .314; .187 INJECTION INTRAVENOUS at 19:27

## 2024-01-01 RX ADMIN — NOREPINEPHRINE BITARTRATE 0.18 MCG/KG/MIN: 0.06 INJECTION, SOLUTION INTRAVENOUS at 22:53

## 2024-01-01 RX ADMIN — Medication 1 UNITS/HR: at 13:16

## 2024-01-01 RX ADMIN — HYDROCORTISONE SODIUM SUCCINATE 100 MG: 100 INJECTION, POWDER, FOR SOLUTION INTRAMUSCULAR; INTRAVENOUS at 02:20

## 2024-01-01 RX ADMIN — Medication 100 MCG/HR: at 13:34

## 2024-01-01 RX ADMIN — SENNOSIDES 8.6 MG: 8.6 TABLET, FILM COATED ORAL at 20:16

## 2024-01-01 RX ADMIN — GABAPENTIN 100 MG: 100 CAPSULE ORAL at 07:26

## 2024-01-01 RX ADMIN — CALCIUM CHLORIDE, MAGNESIUM CHLORIDE, DEXTROSE MONOHYDRATE, LACTIC ACID, SODIUM CHLORIDE, SODIUM BICARBONATE AND POTASSIUM CHLORIDE 12.5 ML/KG/HR: 5.15; 2.03; 22; 5.4; 6.46; 3.09; .157 INJECTION INTRAVENOUS at 06:03

## 2024-01-01 RX ADMIN — MEROPENEM 1 G: 1 INJECTION, POWDER, FOR SOLUTION INTRAVENOUS at 10:26

## 2024-01-01 RX ADMIN — CEFEPIME HYDROCHLORIDE 2 G: 2 INJECTION, POWDER, FOR SOLUTION INTRAVENOUS at 22:54

## 2024-01-01 RX ADMIN — SODIUM CHLORIDE, POTASSIUM CHLORIDE, SODIUM LACTATE AND CALCIUM CHLORIDE 500 ML: 600; 310; 30; 20 INJECTION, SOLUTION INTRAVENOUS at 20:31

## 2024-01-01 RX ADMIN — MILRINONE LACTATE IN DEXTROSE 0.5 MCG/KG/MIN: 200 INJECTION, SOLUTION INTRAVENOUS at 05:05

## 2024-01-01 RX ADMIN — CHLORHEXIDINE GLUCONATE 0.12% ORAL RINSE 15 ML: 1.2 LIQUID ORAL at 19:35

## 2024-01-01 RX ADMIN — PANTOPRAZOLE SODIUM 40 MG: 40 INJECTION, POWDER, FOR SOLUTION INTRAVENOUS at 07:44

## 2024-01-01 RX ADMIN — CALCIUM CHLORIDE, MAGNESIUM CHLORIDE, DEXTROSE MONOHYDRATE, LACTIC ACID, SODIUM CHLORIDE, SODIUM BICARBONATE AND POTASSIUM CHLORIDE 12.5 ML/KG/HR: 5.15; 2.03; 22; 5.4; 6.46; 3.09; .157 INJECTION INTRAVENOUS at 12:14

## 2024-01-01 RX ADMIN — PROPOFOL 10 MCG/KG/MIN: 10 INJECTION, EMULSION INTRAVENOUS at 14:17

## 2024-01-01 RX ADMIN — Medication 50 MG: at 07:25

## 2024-01-01 RX ADMIN — Medication 2 UNITS/HR: at 19:02

## 2024-01-01 RX ADMIN — MIDAZOLAM 4 MG: 1 INJECTION INTRAMUSCULAR; INTRAVENOUS at 12:52

## 2024-01-01 RX ADMIN — INSULIN ASPART 1 UNITS: 100 INJECTION, SOLUTION INTRAVENOUS; SUBCUTANEOUS at 18:52

## 2024-01-01 RX ADMIN — Medication 2.4 UNITS/HR: at 13:50

## 2024-01-01 RX ADMIN — INSULIN ASPART 1 UNITS: 100 INJECTION, SOLUTION INTRAVENOUS; SUBCUTANEOUS at 16:38

## 2024-01-01 RX ADMIN — BUMETANIDE 2 MG/HR: 0.25 INJECTION INTRAMUSCULAR; INTRAVENOUS at 14:02

## 2024-01-01 RX ADMIN — MINERAL OIL AND WHITE PETROLATUM: 150; 830 OINTMENT OPHTHALMIC at 15:11

## 2024-01-01 RX ADMIN — Medication 2.4 UNITS/HR: at 06:31

## 2024-01-01 RX ADMIN — NOREPINEPHRINE BITARTRATE 0.14 MCG/KG/MIN: 0.06 INJECTION, SOLUTION INTRAVENOUS at 08:46

## 2024-01-01 RX ADMIN — CALCIUM CHLORIDE, MAGNESIUM CHLORIDE, DEXTROSE MONOHYDRATE, LACTIC ACID, SODIUM CHLORIDE, SODIUM BICARBONATE AND POTASSIUM CHLORIDE 12.5 ML/KG/HR: 5.15; 2.03; 22; 5.4; 6.46; 3.09; .157 INJECTION INTRAVENOUS at 10:57

## 2024-01-01 RX ADMIN — CALCIUM CHLORIDE, MAGNESIUM CHLORIDE, DEXTROSE MONOHYDRATE, LACTIC ACID, SODIUM CHLORIDE, SODIUM BICARBONATE AND POTASSIUM CHLORIDE 12.5 ML/KG/HR: 5.15; 2.03; 22; 5.4; 6.46; 3.09; .157 INJECTION INTRAVENOUS at 01:23

## 2024-01-01 RX ADMIN — SODIUM CHLORIDE, SODIUM GLUCONATE, SODIUM ACETATE, POTASSIUM CHLORIDE AND MAGNESIUM CHLORIDE: 526; 502; 368; 37; 30 INJECTION, SOLUTION INTRAVENOUS at 08:26

## 2024-01-01 RX ADMIN — CEFEPIME HYDROCHLORIDE 2 G: 2 INJECTION, POWDER, FOR SOLUTION INTRAVENOUS at 04:14

## 2024-01-01 RX ADMIN — VANCOMYCIN HYDROCHLORIDE 2000 MG: 1 INJECTION, POWDER, LYOPHILIZED, FOR SOLUTION INTRAVENOUS at 00:32

## 2024-01-01 RX ADMIN — VANCOMYCIN HYDROCHLORIDE 2000 MG: 1 INJECTION, POWDER, LYOPHILIZED, FOR SOLUTION INTRAVENOUS at 05:30

## 2024-01-01 RX ADMIN — MINERAL OIL AND WHITE PETROLATUM: 150; 830 OINTMENT OPHTHALMIC at 01:07

## 2024-01-01 RX ADMIN — NOREPINEPHRINE BITARTRATE 0.04 MCG/KG/MIN: 0.06 INJECTION, SOLUTION INTRAVENOUS at 19:13

## 2024-01-01 RX ADMIN — MICAFUNGIN SODIUM 150 MG: 50 INJECTION, POWDER, LYOPHILIZED, FOR SOLUTION INTRAVENOUS at 21:12

## 2024-01-01 RX ADMIN — VANCOMYCIN HYDROCHLORIDE 2250 MG: 1 INJECTION, POWDER, LYOPHILIZED, FOR SOLUTION INTRAVENOUS at 07:33

## 2024-01-01 RX ADMIN — SODIUM BICARBONATE 50 MEQ: 84 INJECTION, SOLUTION INTRAVENOUS at 03:10

## 2024-01-01 RX ADMIN — CHLORHEXIDINE GLUCONATE 0.12% ORAL RINSE 15 ML: 1.2 LIQUID ORAL at 07:44

## 2024-01-01 RX ADMIN — MINERAL OIL AND WHITE PETROLATUM: 150; 830 OINTMENT OPHTHALMIC at 16:43

## 2024-01-01 RX ADMIN — CALCIUM CHLORIDE, MAGNESIUM CHLORIDE, DEXTROSE MONOHYDRATE, LACTIC ACID, SODIUM CHLORIDE, SODIUM BICARBONATE AND POTASSIUM CHLORIDE 12.5 ML/KG/HR: 5.15; 2.03; 22; 5.4; 6.46; 3.09; .157 INJECTION INTRAVENOUS at 12:46

## 2024-01-01 RX ADMIN — VANCOMYCIN HYDROCHLORIDE 1750 MG: 1 INJECTION, POWDER, LYOPHILIZED, FOR SOLUTION INTRAVENOUS at 21:58

## 2024-01-01 RX ADMIN — MINERAL OIL AND WHITE PETROLATUM: 150; 830 OINTMENT OPHTHALMIC at 23:36

## 2024-01-01 RX ADMIN — SODIUM CHLORIDE, POTASSIUM CHLORIDE, SODIUM LACTATE AND CALCIUM CHLORIDE 250 ML: 600; 310; 30; 20 INJECTION, SOLUTION INTRAVENOUS at 01:03

## 2024-01-01 RX ADMIN — NOREPINEPHRINE BITARTRATE 0.02 MCG/KG/MIN: 0.06 INJECTION, SOLUTION INTRAVENOUS at 16:20

## 2024-01-01 RX ADMIN — Medication 20 MG: at 12:45

## 2024-01-01 RX ADMIN — SODIUM CHLORIDE 0.5 MG/MIN: 9 INJECTION, SOLUTION INTRAVENOUS at 17:35

## 2024-01-01 RX ADMIN — MIDAZOLAM HYDROCHLORIDE 8 MG/HR: 1 INJECTION, SOLUTION INTRAVENOUS at 12:20

## 2024-01-01 RX ADMIN — Medication 2 PACKET: at 13:01

## 2024-01-01 RX ADMIN — SODIUM CHLORIDE, PRESERVATIVE FREE 845 UNITS: 5 INJECTION INTRAVENOUS at 20:35

## 2024-01-01 RX ADMIN — Medication 2 UNITS/HR: at 10:53

## 2024-01-01 RX ADMIN — PROPOFOL 50 MCG/KG/MIN: 10 INJECTION, EMULSION INTRAVENOUS at 22:10

## 2024-01-01 RX ADMIN — CEFEPIME HYDROCHLORIDE 2 G: 2 INJECTION, POWDER, FOR SOLUTION INTRAVENOUS at 19:58

## 2024-01-01 RX ADMIN — PANTOPRAZOLE SODIUM 40 MG: 40 INJECTION, POWDER, FOR SOLUTION INTRAVENOUS at 07:30

## 2024-01-01 RX ADMIN — HYDROCORTISONE SODIUM SUCCINATE 50 MG: 100 INJECTION, POWDER, FOR SOLUTION INTRAMUSCULAR; INTRAVENOUS at 13:01

## 2024-01-01 RX ADMIN — CALCIUM CHLORIDE, MAGNESIUM CHLORIDE, SODIUM CHLORIDE, SODIUM BICARBONATE, POTASSIUM CHLORIDE AND SODIUM PHOSPHATE DIBASIC DIHYDRATE 12.5 ML/KG/HR: 3.68; 3.05; 6.34; 3.09; .314; .187 INJECTION INTRAVENOUS at 10:57

## 2024-01-01 RX ADMIN — SODIUM CHLORIDE, SODIUM GLUCONATE, SODIUM ACETATE, POTASSIUM CHLORIDE AND MAGNESIUM CHLORIDE: 526; 502; 368; 37; 30 INJECTION, SOLUTION INTRAVENOUS at 07:58

## 2024-01-01 RX ADMIN — CEFEPIME HYDROCHLORIDE 2 G: 2 INJECTION, POWDER, FOR SOLUTION INTRAVENOUS at 15:14

## 2024-01-01 RX ADMIN — MILRINONE LACTATE IN DEXTROSE 0.5 MCG/KG/MIN: 200 INJECTION, SOLUTION INTRAVENOUS at 16:35

## 2024-01-01 RX ADMIN — CHLORHEXIDINE GLUCONATE 0.12% ORAL RINSE 15 ML: 1.2 LIQUID ORAL at 20:16

## 2024-01-01 RX ADMIN — AMINOCAPROIC ACID 7.5 G/HR: 250 INJECTION, SOLUTION INTRAVENOUS at 09:52

## 2024-01-01 RX ADMIN — POTASSIUM CHLORIDE 20 MEQ: 29.8 INJECTION, SOLUTION INTRAVENOUS at 01:22

## 2024-01-01 RX ADMIN — CLINDAMYCIN PHOSPHATE 900 MG: 900 INJECTION, SOLUTION INTRAVENOUS at 07:58

## 2024-01-01 RX ADMIN — CALCIUM CHLORIDE, MAGNESIUM CHLORIDE, SODIUM CHLORIDE, SODIUM BICARBONATE, POTASSIUM CHLORIDE AND SODIUM PHOSPHATE DIBASIC DIHYDRATE 12.5 ML/KG/HR: 3.68; 3.05; 6.34; 3.09; .314; .187 INJECTION INTRAVENOUS at 09:45

## 2024-01-01 RX ADMIN — CALCIUM CHLORIDE, MAGNESIUM CHLORIDE, DEXTROSE MONOHYDRATE, LACTIC ACID, SODIUM CHLORIDE, SODIUM BICARBONATE AND POTASSIUM CHLORIDE 12.5 ML/KG/HR: 5.15; 2.03; 22; 5.4; 6.46; 3.09; .157 INJECTION INTRAVENOUS at 14:24

## 2024-01-01 RX ADMIN — MEROPENEM 1 G: 1 INJECTION, POWDER, FOR SOLUTION INTRAVENOUS at 10:03

## 2024-01-01 RX ADMIN — FENTANYL CITRATE 100 MCG: 50 INJECTION INTRAMUSCULAR; INTRAVENOUS at 12:45

## 2024-01-01 RX ADMIN — HYDROCORTISONE SODIUM SUCCINATE 50 MG: 100 INJECTION, POWDER, FOR SOLUTION INTRAMUSCULAR; INTRAVENOUS at 07:32

## 2024-01-01 RX ADMIN — METRONIDAZOLE 500 MG: 5 INJECTION, SOLUTION INTRAVENOUS at 02:21

## 2024-01-01 RX ADMIN — Medication 200 MCG/HR: at 04:38

## 2024-01-01 RX ADMIN — ACETAMINOPHEN 975 MG: 325 TABLET, FILM COATED ORAL at 21:09

## 2024-01-01 RX ADMIN — CALCIUM CHLORIDE, MAGNESIUM CHLORIDE, DEXTROSE MONOHYDRATE, LACTIC ACID, SODIUM CHLORIDE, SODIUM BICARBONATE AND POTASSIUM CHLORIDE 16 ML/KG/HR: 5.15; 2.03; 22; 5.4; 6.46; 3.09; .157 INJECTION INTRAVENOUS at 22:15

## 2024-01-01 RX ADMIN — ACETAMINOPHEN 975 MG: 325 TABLET, FILM COATED ORAL at 13:46

## 2024-01-01 RX ADMIN — GABAPENTIN 100 MG: 100 CAPSULE ORAL at 19:59

## 2024-01-01 RX ADMIN — SODIUM CHLORIDE, POTASSIUM CHLORIDE, SODIUM LACTATE AND CALCIUM CHLORIDE 500 ML: 600; 310; 30; 20 INJECTION, SOLUTION INTRAVENOUS at 07:47

## 2024-01-01 RX ADMIN — MEROPENEM 1 G: 1 INJECTION, POWDER, FOR SOLUTION INTRAVENOUS at 09:19

## 2024-01-01 RX ADMIN — CHLORHEXIDINE GLUCONATE 0.12% ORAL RINSE 15 ML: 1.2 LIQUID ORAL at 20:37

## 2024-01-01 RX ADMIN — PROPOFOL 15 MCG/KG/MIN: 10 INJECTION, EMULSION INTRAVENOUS at 14:42

## 2024-01-01 RX ADMIN — Medication 50 MG: at 08:27

## 2024-01-01 RX ADMIN — SENNOSIDES 8.6 MG: 8.6 TABLET, FILM COATED ORAL at 07:53

## 2024-01-01 RX ADMIN — Medication 20 MG: at 09:11

## 2024-01-01 RX ADMIN — Medication 15 ML: at 07:30

## 2024-01-01 RX ADMIN — MIDAZOLAM HYDROCHLORIDE 6 MG/HR: 1 INJECTION, SOLUTION INTRAVENOUS at 20:00

## 2024-01-01 RX ADMIN — CALCIUM CHLORIDE INJECTION 0.5 G: 100 INJECTION, SOLUTION INTRAVENOUS at 15:01

## 2024-01-01 RX ADMIN — CALCIUM CHLORIDE, MAGNESIUM CHLORIDE, DEXTROSE MONOHYDRATE, LACTIC ACID, SODIUM CHLORIDE, SODIUM BICARBONATE AND POTASSIUM CHLORIDE 12.5 ML/KG/HR: 5.15; 2.03; 22; 5.4; 6.46; 3.09; .157 INJECTION INTRAVENOUS at 15:58

## 2024-01-01 RX ADMIN — ACETAMINOPHEN 975 MG: 325 TABLET, FILM COATED ORAL at 06:36

## 2024-01-01 RX ADMIN — ACETAMINOPHEN 975 MG: 325 TABLET, FILM COATED ORAL at 22:14

## 2024-01-01 RX ADMIN — CALCIUM CHLORIDE, MAGNESIUM CHLORIDE, DEXTROSE MONOHYDRATE, LACTIC ACID, SODIUM CHLORIDE, SODIUM BICARBONATE AND POTASSIUM CHLORIDE 12.5 ML/KG/HR: 5.15; 2.03; 22; 5.4; 6.46; 3.09; .157 INJECTION INTRAVENOUS at 09:45

## 2024-01-01 RX ADMIN — ASPIRIN 81 MG CHEWABLE TABLET 81 MG: 81 TABLET CHEWABLE at 07:32

## 2024-01-01 RX ADMIN — Medication 15 ML: at 16:23

## 2024-01-01 RX ADMIN — BUMETANIDE 4 MG: 0.25 INJECTION INTRAMUSCULAR; INTRAVENOUS at 01:48

## 2024-01-01 RX ADMIN — BIVALIRUDIN 0.08 MG/KG/HR: 250 INJECTION, POWDER, LYOPHILIZED, FOR SOLUTION INTRAVENOUS at 03:48

## 2024-01-01 RX ADMIN — Medication 15 ML: at 08:00

## 2024-01-01 RX ADMIN — VANCOMYCIN HYDROCHLORIDE 2250 MG: 1 INJECTION, POWDER, LYOPHILIZED, FOR SOLUTION INTRAVENOUS at 08:47

## 2024-01-01 RX ADMIN — CALCIUM CHLORIDE, MAGNESIUM CHLORIDE, DEXTROSE MONOHYDRATE, LACTIC ACID, SODIUM CHLORIDE, SODIUM BICARBONATE AND POTASSIUM CHLORIDE 16 ML/KG/HR: 5.15; 2.03; 22; 5.4; 6.46; 3.09; .157 INJECTION INTRAVENOUS at 17:12

## 2024-01-01 RX ADMIN — SODIUM CHLORIDE, POTASSIUM CHLORIDE, SODIUM LACTATE AND CALCIUM CHLORIDE 500 ML: 600; 310; 30; 20 INJECTION, SOLUTION INTRAVENOUS at 20:59

## 2024-01-01 RX ADMIN — Medication 850 UNITS: at 00:04

## 2024-01-01 RX ADMIN — BUMETANIDE 1 MG/HR: 0.25 INJECTION INTRAMUSCULAR; INTRAVENOUS at 02:24

## 2024-01-01 RX ADMIN — FENTANYL CITRATE 100 MCG: 50 INJECTION INTRAMUSCULAR; INTRAVENOUS at 07:25

## 2024-01-01 RX ADMIN — HEPARIN SODIUM 40000 UNITS: 1000 INJECTION INTRAVENOUS; SUBCUTANEOUS at 09:47

## 2024-01-01 RX ADMIN — METRONIDAZOLE 500 MG: 5 INJECTION, SOLUTION INTRAVENOUS at 10:55

## 2024-01-01 RX ADMIN — METRONIDAZOLE 500 MG: 5 INJECTION, SOLUTION INTRAVENOUS at 02:54

## 2024-01-01 RX ADMIN — BIVALIRUDIN: 250 INJECTION, POWDER, LYOPHILIZED, FOR SOLUTION INTRAVENOUS at 12:48

## 2024-01-01 RX ADMIN — MINERAL OIL AND WHITE PETROLATUM: 150; 830 OINTMENT OPHTHALMIC at 00:52

## 2024-01-01 RX ADMIN — NOREPINEPHRINE BITARTRATE 44.8 MCG: 1 INJECTION, SOLUTION, CONCENTRATE INTRAVENOUS at 10:10

## 2024-01-01 RX ADMIN — MINERAL OIL AND WHITE PETROLATUM: 150; 830 OINTMENT OPHTHALMIC at 00:10

## 2024-01-01 RX ADMIN — HYDROCORTISONE SODIUM SUCCINATE 100 MG: 100 INJECTION, POWDER, FOR SOLUTION INTRAMUSCULAR; INTRAVENOUS at 02:19

## 2024-01-01 RX ADMIN — HEPARIN SODIUM 400 UNITS/HR: 10000 INJECTION, SOLUTION INTRAVENOUS at 02:20

## 2024-01-01 RX ADMIN — GABAPENTIN 100 MG: 100 CAPSULE ORAL at 19:51

## 2024-01-01 RX ADMIN — Medication 2 UNITS: at 10:09

## 2024-01-01 RX ADMIN — MICAFUNGIN SODIUM 150 MG: 50 INJECTION, POWDER, LYOPHILIZED, FOR SOLUTION INTRAVENOUS at 20:40

## 2024-01-01 RX ADMIN — POTASSIUM CHLORIDE 20 MEQ: 29.8 INJECTION, SOLUTION INTRAVENOUS at 00:25

## 2024-01-01 RX ADMIN — Medication 2 PACKET: at 19:52

## 2024-01-01 RX ADMIN — AMIODARONE HYDROCHLORIDE 400 MG: 200 TABLET ORAL at 15:11

## 2024-01-01 RX ADMIN — CALCIUM CHLORIDE, MAGNESIUM CHLORIDE, SODIUM CHLORIDE, SODIUM BICARBONATE, POTASSIUM CHLORIDE AND SODIUM PHOSPHATE DIBASIC DIHYDRATE 12.5 ML/KG/HR: 3.68; 3.05; 6.34; 3.09; .314; .187 INJECTION INTRAVENOUS at 23:48

## 2024-01-01 RX ADMIN — NOREPINEPHRINE BITARTRATE 0.2 MCG/KG/MIN: 0.06 INJECTION, SOLUTION INTRAVENOUS at 09:47

## 2024-01-01 RX ADMIN — CALCIUM CHLORIDE, MAGNESIUM CHLORIDE, DEXTROSE MONOHYDRATE, LACTIC ACID, SODIUM CHLORIDE, SODIUM BICARBONATE AND POTASSIUM CHLORIDE 16 ML/KG/HR: 5.15; 2.03; 22; 5.4; 6.46; 3.09; .157 INJECTION INTRAVENOUS at 06:17

## 2024-01-01 RX ADMIN — POTASSIUM CHLORIDE 20 MEQ: 29.8 INJECTION, SOLUTION INTRAVENOUS at 12:13

## 2024-01-01 RX ADMIN — CALCIUM CHLORIDE, MAGNESIUM CHLORIDE, SODIUM CHLORIDE, SODIUM BICARBONATE, POTASSIUM CHLORIDE AND SODIUM PHOSPHATE DIBASIC DIHYDRATE 12.5 ML/KG/HR: 3.68; 3.05; 6.34; 3.09; .314; .187 INJECTION INTRAVENOUS at 16:30

## 2024-01-01 RX ADMIN — INSULIN ASPART 1 UNITS: 100 INJECTION, SOLUTION INTRAVENOUS; SUBCUTANEOUS at 03:59

## 2024-01-01 RX ADMIN — CALCIUM CHLORIDE 1 G: 100 INJECTION, SOLUTION INTRAVENOUS at 16:50

## 2024-01-01 RX ADMIN — POTASSIUM CHLORIDE 20 MEQ: 29.8 INJECTION, SOLUTION INTRAVENOUS at 06:07

## 2024-01-01 RX ADMIN — Medication 15 ML: at 07:32

## 2024-01-01 RX ADMIN — CALCIUM CHLORIDE, MAGNESIUM CHLORIDE, DEXTROSE MONOHYDRATE, LACTIC ACID, SODIUM CHLORIDE, SODIUM BICARBONATE AND POTASSIUM CHLORIDE 12.5 ML/KG/HR: 5.15; 2.03; 22; 5.4; 6.46; 3.09; .157 INJECTION INTRAVENOUS at 06:59

## 2024-01-01 RX ADMIN — CHLORHEXIDINE GLUCONATE 0.12% ORAL RINSE 15 ML: 1.2 LIQUID ORAL at 19:51

## 2024-01-01 RX ADMIN — MINERAL OIL AND WHITE PETROLATUM: 150; 830 OINTMENT OPHTHALMIC at 23:32

## 2024-01-01 RX ADMIN — HEPARIN SODIUM 850 UNITS/HR: 10000 INJECTION, SOLUTION INTRAVENOUS at 20:11

## 2024-01-01 RX ADMIN — POLYETHYLENE GLYCOL 3350 17 G: 17 POWDER, FOR SOLUTION ORAL at 07:53

## 2024-01-01 RX ADMIN — BIVALIRUDIN 0.09 MG/KG/HR: 250 INJECTION, POWDER, LYOPHILIZED, FOR SOLUTION INTRAVENOUS at 10:52

## 2024-01-01 RX ADMIN — CALCIUM CHLORIDE, MAGNESIUM CHLORIDE, SODIUM CHLORIDE, SODIUM BICARBONATE, POTASSIUM CHLORIDE AND SODIUM PHOSPHATE DIBASIC DIHYDRATE: 3.68; 3.05; 6.34; 3.09; .314; .187 INJECTION INTRAVENOUS at 11:41

## 2024-01-01 RX ADMIN — CALCIUM CHLORIDE, MAGNESIUM CHLORIDE, SODIUM CHLORIDE, SODIUM BICARBONATE, POTASSIUM CHLORIDE AND SODIUM PHOSPHATE DIBASIC DIHYDRATE: 3.68; 3.05; 6.34; 3.09; .314; .187 INJECTION INTRAVENOUS at 17:10

## 2024-01-01 RX ADMIN — CEFEPIME HYDROCHLORIDE 2 G: 2 INJECTION, POWDER, FOR SOLUTION INTRAVENOUS at 23:12

## 2024-01-01 RX ADMIN — CEFEPIME HYDROCHLORIDE 2 G: 2 INJECTION, POWDER, FOR SOLUTION INTRAVENOUS at 10:57

## 2024-01-01 RX ADMIN — INSULIN ASPART 1 UNITS: 100 INJECTION, SOLUTION INTRAVENOUS; SUBCUTANEOUS at 05:32

## 2024-01-01 RX ADMIN — SODIUM CHLORIDE, PRESERVATIVE FREE 845 UNITS: 5 INJECTION INTRAVENOUS at 22:50

## 2024-01-01 RX ADMIN — VANCOMYCIN HYDROCHLORIDE 1500 MG: 10 INJECTION, POWDER, LYOPHILIZED, FOR SOLUTION INTRAVENOUS at 07:58

## 2024-01-01 RX ADMIN — Medication 75 MCG/HR: at 14:46

## 2024-01-01 RX ADMIN — GABAPENTIN 100 MG: 100 CAPSULE ORAL at 07:53

## 2024-01-01 RX ADMIN — Medication 3 UNITS/HR: at 12:41

## 2024-01-01 RX ADMIN — ASPIRIN 81 MG CHEWABLE TABLET 81 MG: 81 TABLET CHEWABLE at 07:30

## 2024-01-01 RX ADMIN — NOREPINEPHRINE BITARTRATE 0.08 MCG/KG/MIN: 0.06 INJECTION, SOLUTION INTRAVENOUS at 04:14

## 2024-01-01 RX ADMIN — HEPARIN SODIUM 3 ML/HR: 200 INJECTION, SOLUTION INTRAVENOUS at 21:42

## 2024-01-01 RX ADMIN — NOREPINEPHRINE BITARTRATE 6.4 MCG: 1 INJECTION, SOLUTION, CONCENTRATE INTRAVENOUS at 09:24

## 2024-01-01 RX ADMIN — PANTOPRAZOLE SODIUM 40 MG: 40 INJECTION, POWDER, FOR SOLUTION INTRAVENOUS at 08:00

## 2024-01-01 RX ADMIN — SODIUM CHLORIDE, POTASSIUM CHLORIDE, SODIUM LACTATE AND CALCIUM CHLORIDE 500 ML: 600; 310; 30; 20 INJECTION, SOLUTION INTRAVENOUS at 05:31

## 2024-01-01 RX ADMIN — CALCIUM CHLORIDE, MAGNESIUM CHLORIDE, DEXTROSE MONOHYDRATE, LACTIC ACID, SODIUM CHLORIDE, SODIUM BICARBONATE AND POTASSIUM CHLORIDE 12.5 ML/KG/HR: 5.15; 2.03; 22; 5.4; 6.46; 3.09; .157 INJECTION INTRAVENOUS at 06:11

## 2024-01-01 RX ADMIN — MICAFUNGIN SODIUM 150 MG: 50 INJECTION, POWDER, LYOPHILIZED, FOR SOLUTION INTRAVENOUS at 20:10

## 2024-01-01 RX ADMIN — CALCIUM CHLORIDE, MAGNESIUM CHLORIDE, DEXTROSE MONOHYDRATE, LACTIC ACID, SODIUM CHLORIDE, SODIUM BICARBONATE AND POTASSIUM CHLORIDE 16 ML/KG/HR: 5.15; 2.03; 22; 5.4; 6.46; 3.09; .157 INJECTION INTRAVENOUS at 00:39

## 2024-01-01 RX ADMIN — GABAPENTIN 100 MG: 100 CAPSULE ORAL at 20:33

## 2024-01-01 RX ADMIN — SODIUM ZIRCONIUM CYCLOSILICATE 10 G: 10 POWDER, FOR SUSPENSION ORAL at 08:02

## 2024-01-01 RX ADMIN — GABAPENTIN 100 MG: 100 CAPSULE ORAL at 15:50

## 2024-01-01 RX ADMIN — CHLORHEXIDINE GLUCONATE 0.12% ORAL RINSE 15 ML: 1.2 LIQUID ORAL at 19:58

## 2024-01-01 RX ADMIN — MIDAZOLAM HYDROCHLORIDE 4 MG/HR: 1 INJECTION, SOLUTION INTRAVENOUS at 22:53

## 2024-01-01 RX ADMIN — CALCIUM CHLORIDE, MAGNESIUM CHLORIDE, SODIUM CHLORIDE, SODIUM BICARBONATE, POTASSIUM CHLORIDE AND SODIUM PHOSPHATE DIBASIC DIHYDRATE 12.5 ML/KG/HR: 3.68; 3.05; 6.34; 3.09; .314; .187 INJECTION INTRAVENOUS at 23:08

## 2024-01-01 RX ADMIN — GABAPENTIN 100 MG: 100 CAPSULE ORAL at 13:01

## 2024-01-01 RX ADMIN — Medication 15 ML: at 07:44

## 2024-01-01 RX ADMIN — BUMETANIDE 4 MG: 0.25 INJECTION INTRAMUSCULAR; INTRAVENOUS at 20:16

## 2024-01-01 RX ADMIN — CALCIUM CHLORIDE, MAGNESIUM CHLORIDE, SODIUM CHLORIDE, SODIUM BICARBONATE, POTASSIUM CHLORIDE AND SODIUM PHOSPHATE DIBASIC DIHYDRATE 12.5 ML/KG/HR: 3.68; 3.05; 6.34; 3.09; .314; .187 INJECTION INTRAVENOUS at 12:48

## 2024-01-01 RX ADMIN — CALCIUM CHLORIDE, MAGNESIUM CHLORIDE, DEXTROSE MONOHYDRATE, LACTIC ACID, SODIUM CHLORIDE, SODIUM BICARBONATE AND POTASSIUM CHLORIDE 12.5 ML/KG/HR: 5.15; 2.03; 22; 5.4; 6.46; 3.09; .157 INJECTION INTRAVENOUS at 16:56

## 2024-01-01 RX ADMIN — CALCIUM CHLORIDE, MAGNESIUM CHLORIDE, SODIUM CHLORIDE, SODIUM BICARBONATE, POTASSIUM CHLORIDE AND SODIUM PHOSPHATE DIBASIC DIHYDRATE 12.5 ML/KG/HR: 3.68; 3.05; 6.34; 3.09; .314; .187 INJECTION INTRAVENOUS at 06:03

## 2024-01-01 RX ADMIN — HEPARIN SODIUM 1200 UNITS/HR: 10000 INJECTION, SOLUTION INTRAVENOUS at 02:56

## 2024-01-01 RX ADMIN — CALCIUM CHLORIDE, MAGNESIUM CHLORIDE, DEXTROSE MONOHYDRATE, LACTIC ACID, SODIUM CHLORIDE, SODIUM BICARBONATE AND POTASSIUM CHLORIDE 12.5 ML/KG/HR: 5.15; 2.03; 22; 5.4; 6.46; 3.09; .157 INJECTION INTRAVENOUS at 19:54

## 2024-01-01 RX ADMIN — HEPARIN SODIUM 1650 UNITS/HR: 10000 INJECTION, SOLUTION INTRAVENOUS at 04:55

## 2024-01-01 RX ADMIN — CHLORHEXIDINE GLUCONATE 0.12% ORAL RINSE 15 ML: 1.2 LIQUID ORAL at 21:10

## 2024-01-01 RX ADMIN — HYDROCORTISONE SODIUM SUCCINATE 50 MG: 100 INJECTION, POWDER, FOR SOLUTION INTRAMUSCULAR; INTRAVENOUS at 18:36

## 2024-01-01 RX ADMIN — Medication 4 UNITS/HR: at 01:51

## 2024-01-01 RX ADMIN — PROTHROMBIN, COAGULATION FACTOR VII HUMAN, COAGULATION FACTOR IX HUMAN, COAGULATION FACTOR X HUMAN, PROTEIN C, PROTEIN S HUMAN, AND WATER 1094 UNITS: KIT at 14:52

## 2024-01-01 RX ADMIN — CHLORHEXIDINE GLUCONATE 0.12% ORAL RINSE 15 ML: 1.2 LIQUID ORAL at 19:55

## 2024-01-01 RX ADMIN — CHLORHEXIDINE GLUCONATE 0.12% ORAL RINSE 15 ML: 1.2 LIQUID ORAL at 07:48

## 2024-01-01 RX ADMIN — ACETAZOLAMIDE 500 MG: 500 INJECTION, POWDER, LYOPHILIZED, FOR SOLUTION INTRAVENOUS at 03:44

## 2024-01-01 RX ADMIN — CHLORHEXIDINE GLUCONATE 0.12% ORAL RINSE 15 ML: 1.2 LIQUID ORAL at 08:00

## 2024-01-01 RX ADMIN — EPINEPHRINE 100 MCG: 0.1 INJECTION INTRACARDIAC; INTRAVENOUS at 10:10

## 2024-01-01 RX ADMIN — CALCIUM CHLORIDE, MAGNESIUM CHLORIDE, SODIUM CHLORIDE, SODIUM BICARBONATE, POTASSIUM CHLORIDE AND SODIUM PHOSPHATE DIBASIC DIHYDRATE 12.5 ML/KG/HR: 3.68; 3.05; 6.34; 3.09; .314; .187 INJECTION INTRAVENOUS at 18:04

## 2024-01-01 RX ADMIN — SODIUM BICARBONATE 10.1 ML/HR: 84 INJECTION, SOLUTION INTRAVENOUS at 15:50

## 2024-01-01 RX ADMIN — INSULIN ASPART 1 UNITS: 100 INJECTION, SOLUTION INTRAVENOUS; SUBCUTANEOUS at 10:26

## 2024-01-01 RX ADMIN — POTASSIUM CHLORIDE 20 MEQ: 29.8 INJECTION, SOLUTION INTRAVENOUS at 23:41

## 2024-01-01 RX ADMIN — Medication 30 MG: at 14:17

## 2024-01-01 RX ADMIN — MINERAL OIL AND WHITE PETROLATUM: 150; 830 OINTMENT OPHTHALMIC at 09:05

## 2024-01-01 RX ADMIN — BUMETANIDE 4 MG: 0.25 INJECTION, SOLUTION INTRAMUSCULAR; INTRAVENOUS at 15:50

## 2024-01-01 RX ADMIN — Medication 1 UNITS: at 09:24

## 2024-01-01 RX ADMIN — SODIUM CHLORIDE, POTASSIUM CHLORIDE, SODIUM LACTATE AND CALCIUM CHLORIDE 1000 ML: 600; 310; 30; 20 INJECTION, SOLUTION INTRAVENOUS at 01:05

## 2024-01-01 RX ADMIN — CALCIUM CHLORIDE, MAGNESIUM CHLORIDE, DEXTROSE MONOHYDRATE, LACTIC ACID, SODIUM CHLORIDE, SODIUM BICARBONATE AND POTASSIUM CHLORIDE 12.5 ML/KG/HR: 5.15; 2.03; 22; 5.4; 6.46; 3.09; .157 INJECTION INTRAVENOUS at 03:40

## 2024-01-01 RX ADMIN — CALCIUM CHLORIDE, MAGNESIUM CHLORIDE, SODIUM CHLORIDE, SODIUM BICARBONATE, POTASSIUM CHLORIDE AND SODIUM PHOSPHATE DIBASIC DIHYDRATE 12.5 ML/KG/HR: 3.68; 3.05; 6.34; 3.09; .314; .187 INJECTION INTRAVENOUS at 03:40

## 2024-01-01 RX ADMIN — NOREPINEPHRINE BITARTRATE 0.08 MCG/KG/MIN: 0.06 INJECTION, SOLUTION INTRAVENOUS at 10:58

## 2024-01-01 RX ADMIN — METRONIDAZOLE 500 MG: 5 INJECTION, SOLUTION INTRAVENOUS at 10:42

## 2024-01-01 RX ADMIN — EPINEPHRINE 0.06 MCG/KG/MIN: 1 INJECTION INTRAMUSCULAR; INTRAVENOUS; SUBCUTANEOUS at 20:49

## 2024-01-01 RX ADMIN — Medication 3 UNITS/HR: at 06:34

## 2024-01-01 RX ADMIN — Medication 4 UNITS/HR: at 13:34

## 2024-01-01 RX ADMIN — INSULIN HUMAN 2 UNITS/HR: 1 INJECTION, SOLUTION INTRAVENOUS at 16:49

## 2024-01-01 RX ADMIN — Medication 2 PACKET: at 20:00

## 2024-01-01 RX ADMIN — PROTAMINE SULFATE 50 MG: 10 INJECTION, SOLUTION INTRAVENOUS at 14:50

## 2024-01-01 RX ADMIN — HEPARIN SODIUM 5000 UNITS: 1000 INJECTION INTRAVENOUS; SUBCUTANEOUS at 09:17

## 2024-01-01 RX ADMIN — CALCIUM CHLORIDE, MAGNESIUM CHLORIDE, SODIUM CHLORIDE, SODIUM BICARBONATE, POTASSIUM CHLORIDE AND SODIUM PHOSPHATE DIBASIC DIHYDRATE 12.5 ML/KG/HR: 3.68; 3.05; 6.34; 3.09; .314; .187 INJECTION INTRAVENOUS at 06:11

## 2024-01-01 RX ADMIN — MEROPENEM 1 G: 1 INJECTION, POWDER, FOR SOLUTION INTRAVENOUS at 18:15

## 2024-01-01 RX ADMIN — GABAPENTIN 100 MG: 100 CAPSULE ORAL at 14:32

## 2024-01-01 RX ADMIN — MINERAL OIL AND WHITE PETROLATUM: 150; 830 OINTMENT OPHTHALMIC at 15:48

## 2024-01-01 RX ADMIN — EPINEPHRINE 10 MCG: 1 INJECTION INTRAMUSCULAR; INTRAVENOUS; SUBCUTANEOUS at 09:19

## 2024-01-01 RX ADMIN — ANGIOTENSIN II 15 NG/KG/MIN: 2.5 INJECTION INTRAVENOUS at 18:33

## 2024-01-01 RX ADMIN — CEFEPIME HYDROCHLORIDE 2 G: 2 INJECTION, POWDER, FOR SOLUTION INTRAVENOUS at 20:33

## 2024-01-01 RX ADMIN — Medication 15 ML: at 07:53

## 2024-01-01 RX ADMIN — CHLORHEXIDINE GLUCONATE 0.12% ORAL RINSE 15 ML: 1.2 LIQUID ORAL at 07:30

## 2024-01-01 RX ADMIN — BIVALIRUDIN: 250 INJECTION, POWDER, LYOPHILIZED, FOR SOLUTION INTRAVENOUS at 03:57

## 2024-01-01 RX ADMIN — PROPOFOL 50 MCG/KG/MIN: 10 INJECTION, EMULSION INTRAVENOUS at 16:38

## 2024-01-01 RX ADMIN — Medication 3 UNITS/HR: at 02:55

## 2024-01-01 RX ADMIN — CALCIUM CHLORIDE, MAGNESIUM CHLORIDE, SODIUM CHLORIDE, SODIUM BICARBONATE, POTASSIUM CHLORIDE AND SODIUM PHOSPHATE DIBASIC DIHYDRATE 12.5 ML/KG/HR: 3.68; 3.05; 6.34; 3.09; .314; .187 INJECTION INTRAVENOUS at 12:49

## 2024-01-01 RX ADMIN — POLYETHYLENE GLYCOL 3350 17 G: 17 POWDER, FOR SOLUTION ORAL at 13:49

## 2024-01-01 RX ADMIN — Medication 2 UNITS/HR: at 23:26

## 2024-01-01 RX ADMIN — DEXTROSE MONOHYDRATE 300 ML: 100 INJECTION, SOLUTION INTRAVENOUS at 13:30

## 2024-01-01 RX ADMIN — SODIUM CHLORIDE, POTASSIUM CHLORIDE, SODIUM LACTATE AND CALCIUM CHLORIDE 500 ML: 600; 310; 30; 20 INJECTION, SOLUTION INTRAVENOUS at 20:32

## 2024-01-01 RX ADMIN — SODIUM CHLORIDE, POTASSIUM CHLORIDE, SODIUM LACTATE AND CALCIUM CHLORIDE 500 ML: 600; 310; 30; 20 INJECTION, SOLUTION INTRAVENOUS at 22:47

## 2024-01-01 RX ADMIN — HYDROMORPHONE HYDROCHLORIDE 0.5 MG: 1 INJECTION, SOLUTION INTRAMUSCULAR; INTRAVENOUS; SUBCUTANEOUS at 16:32

## 2024-01-01 RX ADMIN — BUMETANIDE 4 MG: 0.25 INJECTION INTRAMUSCULAR; INTRAVENOUS at 14:13

## 2024-01-01 RX ADMIN — Medication 2 PACKET: at 11:36

## 2024-01-01 RX ADMIN — VANCOMYCIN HYDROCHLORIDE 1750 MG: 1 INJECTION, POWDER, LYOPHILIZED, FOR SOLUTION INTRAVENOUS at 06:23

## 2024-01-01 RX ADMIN — SODIUM CHLORIDE, POTASSIUM CHLORIDE, SODIUM LACTATE AND CALCIUM CHLORIDE: 600; 310; 30; 20 INJECTION, SOLUTION INTRAVENOUS at 07:40

## 2024-01-01 RX ADMIN — PROPOFOL 20 MCG/KG/MIN: 10 INJECTION, EMULSION INTRAVENOUS at 05:22

## 2024-01-01 RX ADMIN — MEROPENEM 1 G: 1 INJECTION, POWDER, FOR SOLUTION INTRAVENOUS at 02:35

## 2024-01-01 RX ADMIN — HEPARIN SODIUM 850 UNITS/HR: 10000 INJECTION, SOLUTION INTRAVENOUS at 18:50

## 2024-01-01 RX ADMIN — CALCIUM CHLORIDE, MAGNESIUM CHLORIDE, DEXTROSE MONOHYDRATE, LACTIC ACID, SODIUM CHLORIDE, SODIUM BICARBONATE AND POTASSIUM CHLORIDE 16 ML/KG/HR: 5.15; 2.03; 22; 5.4; 6.46; 3.09; .157 INJECTION INTRAVENOUS at 03:10

## 2024-01-01 RX ADMIN — MINERAL OIL AND WHITE PETROLATUM: 150; 830 OINTMENT OPHTHALMIC at 16:38

## 2024-01-01 RX ADMIN — MINERAL OIL AND WHITE PETROLATUM: 150; 830 OINTMENT OPHTHALMIC at 15:50

## 2024-01-01 RX ADMIN — PROTAMINE SULFATE 180 MG: 10 INJECTION, SOLUTION INTRAVENOUS at 14:38

## 2024-01-01 RX ADMIN — POLYETHYLENE GLYCOL 3350 17 G: 17 POWDER, FOR SOLUTION ORAL at 14:17

## 2024-01-01 RX ADMIN — AMIODARONE HYDROCHLORIDE 400 MG: 200 TABLET ORAL at 07:33

## 2024-01-01 RX ADMIN — GLYCOPYRROLATE 0.2 MG: 0.2 INJECTION INTRAMUSCULAR; INTRAVENOUS at 12:51

## 2024-01-01 RX ADMIN — CEFEPIME HYDROCHLORIDE 2 G: 2 INJECTION, POWDER, FOR SOLUTION INTRAVENOUS at 13:45

## 2024-01-01 RX ADMIN — CEFEPIME HYDROCHLORIDE 2 G: 2 INJECTION, POWDER, FOR SOLUTION INTRAVENOUS at 03:31

## 2024-01-01 RX ADMIN — MEROPENEM 1 G: 1 INJECTION, POWDER, FOR SOLUTION INTRAVENOUS at 17:38

## 2024-01-01 RX ADMIN — CEFEPIME HYDROCHLORIDE 2 G: 2 INJECTION, POWDER, FOR SOLUTION INTRAVENOUS at 14:17

## 2024-01-01 RX ADMIN — HYDROXOCOBALAMIN 5 G: 5 INJECTION, POWDER, LYOPHILIZED, FOR SOLUTION INTRAVENOUS at 10:32

## 2024-01-01 RX ADMIN — CEFEPIME HYDROCHLORIDE 2 G: 2 INJECTION, POWDER, FOR SOLUTION INTRAVENOUS at 05:32

## 2024-01-01 RX ADMIN — CALCIUM CHLORIDE, MAGNESIUM CHLORIDE, DEXTROSE MONOHYDRATE, LACTIC ACID, SODIUM CHLORIDE, SODIUM BICARBONATE AND POTASSIUM CHLORIDE 16 ML/KG/HR: 5.15; 2.03; 22; 5.4; 6.46; 3.09; .157 INJECTION INTRAVENOUS at 19:42

## 2024-01-01 RX ADMIN — MIDAZOLAM HYDROCHLORIDE 4 MG/HR: 1 INJECTION, SOLUTION INTRAVENOUS at 02:03

## 2024-01-01 RX ADMIN — MIDAZOLAM 2 MG: 1 INJECTION INTRAMUSCULAR; INTRAVENOUS at 09:29

## 2024-01-01 RX ADMIN — ASPIRIN 81 MG CHEWABLE TABLET 81 MG: 81 TABLET CHEWABLE at 08:00

## 2024-01-01 RX ADMIN — Medication 150 MCG/HR: at 17:51

## 2024-01-01 RX ADMIN — MIDAZOLAM HYDROCHLORIDE 7 MG/HR: 1 INJECTION, SOLUTION INTRAVENOUS at 09:19

## 2024-01-01 RX ADMIN — AMIODARONE HYDROCHLORIDE 0.5 MG/MIN: 50 INJECTION, SOLUTION INTRAVENOUS at 22:51

## 2024-01-01 RX ADMIN — NOREPINEPHRINE BITARTRATE 0.07 MCG/KG/MIN: 0.06 INJECTION, SOLUTION INTRAVENOUS at 14:47

## 2024-01-01 RX ADMIN — Medication 75 MCG/HR: at 07:49

## 2024-01-01 RX ADMIN — METRONIDAZOLE 500 MG: 5 INJECTION, SOLUTION INTRAVENOUS at 02:18

## 2024-01-01 RX ADMIN — Medication 75 MCG/HR: at 10:57

## 2024-01-01 RX ADMIN — CALCIUM CHLORIDE, MAGNESIUM CHLORIDE, SODIUM CHLORIDE, SODIUM BICARBONATE, POTASSIUM CHLORIDE AND SODIUM PHOSPHATE DIBASIC DIHYDRATE: 3.68; 3.05; 6.34; 3.09; .314; .187 INJECTION INTRAVENOUS at 18:06

## 2024-01-01 RX ADMIN — BISACODYL 10 MG: 10 SUPPOSITORY RECTAL at 16:19

## 2024-01-01 RX ADMIN — Medication 15 ML: at 09:03

## 2024-01-01 RX ADMIN — CEFEPIME HYDROCHLORIDE 2 G: 2 INJECTION, POWDER, FOR SOLUTION INTRAVENOUS at 16:36

## 2024-01-01 RX ADMIN — CALCIUM CHLORIDE, MAGNESIUM CHLORIDE, SODIUM CHLORIDE, SODIUM BICARBONATE, POTASSIUM CHLORIDE AND SODIUM PHOSPHATE DIBASIC DIHYDRATE 12.5 ML/KG/HR: 3.68; 3.05; 6.34; 3.09; .314; .187 INJECTION INTRAVENOUS at 01:23

## 2024-01-01 RX ADMIN — ACETAMINOPHEN 975 MG: 325 TABLET, FILM COATED ORAL at 14:32

## 2024-01-01 RX ADMIN — SODIUM CHLORIDE, SODIUM GLUCONATE, SODIUM ACETATE, POTASSIUM CHLORIDE AND MAGNESIUM CHLORIDE: 526; 502; 368; 37; 30 INJECTION, SOLUTION INTRAVENOUS at 07:30

## 2024-01-01 RX ADMIN — MIDAZOLAM HYDROCHLORIDE 2 MG: 1 INJECTION, SOLUTION INTRAMUSCULAR; INTRAVENOUS at 13:35

## 2024-01-01 RX ADMIN — CALCIUM CHLORIDE, MAGNESIUM CHLORIDE, SODIUM CHLORIDE, SODIUM BICARBONATE, POTASSIUM CHLORIDE AND SODIUM PHOSPHATE DIBASIC DIHYDRATE: 3.68; 3.05; 6.34; 3.09; .314; .187 INJECTION INTRAVENOUS at 23:08

## 2024-01-01 RX ADMIN — CALCIUM CHLORIDE, MAGNESIUM CHLORIDE, DEXTROSE MONOHYDRATE, LACTIC ACID, SODIUM CHLORIDE, SODIUM BICARBONATE AND POTASSIUM CHLORIDE 12.5 ML/KG/HR: 5.15; 2.03; 22; 5.4; 6.46; 3.09; .157 INJECTION INTRAVENOUS at 22:11

## 2024-01-01 RX ADMIN — Medication 50 MCG/HR: at 12:23

## 2024-01-01 RX ADMIN — HYDROCORTISONE SODIUM SUCCINATE 50 MG: 100 INJECTION, POWDER, FOR SOLUTION INTRAMUSCULAR; INTRAVENOUS at 19:51

## 2024-01-01 RX ADMIN — POTASSIUM PHOSPHATE, MONOBASIC POTASSIUM PHOSPHATE, DIBASIC 9 MMOL: 224; 236 INJECTION, SOLUTION, CONCENTRATE INTRAVENOUS at 17:53

## 2024-01-01 RX ADMIN — GABAPENTIN 100 MG: 100 CAPSULE ORAL at 13:49

## 2024-01-01 RX ADMIN — GABAPENTIN 100 MG: 100 CAPSULE ORAL at 13:47

## 2024-01-01 RX ADMIN — CHLORHEXIDINE GLUCONATE 0.12% ORAL RINSE 15 ML: 1.2 LIQUID ORAL at 19:56

## 2024-01-01 RX ADMIN — CALCIUM CHLORIDE, MAGNESIUM CHLORIDE, SODIUM CHLORIDE, SODIUM BICARBONATE, POTASSIUM CHLORIDE AND SODIUM PHOSPHATE DIBASIC DIHYDRATE 12.5 ML/KG/HR: 3.68; 3.05; 6.34; 3.09; .314; .187 INJECTION INTRAVENOUS at 14:24

## 2024-01-01 RX ADMIN — AMIODARONE HYDROCHLORIDE 400 MG: 200 TABLET ORAL at 07:30

## 2024-01-01 RX ADMIN — Medication 2 UNITS/HR: at 01:40

## 2024-01-01 RX ADMIN — CHLOROTHIAZIDE SODIUM 1000 MG: 500 INJECTION, POWDER, LYOPHILIZED, FOR SOLUTION INTRAVENOUS at 16:56

## 2024-01-01 RX ADMIN — METRONIDAZOLE 500 MG: 5 INJECTION, SOLUTION INTRAVENOUS at 10:39

## 2024-01-01 RX ADMIN — SENNOSIDES 8.6 MG: 8.6 TABLET, FILM COATED ORAL at 07:54

## 2024-01-01 RX ADMIN — MINERAL OIL AND WHITE PETROLATUM: 150; 830 OINTMENT OPHTHALMIC at 07:53

## 2024-01-01 RX ADMIN — CALCIUM GLUCONATE 1 G: 98 INJECTION, SOLUTION INTRAVENOUS at 13:25

## 2024-01-01 RX ADMIN — CEFEPIME HYDROCHLORIDE 2 G: 2 INJECTION, POWDER, FOR SOLUTION INTRAVENOUS at 05:57

## 2024-01-01 RX ADMIN — HYDROCORTISONE SODIUM SUCCINATE 50 MG: 100 INJECTION, POWDER, FOR SOLUTION INTRAMUSCULAR; INTRAVENOUS at 07:29

## 2024-01-01 RX ADMIN — SODIUM ZIRCONIUM CYCLOSILICATE 10 G: 10 POWDER, FOR SUSPENSION ORAL at 20:33

## 2024-01-01 RX ADMIN — GABAPENTIN 100 MG: 100 CAPSULE ORAL at 09:03

## 2024-01-01 RX ADMIN — BIVALIRUDIN 0.09 MG/KG/HR: 250 INJECTION, POWDER, LYOPHILIZED, FOR SOLUTION INTRAVENOUS at 13:50

## 2024-01-01 RX ADMIN — CEFEPIME HYDROCHLORIDE 2 G: 2 INJECTION, POWDER, FOR SOLUTION INTRAVENOUS at 21:45

## 2024-01-01 RX ADMIN — ACETAMINOPHEN 975 MG: 325 TABLET, FILM COATED ORAL at 05:46

## 2024-01-01 RX ADMIN — GABAPENTIN 100 MG: 100 CAPSULE ORAL at 20:10

## 2024-01-01 RX ADMIN — SENNOSIDES 8.6 MG: 8.6 TABLET, FILM COATED ORAL at 20:32

## 2024-01-01 RX ADMIN — NOREPINEPHRINE BITARTRATE 64 MCG: 1 INJECTION, SOLUTION, CONCENTRATE INTRAVENOUS at 10:09

## 2024-01-01 ASSESSMENT — ACTIVITIES OF DAILY LIVING (ADL)
ADLS_ACUITY_SCORE: 42
ADLS_ACUITY_SCORE: 49
ADLS_ACUITY_SCORE: 45
ADLS_ACUITY_SCORE: 49
ADLS_ACUITY_SCORE: 36
ADLS_ACUITY_SCORE: 51
ADLS_ACUITY_SCORE: 36
ADLS_ACUITY_SCORE: 45
ADLS_ACUITY_SCORE: 42
ADLS_ACUITY_SCORE: 47
ADLS_ACUITY_SCORE: 38
ADLS_ACUITY_SCORE: 49
ADLS_ACUITY_SCORE: 49
ADLS_ACUITY_SCORE: 45
ADLS_ACUITY_SCORE: 38
ADLS_ACUITY_SCORE: 51
ADLS_ACUITY_SCORE: 49
ADLS_ACUITY_SCORE: 51
ADLS_ACUITY_SCORE: 38
ADLS_ACUITY_SCORE: 38
ADLS_ACUITY_SCORE: 45
ADLS_ACUITY_SCORE: 51
ADLS_ACUITY_SCORE: 38
ADLS_ACUITY_SCORE: 36
ADLS_ACUITY_SCORE: 42
ADLS_ACUITY_SCORE: 45
ADLS_ACUITY_SCORE: 38
ADLS_ACUITY_SCORE: 47
ADLS_ACUITY_SCORE: 45
ADLS_ACUITY_SCORE: 49
ADLS_ACUITY_SCORE: 38
ADLS_ACUITY_SCORE: 51
ADLS_ACUITY_SCORE: 49
ADLS_ACUITY_SCORE: 38
ADLS_ACUITY_SCORE: 42
ADLS_ACUITY_SCORE: 38
ADLS_ACUITY_SCORE: 49
ADLS_ACUITY_SCORE: 40
ADLS_ACUITY_SCORE: 38
ADLS_ACUITY_SCORE: 42
ADLS_ACUITY_SCORE: 38
ADLS_ACUITY_SCORE: 49
ADLS_ACUITY_SCORE: 35
ADLS_ACUITY_SCORE: 49
ADLS_ACUITY_SCORE: 45
ADLS_ACUITY_SCORE: 40
ADLS_ACUITY_SCORE: 38
ADLS_ACUITY_SCORE: 38
ADLS_ACUITY_SCORE: 42
ADLS_ACUITY_SCORE: 45
ADLS_ACUITY_SCORE: 38
ADLS_ACUITY_SCORE: 49
ADLS_ACUITY_SCORE: 38
ADLS_ACUITY_SCORE: 38
ADLS_ACUITY_SCORE: 49
ADLS_ACUITY_SCORE: 49
ADLS_ACUITY_SCORE: 40
ADLS_ACUITY_SCORE: 38
ADLS_ACUITY_SCORE: 45
ADLS_ACUITY_SCORE: 55
ADLS_ACUITY_SCORE: 45
ADLS_ACUITY_SCORE: 38
ADLS_ACUITY_SCORE: 55
ADLS_ACUITY_SCORE: 47
ADLS_ACUITY_SCORE: 36
ADLS_ACUITY_SCORE: 38
ADLS_ACUITY_SCORE: 51
ADLS_ACUITY_SCORE: 49
ADLS_ACUITY_SCORE: 38
ADLS_ACUITY_SCORE: 38
ADLS_ACUITY_SCORE: 49
ADLS_ACUITY_SCORE: 38
ADLS_ACUITY_SCORE: 36
ADLS_ACUITY_SCORE: 36
ADLS_ACUITY_SCORE: 38
ADLS_ACUITY_SCORE: 40
ADLS_ACUITY_SCORE: 35
ADLS_ACUITY_SCORE: 40
ADLS_ACUITY_SCORE: 38
ADLS_ACUITY_SCORE: 45
ADLS_ACUITY_SCORE: 45
ADLS_ACUITY_SCORE: 38
ADLS_ACUITY_SCORE: 38
ADLS_ACUITY_SCORE: 49
ADLS_ACUITY_SCORE: 38
ADLS_ACUITY_SCORE: 42
ADLS_ACUITY_SCORE: 40
ADLS_ACUITY_SCORE: 38
ADLS_ACUITY_SCORE: 36
ADLS_ACUITY_SCORE: 49
ADLS_ACUITY_SCORE: 49
ADLS_ACUITY_SCORE: 38
ADLS_ACUITY_SCORE: 49
ADLS_ACUITY_SCORE: 49
ADLS_ACUITY_SCORE: 42
ADLS_ACUITY_SCORE: 49
ADLS_ACUITY_SCORE: 45
ADLS_ACUITY_SCORE: 49
ADLS_ACUITY_SCORE: 38
ADLS_ACUITY_SCORE: 49
ADLS_ACUITY_SCORE: 45
ADLS_ACUITY_SCORE: 49
ADLS_ACUITY_SCORE: 49
ADLS_ACUITY_SCORE: 40
ADLS_ACUITY_SCORE: 36
ADLS_ACUITY_SCORE: 38
ADLS_ACUITY_SCORE: 36
ADLS_ACUITY_SCORE: 47
ADLS_ACUITY_SCORE: 45
ADLS_ACUITY_SCORE: 38
ADLS_ACUITY_SCORE: 36
ADLS_ACUITY_SCORE: 38
ADLS_ACUITY_SCORE: 49
ADLS_ACUITY_SCORE: 38
DEPENDENT_IADLS:: INDEPENDENT
ADLS_ACUITY_SCORE: 49
ADLS_ACUITY_SCORE: 49
ADLS_ACUITY_SCORE: 45
ADLS_ACUITY_SCORE: 49
ADLS_ACUITY_SCORE: 38
ADLS_ACUITY_SCORE: 36
ADLS_ACUITY_SCORE: 36
ADLS_ACUITY_SCORE: 49
ADLS_ACUITY_SCORE: 49
ADLS_ACUITY_SCORE: 36
ADLS_ACUITY_SCORE: 49
ADLS_ACUITY_SCORE: 49
ADLS_ACUITY_SCORE: 38
ADLS_ACUITY_SCORE: 49
ADLS_ACUITY_SCORE: 38
ADLS_ACUITY_SCORE: 42
ADLS_ACUITY_SCORE: 45
ADLS_ACUITY_SCORE: 42
ADLS_ACUITY_SCORE: 38
ADLS_ACUITY_SCORE: 49
ADLS_ACUITY_SCORE: 49
ADLS_ACUITY_SCORE: 40
ADLS_ACUITY_SCORE: 45
ADLS_ACUITY_SCORE: 42
ADLS_ACUITY_SCORE: 40
ADLS_ACUITY_SCORE: 45
ADLS_ACUITY_SCORE: 49
ADLS_ACUITY_SCORE: 49
ADLS_ACUITY_SCORE: 36
ADLS_ACUITY_SCORE: 38
ADLS_ACUITY_SCORE: 45
ADLS_ACUITY_SCORE: 53
ADLS_ACUITY_SCORE: 51
ADLS_ACUITY_SCORE: 49
ADLS_ACUITY_SCORE: 40
ADLS_ACUITY_SCORE: 38
ADLS_ACUITY_SCORE: 45
ADLS_ACUITY_SCORE: 38
ADLS_ACUITY_SCORE: 55
ADLS_ACUITY_SCORE: 38
ADLS_ACUITY_SCORE: 45
ADLS_ACUITY_SCORE: 49
ADLS_ACUITY_SCORE: 40
ADLS_ACUITY_SCORE: 38
ADLS_ACUITY_SCORE: 49
ADLS_ACUITY_SCORE: 36
ADLS_ACUITY_SCORE: 49
ADLS_ACUITY_SCORE: 45
ADLS_ACUITY_SCORE: 49
ADLS_ACUITY_SCORE: 42
ADLS_ACUITY_SCORE: 42
ADLS_ACUITY_SCORE: 36
ADLS_ACUITY_SCORE: 36
ADLS_ACUITY_SCORE: 49
ADLS_ACUITY_SCORE: 49
ADLS_ACUITY_SCORE: 38
ADLS_ACUITY_SCORE: 45
ADLS_ACUITY_SCORE: 49
ADLS_ACUITY_SCORE: 38
ADLS_ACUITY_SCORE: 49
ADLS_ACUITY_SCORE: 42
ADLS_ACUITY_SCORE: 49
ADLS_ACUITY_SCORE: 38
ADLS_ACUITY_SCORE: 49
ADLS_ACUITY_SCORE: 55
ADLS_ACUITY_SCORE: 49
ADLS_ACUITY_SCORE: 38
ADLS_ACUITY_SCORE: 49
ADLS_ACUITY_SCORE: 38
ADLS_ACUITY_SCORE: 36
ADLS_ACUITY_SCORE: 49
ADLS_ACUITY_SCORE: 38
ADLS_ACUITY_SCORE: 38
ADLS_ACUITY_SCORE: 49
ADLS_ACUITY_SCORE: 49
ADLS_ACUITY_SCORE: 38
ADLS_ACUITY_SCORE: 38
ADLS_ACUITY_SCORE: 49
ADLS_ACUITY_SCORE: 49
ADLS_ACUITY_SCORE: 53
ADLS_ACUITY_SCORE: 38
ADLS_ACUITY_SCORE: 38
ADLS_ACUITY_SCORE: 49
ADLS_ACUITY_SCORE: 45
ADLS_ACUITY_SCORE: 49
ADLS_ACUITY_SCORE: 49
ADLS_ACUITY_SCORE: 36
ADLS_ACUITY_SCORE: 49
ADLS_ACUITY_SCORE: 40
ADLS_ACUITY_SCORE: 49
ADLS_ACUITY_SCORE: 51
ADLS_ACUITY_SCORE: 36
ADLS_ACUITY_SCORE: 49
ADLS_ACUITY_SCORE: 42
ADLS_ACUITY_SCORE: 38
ADLS_ACUITY_SCORE: 49
ADLS_ACUITY_SCORE: 53
ADLS_ACUITY_SCORE: 49
ADLS_ACUITY_SCORE: 40
ADLS_ACUITY_SCORE: 36
ADLS_ACUITY_SCORE: 45
ADLS_ACUITY_SCORE: 49
ADLS_ACUITY_SCORE: 49
ADLS_ACUITY_SCORE: 38
ADLS_ACUITY_SCORE: 53
ADLS_ACUITY_SCORE: 38
ADLS_ACUITY_SCORE: 45
ADLS_ACUITY_SCORE: 49
ADLS_ACUITY_SCORE: 45
ADLS_ACUITY_SCORE: 38
ADLS_ACUITY_SCORE: 53
ADLS_ACUITY_SCORE: 49
ADLS_ACUITY_SCORE: 49
ADLS_ACUITY_SCORE: 47
ADLS_ACUITY_SCORE: 49
ADLS_ACUITY_SCORE: 38
ADLS_ACUITY_SCORE: 36
ADLS_ACUITY_SCORE: 49
ADLS_ACUITY_SCORE: 47
ADLS_ACUITY_SCORE: 36
ADLS_ACUITY_SCORE: 45
ADLS_ACUITY_SCORE: 49
ADLS_ACUITY_SCORE: 38
ADLS_ACUITY_SCORE: 45
ADLS_ACUITY_SCORE: 40
ADLS_ACUITY_SCORE: 45
ADLS_ACUITY_SCORE: 49

## 2024-01-01 ASSESSMENT — COPD QUESTIONNAIRES
COPD: 0
COPD: 0

## 2024-01-01 ASSESSMENT — ENCOUNTER SYMPTOMS
SEIZURES: 0
SEIZURES: 0

## 2024-01-01 ASSESSMENT — LIFESTYLE VARIABLES
TOBACCO_USE: 1
TOBACCO_USE: 1

## 2024-03-22 PROBLEM — I21.3 ACUTE ST ELEVATION MYOCARDIAL INFARCTION (STEMI) (H): Status: ACTIVE | Noted: 2024-01-01

## 2024-03-22 NOTE — Clinical Note
Villanueva in left internal jugular at 64 hub of neck-capped and bio patch with clean dressing noted

## 2024-03-22 NOTE — Clinical Note
Secured with Catheter remains in place at 64cm.    Secured in normal fashion with sutureTegaderm, caps, swan cover, air out of syringeby Gena CERVANTES

## 2024-03-22 NOTE — Clinical Note
Hemodynamic equipment used: 5 lead ECG, Machine BP Cuff and pulse oximeter probe. Review of Systems:  Severe or unusual headache: No  Hearing problems: Yes  Vision problems: Yes  Sinus problems or allergies: Yes  Hoarseness or change in voice: No  Problems with your teeth or gums: No  Skin problems: No  Weight loss or gain: No  Chest pain or palpitations: No  Shortness of breath: No  Cough or coughing up blood: No  Stomach pain, nausea or vomiting: No  Constipation or diarrhea: No  Blood in bowel movements: No  Problems with urination or blood in urine: No  Muscle aches or joint pain: Yes  Sexual difficulties: No  Depression, sleep problems or severe stress: No  Easy bruising or bleeding, or enlarged glands: No  Speech or memory problems: Yes  Numbness or tingling: Yes

## 2024-03-22 NOTE — PLAN OF CARE
Major Shift Events:  No major shift events     Neuro: Sedated. Follows commands w/o sedation. Sedated with versed/fent   CV: 's occasional nonsustained VT. See result for EKG.   Pulm: CMV/AC 90FiO2 580TV 14RR 12PEEP  GI: NJ clamped - nutrition to consult   : AUOP via mackay 100-125ml/hr bumex gtt   Skin: Cool/clammy. Slightly dusky.   Drains: none     Drips:   Amio - 0.5mg/min  Bumex - 1mg/hr  Fent - 75mcg/mL  Hep - 1200un/hr   Versed - 4mg/hr   Milrinone - 0.5mcg   Levo - 0.18mcg  Vaso - 2.4un    Plan: Plan for Echo. Possible swan placement.   For vital signs and complete assessments, please see documentation flowsheets.     Admitted/transferred from: OSH - [facility]   Reason for admission/transfer: Acute decom/heart failure   2 RN skin assessment: completed by Myself and Savita JIMENEZ RN   Result of skin assessment and interventions/actions: Preventative mepi placed to sacrum     Height, weight, drug calc weight: Done  Patient belongings (see Flowsheet)  MDRO education added to care planN/A  ?     29-Aug-2022 14:08

## 2024-03-22 NOTE — CONSULTS
Cardiothoracic Surgery Consult Note    Consult Reason: evaluate for mitral valve surgery    HPI: Mr. Giacomo Castro is a 54 year old gentleman with a PMH significant for HLD, tobacco use. Presented to [facility] on 3/12 with chest pain, found to have inferior STEMI. PCI of RCA attempted and unsuccessful, unresponsive to POBA or aspiration thrombectomy. He developed cardiogenic shock, was placed on dobutamine. He then had VT leading to shocks and it was found his LVEF was 25% with moderate MR/posterior restricted leaflet. Had RHC on 3/20 which demonstrated elevated right pressures. Diuresis was initiated and dobutamine continued. He then had recurrence of VT requiring shocks. He was then intubated, dobutamine discontinued, milrinone, norepi and amiodarone initiated. FERNANDEZ demonstrated torrential MR with restricted posterior leaflet likely 2/2 infarction of postero-medial papillary muscles (as evidenced on cMRI). Additionally, course c/b HAP now on BS ABX. Currently requiring high ventilatory support of 90% FiO2 and PEEP 12. CVTS was consulted for consideration of surgical intervention. Of note, plavix held as of 3/21/24.    Upon discussion with parents, Giacomo lives alone and does drink alcohol regularly, at least ~2 hard liquor drinks or more per day and is a heavy smoker. Has a sedentary lifestyle but does hold a full time job. Had recurrent pneumonia as a child.    PMH:  HLD  Tobacco use    PSH:  Past Surgical History:   Procedure Laterality Date    C ARTHROSCOPIC DECOMPRESSION OF GANGLION CYST Left 2008    completed by [facility]    HERNIA REPAIR, UMBILICAL  2016    complete at [facility]    vein surgery Right 2006    Treatment of varicose vein     Past Surgical History:   Procedure Laterality Date    C ARTHROSCOPIC DECOMPRESSION OF GANGLION CYST Left 2008    completed by [facility]    HERNIA REPAIR, UMBILICAL  2016    complete at [facility]    vein surgery Right 2006    Treatment of varicose vein     FH:  Family History   Problem  Relation Age of Onset    Breast Cancer Mother     Coronary Artery Disease Father         MI; possibly in his 40's, smoker    Diabetes No family hx of     Hypertension No family hx of     Hyperlipidemia No family hx of     Cerebrovascular Disease No family hx of     Colon Cancer No family hx of     Prostate Cancer No family hx of     Thyroid Disease No family hx of     Genetic Disorder No family hx of      SH:  Social History     Socioeconomic History    Marital status: Single   Tobacco Use    Smoking status: Every Day     Packs/day: 1.00     Years: 30.00     Additional pack years: 0.00     Total pack years: 30.00     Types: Cigarettes    Smokeless tobacco: Never   Substance and Sexual Activity    Alcohol use: Yes    Drug use: No    Sexual activity: Yes     Partners: Female       Home Meds:  No medications prior to admission.       Allergies:  Allergies   Allergen Reactions    Penicillins Hives       ROS:  ROS: A complete 10 point ROS neg other than the symptoms noted above in the HPI.     Physical Exam:  Temp:  [99.2  F (37.3  C)-100.2  F (37.9  C)] 100  F (37.8  C)  Pulse:  [107-119] 107  Resp:  [13-27] 16  MAP:  [60 mmHg-86 mmHg] 66 mmHg  Arterial Line BP: ()/(48-78) 72/60  FiO2 (%):  [90 %-100 %] 90 %  SpO2:  [90 %-98 %] 93 %  Gen: NAD, sedated on ventilator. Clammy, pale  HEENT: normocephalic, atraumatic cranium, EOMI, sclerae anicteric. Oral mucosa pink and moist, midline trachea  Lungs: Coarse in all fields, synchronous with ventilator  CV: RRR, S1S2 normal, no murmur. Radial pulses and DP pulses symmetric. No dependent edema.   Abd: no scars, positive normal pitched bowel sounds, overall soft and non distended, nontender, no hepatosplenomegaly, no masses/guarding/rebound tenderness.   Musculoskeletal: sedated, 2+BLE edema, dusky toes  Neuro: sedated  Mental: sedated    Labs:  ABG   Recent Labs   Lab 03/22/24  0803 03/22/24  0409 03/22/24  0137   PH 7.31* 7.32* 7.31*   PCO2 43 45 46*   PO2 96 91 77*    HCO3 21 23 23     CBC  Recent Labs   Lab 03/22/24  0406 03/22/24  0135   WBC 18.7*  18.7* 17.6*   HGB 13.6  13.6 13.1*   *  510* 491*     BMP  Recent Labs   Lab 03/22/24  0802 03/22/24  0424 03/22/24  0406 03/22/24  0135   NA  --   --  135 137   POTASSIUM  --   --  3.9 4.2   CHLORIDE  --   --  101 102   CO2  --   --  21* 21*   BUN  --   --  31.6* 32.2*   CR  --   --  1.18* 1.24*   * 156* 151* 153*     LFT  Recent Labs   Lab 03/22/24  0406 03/22/24  0138 03/22/24  0135   *  --  389*   *  --  357*   ALKPHOS 61  --  60   BILITOTAL 0.5  --  0.5   ALBUMIN 3.4*  --  3.3*   INR  --  1.34*  --      PancreasNo lab results found in last 7 days.    Imaging:  Pending    A/P: Patient is a 54 year old male with torrential MR in the setting of STEMI with unsuccessful revascularization. CVTS was consulted for consideration of surgical intervention.    - STS Risk Score CABG/MVR:  Procedure Type: CABG + MVR  PERIOPERATIVE OUTCOME ESTIMATE %  Operative Mortality 13.9%  Morbidity & Mortality 57.4%  Stroke 2.46%  Renal Failure 7.71%  Reoperation 15.9%  Prolonged Ventilation 51.3%  Deep Sternal Wound Infection 0.295%  Long Hospital Stay (>14 days) 33%  Short Hospital Stay (<6 days)* 3.41%    - Would appreciate images be pushed over from [facility] (caths, cMRI, FERNANDEZ)  - Plan pending further investigations/workup, discussions with  multidisciplinary team  - Thank you for the opportunity to participate in the care of this patient.    Patient and plan discussed with attending, Dr. Mulvihill Leah Jensen, APRN, ACNPC-AG, CCRN  Nurse Practitioner  Cardiothoracic Surgery  Pager: [phone]      8:58 AM  March 22, 2024

## 2024-03-22 NOTE — H&P
Cardiology ICU Note    03/22/2024    Giacomo Castro MRN# [id number]   Age: 54 year old YOB: 1970          H&P:   Giacomo Castro is a 54 year old male who was admitted on 3/22/24. He has a PMH HLD, tobacco use. He initially presented to [facility] on 3/12 with chest pain that started on 3/10. He was found to have an inferior STEMI. He was taken to the cath lab, where RCA PCI was unsuccessful, unreponsive to POBA or aspiration thrombectomy. He was then admitted for cardiogenic shock on dobutamine, cath lab course complicated by VT leading to shocks. EF noted to be 25% with moderate MR with posterior restricted leaflet. Course complicated by PNA s/p CTX course. 3/20 patient had RHC which showed CVP 15, RA 52/15, mPA 40, mPCWP 35. Diuresis attempted and continued dobutamine, but then overnight had recurrent VT withc shocks. He was subsequently intubated, dobutamine stopped and switched to milrinone and levo, amiodarone was started. Plan was for FERNANDEZ today, which showed torrential MR with a restricted posterior leaflet, likely related to cMRI findings of concern for infarction of postero-medial papillary muscles. 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 Found to have inferior STEMI, RCA lesion was unable to be revascularized. Course complicated by cardiogenic shock initially treated with dobutamine, but was shocked in cath lab and shocked on 3/20, switched to levo and milrinone. Presenting for [facility] for advanced therapy  consideration.  -RHC results 3/20: CVP 15, RA 52/15, mPA 40, mPCWP 35  ===PLAN===  -Continue ASA, heparin   -Plavix held starting 3/21 given possibility of valve intervention   -CVTS consult placed for the morning  -Diuresis with bumex 4mg IV x1 and bumex drip, goal at least -1L overnight  -continue amiodarone for VT  -BMP, mag, phos with frequent checks (K >4, Mag >2)  -Monitor on telemetry  -Hold BB given worsening cardiogenic shock  -Hold ACE/ARB/ARNI given shock  -TTE ordered  -EKG on admission  -hold statin in setting of worsening LFTs  -VBGs with oxyhemoglobin      Pulmonary  # Acute Hypoxic Respiratory Failiure 2/2  # CAP vs. HCAP  # Cardiogenic Shock  Vent Mode: CMV/AC  (Continuous Mandatory Ventilation/ Assist Control)  Resp Rate (Set): 14 breaths/min  Tidal Volume (Set, mL): 540 mL  PEEP (cm H2O): 14 cmH2O  Resp: 18    - CXR on 3/12 with b/l opacities, CT chest done on 3/17 with GGO concerning for PNA   -Urine legionella, strep antigen negative  - Worsening O2 requirements on admission here   Was on CCTX/azithro 3/18-3/22, azithro was stopped after VT event  -Switch CAP coverage to HCAP coverage for now with vanc/luz (PCN allergy)  - CXR to assess ET tube placement and status of pulmonary edema/PNA  - Treatment of cardiogenic shock as above  - wean vent as able  - check procal          Gastrointestinal, Nutrition  #Transaminitis   -LFTs <100 today at [facility], on admission here in the high 300's. Likely congestive hepatopathy in the setting of acute HF from severe MR  -treatment of cardiogenic shock as above  -low threshold to order RUQ U/S if LFTs worsen despite improvement in volume status     Renal, Electrolytes  N/A    Infectious Disease  # CAP vs. HCAP  -treatment as above in pulm section    Hematology  N/A    Endocrinology  N/A    Integumentary:  N/A        ICU Checklist:  #Feeding: N/A  #Analgesia: fentanyl  #Sedation: versed  #Thromboembolism ppx: heparin subq  #HOB: 30 degrees   #Ulcer ppx:  "PPI  #Glucose: PRN  #SBT/SAT: not now  #Bowel reg: senna/miralax PRN  #Family: mother  #Dispo: CCU      Code Status: Full    The pt was discussed with the attending physician.     Kye Reynolds MD  Cardiology fellow         Medications:   Drug and lactation database from the [place] [employer]:  http://toxnet.nlm.nih.gov/cgi-bin/sis/htmlgen?LACT           Past Medical History:   No past medical history on file.         Family History:   Unable to obtain due to patients medical condition.         Social History:   Unable to obtain due to patients medical condition.         Past Surgical History:   Unable to obtain due to patients medical condition.         Review of Systems:   Unable to obtain due to patients medical condition.            Physical Exam:   Pulse 119   Temp 99.2  F (37.3  C) (Axillary)   Resp 18   Ht 1.905 m (6' 3\")   Wt 105.1 kg (231 lb 11.3 oz)   SpO2 93%   BMI 28.96 kg/m        GENERAL: Intubated, sedated. NAD.  HEENT: No icterus. ETT in place, OG tube in place.  CARDIOVASCULAR: tachycardic. Normal S1 and S2.  RESP: Coarse bilaterally. Mechanical ventilation.    GI Soft, bowel sounds hypoactive but present.  GENITOURINARY: Chua in place.  EXTREMITIES: Cool, 1+ edema, pulses dopplered, as above.   NEURO: Sedated and intubated.  INTEGUMENTARY: No rashes      Resp: 18 SpO2: 93 % O2 Device: Mechanical Ventilator      Arterial Blood Gas:   Recent Labs   Lab 03/22/24 0137   PH 7.31*   PCO2 46*   PO2 77*   HCO3 23   O2PER 100       Vitals:    03/22/24 0130 03/22/24 0200   Weight: 105.1 kg (231 lb 11.3 oz) 105.1 kg (231 lb 11.3 oz)   No intake/output data recorded.  Recent Labs   Lab 03/22/24 0135 03/22/24  0113     --    POTASSIUM 4.2  --    CHLORIDE 102  --    CO2 21*  --    ANIONGAP 14  --    * 143*   BUN 32.2*  --    CR 1.24*  --    GRICEL 8.6  --      No components found for: \"URINE\"   Recent Labs   Lab 03/22/24 0135   *   *   BILITOTAL 0.5 "   ALBUMIN 3.3*   PROTTOTAL 6.3*   ALKPHOS 60     Temp: 99.2  F (37.3  C) Temp src: AxillaryTemp  Min: 99.2  F (37.3  C)  Max: 99.2  F (37.3  C)   Recent Labs   Lab 03/22/24  0135   WBC 17.6*   HGB 13.1*   HCT 38.0*   MCV 95   RDW 12.8   *     Recent Labs   Lab 03/22/24  0138   INR 1.34*   PTT 29     Recent Labs   Lab 03/22/24  0135 03/22/24  0113   * 143*       Lines:           Data:   All lab results reviewed    Recent Results (from the past 24 hour(s))   XR Chest Port 1 View    Impression    COMPARISON:  3/21/2024    FINDINGS: Right-sided central venous catheter tip projects over the mid SVC. Endotracheal tube tip 6.9 cm from the cesia. No pneumothorax. Remainder of the chest is unchanged from the chest x-ray obtained today at 0203 hours.   XR Abdomen 1 View    Impression    COMPARISON:  None.    FINDINGS:  The feeding tube tip lies at the level of the gastric fundus. Nondilated air-filled loops of large bowel. Paucity of air-filled loops of small bowel. Patchy airspace opacities in the visualized lower lungs. Degenerative changes in the thoracic and lumbar spine.   XR Chest Port 1 View    Impression    RESIDENT PRELIMINARY INTERPRETATION  Impression:     1. Endotracheal tube tip projects 6.8 cm above cesia. Right IJ  central venous catheter tip projects over the superior SVC.  2. Enlarged aortic arch and cardiac silhouette, with patchy and  confluent airspace as well as coarsened interstitial opacities in the  perihilar and left retrocardiac lung zones.  3. Possible small pleural effusion.   XR Abdomen Port 1 View    Impression    RESIDENT PRELIMINARY INTERPRETATION  Impression: Feeding tube tip projects over the stomach. Advancement  recommended.       MRI  Summary:  1. Ischemic cardiomyopathy with evidence of acute / subacute infarction in the RCA territory and resultant biventricular involvement - LV (EF 30%) and RV (EF 40%).  2. Evidence for acute infarction (and microvascular obstruction) of  the postero-medial papillary muscle.  3. No evidence for intracardiac thrombus.    CTA chest   IMPRESSION:   1. No pulmonary embolism.  2. Small bilateral pleural effusions, left greater than right.  3. Bibasilar volume loss and consolidation with patchy peripheral areas of interstitial and groundglass opacity, correlate clinically for acute infectious/inflammatory etiologies.  4. Trace pericardial effusion.   5. Ectasia of the ascending aorta at 4 cm in caliber.  6. Main pulmonary artery is mildly dilated which can be seen in pulmonary arterial hypertension.    RHC  Conclusions  1. Severely elevated RA pressures (mean -15 mm Hg).  2. Moderately elevated RV pressures (52/15 mm Hg).  3. Moderately elevated PA pressures (58/27, mean - 40 mm Hg).  4. Severely elevated PCW pressures (mean - 35 mm Hg) with a large V wave (V - 52 mm Hg).  5. Findings consistent with severely elevated LV filling pressures. Large V wave raises suspicion of severe mitral regurgitation.       I have reviewed today's vital signs, notes, medications, labs and imaging.  I have also seen and examined the patient and agree with the findings and plan as outlined above.   Pt with CAD who presented late following IWMI and presenting hospital unable to cross tot occlusion of RCA now with severe biventricular failure and severe MR.  Pt on pressors, sedated and intubated.  Pt's hx from mother who stated that pt is current smoker and alochol use (daily).  Plan is to obtain FERNANDEZ to assess MV, vasodilator therapy (ass opposed to pressors) and may need to stabilize with mechanical support (IABP or Impella) or ECMO.  Pt critically ill.  Full Code.     Rachid Reynolds MD, PhD  Professor, Heart Failure and Cardiac Transplantation  [facility]

## 2024-03-22 NOTE — Clinical Note
IABP inserted in the left femoral artery. IABP inserted with 50 cc balloon volume Lot number is: [id number]

## 2024-03-22 NOTE — PROGRESS NOTES
CLINICAL NUTRITION SERVICES - ASSESSMENT NOTE     Nutrition Prescription    RECOMMENDATIONS FOR MDs/PROVIDERS TO ORDER:  - Total daily fluids/adjustments per MD  - Extubation and diet advancement vs EN via current access (small bore nasoFT from OSH). Please consult Nutrition Services Adult IP Consult with reason Registered Dietitian to Order TF per Medical Nutrition Therapy Guidelines if EN becomes POC.   - Bowel regimen as appro   - If EtoH concerns, recommend thiamine and folic acid     Malnutrition Status:    - Patient does not meet two of the established criteria necessary for diagnosing malnutrition but is at risk for malnutrition    Recommendations already ordered by Registered Dietitian (RD):  - none currently while nutrition POC pending     Future/Additional Recommendations:  - Extubation and diet vs EN via NGT (small bore nasoFT from OSH)    - Initiate via current access/NGT: TwoCal HN @ 55 mL/hr (1320 mL/day) to provide 2640 kcals,111 g PRO, 924 mL H2O, 289 g CHO and 7 g Fiber daily + 3 [id number] daily = 171 gm PRO (1.6 gm PRO/kg) and 2880 kcal (27 kcals/kg)  - Initiate @ 10 ml/hr and advance by 10 ml q8hr as tolerated  - Do not start or advance until lytes (Mg++,K+) WNL and phos>2.0  - Recommend 30-60 ml q4hr fluid flushes for tube patency. Additional fluids and/or adjustments per MD.    - Order multivitamin/mineral (15 ml/day via FT) to help ensure micronutrient needs being met with suspected hypermetabolic demands and potential interruptions to TF infusions.  - Elevated HOB with gastric feeds           REASON FOR ASSESSMENT  Giacomo Castro is a/an 54 year old male assessed by the dietitian for Nutrition Risk Monitoring    NUTRITION HISTORY  Per chart:  Giacomo lives alone and does drink alcohol regularly, at least ~2 hard liquor drinks or more per day and is a heavy smoker. Has a sedentary lifestyle but does hold a full time job.     Per family at bedside:  Pt was eating oral (cardiac) diet at OSH x 10  "days PTA. Pt vented 1 day prior to transfer and currently has small bore nasoFT in place, gastric per AXR. No significant weight loss or decreased PO prior to hospitalization. Pt does not take any known nutrition supplements or micronutrients at home. Was tolerating cardiac diet in hospital, but was not following any special diet at home prior.     CURRENT NUTRITION ORDERS  Diet: NPO  Enteral Access: NGT (small bore [employer] FT)    LABS  Labs reviewed  BUN: 30.3 (H)  ALT: 318 (H)  AST: 322 (H)  Glucose: 181, 156    MEDICATIONS  Medications reviewed  Bumex  Norepi  Vasopressin    ANTHROPOMETRICS  Height: 190.5 cm (6' 3\") 75\"  Most Recent Weight: 105.1 kg (231 lb 11.3 oz)    IBW: 89 kg  BMI: Overweight BMI 25-29.9  Weight History:   Wt Readings from Last 8 Encounters:   03/22/24 105.1 kg (231 lb 11.3 oz)   12/10/18 100.2 kg (221 lb)     Dosing Weight: 105 kg (current/admit weight)    ASSESSED NUTRITION NEEDS  Estimated Energy Needs: 7466-8387 kcals/day (25 - 30 kcals/kg)  Justification: Maintenance  Estimated Protein Needs: 160-210 grams protein/day (1.5 - 2 grams of pro/kg)  Justification: Increased needs  Estimated Fluid Needs:  Per team   Justification: Per provider pending fluid status    PHYSICAL FINDINGS  See malnutrition section below.  No abnormal nutrition-related physical findings observed.     MALNUTRITION  % Intake: Decreased intake does not meet criteria - but unable to fully quantify   % Weight Loss: None noted  Subcutaneous Fat Loss: None observed  Muscle Loss: None observed  Fluid Accumulation/Edema: medically related   Malnutrition Diagnosis: Patient does not meet two of the established criteria necessary for diagnosing malnutrition but is at risk for malnutrition    NUTRITION DIAGNOSIS  Inadequate oral intake related to inability to take oral PO/vented as evidenced by NPO and meeting 0% nutrition needs      INTERVENTIONS  Implementation  Nutrition Education: Not appropriate at this time due to patient " "condition - education provided to family at bedside  Enteral Nutrition - recs made pending nutrition POC     Goals  Diet adv v nutrition support within 48 hours      Monitoring/Evaluation  Progress toward goals will be monitored and evaluated per protocol.  Manjinder Parr, RD, LD, [facility]  Neuro ICU Dietitian; 6A Neuro  Vocera \"4E Clinical Dietitian\"  Weekend/holiday \"Weekend Clinical Dietitian\"      4E SICU RD pager: [phone]  Weekend/Holiday RD pager: [phone]      "

## 2024-03-22 NOTE — PHARMACY-ADMISSION MEDICATION HISTORY
Pharmacist Admission Medication History    Admission medication history is complete. The information provided in this note is only as accurate as the sources available at the time of the update.    Information Source(s): Irnia/Idalmis via  chart review.    Pertinent Information:   Patient intubated and was unable to complete medication history. Attempted to speak with motherChica but phone call was automatically directed to voicemail.  Per [facility], patient does not have any recent fill history. Per Irina, active medications are listed below. Due to start date from 2016 and no active records on [place]  for Percocet or diazepam, no medications were added to PTA list.    Changes made to PTA medication list:  Added: None  Deleted: None  Changed: None    Allergies reviewed with patient and updates made in EHR:  Assessed by RN on 3/22/2024.    Medication History Completed By: Marcin Hayes [facility] 3/22/2024 11:49 AM    Prior to Admission medications    Not on File

## 2024-03-22 NOTE — PHARMACY-VANCOMYCIN DOSING SERVICE
Pharmacy Vancomycin Initial Note  Date of Service 2024  Patient's  1970  54 year old, male    Indication: [facility]ated Pneumonia    Current estimated CrCl = Estimated Creatinine Clearance: 84.7 mL/min (A) (based on SCr of 1.24 mg/dL (H)).    Creatinine for last 3 days  3/22/2024:  1:35 AM Creatinine 1.24 mg/dL    Recent Vancomycin Level(s) for last 3 days  No results found for requested labs within last 3 days.      Vancomycin IV Administrations (past 72 hours)        No vancomycin orders with administrations in past 72 hours.                    Nephrotoxins and other renal medications (From now, onward)      Start     Dose/Rate Route Frequency Ordered Stop    24 1530  vancomycin (VANCOCIN) 1,000 mg in 200 mL dextrose intermittent infusion         1,000 mg  200 mL/hr over 1 Hours Intravenous EVERY 12 HOURS 24 0245      24 0330  vancomycin (VANCOCIN) 2,000 mg in sodium chloride 0.9 % 500 mL intermittent infusion         2,000 mg  over 120 Minutes Intravenous ONCE 24 0245      24 0230  norepinephrine (LEVOPHED) 16 mg in  mL infusion MAX CONC CENTRAL LINE         0.01-0.6 mcg/kg/min × 105.1 kg (Dosing Weight)  1-59.1 mL/hr  Intravenous CONTINUOUS 24 0216      24 0200  vasopressin (VASOSTRICT) 20 Units in sodium chloride 0.9 % 100 mL standard conc infusion         0.5-4 Units/hr  2.5-20 mL/hr  Intravenous CONTINUOUS 24 0130      24 0200  bumetanide (BUMEX) 0.25 mg/mL infusion         1 mg/hr  4 mL/hr  Intravenous CONTINUOUS 24 0150              Contrast Orders - past 72 hours (72h ago, onward)      None            InsightRX Prediction of Planned Initial Vancomycin Regimen  Loading dose: 2000 mg at 00:00 2024.  Regimen: 1000 mg IV every 12 hours.  Start time: 12:00 on 2024  Exposure target: AUC24 (range)400-600 mg/L.hr   AUC24,ss: 507 mg/L.hr  Probability of AUC24 > 400: 75 %  Ctrough,ss: 17 mg/L  Probability of  Ctrough,ss > 20: 35 %  Probability of nephrotoxicity (Lodise MARCY 2009): 13 %        Plan:  Load vancomycin 2000mg IV once now  Start vancomycin  1000 mg IV q12h.   Vancomycin monitoring method: AUC  Vancomycin therapeutic monitoring goal: 400-600 mg*h/L  Pharmacy will check vancomycin levels as appropriate in 1-3 Days.    Serum creatinine levels will be ordered daily for the first week of therapy and at least twice weekly for subsequent weeks.      Ad Smart, [facility]  [id number]

## 2024-03-22 NOTE — Clinical Note
PtArtem Schrader from ICU for leave in [place] L internal jugular.  Vented and sedate on gtt from floor

## 2024-03-22 NOTE — PROGRESS NOTES
[facility]    Cardiology Progress Note- Cardiology        Date of Admission:  3/22/2024     Assessment & Plan: SL   Giacomo Castro is a 54 year old male with PMH HLD, tobacco use who presented to [facility] on 3/12 with chest pain that started on 3/10 and found to have an inferior STEMI. He was taken to the cath lab, where RCA PCI was unsuccessful, unreponsive to POBA or aspiration thrombectomy. He was then admitted for cardiogenic shock on dobutamine, cath lab course complicated by VT leading to shocks. EF noted to be 25% with moderate MR with posterior restricted leaflet. Course complicated by PNA s/p CTX course. 3/20 patient had RHC which showed CVP 15, RA 52/15, mPA 40, mPCWP 35. Diuresis attempted and continued dobutamine, but then overnight had recurrent VT withc shocks. He was subsequently intubated, dobutamine stopped and switched to milrinone and levo, amiodarone was started. Plan was for FERNANDEZ today, which showed torrential MR with a restricted posterior leaflet, likely related to cMRI findings of concern for infarction of postero-medial papillary muscles. Given acute decompensation, patient was transferred to [facility] for further evaluation and management.    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 Found to have inferior STEMI, RCA lesion was unable to be revascularized. Course complicated by cardiogenic shock initially treated with dobutamine, but was shocked in cath lab and shocked on 3/20, switched to levo and milrinone. Presenting for [facility] for advanced therapy consideration.    Date CVP PAP PCWP CO/CI  SVR Inotrope Vasodilator MCS   3/22/24  ([facility]) 18 55/28/37 32 4.15/1.77 1098 Milrinone, NE, vasopressin none none                                      - inotropes:   - milrinone 0.5 mcg/kg/min  - NE 0.18 mcg/kg/min  - vasopressin 2.4 unit(s)/hr  -Continue ASA, heparin  - Plavix held starting 3/21 given possibility of valve intervention  - Diuresis   - bumex 4mg IV x1, increase bumex drip at 2 mg/hr   - diuril 1 g IV, will consider redose overnight   - OSH 40 IV lasix + Lasix gtt 10 mg/hr PTA 0-> Chua output approx 150cc q 2 hours.   - hold statin in setting of worsening LFTs  - CVTS consult for mitral valve regurgitation    # VT storm  Had VT storm requiring shock in cath lab. Recurrent requiring shock again on 3/21  - continue amiodarone gtt for VT    FERNANDEZ: 3/21  There is coaptation failure of mitral leaflets. Anterior leaflet overrides due  to severely restricted posterior leaflet and chordal tethering.  Severe posteriorly directed mitral regurgitation (secondary MR).  Jemal class IIIb.  Pulmonary flow reversal noted in pulmonary veins.  Left ventricular function is decreased. The ejection fraction is 10-15%  (severely reduced).  Global right ventricular function is severely reduced.  The right ventricle is normal size.    Cath 3/12  1. Delayed presentation acute inferior STEMI.  Persistent symptoms began [place] 3/10.    2. Unsuccessful PCI to the mid-RCA.  Thrombosed vessel, unresponsive to balloon angioplasty and mechanical aspiration thrombectomy.     Pulmonary  # Acute Hypoxic Respiratory Failiure 2/2  # CAP vs. HCAP  # Cardiogenic Shock  Vent Mode: CMV/AC  (Continuous Mandatory  "Ventilation/ Assist Control)  FiO2 (%): 100 %  Resp Rate (Set): 14 breaths/min  Tidal Volume (Set, mL): 580 mL  PEEP (cm H2O): 12 cmH2O  Resp: 15  CXR on 3/12 with b/l opacities, CT chest done on 3/17 with GGO concerning for PNA. Urine legionella, strep antigen negative. Worsening O2 requirements at OSH. Was on CTX/azithro 3/18-3/22, azithro was stopped after VT event.  - discontinue Vanc, MRSA nares neg  - transition from Meropenem to cefepime   - wean as able tomorrow        Gastrointestinal, Nutrition  #Transaminitis   - LFTs <100 today at [facility], on admission here in the high 300's. Likely congestive hepatopathy in the setting of acute HF from severe MR  - treatment of cardiogenic shock as above  - low threshold to order RUQ U/S if LFTs worsen despite improvement in volume status     Renal, Electrolytes    Cr 1.18, baseline <1    # Hyponatremia, mild  likely from hypervolemia     Infectious Disease  # CAP vs. HCAP  -treatment as above in pulm section     Hematology  N/A     Endocrinology  N/A     Integumentary:  N/A                Diet: NPO for Medical/Clinical Reasons Except for: Meds    DVT Prophylaxis: heparin gtt  Chua Catheter: PRESENT, indication: ICU only: hourly urine output needed for patient care  Cardiac Monitoring: ACTIVE order. Indication: ICU  Code Status: Full Code          Clinically Significant Risk Factors Present on Admission              # Hypoalbuminemia: Lowest albumin = 3.3 g/dL at 3/22/2024  1:35 AM, will monitor as appropriate  # Coagulation Defect: INR = 1.34 (Ref range: 0.85 - 1.15) and/or PTT = 29 Seconds (Ref range: 22 - 38 Seconds), will monitor for bleeding      # Circulatory Shock: required vasopressors within past 24 hours       # Overweight: Estimated body mass index is 28.96 kg/m  as calculated from the following:    Height as of this encounter: 1.905 m (6' 3\").    Weight as of this encounter: 105.1 kg (231 lb 11.3 oz).              Patient care discussed with Dr." Rodolfo Stafford MD  PGY-2 Internal Medicine  Cardiology Service  [facility]  ______________________________________________________________________    Interval History   Admitted overnight. Patient remain sedated on ventilator.    Physical Exam   Vital Signs: Temp: 100  F (37.8  C) Temp src: Esophageal   Pulse: 113   Resp: 15 SpO2: 95 % O2 Device: Mechanical Ventilator    Weight: 231 lbs 11.26 oz    GENERAL: Intubated, sedated. NAD.  HEENT: No icterus. ETT in place, NG tube in place.  CARDIOVASCULAR: tachycardic. Normal S1 and S2.  RESP: Coarse bilaterally. Mechanical ventilation.    GI Soft, bowel sounds hypoactive but present.  GENITOURINARY: Chua in place.  EXTREMITIES: no pitting edema  NEURO: Sedated and intubated.  INTEGUMENTARY: No rashes    Medical Decision Making       Please see A&P for additional details of medical decision making.      Data   ------------------------- PAST 24 HR DATA REVIEWED -----------------------------------------------    I have reviewed today's vital signs, notes, medications, labs and imaging.  I have also seen and examined the patient and agree with the findings and plan as outlined above. Pt sedated and intubated with recent Inf MI with LV dysfn and RHC today revealing elevated Right and Left Filing pressures with severe MR.  No evidence of papillary m rupture.  Pt would benefit from further vasodilation therapy to decrease afterload and promote forward flow vs regurgitant flow through MV.  Plan is for aggressive iv diuresis and hopefully extubation in next couple of days.  Pt with hx of alcohol use per family.  Pt seen X3 for total critical care time 50 min.     Rachid Reynolds MD, PhD  Professor, Heart Failure and Cardiac Transplantation  [facility]

## 2024-03-22 NOTE — PROGRESS NOTES
Major Shift Events:  RASS -2/-3 on 75 fent, 4 versed. Following basic commands. Tmax 38.1. Blood cultures sent. MAP > 65 on 0.18 norepi, 2.4 vaso. Unable to wean pressors. Remains on 0.5 amio, 0.5 milrinone gtts. [place] placed in cath lab. See flowsheets for QING numbers. Requiring up to 100% FiO2, weaned from 90 to 70%. Chua w/ adequate OP. Continues on bumex gtt; one-time additional doses of Bumex & diuril given. Lactic up to 3.0    Plan: Continue POC  For vital signs and complete assessments, please see documentation flowsheets.

## 2024-03-22 NOTE — PLAN OF CARE
Problem: Oral Intake Inadequate  Goal: Improved Oral Intake  Outcome: Unable to Meet   Goal Outcome Evaluation: Extubation and diet vs EN via current access; see RD note for details.

## 2024-03-23 NOTE — PROGRESS NOTES
ECMO Attending Progress Note  3/23/2024    Giacomo Castro is a 54 year old male who was cannulated for ECMO 3/23/24 due to cardiogenic shock    Cannulation Site:  17 Fr in the R femoral artery  25 Fr in the R femoral vein    Interval events: admitted to the ICU weaning down pressors at 4.5L no chugging     Physical Exam:  Temp:  [98.7  F (37.1  C)-101.3  F (38.5  C)] 98.7  F (37.1  C)  Pulse:  [] 94  Resp:  [13-21] 14  MAP:  [55 mmHg-75 mmHg] 68 mmHg  Arterial Line BP: ()/(40-67) 101/52  FiO2 (%):  [50 %-80 %] 70 %  SpO2:  [95 %-100 %] 100 %    Intake/Output Summary (Last 24 hours) at 3/23/2024 1644  Last data filed at 3/23/2024 1600  Gross per 24 hour   Intake 2951.62 ml   Output 6085 ml   Net -3133.38 ml    Vent Mode: CMV/AC  (Continuous Mandatory Ventilation/ Assist Control)  FiO2 (%): 70 %  Resp Rate (Set): 14 breaths/min  Tidal Volume (Set, mL): 580 mL  PEEP (cm H2O): 10 cmH2O  Resp: 14       Labs:  Recent Labs   Lab 03/23/24  1605 03/23/24  1602 03/23/24  1522 03/23/24  1516 03/23/24  1231 03/23/24  0958   PH 7.42  --   --  7.49* 7.43 7.46*   PCO2 39  --   --  34* 42 40   PO2 105  --   --  68* 85 70*   HCO3 25  --   --  25 28 28   O2PER 70 70 70.0 70.0 70  70 60  60      Recent Labs   Lab 03/23/24  1522 03/23/24  1516 03/23/24  1230 03/23/24  0445 03/22/24  1626 03/22/24  1457 03/22/24  0406   WBC  --   --  25.3* 23.2* 21.2*  --  18.7*  18.7*   HGB 11.6* 12.7* 12.7* 13.0* 12.9*   < > 13.6  13.6    < > = values in this interval not displayed.     Creatinine   Date Value Ref Range Status   03/23/2024 1.27 (H) 0.67 - 1.17 mg/dL Final   03/23/2024 1.18 (H) 0.67 - 1.17 mg/dL Final   03/23/2024 1.21 (H) 0.67 - 1.17 mg/dL Final   03/22/2024 1.19 (H) 0.67 - 1.17 mg/dL Final   12/10/2018 0.83 0.66 - 1.25 mg/dL Final              ECMO Issues including assessments and plan on DOS 3/23/2024:  Neuro: Sedated for mechanical ventilation and ECMO.  No acute distress.  NIRS stable  b/l  RASS goal: -1-2  CV:  Cardiogenic shock.  Hemodynamically stable on norepi and epi weaned off vaso   Pulm: Keep vent settings at rest settings as above.  FEN/Renal: Electrolytes stable w/ replacement protocols in place, Cr stable, UOP stable  Heme: ACT goal: 180-200, Hemoglobin stable .  Minimal oozing around the ECMO cannulas.  ID: Receiving empiric antibiotics  Cannulae: Position is acceptable on exam and the available imaging.  Distal perfusion cannula is in place and patent.  Extremities are well-perfused.     I have personally reviewed the ECMO flows, oxygenation and CO2 clearance, anticoagulation, and cannula position.  I have also personally assessed the patient's systemic response with hemodynamics, oxygenation, ventilation, and bleeding.       The patient requires continued ECMO support and management in the ICU.  I have discussed patient care and treatment plan with the primary team.      Julianna Leach MD  Cardiology critical care  Pager

## 2024-03-23 NOTE — PLAN OF CARE
Major Shift Events: no major events this shift 7p-7a, sedated (versed/fentanyl), opens eyes, follows simple commands, 's, no ectopy, see flowsheets for QING numbers, unable to wean levo off for MAP >65, k+ replaced, heparin bolus & gtt increased, tolerating ventilator - does get hypotensive momentarily with turns, cough & suction, no BM this shift, >4L UOP via mackay s/p diuretics (IVP & gtt), bruising noted to lower abdomen, scab to lower left lip & redness to upper right lip, mother updated via telephone this morning,       For vital signs and complete assessments, please see documentation flowsheets.

## 2024-03-23 NOTE — PROGRESS NOTES
[facility]         Intra-Aortic Balloon Pump Insertion:       Arrival Date: 3/22/2024  Arrival Time: 0131      Insertion Date: 3/23/2024  Insertion Time: 1500  Insertion Locations: CCL    Insertion Details:  Physician: Beto  Site: Left Femoral Artery  Catheter Size: 8 Fr.  Balloon Volume: 50 cc  Fiberoptic: YES  Sheath: YES  Position verified with: fluoroscopy    Initial Settings:  Mode: Auto        Machine #: [id number]      RT Beatrice  ECMO Specialist  3/23/2024 3:14 PM

## 2024-03-23 NOTE — PROGRESS NOTES
Major Shift Events:  [id number]  Pt febrile w/ increasing pressor needs, became hypotensive w/ bigeminal rhythm when returned from CT. Epi gtt initiated, 2g Mg given. Milrinone & bumex gtts stopped, amio gtt increased to 1 per cards 2. Sent to cath lab for ecmo cannulation. Unable to wean FiO2. K replaced. Heparin gtt bolus & rate increased.     Plan: Continue POC  For vital signs and complete assessments, please see documentation flowsheets.

## 2024-03-23 NOTE — CONSULTS
[facility]  Cardiology Consult  Patient Name: Giacomo Castro  Medical Record Number: [id number]  YOB: 1970  PCP: No Ref-Primary, Physician    Date of Service: 3/23/2024  Admit Date/Time: 3/22/2024  1:29 AM    Consult performed at the request of Rachid Reynolds MD  Reason for consult/Service:     HPI: Giacomo Castro is a 54 year old male with PMH HLD, tobacco use who presented to [facility] on 3/12 with chest pain that started on 3/10 and found to have an inferior STEMI. He was taken to the cath lab, where RCA PCI was unsuccessful, unreponsive to POBA or aspiration thrombectomy. He was then admitted for cardiogenic shock on dobutamine, cath lab course complicated by VT leading to shocks. EF noted to be 25% with moderate MR with posterior restricted leaflet. Course complicated by PNA s/p CTX course. 3/20 patient had RHC which showed CVP 15, RA 52/15, mPA 40, mPCWP 35. Diuresis attempted and continued dobutamine, but then overnight had recurrent VT withc shocks. He was subsequently intubated, dobutamine stopped and switched to milrinone and levo, amiodarone was started. Plan was for FERNANDEZ today, which showed torrential MR with a restricted posterior leaflet, likely related to cMRI findings of concern for infarction of postero-medial papillary muscles. Given acute decompensation, patient was transferred to [facility] for further evaluation and management.      Interval Changes: Febrile and hypotensive today.  Pressors and ectopy increasing over the day. Milrinone stopped due to increasing ectopy, and amiodarone increased to 1.  IABP in place. After discussion with cards 2 and CICU decision was made to support him with [facility] ECMO.     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 Please do not hesitate to call if you have further questions or concerns.     Giacomo GRISELDA Castro was seen and examined with Dr. Jackson, attending physician, who agrees with the above assessment and plan.     Delaney Jessica DNP APRN [facility]  Cardiology ICU  Pager [phone]    March 23, 2024      Objective:   Vital signs: Pulse (!) 127   Temp 100.4  F (38  C)   Resp 14   Ht 1.905 m (6' 3\")   Wt 101.9 kg (224 lb 10.4 oz)   SpO2 96%   BMI 28.08 kg/m       Intake/Output Summary (Last 24 hours) at 3/23/2024 1606  Last data filed at 3/23/2024 1300  Gross per 24 hour   Intake 2951.62 ml   Output 5845 ml   Net -2893.38 ml     Wt Readings from Last 1 Encounters:   03/23/24 101.9 kg (224 lb 10.4 oz)     ECMO:  RFV 25 Fr cannula, RFA 17 Fr cannula  Flow 4.5  Sweep  O2  -200        LABS:  CMP  Recent Labs   Lab 03/23/24  1522 03/23/24  1516 03/23/24  1234 03/23/24  1230 03/23/24  0958 03/23/24  0811 03/23/24  0445 03/22/24  2210    143  --  141 143  --  142 142   POTASSIUM 3.2* 3.3*  --  3.7 4.2  --  3.4  3.4 3.4   CHLORIDE  --   --   --  103 105  --  102 101   CO2  --   --   --  25 25  --  25 26   ANIONGAP  --   --   --  13 13  --  15 15   * 165* 158* 161* 153*   < > 161* 160*   BUN  --   --   --  30.0* 29.4*  --  29.5* 29.2*   CR  --   --   --  1.27* 1.18*  --  1.21* 1.19*   GFRESTIMATED  --   --   --  66 72  --  70 72   GRICEL  --   --   --  8.5* 8.5*  --  8.5* 8.4*   MAG  --   --   --  2.2 2.2  --  2.2 2.3   PHOS  --   --   --  2.2* 2.1*  --  2.4* 2.9   PROTTOTAL  --   --   --  6.0* 6.0*  --  6.2* 6.4   ALBUMIN  --   --   --  3.2* 3.2*  --  3.3* 3.3*   BILITOTAL  --   --   --  0.6 0.6  --  0.5 0.5   ALKPHOS  --   --   --  56 56  --  57 59   AST  --   --   --  200* 197*  --  212* 233*   ALT  --   --   --  " "304* 298*  --  303* 306*    < > = values in this interval not displayed.     CBC  Recent Labs   Lab 03/23/24  1522 03/23/24  1516 03/23/24  1230 03/23/24  0445 03/22/24  1626 03/22/24  1457 03/22/24  0406   WBC  --   --  25.3* 23.2* 21.2*  --  18.7*  18.7*   RBC  --   --  3.94* 3.98* 3.84*  --  4.09*  4.09*   HGB 11.6* 12.7* 12.7* 13.0* 12.9*   < > 13.6  13.6   HCT  --   --  37.1* 37.4* 37.1*  --  39.1*  39.1*   MCV  --   --  94 94 97  --  96  96   MCH  --   --  32.2 32.7 33.6*  --  33.3*  33.3*   MCHC  --   --  34.2 34.8 34.8  --  34.8  34.8   RDW  --   --  12.9 12.8 12.9  --  12.7  12.7   PLT  --   --  513* 499* 487*  --  510*  510*    < > = values in this interval not displayed.     INR  Recent Labs   Lab 03/23/24  1233 03/22/24  0138   INR 1.57* 1.34*     Arterial Blood Gas  Recent Labs   Lab 03/23/24  1522 03/23/24  1516 03/23/24  1231 03/23/24  0958 03/23/24  0812   PH  --  7.49* 7.43 7.46* 7.46*   PCO2  --  34* 42 40 40   PO2  --  68* 85 70* 74*   HCO3  --  25 28 28 28   O2PER 70.0 70.0 70  70 60  60 50     LipidsNo lab results found in last 7 days.  TSHNo lab results found in last 7 days.  [id number] lab results found in last 7 days.  TroponinInvalid input(s): \"TROP\"    EKG: reviewed     ECHO: 3/21  There is coaptation failure of mitral leaflets. Anterior leaflet overrides due  to severely restricted posterior leaflet and chordal tethering.  Severe posteriorly directed mitral regurgitation (secondary MR).  Jemal class IIIb.  Pulmonary flow reversal noted in pulmonary veins.  Left ventricular function is decreased. The ejection fraction is 10-15%  (severely reduced).  Global right ventricular function is severely reduced.  The right ventricle is normal size.    Imaging/Angiography:  3/12  1. Delayed presentation acute inferior STEMI.  Persistent symptoms began [place] 3/10.    2. Unsuccessful PCI to the mid-RCA.  Thrombosed vessel, unresponsive to balloon angioplasty and mechanical aspiration " thrombectomy.

## 2024-03-23 NOTE — PROGRESS NOTES
[facility]    ECLS Cannulation Note:     Date on: 3/23/2024  Time on: 1439  Cannulating Physician: Gabriela    Arterial Cannula: 17 Fr. In the Right Femoral Artery  Venous Cannula: 25 Fr. In the Right Femoral Vein  Distal Perfusion Cannula: 8 Fr. In the Right Superficial Femoral Artery    ECMO components include:  Oxygenator Serial # - [id number]  Circuit Tubing Serial #- [id number]  CardioHelp- [id number]             Cannulation was performed in the Cath Lab, placement was verified by fluoroscopy, cannula position is good.    Patient's blood type is pending.    Itz Loja, RT  ECMO Specialist  3/23/2024 5:16 PM

## 2024-03-23 NOTE — PROGRESS NOTES
[facility]    Cardiology Progress Note- Cardiology        Date of Admission:  3/22/2024     Assessment & Plan: HVSL   Giacomo Castro is a 54 year old male with PMH HLD, tobacco use who presented to [facility] on 3/12 with chest pain that started on 3/10 and found to have an inferior STEMI s/p cath with unsuccessful RCA PCI or thrombectomy, complicated by severe MR 2/2 infarction of postero-medial papillary muscle. Course complicated by VT x2 episodes requiring cardioversion, pneumonia. Transferred to [facility] for possible mitral valve surgery. Patient was decompensating while on 3 inopressors thus was cannulated for ECMO with IABP 3/23.    Changes today  - cannulated for ECMO with IABP as inotropes/vasopressors were consistently increased  - transition from milrinone to epinephrine  - bumex 2 mg/hr  - CT chest abdomen pelvis for source of infection, sputum/urine cultures  - ID consult  - decrease PEEP 12 to 10 to decrease afterload for RV, ABG afterwards    Neurology   # Acute metabolic encephalopathy 2/2 sedation  - continue versed and fentanyl gtt  - rass goal -5     Cardiovascular  # Late presenting Inferior STEMI c/b cardiogenic shock and recurrent VT  # Biventricular failure  # Papillary muscle infarction leading to restricted posterior mitral valve leaflet and severe MR  Patient with symptoms starting on 3/10, but presented to [facility] on 3/12. Found to have inferior STEMI, RCA lesion was unable to be revascularized. Course complicated by cardiogenic shock initially treated with dobutamine, but complicated by VT requiring shock in cath lab and again on 3/20, switched to milrinone, vasopressin, levo. Presenting for [facility] for advanced therapy consideration. 3/22 developed fever, leukocytosis requiring more pressors concerning for septic shock, discussed in ID section.    Date CVP PAP PCWP CO/CI  SVR Inotrope Vasodilator MCS   3/22/24  ([facility]) 18  55/28/37 32 4.15/1.77 1098 Milrinone, NE, vasopressin none none   3/23/24 17 62/39/46  4.5/1.9 970 Epinephrine, NE, vasopressin none none                           - Preload:   - CVP 17   - Diuresis: bumex 2 mg/hr  - Afterload: none  - inotropes:   - milrinone 3/22-3/23  - epinephrine 3/23-TBD  - NE 3/23-TBD  - vasopressin 3/22-3/23  - VA ECMO with IABP 3/23, appreciate CICU assistance  - antiplatelet/anticoag: continue ASA, heparin gtt  - Plavix held starting 3/21 given possibility of valve intervention    - hold statin in setting of worsening LFTs  - CVTS consult for mitral valve regurgitation: plan to diurese first, cardiac MR at some point to see viability and possible surgery at same time  - sepsis management as below in ID section    # VT storm  Had VT storm requiring shock in cath lab. Recurrent requiring shock again on 3/21  - amiodarone gtt for VT, increased 0.5->1 for multiple ectopy  - avoid dobutamine    FERNANDEZ: 3/21  There is coaptation failure of mitral leaflets. Anterior leaflet overrides due  to severely restricted posterior leaflet and chordal tethering.  Severe posteriorly directed mitral regurgitation (secondary MR).  Jemal class IIIb.  Pulmonary flow reversal noted in pulmonary veins.  Left ventricular function is decreased. The ejection fraction is 10-15%  (severely reduced).  Global right ventricular function is severely reduced.  The right ventricle is normal size.    Cath 3/12  1. Delayed presentation acute inferior STEMI.  Persistent symptoms began [place] 3/10.    2. Unsuccessful PCI to the mid-RCA.  Thrombosed vessel, unresponsive to balloon angioplasty and mechanical aspiration thrombectomy.     Pulmonary  # Acute Hypoxic Respiratory Failiure 2/2  # CAP vs. HCAP  # Cardiogenic Shock  Vent Mode: CMV/AC  (Continuous Mandatory Ventilation/ Assist Control)  FiO2 (%): 60 %  Resp Rate (Set): 14 breaths/min  Tidal Volume (Set, mL): 580 mL  PEEP (cm H2O): (S) 10 cmH2O  Resp: 14  CXR on 3/12 with  b/l opacities, CT chest done on 3/17 with GGO concerning for PNA. Urine legionella, strep antigen negative. Was on CTX/azithro 3/18-3/22, azithro was stopped after VT event. Decompensated from pulmonary edema and possible pneumonia. He was intubated on 3/21 following cardioversion.   - remain on ventilator  - decrease PEEP 12 to 10 to decrease afterload for RV        Gastrointestinal, Nutrition  #Transaminitis   - LFTs <100 today at [facility], on admission here in the high 300's. Likely congestive hepatopathy in the setting of acute HF from severe MR  - treatment of cardiogenic shock as above    # Stress ulcer prophylaxis  - PPI IV for prophylaxis     Renal, Electrolytes    # Mild JEANE  Cr 1.21, baseline <1    # Hyponatremia, mild  likely from hypervolemia     Infectious Disease  # Septic shock  # Possible HAP  CT chest with GGO 3/17 concerning for PNA. Urine legionella, strep antigen negative. Patient was on CTX/azithro 3/18-3/22, azithro was stopped after VT. Transitioned to Vanc and meropenem on 3/22 for HAP. Patient has been decompensating requiring more pressors. Still do not have a clear source for infection as did not produce much of sputum.  - ID consult  - CT chest/abdomen/pelvis  - antibiotics   - Vancomycin: 3/22-TBD   - Meropenem: 3/22, 3/23-TBD   - Cefpeime: 3/23, dc'd  - Infectious workup   - 3/18 urine legionella, S pneumo: negative   - 3/18 sputum: oropharyngeal monica   - 3/22 MRSA nares: negative   - 3/22 blood culture: pending   - 3/23 urine culture: pending     Hematology  N/A     Endocrinology  N/A     Integumentary:  N/A          Diet: NPO per Anesthesia Guidelines for Procedure/Surgery Except for: Meds    DVT Prophylaxis: heparin gtt  Chua Catheter: PRESENT, indication: ICU only: hourly urine output needed for patient care  Cardiac Monitoring: ACTIVE order. Indication: ICU  Code Status: Full Code          Clinically Significant Risk Factors              # Hypoalbuminemia: Lowest albumin = 3.1  "g/dL at 3/22/2024 10:34 AM, will monitor as appropriate    # Coagulation Defect: INR = 1.34 (Ref range: 0.85 - 1.15) and/or PTT = 29 Seconds (Ref range: 22 - 38 Seconds), will monitor for bleeding     # Acute heart failure with reduced ejection fraction: last echo with EF <40% and receiving IV diuretics       # Overweight: Estimated body mass index is 28.08 kg/m  as calculated from the following:    Height as of this encounter: 1.905 m (6' 3\").    Weight as of this encounter: 101.9 kg (224 lb 10.4 oz)., PRESENT ON ADMISSION            Patient care discussed with Dr. Rodolfo Stafford MD  PGY-2 Internal Medicine  Cardiology Service  [facility]  ______________________________________________________________________    Physical Exam   Vital Signs: Temp: (!) 101.3  F (38.5  C) Temp src: Esophageal   Pulse: 104   Resp: 14 SpO2: 97 % O2 Device: Mechanical Ventilator    Weight: 224 lbs 10.38 oz    GENERAL: Intubated, sedated. NAD.  HEENT: No icterus. ETT in place, NG tube in place.  CARDIOVASCULAR: tachycardic. Normal S1 and S2.  RESP: Coarse bilaterally. Mechanical ventilation.    GI Soft, bowel sounds hypoactive but present.  GENITOURINARY: Chua in place.  EXTREMITIES: no pitting edema  NEURO: Sedated and intubated.  INTEGUMENTARY: No rashes    Medical Decision Making       Please see A&P for additional details of medical decision making.      Data   ------------------------- PAST 24 HR DATA REVIEWED -----------------------------------------------    I have reviewed today's vital signs, notes, medications, labs and imaging.  I have also seen and examined the patient and agree with the findings and plan as outlined above.   Pt sedated and intubated on VA ECMO with peripheral cannulation (today), IABP in place (placed today) and on pressor support.  Pt with hx of Inf MI with late presentation and noted to complete occlusion of RCA.  Pt noted to have severe MR, " LVEF 10% and mild to moderate RV dysfn.  Pt on amiodarone 1, Epi, NE and Vasopressn with low pulsatility (5mmHg). Labs with Cr 1.0, WBC 27, Lactic acid 1.7 and [id number].  Pt with cardiogenic shock with tobacco and alcohol abuse hx.  Plan to consult CVTS regarding timing of possible surgical revascularization and MVR vs. MitraClip.  Will follow LE in pt with probable PAD.  Pt seen X3 for total critical care time 50 min.     Rachid Reynolds MD, PhD  Professor, Heart Failure and Cardiac Transplantation  [facility]

## 2024-03-23 NOTE — PHARMACY-VANCOMYCIN DOSING SERVICE
Pharmacy Vancomycin Initial Note  Date of Service 2024  Patient's  1970  54 year old, male    Indication: Sepsis    Current estimated CrCl = Estimated Creatinine Clearance: 90.5 mL/min (A) (based on SCr of 1.18 mg/dL (H)).    Creatinine for last 3 days  3/22/2024:  1:35 AM Creatinine 1.24 mg/dL;  4:06 AM Creatinine 1.18 mg/dL; 10:34 AM Creatinine 1.11 mg/dL;  4:26 PM Creatinine 1.11 mg/dL; 10:10 PM Creatinine 1.19 mg/dL  3/23/2024:  4:45 AM Creatinine 1.21 mg/dL;  9:58 AM Creatinine 1.18 mg/dL    Recent Vancomycin Level(s) for last 3 days  3/23/2024: 10:01 AM Vancomycin <4.0 ug/mL      Vancomycin IV Administrations (past 72 hours)                     vancomycin (VANCOCIN) 2,000 mg in sodium chloride 0.9 % 500 mL intermittent infusion (mg) 2,000 mg New Bag 24 0530                    Nephrotoxins and other renal medications (From now, onward)      Start     Dose/Rate Route Frequency Ordered Stop    24 1200  vancomycin (VANCOCIN) 2,000 mg in sodium chloride 0.9 % 250 mL intermittent infusion         2,000 mg (central catheter)  over 90 Minutes Intravenous ONCE 24 1130      24 0600  vasopressin 1 unit/mL MAX Conc (PITRESSIN) infusion         0.5-4 Units/hr  0.5-4 mL/hr  Intravenous CONTINUOUS 24 0548      24 0230  norepinephrine (LEVOPHED) 16 mg in  mL infusion MAX CONC CENTRAL LINE         0.01-0.6 mcg/kg/min × 105.1 kg (Dosing Weight)  1-59.1 mL/hr  Intravenous CONTINUOUS 24 0216      24 0200  bumetanide (BUMEX) 0.25 mg/mL infusion         2 mg/hr  8 mL/hr  Intravenous CONTINUOUS 24 0150              Contrast Orders - past 72 hours (72h ago, onward)      None            InsightRX Prediction of Planned Initial Vancomycin Regimen    Loading dose: N/A  Regimen: 1500 mg IV every 12 hours.  Start time: 17:30 on 2024  Exposure target: AUC24 (range)400-600 mg/L.hr   AUC24,ss: 527 mg/L.hr  Probability of AUC24 > 400: 93 %  Ctrough,ss: 14.1  mg/L  Probability of Ctrough,ss > 20: 9 %  Probability of nephrotoxicity (Lodise MARCY 2009): 9 %          Plan:  Start vancomycin  2000mg IV once followed by 1500 mg IV q12h.   Vancomycin monitoring method: AUC  Vancomycin therapeutic monitoring goal: 400-600 mg*h/L  Pharmacy will check vancomycin levels as appropriate in 1-3 Days.    Serum creatinine levels will be ordered daily for the first week of therapy and at least twice weekly for subsequent weeks.      Cynthia Barragan RPH

## 2024-03-24 NOTE — PHARMACY-CONSULT NOTE
Pharmacy Tube Feeding Consult    Medication reviewed for administration by feeding tube and for potential food/drug interactions.    Recommendation: No changes are needed at this time.     Pharmacy will continue to follow as new medications are ordered.    Wes JordanD  CVICU and Advanced Heart Failure Pharmacist  Pager [phone]

## 2024-03-24 NOTE — PROGRESS NOTES
[facility]     ECLS Shift Summary:      ECMO Equipment:  Console Serial Number: [id number]  Circuit Lot Number: [id number]  Oxygenator Lot Number: [id number]     Circuit Assessment: Free of fibrin, clot, and air     Arterial ECMO Cannula: 17 Fr in the Right Femoral Artery  Venous ECMO Cannula: 25 Fr in the Right Femoral Vein  Distal Reperfusion Cannula: 8 Fr in the Right Superficial Femoral Artery  Cannula site dressed with Silverlon and loban.  No concerns.     Patient remains on V-A ECMO, all equipment is functioning and alarms are appropriately set.      RPM's: 3800 with Blood Flow (Circuit) LPM  Av LPM  Min: 3.83 LPM  Max: 4.13 LPM L/min. Sweep is at 1 LPM and 70 %. Extremities are cool.     Significant Shift Events: no events    Vent settings:  Vent Mode: CMV/AC  (Continuous Mandatory Ventilation/ Assist Control)  FiO2 (%): 50 %  Resp Rate (Set): (S) 12 breaths/min  Tidal Volume (Set, mL): 580 mL  PEEP (cm H2O): 10 cmH2O  Resp: 12      Anticoagulation:  Dose (units/hr) HEParin: 2400 Units/hr  Rate (mL/hr) HEParin: 24 mL/hr  Concentration HEParin: 100 Units/mL        Most recent: ACT  (seconds): 158 seconds    Urine output is  .  Blood loss was minimal. Product given included none.     Intake/Output Summary (Last 24 hours) at 3/24/2024 0543  Last data filed at 3/24/2024 0500  Gross per 24 hour   Intake 3270.76 ml   Output 2300 ml   Net 970.76 ml       Labs:  Recent Labs   Lab 24  0359 24  0358 24  0156 24  0013 24  2155   PH 7.44  --  7.39 7.45 7.50*   PCO2 43  --  49* 40 32*   PO2 128*  --  144* 124* 177*   HCO3 29*  --  30* 28 25   O2PER 50 60  50 50 50 70       Lab Results   Component Value Date    HGB 11.7 (L) 2024    PHGB <30 2024     2024    FIBR 532 (H) 2024    INR 1.55 (H) 2024    PTT 60 (H) 2024    DD 8.77 (H) 2024       Plan is continue full support.    Landen Reyes,  RT  ECMO Specialist  3/24/2024 5:43 AM o

## 2024-03-24 NOTE — PROGRESS NOTES
Major Shift Events:  Cannulated for VA ecmo & IABP placed    Neuro: Sedated on fent & versed Pupils = round & reactive     CV:   Rate/rhythm:  -120s, bigeminal non-perfusing PVCs, doppler pulses  Maps maintained with epi/levo  IABP 1:1 100%    ecmo 4.5 flows 80% & 1.5 sweep    Pulm: CMV/AC 70%/10/580/14    GI: NG clamped - nutrition to consult     : mackay 50-100ml/hr     Skin: Cool/clammy, dusky    Drips:   Amio 1mg/min  Fent 150mcg/mL  Hep 850un/hr   Versed 6mg/hr   Levo 0.18 mcg  Epi 0.15    Labs: K+ low--> being replaced, ical low- replaced, LA rising, WBC rising & trop is 1637    For vital signs and complete assessments, please see documentation flowsheets.

## 2024-03-24 NOTE — PROGRESS NOTES
[facility]    Cardiology Progress Note- Cardiology        Date of Admission:  3/22/2024     Assessment & Plan: HVSL   Giacomo Castro is a 54 year old male with PMH HLD, tobacco use who presented to [facility] on 3/12 with late presentation inferior STEMI s/p cath with unsuccessful RCA PCI or thrombectomy, complicated by biventricular failure, severe MR 2/2 infarction of postero-medial papillary muscle, VT x2 episodes requiring cardioversion, pneumonia. Transferred to [facility] for possible mitral valve surgery. Patient was decompensating while on 3 inopressors thus was cannulated for ECMO with IABP 3/23.    Changes today  - will discuss with CVTS again tooday regarding surgery plan  - decrease ->500, PEEP 10->8, ABG afterwards  - bumex 4 mg IV  - stopped heparin, start bivalirudin as no improvement of ACT despite increasing   - decrease amiodarone from 1 to 0.5 mg/hr  - nutrition consult for TF    Neurology   # Acute metabolic encephalopathy 2/2 sedation  - continue versed and fentanyl gtt   - rass goal -2 to -3     Cardiovascular  # Late presenting Inferior STEMI c/b cardiogenic shock and recurrent VT  # Biventricular failure  # Papillary muscle infarction leading to restricted posterior mitral valve leaflet and severe MR  Patient with symptoms starting on 3/10, but presented to [facility] on 3/12. Found to have inferior STEMI, RCA lesion was unable to be revascularized. Course complicated by cardiogenic shock initially treated with dobutamine, but complicated by VT requiring shock in cath lab and again on 3/20, switched to milrinone, vasopressin, levo. Presenting for [facility] for advanced therapy consideration. 3/22 developed fever, leukocytosis requiring more pressors concerning for septic shock, discussed in ID section.    Date CVP PAP PCWP CO/CI  SVR Inotrope Vasodilator MCS   3/22/24  ([facility]) 18 55/28/37 32 4.15/1.77 1098 Milrinone, NE,  vasopressin none none   3/23/24 17 62/39/46  4.5/1.9 970 Epinephrine, NE, vasopressin none none     - ECMO at R femoral 3/23: O2 70%, flow 4.1, sweep 1, pulsatility of 4  - IABP 1:1  - inotropes: epinephrine, NE, vasopressin-- wean as able  - diuretics: bumex gtt was stopped -- plan to spot diurese with bumex IV  - antiplatelet/anticoag:   - continue ASA  - Plavix held starting 3/21 given possibility of valve intervention  - transition from heparin gtt to bivalirudin 3/24 as no improvement of ACT despite increasing  - hold statin in setting of worsening LFTs  - CVTS consult for mitral valve regurgitation  - not transplant/LVAD candidate due to alcohol, smoking    # recurrent VT storm  Had VT storm requiring shock in cath lab. Recurrent requiring shock again on 3/21. Amiodarone increased 0.5->1 for multiple ectopy 3/23  - decrease amiodarone gtt 1-> 0.5 mg/hr  - avoid dobutamine    FERNANDEZ: 3/21  There is coaptation failure of mitral leaflets. Anterior leaflet overrides due  to severely restricted posterior leaflet and chordal tethering.  Severe posteriorly directed mitral regurgitation (secondary MR).  Jemal class IIIb.  Pulmonary flow reversal noted in pulmonary veins.  Left ventricular function is decreased. The ejection fraction is 10-15%  (severely reduced).  Global right ventricular function is severely reduced.  The right ventricle is normal size.    Cath 3/12  1. Delayed presentation acute inferior STEMI.  Persistent symptoms began [place] 3/10.   2. Unsuccessful PCI to the mid-RCA.  Thrombosed vessel, unresponsive to balloon angioplasty and mechanical aspiration thrombectomy.     Pulmonary  # Acute Hypoxic Respiratory Failiure 2/2 pulmonary edema, HAP  # CAP vs. HCAP  # Cardiogenic Shock  Vent Mode: CMV/AC  (Continuous Mandatory Ventilation/ Assist Control)  FiO2 (%): 50 %  Resp Rate (Set): 12 breaths/min  Tidal Volume (Set, mL): 580 mL  PEEP (cm H2O): 10 cmH2O  Resp: 12    CXR on 3/12 with b/l opacities, CT  chest done on 3/17 with GGO concerning for PNA. Urine legionella, strep antigen negative. Was on CTX/azithro 3/18-3/22, azithro was stopped after VT event. Decompensated from pulmonary edema and possible pneumonia. He was intubated on 3/21 following cardioversion.   - remain on ventilator with good oxygenation and ventilation  - decrease ->500, PEEP 10->8, ABG afterwards        Gastrointestinal, Nutrition  #Transaminitis   LFTs <100 today at [facility], on admission here in the high 300's. Likely congestive hepatopathy in the setting of acute HF from severe MR  - treatment of cardiogenic shock as above    # Nutrition  - start TF 3/24, appreciate nutrition assistance    # Constipation  - scheduled senna, miralax while on fentanyl gtt    # Stress ulcer prophylaxis  - PPI IV for prophylaxis     Renal, Electrolytes    # Mild JEANE  Cr 1.21, baseline <1  - currently Cr returned to normal    # Hyponatremia, mild, resolved  likely from hypervolemia     Infectious Disease  # Possible aspiration HAP  CT chest with GGO 3/17 concerning for PNA. Patient was on CTX/azithro 3/18-3/22, azithro was stopped after VT. Transitioned to Vanc and meropenem on 3/22 for HAP. Patient has been febrile and decompensating requiring more pressors. Possible that this is from myocardial infarction however could not rule out infection. CT chest/abdomen/pelvis with bilateral ground glass and consolidative opacities concerning for aspiration and/or infection.  - ID following, appreciate recs  - antibiotics   - Vancomycin: 3/22-3/23   - Meropenem: 3/22, 3/23-TBD   - Cefpeime: 3/23, dc'd  - Infectious workup   - 3/18 urine legionella, S pneumo: negative   - 3/18 sputum: oropharyngeal monica   - 3/22 MRSA nares: negative   - 3/22 blood culture: pending   - 3/23 urine culture: pending   - 3/24 respiratory panel: pending     Hematology  Heparin/bival as discussed above    Endocrinology  - glucose goal 120-180  - medium insulin sliding scale         "  Diet: NPO for Medical/Clinical Reasons Except for: No Exceptions    DVT Prophylaxis: heparin gtt  Chua Catheter: PRESENT, indication: ICU only: hourly urine output needed for patient care  Cardiac Monitoring: ACTIVE order. Indication: ICU  Code Status: Full Code          Clinically Significant Risk Factors        # Hypokalemia: Lowest K = 3 mmol/L in last 2 days, will replace as needed   # Hypocalcemia: Lowest iCa = 3.9 mg/dL in last 2 days, will monitor and replace as appropriate     # Hypoalbuminemia: Lowest albumin = 2.9 g/dL at 3/24/2024  3:58 AM, will monitor as appropriate    # Coagulation Defect: INR = 1.55 (Ref range: 0.85 - 1.15) and/or PTT = 60 Seconds (Ref range: 22 - 38 Seconds), will monitor for bleeding     # Acute heart failure with reduced ejection fraction: last echo with EF <40% and receiving IV diuretics       # Overweight: Estimated body mass index is 28.08 kg/m  as calculated from the following:    Height as of this encounter: 1.905 m (6' 3\").    Weight as of this encounter: 101.9 kg (224 lb 10.4 oz)., PRESENT ON ADMISSION            Patient care discussed with Dr. Rodolfo Stafford MD  PGY-2 Internal Medicine  Cardiology Service  [facility]  ______________________________________________________________________    Interval History  Overnight, IABP was repositioned and repeated CXR with ok position.  mL IV was given. Pressors rates were decreased. RR on ventilator reduced 12 due to alkalosis.     Physical Exam   Vital Signs: Temp: 98.8  F (37.1  C) Temp src: Esophageal   Pulse: 62   Resp: 12 SpO2: 100 % O2 Device: Mechanical Ventilator    Weight: 224 lbs 10.38 oz    GENERAL: Intubated, sedated. NAD.  HEENT: No icterus. ETT in place, NG tube in place.  CARDIOVASCULAR: tachycardic. Normal S1 and S2.  RESP: Coarse bilaterally. Mechanical ventilation.    GI Soft, bowel sounds hypoactive but present.  GENITOURINARY: Toma in " place.  EXTREMITIES: no pitting edema  NEURO: Sedated and intubated.  INTEGUMENTARY: No rashes    Medical Decision Making       Please see A&P for additional details of medical decision making.      Data   ------------------------- PAST 24 HR DATA REVIEWED -----------------------------------------------    I have reviewed today's vital signs, notes, medications, labs and imaging.  I have also seen and examined the patient and agree with the findings and plan as outlined above.   Pt sedated and intubated on VA ECMO with peripheral cannulation, IABP in place and on pressor support.  Pt with hx of Inf MI with late presentation and noted to complete occlusion of RCA.  Pt noted to have severe MR, LVEF 10% and mild to moderate RV dysfn.  Pt on amiodarone 1, Epi, NE and Vasopressn with low pulsatility (5mmHg). Labs with Cr 1.0, WBC 27, Lactic acid 1.7 and [id number].  Pt with cardiogenic shock with tobacco and alcohol abuse hx.  Plan to consult CVTS regarding timing of possible surgical revascularization and MVR vs. MitraClip.  Will follow LE in pt with probable PAD.  Pt seen X3 for total critical care time 50 min.     Rachid Reynolds MD, PhD  Professor, Heart Failure and Cardiac Transplantation  [facility]

## 2024-03-24 NOTE — PROGRESS NOTES
[facility]    ECLS Shift Summary:     ECMO Equipment:  Console Serial Number: [id number]  Circuit Lot Number: [id number]  Oxygenator Lot Number: [id number]    Circuit Assessment: Free of fibrin, clot, and air    Arterial ECMO Cannula: 17 Fr in the Right Femoral Artery  Venous ECMO Cannula: 25 Fr in the Right Femoral Vein  Distal Reperfusion Cannula: 8 Fr in the Right Superficial Femoral Artery  Cannula site dressed with Silverlon and loban.  No concerns.    Patient remains on V-A ECMO, all equipment is functioning and alarms are appropriately set. RPM's: 3800 with Blood Flow (Circuit) LPM  Av.2 LPM  Min: 4.13 LPM  Max: 4.35 LPM L/min. Sweep is at (S) 0.5 LPM and (S) 70 %. Extremities are cool.     Significant Shift Events: Flow increased due to high lactate. Heparin started.    Vent settings:  Vent Mode: CMV/AC  (Continuous Mandatory Ventilation/ Assist Control)  FiO2 (%): 70 %  Resp Rate (Set): 14 breaths/min  Tidal Volume (Set, mL): 580 mL  PEEP (cm H2O): 10 cmH2O  Resp: 15      Anticoagulation:  Dose (units/hr) HEParin: 1000 Units/hr  Rate (mL/hr) HEParin: 10 mL/hr  Concentration HEParin: 100 Units/mL  Bolus given x 1     Most recent: ACT  (seconds): 155 seconds (Redraw was 151. Heparin increased.)    Urine output is yellow.  No blood loss was appreciated.  No blood product given.     Intake/Output Summary (Last 24 hours) at 3/23/2024 2119  Last data filed at 3/23/2024 2100  Gross per 24 hour   Intake 3654.31 ml   Output 4505 ml   Net -850.69 ml       Labs:  Recent Labs   Lab 24  1947 24  1802 24  1712 24  1605 24  1522 24  1516   PH 7.47* 7.45  --  7.42  --  7.49*   PCO2 32* 31*  --  39  --  34*   PO2 173* 130*  --  105  --  68*   HCO3 23 22  --  25  --  25   O2PER 80 70 80  70 70   < > 70.0    < > = values in this interval not displayed.       Lab Results   Component Value Date    HGB 12.2 (L) 2024    PHGB <30 2024      (H) 03/23/2024    FIBR 592 (H) 03/23/2024    INR 1.90 (H) 03/23/2024    PTT >240 (HH) 03/23/2024    DD 5.81 (H) 03/23/2024       Plan is to continue VA ECMO.    Rebecca Pitt, RT  ECMO Specialist  3/23/2024 9:19 PM

## 2024-03-24 NOTE — PROGRESS NOTES
[facility]    ECLS Shift Summary: 1500 to 1900     ECMO Equipment:  Console Serial Number: [id number]  Circuit Lot Number: [id number]  Oxygenator Lot Number: [id number]    Circuit Assessment: Free of fibrin, clot, and air    Arterial ECMO Cannula: 17 Fr in the Right Femoral Artery  Venous ECMO Cannula: 25 Fr in the Right Femoral Vein  Distal Reperfusion Cannula: 8 Fr in the RSFA       Patient remains on V-A ECMO, all equipment is functioning and alarms are appropriately set. RPM's: 4000 with Blood Flow (Circuit) LPM  Av.1 LPM  Min: 3.96 LPM  Max: 4.22 LPM L/min. Sweep is at (S) 2 LPM and 70 %. Extremities are cool.     Significant Shift Events: None.    Vent settings:  Vent Mode: CMV/AC  (Continuous Mandatory Ventilation/ Assist Control)  FiO2 (%): (S) 50 %  Resp Rate (Set): 12 breaths/min  Tidal Volume (Set, mL): 500 mL  PEEP (cm H2O): 8 cmH2O  Resp: 12      Anticoagulation:  Dose (units/hr) HEParin: 3000 Units/hr  Rate (mL/hr) HEParin: 30 mL/hr  Concentration HEParin: 100 Units/mL     Dose (mg/kg/hr) Bivalirudin: 0.075 mg/kg/hr  Rate (mL/hr) Bivalirudin: 7.9 mL/hr  Concentration Bivalirudin: 1 mg/mL  Most recent: ACT  (seconds): 170 seconds    Urine output is Cloudy, Sediment, Yellow / Straw Colored.  Blood loss was none. No product was given.     Intake/Output Summary (Last 24 hours) at 3/24/2024 1813  Last data filed at 3/24/2024 1800  Gross per 24 hour   Intake 2738.26 ml   Output 3030 ml   Net -291.74 ml       Labs:  Recent Labs   Lab 24  1615 24  1614 24  1438 24  1230 24  1018   PH 7.44  --  7.42 7.46* 7.45   PCO2 50*  --  49* 38 43   PO2 68*  --  74* 112* 146*   HCO3 34*  --  32* 27 30*   O2PER 45 45  70 45 45 45       Lab Results   Component Value Date    HGB 11.0 (L) 2024    PHGB <30 2024     2024    FIBR 493 (H) 2024    INR 1.86 (H) 2024    PTT 40 (H) 2024    DD 10.80 (H) 2024     LONNIE 49 (L) 03/24/2024       Plan is to remain on VA ECMO support.    CECILY Mercado, RRT  ECMO Specialist  3/24/2024 6:13 PM

## 2024-03-24 NOTE — CONSULTS
[facility] INFECTIOUS DISEASES CONSULTATION     Patient:  Giacomo Castro   Date of birth 1970, Medical record number [id number]  Date of Visit:  03/24/2024  Date of Admission: 3/22/2024  Consult Requester:Rachid Reynolds MD          Assessment and Recommendations:   ASSESSMENT:  Shock  Fever  Bilateral GGO and consolidative lung opacities  STEMI (3/10/24) c/b severe MR and ADHF  S/p VA-ECMO with IABP (3/23-*)  LFT elevation  Allergies: pcn (unknown)    DISCUSSION:   Giacomo Castro is a 54 year old male with history of HLD and tobacco use who initially presented to [facility] on 3/10/24 with inferior STEMI s/p unsuccessful attempted RCA revascularization. Course complicated by ADHF with significant volume overload despite diuresis, recurrent episodes of VT, torrential MR with concern for infarction of posteromedial papillary muscles, and pneumonia s/p ceftriaxone+azithromycin x5d. Transferred to [facility] on 3/22/24 for further management. Cannulated for VA-ECMO with IABP (3/23-*).     Concern for pneumonia at OSH with GGO and consolidation on CT chest. Was treated with ceftriaxone and azithromycin (3/18-3/22). Sputum Cx from OSH (3/18) with oropharyngeal monica; Strep pneumo and Legionella urine antigens negative.    Increased pressor requirements and fever to 101.3F on 3/23 and leukocytosis to 27.8K, . Afebrile since cannulation for ECMO with persistent leukocytosis and requiring pressors. Has been on meropenem since arrival.  CT C/A/P with mixed bilateral GGO and consolidation; no free air or evidence of cholecystitis, colitis, or diverticulitis; bladder wall thickened though noted that mackay is tenting bladder wall. Initial UA c/w recent catheterization. Urine culture 3/23 negative. Volume overload, VAP, viral or atypical pulmonary infection considered as etiology of lung findings. Sputum cultures 3/23, 3/24 pending. Negative COVID on arrival; no respiratory panel available. Blood cultures  3/22, 3/23 NGTD, pending from 3/24. Continue meropenem while awaiting culture data.     RECOMMENDATION:  Continue meropenem for now  Respiratory PCR panel  Follow pending cultures (blood and sputum)    Thank you for this consult. ID will continue to follow.       Suzie Diaz PA-C  Pronouns: she/her/hers  Infectious Diseases  Contact via [employer] or [employer] Paging/Directory      80 MINUTES SPENT BY ME on the date of service doing chart review, history, exam, documentation & further activities per the note.       ________________________________________________________________    Consult Question: sepsis unknown origin.  Admission Diagnosis: Acute ST elevation myocardial infarction (STEMI) (H) [I21.3]         History of Present Illness:   Giacomo Castro is a 54 year old male with history of HLD and tobacco use who initially presented to [facility] on 3/10/24 with chest pain, found to have an inferior STEMI. Taken to cath lab where RCA revascularization. Course complicated by acute decompensated heart failure with significant volume overload despite diuresis, recurrent episodes of VT leading to shocks, torrential mitral regurgitation with restricted posterior leaflet with concern for infarction of posteromedial papillary muscles, and pneumonia s/p ceftriaxone+azithromycin x5d,intubated at OSH. Transferred to [facility] on 3/22/24 for further management. Increased pressor requirements and fever on 3/23, now on VA-ECMO with IABP (3/23-*).     MRSA nares neg (3/21 at OSH). Sputum (3/18) many GPC, many GPB, oropharyngeal monica on cx. Legionella and S.pneumo neg.    Lives in [place], [place]. Per previous notes, parents and sister live in the area. He smokes cigarettes and drinks alcohol regularly at least 2 heard liquor drinks per day.   Brother and sister in law provide additional history: works as a  for an elementary school. No known sick family/friends but does have potential for exposure to illness at work.  Previously had a pet cat >1 year ago. No recent travel, more of a home body. Has visited resorts in [place] >5 years ago. Goes to [facility] with family and has stayed in a camper/trailer for family trips. No tent camping, hiking, hunting or fishing.     Antimicrobials  Current:  - meropenem   - vancomycin    Prior:  - ceftriaxone (3/18-3/22)  - Azithromycin (3/18-3/22)           Past Medical History:     Past Medical History:   Diagnosis Date    Tobacco abuse             Past Surgical History:     Past Surgical History:   Procedure Laterality Date    CV RIGHT HEART CATH MEASUREMENTS RECORDED N/A 3/22/2024    Procedure: Right Heart Catheterization;  Surgeon: Blade Abdul MD;  Location:  HEART CARDIAC CATH LAB    CV SWAN RICHARD PROCEDURE N/A 3/22/2024    Procedure: Morgan Richard Procedure;  Surgeon: Blade Abdul MD;  Location:  HEART CARDIAC CATH LAB    HERNIA REPAIR, UMBILICAL  2016    complete at [facility]    vein surgery Right 2006    Treatment of varicose vein    ZC ARTHROSCOPIC DECOMPRESSION OF GANGLION CYST Left 2008    completed by [facility]            Family History:   Reviewed and non-contributory.   Family History   Problem Relation Age of Onset    Breast Cancer Mother     Coronary Artery Disease Father         MI; possibly in his 40's, smoker    Diabetes No family hx of     Hypertension No family hx of     Hyperlipidemia No family hx of     Cerebrovascular Disease No family hx of     Colon Cancer No family hx of     Prostate Cancer No family hx of     Thyroid Disease No family hx of     Genetic Disorder No family hx of             Social History:     Social History     Tobacco Use    Smoking status: Every Day     Packs/day: 1.00     Years: 30.00     Additional pack years: 0.00     Total pack years: 30.00     Types: Cigarettes    Smokeless tobacco: Never   Substance Use Topics    Alcohol use: Yes     History   Sexual Activity    Sexual activity: Yes    Partners: Female            Current Medications:       artificial tears   Both Eyes Q8H    aspirin  81 mg Oral or Feeding Tube Daily    chlorhexidine  15 mL Swish & Spit BID    insulin aspart  1-7 Units Subcutaneous Q4H    meropenem  1 g Intravenous Q8H    pantoprazole  40 mg Intravenous Daily with breakfast    polyethylene glycol  17 g Oral or Feeding Tube Daily    sennosides  8.6 mg Oral or Feeding Tube BID            Allergies:     Allergies   Allergen Reactions    Penicillins Hives     Tolerated ceftriaxone March 2024 ([facility])            Physical Exam:   Vitals were reviewed  Patient Vitals for the past 24 hrs:  Temp: 98.6  F (37  C) Temp  Min: 98.1  F (36.7  C)  Max: 100.9  F (38.3  C)  SpO2: 100 % SpO2  Min: 96 %  Max: 100 %    Physical Examination:  GENERAL:  intubated and sedated, supine in ICU bed.   HEENT:  Head is normocephalic, atraumatic   EYES:  Eyes have anicteric sclerae without conjunctival injection or discharge.    ENT:  Oropharynx is moist without exudates or ulcers on visible surfaces. ETT in place.  LUNGS:  Clear to auscultation bilateral, no wheeze, crackles, rhonchi.   CARDIOVASCULAR:  RRR, +systolic murmur.  ABDOMEN:  hypoactive bowel sounds, soft, nondistended, nontender.   MUSCULOSKELETAL: no erythema or swelling over bilateral wrists, elbows, shoulders, knees, ankles.  SKIN:  Warm, dry. No acute rashes or jaundice.  No stigmata of endocarditis or significant wounds. R femoral ECMO access in place, right internal jugular line in place without erythema or drainage.  NEUROLOGIC:  sedated.         Laboratory Data:     Inflammatory Markers    Recent Labs   Lab Test 03/24/24  0358 03/23/24  1602 03/23/24  0445   SED 27* 27*  --    CRPI 110.00* 108.00* 122.00*       Hematology Studies    Recent Labs   Lab Test 03/24/24  0358 03/23/24  2147 03/23/24  1602 03/23/24  1522 03/23/24  1516 03/23/24  1230 03/23/24  0445 03/22/24  1626 03/22/24  1457 03/22/24  0406   WBC 27.8* 27.8* 27.2*  --   --  25.3* 23.2* 21.2*  --  18.7*  18.7*   ANEU  "22.2*  --  20.4*  --   --  19.2*  --  18.9*  --  14.4*   AEOS 0.0  --  0.0  --   --  0.0  --  0.2  --  0.2   HGB 11.7* 11.9* 12.2* 11.6* 12.7* 12.7* 13.0* 12.9*   < > 13.6  13.6   MCV 97 96 96  --   --  94 94 97  --  96  96    389 481*  --   --  513* 499* 487*  --  510*  510*    < > = values in this interval not displayed.       Metabolic Studies     Recent Labs   Lab Test 03/24/24 0358 03/23/24 2147 03/23/24  1602 03/23/24  1522 03/23/24  1516 03/23/24  1230 03/23/24  0958    142 143 144 143 141 143   POTASSIUM 4.8 4.4  4.4 3.0* 3.2* 3.3* 3.7 4.2   CHLORIDE 107 107 103  --   --  103 105   CO2 26 22 23  --   --  25 25   BUN 27.9* 28.9* 31.7*  --   --  30.0* 29.4*   CR 1.00 1.17 1.41*  --   --  1.27* 1.18*   GFRESTIMATED 88 73 58*  --   --  66 72       Hepatic Studies    Recent Labs   Lab Test 03/24/24 0358 03/23/24 2147 03/23/24  1602 03/23/24  1230 03/23/24  0958 03/23/24  0445   BILITOTAL 0.5 0.5 0.6 0.6 0.6 0.5   ALKPHOS 53 52 54 56 56 57   ALBUMIN 2.9* 2.9* 3.0* 3.2* 3.2* 3.3*   * 171* 210* 200* 197* 212*   * 281* 309* 304* 298* 303*       Microbiology:  Culture   Date Value Ref Range Status   03/23/2024 No growth after 12 hours  Preliminary   03/23/2024 No growth after 12 hours  Preliminary   03/23/2024 No Growth  Final   03/23/2024 No growth, less than 1 day  Preliminary   03/22/2024 No growth after 1 day  Preliminary   03/22/2024 No growth after 1 day  Preliminary       Urine Studies    Recent Labs   Lab Test 03/23/24  1803 03/23/24  0828   LEUKEST Negative Small*   WBCU 4 66*       Vancomycin Levels    Recent Labs   Lab Test 03/23/24  1001   VANCOMYCIN <4.0       Hepatitis B Testing No lab results found.  Hepatitis C Testing   No results found for: \"HCVAB\", \"HQTG\", \"HCGENO\", \"HCPCR\", \"HQTRNA\", \"HEPRNA\"  Respiratory Virus Testing    No results found for: \"RS\", \"FLUAG\"          Imaging:     CXR (3/24/2024)  IMPRESSION:   1. Intra-aortic balloon pump marker projects over the " proximal  descending thoracic aorta at the level of cesia. Remaining support  devices in stable position.  2. Increased bilateral diffuse mixed pulmonary opacities.     CT Chest/Abd/Pelvis (3/23/24)  IMPRESSION:  1.  Bilateral central perihilar bronchovascular groundglass and  consolidative opacities with bilateral basilar consolidations,  concerning for aspiration and/or infection.  2.  Small bilateral pleural effusions.  3.  Circumferential bladder wall thickening, which may be accentuated  by underdistention. Consider correlation with urinalysis and urinary  symptoms.  4.  Ectatic ascending thoracic aorta measuring up to 4.0 cm.  5.  Enlarged pulmonary artery measuring 3.5 cm, which can be seen in  pulmonary hypertension.    --------------------------------------------  FERNANDEZ (OSH 3/21/24)  CONCLUSIONS   Left ventricular ejection fraction is visually estimated at 30%.   Inferior and lateral akinesis   Severe posterior leaflet restriction with overriding anterior leaflet.   Torrential mitral regurgitation.   Vena contracta 1.04 cm.   PISA radius 2.0 cm.     CT Angio chest w/ contrast (OSH- 3/17/24)  IMPRESSION:   1. No pulmonary embolism.   2. Small bilateral pleural effusions, left greater than right.   3. Bibasilar volume loss and consolidation with patchy peripheral areas of interstitial and groundglass opacity, correlate clinically for acute infectious/inflammatory etiologies.   4. Trace pericardial effusion.   5. Ectasia of the ascending aorta at 4 cm in caliber.   6. Main pulmonary artery is mildly dilated which can be seen in pulmonary arterial hypertension.

## 2024-03-24 NOTE — PROGRESS NOTES
Brief Cardiology Note    IABP repositioned 3cm cranially and position confirmed on x ray. Stat lock replaced and marked on the skin. IABP tech and RN present at the bedside.    Jacob Flores MD, PhD  Cardiology Fellow

## 2024-03-24 NOTE — PROGRESS NOTES
Overnight patient remains labile.   Neuro status unchanged. NPI normal, pupils equal. Arouses to touch, sedation increased.   Cardiac: SR in the 60's-70's with PAC's and PVC's. Pressor requirements decreased. ECMO 70%, flow 4.1, sweep 1. IABP Switched to pressor, fiber optic not functioning. 1:1, EKG, 100%.   Respiratory: Decreased O2 and rate on vent for ABG's. Settings 50%, rate of 12, PEEP 10, . Resp Culture sent.   GI/: Hypoactive bowels, Good UOP.. Urine cloudy this AM.   Labs: Lactic normal, ACT low, K+ rising, White count elevated.     For full assessments and vitals please see flowsheets.

## 2024-03-24 NOTE — PROGRESS NOTES
[facility]    ECLS Shift Summary:7251-4072     ECMO Equipment:  Console Serial Number: [id number]  Circuit Lot Number: [id number]  Oxygenator Lot Number: [id number]    Circuit Assessment: Free of fibrin, clot, and air    Arterial ECMO Cannula: 17 Fr in the Right Femoral Artery  Venous ECMO Cannula: 25 Fr in the Right Femoral Vein  DRC: 8Fr in the RSFA  ECMO Cannula Venous Right femoral vein-Site Assessment: WDL  ECMO Cannula Arterial Right femoral artery-Site Assessment: WDL  Distal Perfusion Catheter Right superficial femoral artery-Site Assessment: Sutured, Secure  ECMO Cannula Venous Right femoral vein-Site Intervention: No intervention needed  ECMO Cannula Arterial Right femoral artery-Site Intervention: No intervention needed  Distal Perfusion Catheter Right superficial femoral artery-Site Intervention: No intervention needed    Patient remains on V-A ECMO, all equipment is functioning and alarms are appropriately set. RPM's: 4000 with Blood Flow (Circuit) LPM  Av LPM  Min: 3.83 LPM  Max: 4.22 LPM L/min. Sweep is at 1 LPM and 70 %. Extremities are cool, with RLE modeled with slow perfusion and capillary refil.     Significant Shift Events:   - Increasing titration of Heparin with no improvement in ACTs, transition to Bival was done. Now to follow PTT.   - Blood & fluid in balloon pump sheath, team aware and asked to bedside to monitor.   - 1x does of Bumex given in PM.  - Slight chugging episode requiring 500 LR Bolus in AM.    Vent settings:  Vent Mode: CMV/AC  (Continuous Mandatory Ventilation/ Assist Control)  FiO2 (%): 45 %  Resp Rate (Set): 12 breaths/min  Tidal Volume (Set, mL): (S) 500 mL  PEEP (cm H2O): (S) 8 cmH2O  Resp: 12      Anticoagulation:  Dose (units/hr) HEParin: 3000 Units/hr  Rate (mL/hr) HEParin: 30 mL/hr  Concentration HEParin: 100 Units/mL     Dose (mg/kg/hr) Bivalirudin: 0.15 mg/kg/hr  Rate (mL/hr) Bivalirudin: 15.8 mL/hr  Concentration  Bivalirudin: 1 mg/mL  Most recent: ACT  (seconds): 170 seconds    Urine output is  .  Blood loss was none. Product given included none.     Intake/Output Summary (Last 24 hours) at 3/24/2024 1358  Last data filed at 3/24/2024 1200  Gross per 24 hour   Intake 2745.31 ml   Output 1790 ml   Net 955.31 ml       Labs:  Recent Labs   Lab 03/24/24  1230 03/24/24  1018 03/24/24  0738 03/24/24  0359   PH 7.46* 7.45 7.49* 7.44   PCO2 38 43 38 43   PO2 112* 146* 121* 128*   HCO3 27 30* 29* 29*   O2PER 45 45 50 50       Lab Results   Component Value Date    HGB 10.9 (L) 03/24/2024    PHGB <30 03/24/2024     03/24/2024    FIBR 532 (H) 03/24/2024    INR 1.66 (H) 03/24/2024     (HH) 03/24/2024    DD 8.77 (H) 03/24/2024    ANTCH 49 (L) 03/24/2024       Plan is to continue ECMO support.    Ayush Hernadez, RT  ECMO Specialist  3/24/2024 1:58 PM

## 2024-03-24 NOTE — PROGRESS NOTES
"CLINICAL NUTRITION SERVICES - BRIEF NOTE     Reason for RD note: Provider order for TF assess and order    New Findings/Chart Review:  PT cannulated for VA ECMO, protein needs reassessed @ 2-2.5 g/kg (210-263 g/day)    Enteral Access: small bore NGT (placed @ OSH)    GI: no BM, bowel regimen scheduled    Labs: reviewed     Meds: Reviewed, notable for: epi, norepi, vaso gtt's (all decreasing)    Interventions:  - Collaboration with providers - Per discussion with cards 2 fellow, plan to initiate trophic EN d/t pressor requirements    - Trophic EN via NGT: TwoCal HN @ 10 ml/hr  - FWF 30 ml q 4 hours  - Certavite daily  - HOB 30 degrees    Future/Additional Recommendations:  - Monitor for ability to advance feeds pending hemodynamic stability, goal: TwoCal HN @ 45 mL/hr (1080 mL/day) + 6 pkts prosource TF20 to provide 2640 kcals (25 kcals/kg/day), 211 g PRO (2 g/kg/day), 756 mL H2O, 237 g CHO and 5 g Fiber daily     Nutrition will continue to follow per protocol.    Aniya Lucio, RD, LD, CNSC  Available on [employer] - can search by name or 4E Dietitian  **Clinical Nutrition is no longer available via pager  Weekend/Holiday RD - \"Weekend Clinical Dietitian\" on [employer]    "

## 2024-03-24 NOTE — PROGRESS NOTES
ECMO Attending Progress Note  3/24/2024    Giacomo Castro is a 54 year old male who was cannulated for ECMO 3/23/24 due to cardiogenic shock     Cannulation Site:  17 Fr in the R femoral artery  25 Fr in the R femoral vein    Interval events: LA normalized pressors being weaned down     Physical Exam:  Temp:  [98.1  F (36.7  C)-99.1  F (37.3  C)] 98.8  F (37.1  C)  Pulse:  [] 69  Resp:  [24] 9  MAP:  [61 mmHg-281 mmHg] 67 mmHg  Arterial Line BP: ()/(40-64) 74/64  FiO2 (%):  [45 %-70 %] 45 %  SpO2:  [94 %-100 %] 94 %    Intake/Output Summary (Last 24 hours) at 3/24/2024 1409  Last data filed at 3/24/2024 1400  Gross per 24 hour   Intake 2783.31 ml   Output 1905 ml   Net 878.31 ml    Vent Mode: CMV/AC  (Continuous Mandatory Ventilation/ Assist Control)  FiO2 (%): 45 %  Resp Rate (Set): 12 breaths/min  Tidal Volume (Set, mL): (S) 500 mL  PEEP (cm H2O): (S) 8 cmH2O  Resp: (!) 9       Labs:  Recent Labs   Lab 24  1230 24  1018 24  0738 24  0359   PH 7.46* 7.45 7.49* 7.44   PCO2 38 43 38 43   PO2 112* 146* 121* 128*   HCO3 27 30* 29* 29*   O2PER 45 45 50 50      Recent Labs   Lab 24  1019 24  0358 24  2147 24  1602   WBC 26.3* 27.8* 27.8* 27.2*   HGB 10.9* 11.7* 11.9* 12.2*     Creatinine   Date Value Ref Range Status   2024 0.88 0.67 - 1.17 mg/dL Final   2024 1.00 0.67 - 1.17 mg/dL Final   2024 1.17 0.67 - 1.17 mg/dL Final   2024 1.41 (H) 0.67 - 1.17 mg/dL Final   12/10/2018 0.83 0.66 - 1.25 mg/dL Final       Blood Flow (Circuit) LPM: 4.22 LPM  Sweep LPM: 1 LPM  Sweep FiO2   %: 70 %  ACT  (seconds): 170 seconds  Arterial Blood Temp  (degrees C): 36.8 C  Pulse Oximetry  (SpO2%): 99 %  Arterial Pressure  mmH mmHg      ECMO Issues including assessments and plan on DOS 3/24/2024:  Neuro: Sedated for mechanical ventilation and ECMO.  No acute distress.  NIRS stable  b/l  RASS goal: -2-3  CV: Cardiogenic shock.  Hemodynamically stable on  norepi and epi  Vaso, pulse pressure 6mmHg will wean pressors but leave on some epi for innate pulsatility. IABP in place   Pulm: Keep vent settings at rest settings as above- decrease VT   FEN/Renal: Electrolytes stable w/ replacement protocols in place, Cr stable, UOP stable- start diuresis  Heme: ACT goal: 180-200- unable to reach goal despite very high doses of heparn ATIII pending- will start bival, Hemoglobin stable .  Minimal oozing around the ECMO cannulas.  ID: Receiving empiric antibiotics  Cannulae: Position is acceptable on exam and the available imaging.  Distal perfusion cannula is in place and patent.  Extremities are well-perfused     I have personally reviewed the ECMO flows, oxygenation and CO2 clearance, anticoagulation, and cannula position.  I have also personally assessed the patient's systemic response with hemodynamics, oxygenation, ventilation, and bleeding.       The patient requires continued ECMO support and management in the ICU.        Julianna Leach MD  Cardiology critical care  Pager

## 2024-03-24 NOTE — PLAN OF CARE
Goal Outcome Evaluation:    Able to wean pressors. One time dose 4mg Bumex given with good response (see flowsheets for details). Anticoagulation changed from Heparin to Bival, next PTT check 2000.   No blood products or electrolytes given. Sedation weaned slightly-pt now following commands, moving all extremities and nodding yes/no to questions.  Arterial line working until 1800, now will not draw, and waveform is flat-team notified.  TF initiated at 10cc flat rate. Bowel regimen initiated.   SR 60's with episodes of frequent/bigeminal PAC's.    Surgery consult to evaluate for mitral valve replacement.

## 2024-03-24 NOTE — CONSULTS
[facility]  Cardiology Consult  Patient Name: Giacomo Castro  Medical Record Number: [id number]  YOB: 1970  PCP: No Ref-Primary, Physician    Date of Service: 3/23/2024  Admit Date/Time: 3/22/2024  1:29 AM    Consult performed at the request of Rachid Reynolds MD  Reason for consult/Service:     HPI: Giacomo Castro is a 54 year old male with PMH HLD, tobacco use who presented to [facility] on 3/12 with chest pain that started on 3/10 and found to have an inferior STEMI. He was taken to the cath lab, where RCA PCI was unsuccessful, unreponsive to POBA or aspiration thrombectomy. He was then admitted for cardiogenic shock on dobutamine, cath lab course complicated by VT leading to shocks. EF noted to be 25% with moderate MR with posterior restricted leaflet. Course complicated by PNA s/p CTX course. 3/20 patient had RHC which showed CVP 15, RA 52/15, mPA 40, mPCWP 35. Diuresis attempted and continued dobutamine, but then overnight had recurrent VT withc shocks. He was subsequently intubated, dobutamine stopped and switched to milrinone and levo, amiodarone was started. Plan was for FERNANDEZ today, which showed torrential MR with a restricted posterior leaflet, likely related to cMRI findings of concern for infarction of postero-medial papillary muscles. Given acute decompensation, patient was transferred to [facility] for further evaluation and management.      Interval Changes: Lactic has normalized and pressor requirements are decreasing. CR and LFT's decreasing. No chugging on ECMO circuit, no blood or fluids given over the night. ACT goal not therapeutic despite 3000 unit(s) /hour of heparin. Antithrombin III pending, will switch to bivalirudin for possible heparin resistance. Pulses in LE dopplered.     Diagnoses:  Late presenting Inferior STEMI c/b cardiogenic shock and recurrent VT  Biventricular failure  Papillary muscle infarction leading to restricted posterior  "mitral valve leaflet and severe MR  Acute hypoxic respiratory failure    Recommendations:  -Continue maximize flow from ECMO   -Wean pressors as tolerates  - DC heparin  - start bival  -diureses per primary team    Thank you for allowing us to participate in the care of this very pleasant patient. Please do not hesitate to call if you have further questions or concerns.     Giacomo Castro was seen and examined with Dr. Jackson, attending physician, who agrees with the above assessment and plan.     Delaney Lopez DNP APRN [facility]  Cardiology ICU  Pager [phone]    March 23, 2024      Objective:   Vital signs: Pulse 64   Temp 98.8  F (37.1  C)   Resp 12   Ht 1.905 m (6' 3\")   Wt 101.9 kg (224 lb 10.4 oz)   SpO2 99%   BMI 28.08 kg/m       Intake/Output Summary (Last 24 hours) at 3/23/2024 1606  Last data filed at 3/23/2024 1300  Gross per 24 hour   Intake 2951.62 ml   Output 5845 ml   Net -2893.38 ml     Wt Readings from Last 1 Encounters:   03/23/24 101.9 kg (224 lb 10.4 oz)     ECMO:  RFV 25 Fr cannula, RFA 17 Fr cannula  Flow 4.5  Sweep  O2  -200        LABS:  CMP  Recent Labs   Lab 03/24/24  0734 03/24/24  0403 03/24/24  0359 03/24/24  0358 03/23/24  2155 03/23/24  2147 03/23/24  1609 03/23/24  1602 03/23/24  1522 03/23/24  1234 03/23/24  1230   NA  --   --   --  143  --  142  --  143 144   < > 141   POTASSIUM  --   --   --  4.8  --  4.4  4.4  --  3.0* 3.2*   < > 3.7   CHLORIDE  --   --   --  107  --  107  --  103  --   --  103   CO2  --   --   --  26  --  22  --  23  --   --  25   ANIONGAP  --   --   --  10  --  13  --  17*  --   --  13   * 134* 142* 146*   < > 161*   < > 152*  184* 154*   < > 161*   BUN  --   --   --  27.9*  --  28.9*  --  31.7*  --   --  30.0*   CR  --   --   --  1.00  --  1.17  --  1.41*  --   --  1.27*   GFRESTIMATED  --   --   --  88  --  73  --  58*  --   --  66   GRICEL  --   --   --  8.4*  --  8.5*  --  8.3*  8.3*  --   --  8.5*   MAG  --   --   --  2.3  --  2.2  --  " "2.6*  --   --  2.2   PHOS  --   --   --  3.2  --  2.6  --  2.5  --   --  2.2*   PROTTOTAL  --   --   --  5.4*  --  5.4*  --  5.6*  --   --  6.0*   ALBUMIN  --   --   --  2.9*  --  2.9*  --  3.0*  --   --  3.2*   BILITOTAL  --   --   --  0.5  --  0.5  --  0.6  --   --  0.6   ALKPHOS  --   --   --  53  --  52  --  54  --   --  56   AST  --   --   --  160*  --  171*  --  210*  --   --  200*   ALT  --   --   --  261*  --  281*  --  309*  --   --  304*    < > = values in this interval not displayed.     CBC  Recent Labs   Lab 03/24/24 0358 03/23/24 2147 03/23/24  1602 03/23/24  1522 03/23/24  1516 03/23/24  1230   WBC 27.8* 27.8* 27.2*  --   --  25.3*   RBC 3.48* 3.59* 3.76*  --   --  3.94*   HGB 11.7* 11.9* 12.2* 11.6*   < > 12.7*   HCT 33.9* 34.3* 35.9*  --   --  37.1*   MCV 97 96 96  --   --  94   MCH 33.6* 33.1* 32.4  --   --  32.2   MCHC 34.5 34.7 34.0  --   --  34.2   RDW 13.3 13.2 13.1  --   --  12.9    389 481*  --   --  513*    < > = values in this interval not displayed.     INR  Recent Labs   Lab 03/24/24  0358 03/23/24 2147 03/23/24  1602 03/23/24  1233   INR 1.55* 1.61* 1.90* 1.57*     Arterial Blood Gas  Recent Labs   Lab 03/24/24  0738 03/24/24  0359 03/24/24  0358 03/24/24  0156 03/24/24  0013   PH 7.49* 7.44  --  7.39 7.45   PCO2 38 43  --  49* 40   PO2 121* 128*  --  144* 124*   HCO3 29* 29*  --  30* 28   O2PER 50 50 60  50 50 50     LipidsNo lab results found in last 7 days.  TSHNo lab results found in last 7 days.  [id number] lab results found in last 7 days.  TroponinInvalid input(s): \"TROP\"    EKG: reviewed     ECHO: 3/21  There is coaptation failure of mitral leaflets. Anterior leaflet overrides due  to severely restricted posterior leaflet and chordal tethering.  Severe posteriorly directed mitral regurgitation (secondary MR).  Jemal class IIIb.  Pulmonary flow reversal noted in pulmonary veins.  Left ventricular function is decreased. The ejection fraction is 10-15%  (severely " reduced).  Global right ventricular function is severely reduced.  The right ventricle is normal size.    Imaging/Angiography:  3/12  1. Delayed presentation acute inferior STEMI.  Persistent symptoms began Kings 3/10.    2. Unsuccessful PCI to the mid-RCA.  Thrombosed vessel, unresponsive to balloon angioplasty and mechanical aspiration thrombectomy.

## 2024-03-25 NOTE — PROGRESS NOTES
[facility]    ECLS Shift Summary: [id number]     ECMO Equipment:  Console Serial Number: [id number]  Circuit Lot Number: [id number]  Oxygenator Lot Number: [id number]    Circuit Assessment: Fibrin, Clot  Fibrin Location: Connectors, oxygenator outlet  Clot Location: Corners of oxygenator, between 9 and 12 positions on oxygenator    Arterial ECMO Cannula: 17 Fr in the Right Femoral Artery  Venous ECMO Cannula: 25 Fr in the Right Femoral Vein  ECMO Cannula Venous Right femoral vein-Site Assessment: WDL  ECMO Cannula Arterial Right femoral artery-Site Assessment: WDL  Distal Perfusion Catheter Right superficial femoral artery-Site Assessment: Secure, Sutured  ECMO Cannula Venous Right femoral vein-Site Intervention: No intervention needed  ECMO Cannula Arterial Right femoral artery-Site Intervention: No intervention needed  Distal Perfusion Catheter Right superficial femoral artery-Site Intervention: No intervention needed    Patient remains on V-A ECMO, all equipment is functioning and alarms are appropriately set. RPM's: 4000 with Blood Flow (Circuit) LPM  Av.1 LPM  Min: 4.07 LPM  Max: 4.12 LPM L/min. Sweep is at 2.5 LPM and 75 %. Extremities are cool.     Significant Shift Events: Hemodynamic instability and hypoxia with big turns. Attempted to wean FiO2, which was not tolerated.    Vent settings:  Vent Mode: CMV/AC  (Continuous Mandatory Ventilation/ Assist Control)  FiO2 (%): 60 %  Resp Rate (Set): 12 breaths/min  Tidal Volume (Set, mL): 500 mL  PEEP (cm H2O): 8 cmH2O  Resp: 11      Anticoagulation:  Dose (units/hr) HEParin: 3000 Units/hr  Rate (mL/hr) HEParin: 30 mL/hr  Concentration HEParin: 100 Units/mL     Dose (mg/kg/hr) Bivalirudin: 0.09 mg/kg/hr  Rate (mL/hr) Bivalirudin: 9.5 mL/hr  Concentration Bivalirudin: 1 mg/mL  Most recent: ACT  (seconds): 170 seconds    Urine output is as charted in I/O flowsheet.  Blood loss was scant. Product given included none.      Intake/Output Summary (Last 24 hours) at 3/25/2024 1755  Last data filed at 3/25/2024 1700  Gross per 24 hour   Intake 3268.07 ml   Output 2635 ml   Net 633.07 ml       Labs:  Recent Labs   Lab 03/25/24  1600 03/25/24  1558 03/25/24  1418 03/25/24  1202 03/25/24  1008   PH 7.43  --  7.45 7.42 7.42   PCO2 44  --  42 46* 46*   PO2 67*  --  51* 87 109*   HCO3 29*  --  29* 30* 30*   O2PER 60 75  60  60 50 50 50       Lab Results   Component Value Date    HGB 10.5 (L) 03/25/2024    PHGB 40 (H) 03/25/2024     03/25/2024    FIBR 445 03/25/2024    INR 2.04 (H) 03/25/2024    PTT 63 (H) 03/25/2024    DD >20.00 (HH) 03/25/2024    ANTCH 52 (L) 03/25/2024       Plan is to remain on VA ECMO.    Glenis Garcia, RT  ECMO Specialist  3/25/2024 5:55 PM

## 2024-03-25 NOTE — PROGRESS NOTES
Cardiothoracic Surgery - Brief Update Note  Mr. Giacomo Castro is a 54 year old gentleman with a PMH significant for HLD, tobacco use. Presented to [facility] on 3/12 with chest pain, found to have inferior STEMI. PCI of RCA attempted and unsuccessful, unresponsive to POBA or aspiration thrombectomy. He developed cardiogenic shock, was placed on dobutamine. He then had VT leading to shocks and it was found his LVEF was 25% with moderate MR/posterior restricted leaflet. Had RHC on 3/20 which demonstrated elevated right pressures. Diuresis was initiated and dobutamine continued. He then had recurrence of VT requiring shocks. He was then intubated, dobutamine discontinued, milrinone, norepi and amiodarone initiated. FERNANDEZ demonstrated torrential MR with restricted posterior leaflet likely 2/2 infarction of postero-medial papillary muscles (as evidenced on cMRI). Additionally, course c/b HAP now on BS ABX. Currently requiring high ventilatory support of 90% FiO2 and PEEP 12. CVTS was consulted for consideration of surgical intervention.     Interval update:  - Since initial CVTS consult (see 3/22/24 note), patient decompensated further while on multiple pressors and was thus cannulated for VA ECMO with iABP placement on 3/23/24.   - Heart failure team has evaluated patient to be very poor candidate for advanced heart failure therapies. Structural cardiology was consulted for consideration of CARL, found be be poor candidate.  - CVTS attending aware of interval reliance on VA ECMO, continue to plan for OR (MVR, CABG, Impella, indicated procedures) as currently scheduled on Wednesday 3/27/24.   - Pre-op orders placed 3/25. Bivalrudin to be held on call to the OR. Del Nido ordered per surgeon request; should surgical plan change, alternative cardioplegia may need to be ordered. Surgeon will alert team should plans change.     Discussed with attending, Dr. Mulvihill.     Ariadna Brito PA-C  Cardiothoracic Surgery  Pager  [phone]    3:40 PM March 25, 2024

## 2024-03-25 NOTE — CONSULTS
Cardiology Structural Consult         Date of Service (when I saw the patient): 03/25/24    ASSESSMENT:   Giacomo Castro is a 54 year old male with PMH HLD, tobacco use who presented to [facility] on 3/12 with chest pain that started on 3/10 and found to have an inferior STEMI s/b cardiogenic shock, severe ischemic MR, VT prompting VA ECMO cannulation. We are being consulted for possible CARL. While patient definitely has severe mitral  regurgitation (failure of coaptation) CARL would be technically challenging due to large coaptation gap between the restricted posterior leaflet and the anterior leaflet.     RECOMMEND:  - Support on VA ECMO  - Consult CVTS     Shimon Brady MD    REASON FOR CONSULT: mitral regurgitation    History of Present Illness   Giacomo Castro is a 54 year old male with PMH HLD, tobacco use who presented to [facility] on 3/12 with chest pain that started on 3/10 and found to have an inferior STEMI. He was taken to the cath lab, where RCA PCI was unsuccessful, unreponsive to POBA or aspiration thrombectomy. He was then admitted for cardiogenic shock on dobutamine, cath lab course complicated by VT leading to shocks. EF noted to be 25% with moderate MR with posterior restricted leaflet. Course complicated by PNA s/p CTX course. 3/20 patient had RHC which showed CVP 15, RA 52/15, mPA 40, mPCWP 35. Diuresis attempted and continued dobutamine, but then overnight had recurrent VT with shocks. He was subsequently intubated, dobutamine stopped and switched to milrinone and levo, amiodarone was started. Plan was for FERNANDEZ today, which showed torrential MR with a restricted posterior leaflet, likely related to cMRI findings of concern for infarction of postero-medial papillary muscles. Given acute decompensation, patient was transferred to [facility] for further evaluation and management.     Patient is on low dose norepinephrine, epinephrine and neosynephrine today. He is on VA ECMO  support at 4 L/min and has an IABP on 1:1 augmentation.       Past Medical History    I have reviewed this patient's medical history and updated it with pertinent information if needed.   Past Medical History:   Diagnosis Date    Tobacco abuse        Past Surgical History   I have reviewed this patient's surgical history and updated it with pertinent information if needed.  Past Surgical History:   Procedure Laterality Date    CV EXTRACORPERAL MEMBRANE OXYGENATION N/A 3/23/2024    Procedure: Extracorporeal Membrance Oxygenation;  Surgeon: Maynor Ochoa MD;  Location:  HEART CARDIAC CATH LAB    CV INTRA AORTIC BALLOON N/A 3/23/2024    Procedure: Intraprocedure Aortic Balloon Pump Insertion;  Surgeon: Maynor Ochoa MD;  Location:  HEART CARDIAC CATH LAB    CV RIGHT HEART CATH MEASUREMENTS RECORDED N/A 3/22/2024    Procedure: Right Heart Catheterization;  Surgeon: Blade Abdul MD;  Location:  HEART CARDIAC CATH LAB    CV SWAN KYRA PROCEDURE N/A 3/22/2024    Procedure: [place] Kyra Procedure;  Surgeon: Blade Abdul MD;  Location:  HEART CARDIAC CATH LAB    HERNIA REPAIR, UMBILICAL  2016    complete at [facility]    vein surgery Right 2006    Treatment of varicose vein    ZZC ARTHROSCOPIC DECOMPRESSION OF GANGLION CYST Left 2008    completed by [facility]       Prior to Admission Medications   None     Allergies   Allergies   Allergen Reactions    Penicillins Hives     Tolerated ceftriaxone March 2024 ([facility])       Social History   I have reviewed this patient's social history and updated it with pertinent information if needed. Giacomo Castro  reports that he has been smoking cigarettes. He has a 30 pack-year smoking history. He has never used smokeless tobacco. He reports current alcohol use. He reports that he does not use drugs.    Family History   I have reviewed this patient's family history and updated it with pertinent information if needed.   Family History   Problem Relation Age of Onset     Breast Cancer Mother     Coronary Artery Disease Father         MI; possibly in his 40's, smoker    Diabetes No family hx of     Hypertension No family hx of     Hyperlipidemia No family hx of     Cerebrovascular Disease No family hx of     Colon Cancer No family hx of     Prostate Cancer No family hx of     Thyroid Disease No family hx of     Genetic Disorder No family hx of        Review of Systems   The 10 point Review of Systems is negative other than noted in the HPI or here.     Physical Exam   Temp: 98.8  F (37.1  C) Temp src: Esophageal   Pulse: 58   Resp: 12 SpO2: 100 % O2 Device: Mechanical Ventilator    Vital Signs with Ranges  Temp:  [98.4  F (36.9  C)-99  F (37.2  C)] 98.8  F (37.1  C)  Pulse:  [54-77] 58  Resp:  [9-18] 12  MAP:  [57 mmHg-105 mmHg] 61 mmHg  Arterial Line BP: ()/() 95/41  FiO2 (%):  [45 %-50 %] 50 %  SpO2:  [91 %-100 %] 100 %  254 lbs 10.1 oz    GEN: intubated, sedated   HEENT: + [place] Richard on LIJ, + central line on RIJ  CV: RRR, normal s1/s2, 3/6 holosystolic murmur heard beast at the apex   CHEST: CTAB  ABD: soft  NEURO: sedated    Data   Data reviewed today:     Recent Labs   Lab 03/25/24  1203 03/25/24  1008 03/25/24  0358 03/25/24  0354 03/24/24  2202 03/24/24  2158   WBC  --  17.5*  --  16.6*  --  18.7*   HGB  --  10.3*  --  10.7*  --  10.9*   MCV  --  100  --  100  --  100   PLT  --  195  --  219  --  256   INR  --  2.10*  --  2.12*  --  1.93*   NA  --  146*  --  146*  --  146*   POTASSIUM  --  3.9  --  4.2  --  4.0   CHLORIDE  --  110*  --  109*  --  106   CO2  --  27  --  30*  --  32*   BUN  --  22.2*  --  23.2*  --  23.2*   CR  --  0.74  --  0.75  --  0.83   ANIONGAP  --  9  --  7  --  8   GRCIEL  --  8.5*  --  8.3*  --  8.3*   * 147*  151*   < > 158*  160*   < > 145*  166*   ALBUMIN  --  2.9*  --  2.7*  --  2.7*   PROTTOTAL  --  5.4*  --  5.1*  --  5.2*   BILITOTAL  --  0.9  --  0.9  --  0.7   ALKPHOS  --  52  --  57  --  50   ALT  --  190*  --  190*  --   214*   AST  --  116*  --  120*  --  112*    < > = values in this interval not displayed.       Recent Results (from the past 24 hour(s))   XR Chest Port 1 View    Narrative    Exam: XR CHEST PORT 1 VIEW, 3/25/2024 1:19 AM    Comparison: Chest radiograph 3/24/2024 at 9:02 AM    History: Check endotracheal tube placement and ECLS cannula placement.  DO NOT log-roll patient.  Place film under patient using patient  safety handling process.    Technique: Portable AP supine view of the chest.     Findings:  Multiple support devices:  *  Endotracheal tube tip terminates at the mid thoracic trachea, 4.2  cm above the cesia.  *  Intrathoracic aortic balloon pump superior marker terminates at the  level of the cesia.  *  Femoral ECMO venous cannula terminates at superior cavoatrial  junction.  *  Right internal jugular Burchard-Richard catheter tip terminates at the  right pulmonary artery.  *  Right internal jugular central venous catheter tip terminates at  the mid SVC.  *  Feeding tube tip terminates below inferior image margin.  *  Radiopaque tip projecting over stomach lumen.     Stable cardiomegaly and diffuse mixed interstitial and airspace  opacities. No significant pneumothorax or pleural effusion. Partially  visualized upper abdomen is within normal limits.      Impression    Impression:   1.  Endotracheal tube tip terminates at the mid thoracic trachea, 4.2  cm above the cesia. Intrathoracic aortic balloon pump superior marker  terminates at the level of the cesia. Additional support devices are  stable.  2.  Stable cardiomegaly with unchanged diffuse mixed interstitial and  airspace opacities, favoring pulmonary edema and atelectasis.    I have personally reviewed the examination and initial interpretation  and I agree with the findings.    MEGAN ROBERT MD         SYSTEM ID:  [id number]       TTE 3/22/2024    Left ventricular function is decreased. The ejection fraction is 15-20%  (severely reduced).  Inferior wall  akinesis is present.  Anterolateral wall akinesis is present.  Apical wall akinesis is present.  Moderate left ventricular dilation is present.  LV anterobasal echo density likely thrombus (2x1.2 cm)  The right ventricle is normal size.  Global right ventricular function is severely reduced.  Severe, eccentric posteriorly directed mitral insufficiency is present.  The cause of the mitral insufficiency appears to be altered left ventricular  size and geometry. Class IIIb.     There is no prior study for direct comparison.    FERNANDEZ 3/22/2024    FERNANDEZ overread.     There is coaptation failure of mitral leaflets. Anterior leaflet overrides due  to severely restricted posterior leaflet and chordal tethering.  Severe posteriorly directed mitral regurgitation (secondary MR).  Jemal class IIIb.  Pulmonary flow reversal noted in pulmonary veins.  Left ventricular function is decreased. The ejection fraction is 10-15%  (severely reduced).  Global right ventricular function is severely reduced.  The right ventricle is normal size.      I have seen and examined the patient and agree with the finding and plan.       Maynor Ochoa MD  Cardiology-CSI  [phone]

## 2024-03-25 NOTE — ANESTHESIA PREPROCEDURE EVALUATION
Anesthesia Pre-Procedure Evaluation    Patient: Giacomo Castro   MRN: [id number] : 1970        Procedure : Procedure(s):  Coronary Artery Bypass graft, mitral valve replacement  INSERTION [facility] CARDIAC ASSIST DEVICE  REMOVAL Extracorporeal Membrane Oxygenation (ECMO)  CANNULA          Past Medical History:   Diagnosis Date    Tobacco abuse       Past Surgical History:   Procedure Laterality Date    CV EXTRACORPERAL MEMBRANE OXYGENATION N/A 3/23/2024    Procedure: Extracorporeal Membrance Oxygenation;  Surgeon: Maynor Ochoa MD;  Location: [facility]    CV INTRA AORTIC BALLOON N/A 3/23/2024    Procedure: Intraprocedure Aortic Balloon Pump Insertion;  Surgeon: Maynor Ochoa MD;  Location: [facility]    CV RIGHT HEART CATH MEASUREMENTS RECORDED N/A 3/22/2024    Procedure: Right Heart Catheterization;  Surgeon: Blade Abdul MD;  [facility]    CV SWAN KYRA PROCEDURE N/A 3/22/2024    Procedure: [place] Kyra Procedure;  Surgeon: Blade Abdul MD;  [facility]    HERNIA REPAIR, UMBILICAL      complete at [facility]    vein surgery Right     Treatment of varicose vein    ZZC ARTHROSCOPIC DECOMPRESSION OF GANGLION CYST Left     completed by [facility]      Allergies   Allergen Reactions    Penicillins Hives     Tolerated ceftriaxone 2024 ([facility])      Social History     Tobacco Use    Smoking status: Every Day     Packs/day: 1.00     Years: 30.00     Additional pack years: 0.00     Total pack years: 30.00     Types: Cigarettes    Smokeless tobacco: Never   Substance Use Topics    Alcohol use: Yes      Wt Readings from Last 1 Encounters:   24 115.5 kg (254 lb 10.1 oz)        Anesthesia Evaluation            ROS/MED HX  ENT/Pulmonary:     (+)                tobacco use, Past use,                    (-) asthma, COPD and sleep apnea   Neurologic:    (-) no seizures, no CVA and migraines   Cardiovascular:  Comment: Presented to outside hospital 3/12 with inferior STEMI s/p cath c/b biventricular failure, severe MR 2/2 infarction of postero-medial papillary muscle, VT x2 episodes requiring cardioversion, pneumonia. Transferred to [facility] for possible mitral valve surgery. Patient was decompensating while on 3 inopressors thus was cannulated for ECMO with IABP 3/23.     Recurrent VT storm s/p amiodarone    (+) Dyslipidemia - -   -  - -                                 Previous cardiac testing   Echo: Date: 3/22 Results:  3/22:  Left ventricular function is decreased. The ejection fraction is 15-20%  (severely reduced).  Inferior wall akinesis is present.  Anterolateral wall akinesis is present.  Apical wall akinesis is present.  Moderate left ventricular dilation is present.  LV anterobasal echo density likely thrombus (2x1.2 cm)  The right ventricle is normal size.  Global right ventricular function is severely reduced.  Severe, eccentric posteriorly directed mitral insufficiency is present.  The cause of the mitral insufficiency appears to be altered left ventricular  size and geometry. Class IIIb.    3/23:  Limited TTE to assess mitral regurgitation pre and post VA-ECMO cannulation.  Pre-VA-ECMO: Severe mitral regurgitation  Post-VA-ECMO: Trivial mitral regurgitation  LV thrombus not seen in this non-contrasted study.  Stress Test:  Date: Results:    ECG Reviewed:  Date: Results:    Cath:  Date: 3/23 Results:   (-) hypertension   METS/Exercise Tolerance:     Hematologic:     (+)      anemia,          Musculoskeletal:  - neg musculoskeletal ROS  (-) arthritis   GI/Hepatic:  - neg GI/hepatic ROS  (-) GERD and liver disease   Renal/Genitourinary:  - neg Renal ROS  (-) renal disease   Endo:  - neg endo ROS  (-) Type II DM and thyroid disease   Psychiatric/Substance Use:  - neg psychiatric ROS  (-) psychiatric history   Infectious Disease: Comment: Concern for aspiration HAP, currently on cefepime and meropenem - neg  "infectious disease ROS  (-) Recent Fever   Malignancy:  - neg malignancy ROS     Other:            Physical Exam    Airway             Respiratory Devices and Support         Dental           Cardiovascular             Pulmonary               Other findings: Intubated and on ECMO.    OUTSIDE LABS:  CBC:   Lab Results   Component Value Date    WBC 17.5 (H) 03/25/2024    WBC 16.6 (H) 03/25/2024    HGB 10.3 (L) 03/25/2024    HGB 10.7 (L) 03/25/2024    HCT 31.9 (L) 03/25/2024    HCT 32.3 (L) 03/25/2024     03/25/2024     03/25/2024     BMP:   Lab Results   Component Value Date     (H) 03/25/2024     (H) 03/25/2024    POTASSIUM 3.9 03/25/2024    POTASSIUM 4.2 03/25/2024    CHLORIDE 110 (H) 03/25/2024    CHLORIDE 109 (H) 03/25/2024    CO2 27 03/25/2024    CO2 30 (H) 03/25/2024    BUN 22.2 (H) 03/25/2024    BUN 23.2 (H) 03/25/2024    CR 0.74 03/25/2024    CR 0.75 03/25/2024     (H) 03/25/2024     (H) 03/25/2024     (H) 03/25/2024     COAGS:   Lab Results   Component Value Date    PTT 58 (H) 03/25/2024    INR 2.10 (H) 03/25/2024    FIBR 476 03/25/2024     POC: No results found for: \"BGM\", \"HCG\", \"HCGS\"  HEPATIC:   Lab Results   Component Value Date    ALBUMIN 2.9 (L) 03/25/2024    PROTTOTAL 5.4 (L) 03/25/2024     (H) 03/25/2024     (H) 03/25/2024    ALKPHOS 52 03/25/2024    BILITOTAL 0.9 03/25/2024     OTHER:   Lab Results   Component Value Date    PH 7.45 03/25/2024    LACT 1.5 03/25/2024    GRICEL 8.5 (L) 03/25/2024    PHOS 2.7 03/25/2024    MAG 1.8 03/25/2024    SED 26 (H) 03/25/2024       Anesthesia Plan    ASA Status:  4    NPO Status:  NPO Appropriate    Anesthesia Type: General (ETT in situ, sedated on VA ECMO).   Induction: N/a.   Maintenance: Balanced.   Techniques and Equipment:     - Lines/Monitors: 2nd IV, Arterial Line, Central Line, PAC, NIRS, BIS, CVP, FERNANDEZ (Arterial Line, PAC, and CVC in situ from ICU)            FERNANDEZ Absolute Contra-indication: NONE    "         FERNANDEZ Relative Contra-indication: Coagulopathy     - Blood: Blood in Room, PRBC, Cell Saver     - Drips/Meds: Norepi, Epinephrine, Vasopressin     Consents    Anesthesia Plan(s) and associated risks, benefits, and realistic alternatives discussed. Questions answered and patient/representative(s) expressed understanding.     - Discussed:     - Discussed with:  Other (See Comment)      - Extended Intubation/Ventilatory Support Discussed: Yes.      - Patient is DNR/DNI Status: No     Use of blood products discussed: Yes.     Postoperative Care    Pain management: IV analgesics.        Comments:    Other Comments: Anesthesia plan was discussed in detail with patients family . They voiced understanding and agreed to proceed as planned. Questions were sought and answered           Ambrose Tolbert MD    I have reviewed the pertinent notes and labs in the chart from the past 30 days and (re)examined the patient.  Any updates or changes from those notes are reflected in this note.

## 2024-03-25 NOTE — PLAN OF CARE
ICU End of Shift Summary. See flowsheets for vital signs and detailed assessment.    Changes this shift: No significant events. Increased O2 requirements. Family at bedside and MD discussed plan of care.     Plan: Continue plan of care.        Goal Outcome Evaluation:      Plan of Care Reviewed With: family    Overall Patient Progress: no changeOverall Patient Progress: no change    Outcome Evaluation: Patient tollerating repositioning.

## 2024-03-25 NOTE — PROGRESS NOTES
[facility]    ECLS Shift Summary:     ECMO Equipment:  Console Serial Number: [id number]  Circuit Lot Number: [id number]  Oxygenator Lot Number: [id number]    Circuit Assessment: Fibrin, Clot  Clot Location: corners of oxy  Fibrin Location: connectors    Arterial ECMO Cannula: 17 Fr in the Right Femoral Artery  Venous ECMO Cannula: 25 Fr in the Right Femoral Vein  ECMO Cannula Venous Right femoral vein-Site Assessment: WDL  ECMO Cannula Arterial Right femoral artery-Site Assessment: WDL  Distal Perfusion Catheter Right superficial femoral artery-Site Assessment: Sutured, Secure  ECMO Cannula Venous Right femoral vein-Site Intervention: No intervention needed  ECMO Cannula Arterial Right femoral artery-Site Intervention: No intervention needed  Distal Perfusion Catheter Right superficial femoral artery-Site Intervention: No intervention needed    Patient remains on V-A ECMO, all equipment is functioning and alarms are appropriately set. RPM's: 4000 with Blood Flow (Circuit) LPM  Av.1 LPM  Min: 3.91 LPM  Max: 4.25 LPM L/min. Sweep is at 2.5 LPM and 70 %. Extremities are cool.     Significant Shift Events: Distal Perfusion catheter clotted but able to aspirate clot and make patent by switching out stopcock. New arterial line placed by MD.    Vent settings:  Vent Mode: CMV/AC  (Continuous Mandatory Ventilation/ Assist Control)  FiO2 (%): 50 %  Resp Rate (Set): 12 breaths/min  Tidal Volume (Set, mL): 500 mL  PEEP (cm H2O): 8 cmH2O  Resp: 12      Anticoagulation:  Dose (units/hr) HEParin: 3000 Units/hr  Rate (mL/hr) HEParin: 30 mL/hr  Concentration HEParin: 100 Units/mL     Dose (mg/kg/hr) Bivalirudin: 0.09 mg/kg/hr  Rate (mL/hr) Bivalirudin: 9.5 mL/hr  Concentration Bivalirudin: 1 mg/mL  Most recent: ACT  (seconds): 174 seconds    Urine output is Cloudy, Sediment, Yellow / Straw Colored.  Blood loss was (see I+Os) . Product given included none.     Intake/Output Summary  (Last 24 hours) at 3/25/2024 0628  Last data filed at 3/25/2024 0600  Gross per 24 hour   Intake 2777.22 ml   Output 3115 ml   Net -337.78 ml       Labs:  Recent Labs   Lab 03/25/24  0552 03/25/24  0354 03/25/24  0158 03/24/24  2352   PH 7.43 7.52* 7.53* 7.46*   PCO2 46* 39 40 50*   PO2 173* 138* 81 179*   HCO3 31* 32* 33* 36*   O2PER 50 50  70  50 50 50       Lab Results   Component Value Date    HGB 10.7 (L) 03/25/2024    PHGB 40 (H) 03/25/2024     03/25/2024    FIBR 476 03/25/2024    INR 2.12 (H) 03/25/2024    PTT 56 (H) 03/25/2024    DD >20.00 (HH) 03/25/2024    ANTCH 49 (L) 03/24/2024       Plan is to continue to rest on VA ECMO.    Eugenio Leon RN  ECMO Specialist  3/25/2024 6:28 AM

## 2024-03-25 NOTE — PROGRESS NOTES
[facility] General ID Service: Follow Up Note      Patient:  Giacomo Castro   Date of birth 1970, Medical record number [id number]  Date of Visit:  03/25/2024  Date of Admission: 3/22/2024         Assessment and Recommendations:   ID Problem List:  Shock  Fever  Bilateral GGO and consolidative lung opacities- pulmonary edema  STEMI (3/10/24) c/b severe MR and ADHF  S/p VA-ECMO with IABP (3/23-*)  LFT elevation  Allergies: pcn (unknown)    Recommendations:  Stop meropenem - no organisms isolated from blood or sputum, will begin to de-escalate abx  Start cefepime 2g IV q8hrs  Following pending cultures      Discussion:  Giacomo Castro is a 54 year old male with history of HLD and tobacco use who initially presented to [facility] on 3/10/24 with inferior STEMI s/p unsuccessful attempted RCA revascularization. Course complicated by ADHF with significant volume overload despite diuresis, recurrent episodes of VT, torrential MR with concern for infarction of posteromedial papillary muscles, and pneumonia s/p ceftriaxone+azithromycin x5d. Transferred to [facility] on 3/22/24 for further management. Cannulated for VA-ECMO with IABP (3/23-*).      Concern for pneumonia at OSH with GGO and consolidation on CT chest. Was treated with ceftriaxone and azithromycin (3/18-3/22). Sputum Cx from OSH (3/18) with oropharyngeal monica; Strep pneumo and Legionella urine antigens negative.     Increased pressor requirements and fever to 101.3F on 3/23 and leukocytosis to 27.8K, . Afebrile since cannulation for ECMO with persistent leukocytosis and requiring pressors. Has been on meropenem since arrival.  CT C/A/P with mixed bilateral GGO and consolidation; no free air or evidence of cholecystitis, colitis, or diverticulitis; bladder wall thickened though noted that mackay is tenting bladder wall. Initial UA c/w recent catheterization. Urine culture 3/23 negative. Volume overload, VAP, viral or atypical pulmonary infection  considered as etiology of lung findings. Sputum cultures 3/23 NGTD, 3/24 pending. Negative COVID and respiratory PCR panel. Blood cultures 3/22, 3/23 NGTD, pending from 3/24. Has already received a course of Azithromycin for atypical coverage at OSH. Does not have significant risk factors for fungal infection. Most recent CXR appears c/w pulmonary edema. Recommend beginning to de-escalate from meropenem to cefepime to maintain broad respiratory coverage while awaiting cultures.     Recs were discussed with primary team today. Don't hesitate to call with questions.     Attestation:  I have reviewed today's vital signs, medications, labs and imaging.    Suzie Diaz PA-C  Pronouns: she/her/hers  Infectious Diseases  Contact via [employer] or [facility] Paging/Directory       55 MINUTES SPENT BY ME on the date of service doing chart review, history, exam, documentation & further activities per the note.             Interval History:       Afebrile, down-trending inflammatory markers. Remains on ECMO with surgery plan pending.          Current Antimicrobials   Current:  - meropenem (3/22)    Prior:  - Vancomycin (3/22)  - ceftriaxone (3/18-3/22)  - Azithromycin (3/18-3/22)         Physical Exam:   Ranges for vital signs:  Temp:  [98.4  F (36.9  C)-99  F (37.2  C)] 98.4  F (36.9  C)  Pulse:  [55-77] 55  Resp:  [9-18] 12  MAP:  [61 mmHg-105 mmHg] 67 mmHg  Arterial Line BP: ()/() 106/43  FiO2 (%):  [45 %-50 %] 50 %  SpO2:  [91 %-100 %] 98 %    Intake/Output Summary (Last 24 hours) at 3/25/2024 0945  Last data filed at 3/25/2024 0800  Gross per 24 hour   Intake 2653.52 ml   Output 3225 ml   Net -571.48 ml     Exam:  GENERAL:  intubated and sedated, supine in ICU bed.   HEENT:  Head is normocephalic, atraumatic   EYES:  Eyes have anicteric sclerae without conjunctival injection or discharge.    ENT:  Oropharynx is moist without exudates or ulcers on visible surfaces. ETT in place.  LUNGS:  Clear to auscultation  bilateral, no wheeze, crackles, rhonchi. +mechanical ventilation  CARDIOVASCULAR:  RRR, +systolic murmur.  ABDOMEN:  hypoactive bowel sounds, soft, nondistended, nontender.   MUSCULOSKELETAL: no erythema or swelling over bilateral wrists, elbows, shoulders, knees, ankles.  SKIN:  Warm, dry. No acute rashes or jaundice. Scabbed over abrasion LLE. No stigmata of endocarditis or significant wounds. R femoral ECMO access in place, right internal jugular line in place without erythema or drainage.  NEUROLOGIC:  sedated.         Laboratory Data:   Reviewed.  Pertinent for:    Culture data:  Culture   Date Value Ref Range Status   03/24/2024 No growth after 12 hours  Preliminary   03/24/2024 No growth after 12 hours  Preliminary   03/24/2024 No growth after 12 hours  Preliminary   03/24/2024 No growth after 1 day  Preliminary   03/24/2024 No growth, less than 1 day  Preliminary   03/23/2024 No growth after 1 day  Preliminary   03/23/2024 No growth after 1 day  Preliminary   03/23/2024 No Growth  Final   03/23/2024 No Growth  Final   03/22/2024 No growth after 2 days  Preliminary   03/22/2024 No growth after 2 days  Preliminary       Inflammatory Markers    Recent Labs   Lab Test 03/25/24  0354 03/24/24  0358 03/23/24  1602 03/23/24  0445   SED 26* 27* 27*  --    CRPI 79.90* 110.00* 108.00* 122.00*       Hematology Studies    Recent Labs   Lab Test 03/25/24  0354 03/24/24 2158 03/24/24  1614 03/24/24  1019   WBC 16.6* 18.7* 24.3* 26.3*   HGB 10.7* 10.9* 11.0* 10.9*    100 99 98    256 295 285     Recent Labs   Lab Test 03/24/24  1614 03/24/24  0358 03/23/24  1602 03/23/24  1230   ANEU 20.4* 22.2* 20.4* 19.2*   AEOS 0.2 0.0 0.0 0.0       Metabolic Studies     Recent Labs   Lab Test 03/25/24  0354 03/24/24  2158 03/24/24  1614 03/24/24  1019   * 146* 146* 144   POTASSIUM 4.2 4.0 4.5 4.6   CHLORIDE 109* 106 108* 109*   CO2 30* 32* 29 27   BUN 23.2* 23.2* 23.8* 26.3*   CR 0.75 0.83 0.85 0.88   GFRESTIMATED  >90 >90 >90 >90       Hepatic Studies    Recent Labs   Lab Test 03/25/24  0354 03/24/24  2158 03/24/24  1614   BILITOTAL 0.9 0.7 0.6   ALKPHOS 57 50 51   ALBUMIN 2.7* 2.7* 2.8*   * 112* 123*   * 214* 234*            Imaging:     CXR (3/24/2024)  IMPRESSION:   1. Intra-aortic balloon pump marker projects over the proximal  descending thoracic aorta at the level of cesia. Remaining support  devices in stable position.  2. Increased bilateral diffuse mixed pulmonary opacities.     CT Chest/Abd/Pelvis (3/23/24)  IMPRESSION:  1.  Bilateral central perihilar bronchovascular groundglass and  consolidative opacities with bilateral basilar consolidations,  concerning for aspiration and/or infection.  2.  Small bilateral pleural effusions.  3.  Circumferential bladder wall thickening, which may be accentuated  by underdistention. Consider correlation with urinalysis and urinary  symptoms.  4.  Ectatic ascending thoracic aorta measuring up to 4.0 cm.  5.  Enlarged pulmonary artery measuring 3.5 cm, which can be seen in  pulmonary hypertension.     --------------------------------------------  FERNANDEZ (OSH 3/21/24)  CONCLUSIONS   Left ventricular ejection fraction is visually estimated at 30%.   Inferior and lateral akinesis   Severe posterior leaflet restriction with overriding anterior leaflet.   Torrential mitral regurgitation.   Vena contracta 1.04 cm.   PISA radius 2.0 cm.      CT Angio chest w/ contrast (OSH- 3/17/24)  IMPRESSION:   1. No pulmonary embolism.   2. Small bilateral pleural effusions, left greater than right.   3. Bibasilar volume loss and consolidation with patchy peripheral areas of interstitial and groundglass opacity, correlate clinically for acute infectious/inflammatory etiologies.   4. Trace pericardial effusion.   5. Ectasia of the ascending aorta at 4 cm in caliber.   6. Main pulmonary artery is mildly dilated which can be seen in pulmonary arterial hypertension.

## 2024-03-25 NOTE — PROGRESS NOTES
[facility]  Critical Care Cardiology Consult    Patient Name: Giacomo Castro  Medical Record Number: [id number]  YOB: 1970  Date of Service: 3/23/2024    Consult performed at the request of Rachid Reynolds MD  Reason for consult/Service: ECMO/critical care management    HPI: Giacomo Castro is a 54 year old male with PMH HLD, tobacco use who presented to [facility] on 3/12 with chest pain that started on 3/10 and found to have an inferior STEMI. He was taken to the cath lab, where RCA PCI was unsuccessful, unreponsive to POBA or aspiration thrombectomy. He was then admitted for cardiogenic shock on dobutamine, cath lab course complicated by VT leading to shocks. EF noted to be 25% with moderate MR with posterior restricted leaflet. Course complicated by PNA s/p CTX course. 3/20 patient had RHC which showed CVP 15, RA 52/15, mPA 40, mPCWP 35. Diuresis attempted and continued dobutamine, but then overnight had recurrent VT withc shocks. He was subsequently intubated, dobutamine stopped and switched to milrinone and levo, amiodarone was started. Plan was for FERNANDEZ today, which showed torrential MR with a restricted posterior leaflet, likely related to cMRI findings of concern for infarction of postero-medial papillary muscles. Given acute decompensation, patient was transferred to [facility] for further evaluation and management.      Interval Changes: new arterial line placed overnight, diuresed    ECMO :  Mode: V-A   Pump Type: Cardiohelp  RPM's: 4000  Blood Flow (Circuit) LPM: 3.91 LPM  Sweep LPM: 2.5 LPM  Sweep FiO2   %: 70 %  Venous Pressure  mmHg: -66 mmHg  Arterial Pressure  mmH mmHg  Internal Pressure mmH mmHg  Pressure Change mmH mmHg    Diagnoses:  Late presenting Inferior STEMI c/b cardiogenic shock and recurrent VT  Biventricular failure  Papillary muscle infarction leading to restricted posterior mitral valve leaflet and severe MR  Acute  "hypoxic respiratory failure    Recommendations:  -? Start gabapentin (don't need full withdrawal dosing at this point), minimize sedation, specifically versed  - reach out to CVTS and structural teams   - needs to have BM, meds increased  - diurese again  - deescalate abx     Staffed with Dr. Escoto.     Jone Stahl, APRN, CNP  [facility]  Critical Care Cardiology  Text Page     March 23, 2024    Objective:   Vital signs: Pulse 62   Temp 98.4  F (36.9  C)   Resp 12   Ht 1.905 m (6' 3\")   Wt 115.5 kg (254 lb 10.1 oz)   SpO2 100%   BMI 31.83 kg/m       Intake/Output Summary (Last 24 hours) at 3/23/2024 1606  Last data filed at 3/23/2024 1300  Gross per 24 hour   Intake 2951.62 ml   Output 5845 ml   Net -2893.38 ml     Wt Readings from Last 1 Encounters:   03/25/24 115.5 kg (254 lb 10.1 oz)     LABS:  CMP  Recent Labs   Lab 03/25/24  0358 03/25/24  0354 03/24/24  2337 03/24/24  2202 03/24/24  2158 03/24/24  1615 03/24/24  1614 03/24/24  1025 03/24/24  1019   NA  --  146*  --   --  146*  --  146*  --  144   POTASSIUM  --  4.2  --   --  4.0  --  4.5  --  4.6   CHLORIDE  --  109*  --   --  106  --  108*  --  109*   CO2  --  30*  --   --  32*  --  29  --  27   ANIONGAP  --  7  --   --  8  --  9  --  8   * 158*  160* 136*   < > 145*  166*   < > 149*   < > 152*   BUN  --  23.2*  --   --  23.2*  --  23.8*  --  26.3*   CR  --  0.75  --   --  0.83  --  0.85  --  0.88   GFRESTIMATED  --  >90  --   --  >90  --  >90  --  >90   GRICEL  --  8.3*  --   --  8.3*  --  8.1*  --  8.2*   MAG  --  2.3  --   --  2.1  --  2.0  --  2.2   PHOS  --  2.3*  --   --  1.6*  --  2.5  --  2.5   PROTTOTAL  --  5.1*  --   --  5.2*  --  5.3*  --  5.1*   ALBUMIN  --  2.7*  --   --  2.7*  --  2.8*  --  2.9*   BILITOTAL  --  0.9  --   --  0.7  --  0.6  --  0.5   ALKPHOS  --  57  --   --  50  --  51  --  49   AST  --  120*  --   --  112*  --  123*  --  132*   ALT  --  190*  --   --  214*  --  234*  --  235*    < > = values in this " "interval not displayed.     CBC  Recent Labs   Lab 03/25/24  0354 03/24/24 2158 03/24/24  1614 03/24/24  1019   WBC 16.6* 18.7* 24.3* 26.3*   RBC 3.22* 3.33* 3.43* 3.33*   HGB 10.7* 10.9* 11.0* 10.9*   HCT 32.3* 33.4* 33.8* 32.6*    100 99 98   MCH 33.2* 32.7 32.1 32.7   MCHC 33.1 32.6 32.5 33.4   RDW 13.9 13.7 13.8 13.7    256 295 285     INR  Recent Labs   Lab 03/25/24  0354 03/24/24 2158 03/24/24  1614 03/24/24  1019   INR 2.12* 1.93* 1.86* 1.66*     Arterial Blood Gas  Recent Labs   Lab 03/25/24  0552 03/25/24  0354 03/25/24  0158 03/24/24  2352   PH 7.43 7.52* 7.53* 7.46*   PCO2 46* 39 40 50*   PO2 173* 138* 81 179*   HCO3 31* 32* 33* 36*   O2PER 50 50  70  50 50 50     LipidsNo lab results found in last 7 days.  TSHNo lab results found in last 7 days.  [id number] lab results found in last 7 days.  TroponinInvalid input(s): \"TROP\"    EKG: reviewed     ECHO: 3/21  There is coaptation failure of mitral leaflets. Anterior leaflet overrides due  to severely restricted posterior leaflet and chordal tethering.  Severe posteriorly directed mitral regurgitation (secondary MR).  Jemal class IIIb.  Pulmonary flow reversal noted in pulmonary veins.  Left ventricular function is decreased. The ejection fraction is 10-15%  (severely reduced).  Global right ventricular function is severely reduced.  The right ventricle is normal size.    Imaging/Angiography:  3/12  1. Delayed presentation acute inferior STEMI.  Persistent symptoms began [place] 3/10.    2. Unsuccessful PCI to the mid-RCA.  Thrombosed vessel, unresponsive to balloon angioplasty and mechanical aspiration thrombectomy.           "

## 2024-03-25 NOTE — PROGRESS NOTES
Overnight patient remains labile.   Neuro status unchanged. NPI normal, pupils equal. Arouses to touch, sedation increased.   Cardiac: SR/SB in the 50's-60's with PAC's and PVC's. Occasionally BBB. Pressor requirements slightly decreased. ECMO 70%, flow 3.8-4.1, sweep 2.5. IABP Pressure transducer, fiber optic not functioning. 1:1, EKG, 100%. Small amount of blood in sheathe cover, team aware.  Respiratory: ABG's very labile.  Settings CMV/AC, 50%, rate of 12, PEEP 8, . Resp Culture unable to be obtained due to lack of secretions.  GI/: Hypoactive bowels, Good UOP. Diamox given.    Labs: Lactic normal, PTT therapeutic, Platelets downtrending, D-Dimer >20      For full assessments and vitals please see flowsheets.

## 2024-03-25 NOTE — PROCEDURES
[facility]    Insert arterial line    Date/Time: 3/24/2024 10:42 PM    Performed by: Jacob Flores MD  Authorized by: Jacob Flores MD      UNIVERSAL PROTOCOL   Site Marked: Yes  Prior Images Obtained and Reviewed:  Yes  Required items: Required blood products, implants, devices and special equipment available    Patient identity confirmed:  Arm band and hospital-assigned identification number  NA - No sedation, light sedation, or local anesthesia  Confirmation Checklist:  Patient's identity using two indicators, relevant allergies, procedure was appropriate and matched the consent or emergent situation and correct equipment/implants were available  Time out: Immediately prior to the procedure a time out was called    Universal Protocol: the Joint Commission Universal Protocol was followed    Preparation: Patient was prepped and draped in usual sterile fashion    ESBL (mL):  1  Indication:  multiple ABGs hemodynamic monitoring  Location:   Location: right ulnar artery.     ANESTHESIA    Anesthesia:  See MAR for details      PROCEDURE DETAILS  Wiliam's Test Normal?: Wiliam's test not abnormal    Seldinger technique: Seldinger technique used    Number of Attempts:  1  Post-procedure:  Line sutured and dressing applied  CMS: normal    PROCEDURE    Patient Tolerance:  Patient tolerated the procedure well with no immediate complications  Length of time physician/provider present for 1:1 monitoring during sedation: 15

## 2024-03-26 NOTE — PROGRESS NOTES
[facility] ID Service: Follow Up Note      Patient:  Giacomo Castro   Date of birth 1970, Medical record number [id number]  Date of Visit:  03/26/2024  Date of Admission: 3/22/2024         Assessment and Recommendations:   ID Problem List:  Shock  Fever  Bilateral GGO and consolidative lung opacities- pulmonary edema  STEMI (3/10/24) c/b severe MR and ADHF  S/p VA-ECMO with IABP (3/23-*)  LFT elevation  Allergies: pcn (unknown)    Recommendations:  Continue cefepime 2g IV q8hrs  Following pending cultures - no growth thus far, favor pulmonary edema as etiology of imaging findings      Discussion:  Giacomo Castro is a 54 year old male with history of HLD and tobacco use who initially presented to [facility] on 3/10/24 with inferior STEMI s/p unsuccessful attempted RCA revascularization. Course complicated by ADHF with significant volume overload despite diuresis, recurrent episodes of VT, torrential MR with concern for infarction of posteromedial papillary muscles, and pneumonia s/p ceftriaxone+azithromycin x5d. Transferred to [facility] on 3/22/24 for further management. Cannulated for VA-ECMO with IABP (3/23-*).      Concern for pneumonia at OSH with GGO and consolidation on CT chest. Was treated with ceftriaxone and azithromycin (3/18-3/22). Sputum Cx from OSH (3/18) with oropharyngeal monica; Strep pneumo and Legionella urine antigens negative.     Increased pressor requirements and fever to 101.3F on 3/23 and leukocytosis to 27.8K, . Afebrile since cannulation for ECMO with persistent leukocytosis and requiring pressors. Has been on meropenem since arrival.  CT C/A/P with mixed bilateral GGO and consolidation; no sign of intra-abdominal infection. Urine culture 3/23 negative. Volume overload, VAP, viral or atypical pulmonary infection considered as etiology of lung findings. Sputum cultures 3/23 NG, 3/24 NGTD. Negative COVID and respiratory PCR panel. Blood cultures 3/22, 3/23, 3/24 NGTD. Has  already received a course of Azithromycin for atypical coverage at OSH. Does not have significant risk factors for fungal infection. Most recent CXR appears c/w pulmonary edema. Started to de-escalate coverage on 3/25 with transition from meropenem -->cefepime.    Recs were discussed with primary team today. Don't hesitate to call with questions.     Attestation:  I have reviewed today's vital signs, medications, labs and imaging.    Suzie Diaz PA-C  Pronouns: she/her/hers  Infectious Diseases  Contact via [email or web address] or [email or web address] Paging/Directory       45 MINUTES SPENT BY ME on the date of service doing chart review, history, exam, documentation & further activities per the note.             Interval History:       Afebrile, stable inflammatory markers. Continues on pressors x3. Cultures NGTD. Plan for OR on 3/17 for MVR, CABG, impella.  Parents visiting this morning, updated from ID standpoint.          Current Antimicrobials   Current:  - meropenem (3/22)    Prior:  - Vancomycin (3/22)  - ceftriaxone (3/18-3/22)  - Azithromycin (3/18-3/22)         Physical Exam:   Ranges for vital signs:  Temp:  [98.4  F (36.9  C)-99  F (37.2  C)] 98.6  F (37  C)  Pulse:  [54-77] 63  Resp:  [9-19] 13  MAP:  [57 mmHg-80 mmHg] 63 mmHg  Arterial Line BP: ()/(38-58) 96/43  FiO2 (%):  [50 %-60 %] 60 %  SpO2:  [88 %-100 %] 98 %    Intake/Output Summary (Last 24 hours) at 3/25/2024 0945  Last data filed at 3/25/2024 0800  Gross per 24 hour   Intake 2653.52 ml   Output 3225 ml   Net -571.48 ml     Exam:  GENERAL:  intubated and sedated, supine in ICU bed.   HEENT:  Head is normocephalic, atraumatic   EYES:  Eyes have anicteric sclerae without conjunctival injection or discharge.    ENT:  Oropharynx is moist without exudates or ulcers on visible surfaces. ETT in place.  LUNGS:  Clear to auscultation bilateral, no wheeze, crackles, rhonchi. +mechanical ventilation  CARDIOVASCULAR:  RRR, +systolic murmur throughout.  ABDOMEN:  hypoactive  bowel sounds, soft, nondistended, nontender.   SKIN:  Warm, dry. No acute rashes or jaundice. Scabbed over abrasion LLE- stable. No stigmata of endocarditis or significant wounds. R femoral ECMO access in place, right internal jugular line in place without erythema or drainage.         Laboratory Data:   Reviewed.  Pertinent for:    Culture data:  Culture   Date Value Ref Range Status   03/25/2024 No growth after 1 day  Preliminary   03/25/2024 No growth after 1 day  Preliminary   03/24/2024 No growth after 1 day  Preliminary   03/24/2024 No growth after 1 day  Preliminary   03/24/2024 No growth after 1 day  Preliminary   03/24/2024 No growth after 2 days  Preliminary   03/24/2024 No Growth  Final   03/23/2024 No growth after 2 days  Preliminary   03/23/2024 No growth after 2 days  Preliminary   03/23/2024 No Growth  Final   03/23/2024 No Growth  Final   03/22/2024 No growth after 3 days  Preliminary   03/22/2024 No growth after 3 days  Preliminary       Inflammatory Markers    Recent Labs   Lab Test 03/26/24  0411 03/25/24  0354 03/24/24  0358 03/23/24  1602   SED 28* 26* 27* 27*   CRPI 102.00* 79.90* 110.00* 108.00*       Hematology Studies    Recent Labs   Lab Test 03/26/24  0411 03/25/24 2209 03/25/24  1558 03/25/24  1008   WBC 17.7* 18.0* 17.1* 17.5*   HGB 10.7* 10.7* 10.5* 10.3*    99 100 100    172 184 195     Recent Labs   Lab Test 03/24/24  1614 03/24/24  0358 03/23/24  1602 03/23/24  1230   ANEU 20.4* 22.2* 20.4* 19.2*   AEOS 0.2 0.0 0.0 0.0       Metabolic Studies     Recent Labs   Lab Test 03/26/24  0411 03/25/24 2209 03/25/24  1558 03/25/24  1008   * 146* 145 146*   POTASSIUM 4.2 3.8 4.1 3.9   CHLORIDE 107 108* 110* 110*   CO2 30* 28 27 27   BUN 19.6 19.6 22.4* 22.2*   CR 0.88 0.80 0.78 0.74   GFRESTIMATED >90 >90 >90 >90       Hepatic Studies    Recent Labs   Lab Test 03/26/24  0411 03/25/24  2209 03/25/24  1804 03/25/24  1558   BILITOTAL 0.9 0.8  --  0.8   ALKPHOS 55 55  --  52    ALBUMIN 3.1* 3.1* 2.8* 2.8*   * 109*  --  114*   * 173*  --  179*            Imaging:     CXR (3/26/2024)  Impression:   1.  Endotracheal tube tip terminates 4.5 cm above the cesia.  Additional support devices are stable.  2.  Stable enlarged cardiac silhouette with slight decreased diffuse  mixed interstitial and airspace opacities, favoring pulmonary edema  and atelectasis.      CT Chest/Abd/Pelvis (3/23/24)  IMPRESSION:  1.  Bilateral central perihilar bronchovascular groundglass and  consolidative opacities with bilateral basilar consolidations,  concerning for aspiration and/or infection.  2.  Small bilateral pleural effusions.  3.  Circumferential bladder wall thickening, which may be accentuated  by underdistention. Consider correlation with urinalysis and urinary  symptoms.  4.  Ectatic ascending thoracic aorta measuring up to 4.0 cm.  5.  Enlarged pulmonary artery measuring 3.5 cm, which can be seen in  pulmonary hypertension.     --------------------------------------------  FERNANDEZ (OSH 3/21/24)  CONCLUSIONS   Left ventricular ejection fraction is visually estimated at 30%.   Inferior and lateral akinesis   Severe posterior leaflet restriction with overriding anterior leaflet.   Torrential mitral regurgitation.   Vena contracta 1.04 cm.   PISA radius 2.0 cm.      CT Angio chest w/ contrast (OSH- 3/17/24)  IMPRESSION:   1. No pulmonary embolism.   2. Small bilateral pleural effusions, left greater than right.   3. Bibasilar volume loss and consolidation with patchy peripheral areas of interstitial and groundglass opacity, correlate clinically for acute infectious/inflammatory etiologies.   4. Trace pericardial effusion.   5. Ectasia of the ascending aorta at 4 cm in caliber.   6. Main pulmonary artery is mildly dilated which can be seen in pulmonary arterial hypertension.

## 2024-03-26 NOTE — PROGRESS NOTES
[facility]    ECLS Shift Summary:     ECMO Equipment:  Console Serial Number: [id number]  Circuit Lot Number: [id number]  Oxygenator Lot Number: [id number]    Circuit Assessment: Fibrin, Clot  Fibrin Location: connectors, oxy outlet  Clot Location: corners of oxy    Arterial ECMO Cannula: 17 Fr in the Right Femoral Artery  Venous ECMO Cannula: 25 Fr in the Right Femoral Vein  ECMO Cannula Venous Right femoral vein-Site Assessment: WDL  ECMO Cannula Arterial Right femoral artery-Site Assessment: WDL  Distal Perfusion Catheter Right superficial femoral artery-Site Assessment: Secure, Sutured  ECMO Cannula Venous Right femoral vein-Site Intervention: No intervention needed  ECMO Cannula Arterial Right femoral artery-Site Intervention: No intervention needed  Distal Perfusion Catheter Right superficial femoral artery-Site Intervention: No intervention needed    Patient remains on V-A ECMO, all equipment is functioning and alarms are appropriately set. RPM's: 4000 with Blood Flow (Circuit) LPM  Avg: 3.9 LPM  Min: 3.88 LPM  Max: 4.03 LPM L/min. Sweep is at (S) 2 LPM and 80 %. Extremities are cool.     Significant Shift Events: none    Vent settings:  Vent Mode: CMV/AC  (Continuous Mandatory Ventilation/ Assist Control)  FiO2 (%): 60 %  Resp Rate (Set): 12 breaths/min  Tidal Volume (Set, mL): 500 mL  PEEP (cm H2O): 8 cmH2O  Resp: 12      Anticoagulation:  Dose (units/hr) HEParin: 3000 Units/hr  Rate (mL/hr) HEParin: 30 mL/hr  Concentration HEParin: 100 Units/mL     Dose (mg/kg/hr) Bivalirudin: 0.09 mg/kg/hr  Rate (mL/hr) Bivalirudin: 9.5 mL/hr  Concentration Bivalirudin: 1 mg/mL  Most recent: ACT  (seconds): 197 seconds    Urine output is  .  Blood loss was minimal. Product given included none.     Intake/Output Summary (Last 24 hours) at 3/26/2024 0608  Last data filed at 3/26/2024 0600  Gross per 24 hour   Intake 3862.28 ml   Output 5690 ml   Net -1827.72 ml       Labs:  Recent  Labs   Lab 03/26/24  0415 03/26/24  0411 03/26/24  0231 03/26/24  0044 03/25/24  2213   PH 7.39  --  7.39 7.44 7.46*   PCO2 53*  --  54* 46* 43   PO2 98  --  255* 106* 74*   HCO3 32*  --  33* 31* 31*   O2PER 60 45  80  60 60 60 60       Lab Results   Component Value Date    HGB 10.7 (L) 03/26/2024    PHGB 40 (H) 03/26/2024     03/26/2024    FIBR 493 (H) 03/26/2024    INR 1.89 (H) 03/26/2024    PTT 59 (H) 03/26/2024    DD >20.00 (HH) 03/26/2024    ANTCH 52 (L) 03/25/2024       Plan is to continue to rest on VA ECMO. Surgery scheduled for Wednesday.    Eugenio Leon RN  ECMO Specialist  3/26/2024 6:08 AM

## 2024-03-26 NOTE — PROGRESS NOTES
[facility]    Cardiology Progress Note- Cardiology        Date of Admission:  3/22/2024     Assessment & Plan: HVSL   Giacomo Castro is a 54 year old male with PMH HLD, tobacco use who presented to [facility] on 3/12 with late presentation inferior STEMI s/p cath with unsuccessful RCA PCI or thrombectomy, complicated by biventricular failure, severe MR 2/2 infarction of postero-medial papillary muscle, VT x2 episodes requiring cardioversion, pneumonia. Transferred to [facility] for possible mitral valve surgery. Patient was decompensating while on 3 inopressors thus was cannulated for ECMO with IABP 3/23.    Changes today  - CVTS plans for MVR, CABG, Impella, indicated procedures as currently scheduled on Wednesday 3/27/24  - Bumex 4 mg IV once; ecmo chugging later; will hold off on further diuresis and likely add  IVF small bolus  - Transition to heparin gtt from the bival gtt; will add FFP given antithrombin III deficiency (and to add the extra small bolus of volume)  - Pressors stable  - Keep on current Abx  - This PM not waking up after sedation interruption like before, will add CTH, however pupils are reactive and equal, exam is non-focal, but not eliciting responses    Neurology   # Acute metabolic encephalopathy 2/2 sedation  - continue versed and fentanyl gtt   - rass goal -2 to -3  - add gabapentin 100 mg TID  - CTH as above, pending result will talk to Neuro    Cardiovascular  # Late presenting Inferior STEMI c/b cardiogenic shock and recurrent VT  # Biventricular failure  # Papillary muscle infarction leading to restricted posterior mitral valve leaflet and severe MR  Patient with symptoms starting on 3/10, but presented to [facility] on 3/12. Found to have inferior STEMI, RCA lesion was unable to be revascularized. Course complicated by cardiogenic shock initially treated with dobutamine, but complicated by VT requiring shock in cath lab and  again on 3/20, switched to milrinone, vasopressin, levo. Presenting for [facility] for advanced therapy consideration. 3/22 developed fever, leukocytosis requiring more pressors concerning for septic shock, discussed in ID section.    Date CVP PAP PCWP CO/CI  SVR Inotrope Vasodilator MCS   3/22/24  ([facility]) 18 55/28/37 32 4.15/1.77 1098 Milrinone, NE, vasopressin none none   3/23/24 17 62/39/46  4.5/1.9 970 Epinephrine, NE, vasopressin none none     ECMO :  Mode: V-A   Pump Type: Cardiohelp  RPM's: 4350  Blood Flow (Circuit) LPM: 4.17 LPM  Sweep LPM: 1.75 LPM  Sweep FiO2   %: 100 %  Venous Pressure  mmHg: -80 mmHg  Arterial Pressure  mmH mmHg  Internal Pressure mmH mmHg  Pressure Change mmH mmHg    - ECMO at R femoral 3/23: O2 70%, flow 4.1, sweep 2.5, pulsatility of 2  - IABP 1:1  - inotropes: epinephrine, NE, vasopressin-- wean as able  - diuretics: bumex 4 mg IV BID  - antiplatelet/anticoag:   - continue ASA  - Plavix held starting 3/21 given possibility of valve intervention  - bivalirudin, started on 3/24 as no improvement of ACT despite increasing heparin dose, goal 44-88  - AT III low  - hold statin in setting of worsening LFTs, will consider resume once LFT normalized  - CVTS consult for mitral valve regurgitation  - not transplant/LVAD candidate due to alcohol, smoking    # recurrent VT storm  Had VT storm requiring shock in cath lab. Recurrent requiring shock again on 3/21. Amiodarone increased 0.5->1 for multiple ectopy 3/23  - amiodarone gtt 0.5 mg/hr    [facility] 3/22  RA: 17/19/16 mmHg  RV: 55/15 mmHg  PA: 55/28/37 mmHg  PCWP: 35/50/32 mmHg. Wedge saturation: 99.7%  PA sat: 52.3%  Ayleen CO/CI:4.2/1.8  Thermo CO/CI: 4.2/1.8 Right sided filling pressures are moderately elevated. Left sided filling pressures are severely elevated. Moderately elevated pulmonary artery hypertension. Reduced cardiac output level.     FERNANDEZ: 3/21  There is coaptation failure of mitral leaflets. Anterior leaflet overrides due  to severely restricted posterior leaflet and chordal tethering.  Severe posteriorly directed mitral regurgitation (secondary MR).  Jemal class IIIb.  Pulmonary flow reversal noted in pulmonary veins.  Left ventricular function is decreased. The ejection fraction is 10-15%  (severely reduced).  Global right ventricular function is severely reduced.  The right ventricle is normal size.    Cath 3/12  1. Delayed presentation acute inferior STEMI.  Persistent symptoms began [facility] 3/10.   2. Unsuccessful PCI to the mid-RCA.  Thrombosed vessel, unresponsive to balloon angioplasty and mechanical aspiration thrombectomy.     Pulmonary  # Acute Hypoxic Respiratory Failiure 2/2 pulmonary edema, HAP  Vent Mode: CMV/AC  (Continuous Mandatory Ventilation/ Assist Control)  FiO2 (%): 60 %  Resp Rate (Set): 12 breaths/min  Tidal Volume (Set, mL): 500 mL  PEEP (cm H2O): 8 cmH2O  Resp: 14    Intubated on 3/21 following cardioversion.   - currently on ventilator as above, good oxygenation and ventilation        Gastrointestinal, Nutrition  #Transaminitis   LFTs <100 today at [facility], on admission here in the high 300's. Likely congestive hepatopathy in the setting of acute HF from severe MR  - treatment of cardiogenic shock as above    # Nutrition  - start TF, TwoCal 10 ml/hr on 3/24, appreciate nutrition assistance    # Constipation  - scheduled senna, miralax while on fentanyl gtt  - bisacodyl    # Stress ulcer prophylaxis  - PPI IV for prophylaxis     Renal, Electrolytes    # Mild JEANE  Cr 1.21, baseline <1  - currently Cr returned to normal    # Hypernatremia  -  ml q4h     # Metabolic alkalosis  - s/p diamox    # Hypophosphatemia  - replete as needed     Infectious Disease  # Possible aspiration HAP  CT chest with GGO 3/17 concerning for PNA. Patient was on CTX/azithro 3/18-3/22, azithro was stopped after VT. Transitioned to Vanc and meropenem on 3/22 for HAP. Patient has been febrile and decompensating requiring  "more pressors. Possible that this is from myocardial infarction however could not rule out infection. CT chest/abdomen/pelvis with bilateral ground glass and consolidative opacities concerning for aspiration and/or infection.  - ID following, appreciate recs  - antibiotics   - Vancomycin: 3/22-3/23   - Meropenem: 3/22, 3/23-3/25   - Cefepime: 3/23, 3/25-TBD  - Infectious workup   - 3/18 urine legionella, S pneumo: negative   - 3/18 sputum: oropharyngeal monica   - 3/22 MRSA nares: negative   - 3/22 blood culture: NGTD   - 3/23 urine culture: NGTD   - 3/24 respiratory panel: pending    Hematology  Heparin/bival as discussed above     Endocrinology  - glucose goal 120-180  - medium insulin sliding scale          Diet: Adult Formula Drip Feeding: Continuous TwoCal HN; Nasogastric tube; Goal Rate: 10; trophic do not adv; mL/hr  NPO per Anesthesia Guidelines for Procedure/Surgery Except for: Meds  NPO per Anesthesia Guidelines for Procedure/Surgery Except for: Meds    DVT Prophylaxis: heparin gtt  Chua Catheter: PRESENT, indication: ICU only: hourly urine output needed for patient care  Cardiac Monitoring: ACTIVE order. Indication: ICU  Code Status: Full Code          Clinically Significant Risk Factors         # Hypernatremia: Highest Na = 146 mmol/L in last 2 days, will monitor as appropriate      # Hypoalbuminemia: Lowest albumin = 2.7 g/dL at 3/25/2024  3:54 AM, will monitor as appropriate    # Coagulation Defect: INR = 1.91 (Ref range: 0.85 - 1.15) and/or PTT = 60 Seconds (Ref range: 22 - 38 Seconds), will monitor for bleeding     # Acute heart failure with reduced ejection fraction: last echo with EF <40% and receiving IV diuretics       # Obesity: Estimated body mass index is 31.14 kg/m  as calculated from the following:    Height as of this encounter: 1.905 m (6' 3\").    Weight as of this encounter: 113 kg (249 lb 1.9 oz).             Patient care discussed with Dr. Rodolfo Aviles MD  PGY-2 " Internal Medicine  Cardiology Service  [facility]  ______________________________________________________________________    Interval History  Overnight, a line at radial was not working. Possible clots distally? A line at ulnar artery was placed. patient received diamox for diuresis and alkalosis (bicarb 32).  ml q6h for hypernatremia (Na 146).     Physical Exam   Vital Signs: Temp: 98.8  F (37.1  C) Temp src: Esophageal   Pulse: 69   Resp: 14 SpO2: 100 % O2 Device: Mechanical Ventilator    Weight: 249 lbs 1.92 oz    GENERAL: Intubated, sedated. NAD.  HEENT: No icterus. ETT in place, NG tube in place.  CARDIOVASCULAR: tachycardic. Normal S1 and S2.  RESP: Coarse bilaterally. Mechanical ventilation.    GI Soft, bowel sounds hypoactive but present.  GENITOURINARY: Chua in place.  EXTREMITIES: no pitting edema  NEURO: Sedated and intubated.  INTEGUMENTARY: No rashes    Medical Decision Making       Please see A&P for additional details of medical decision making.      Data   ------------------------- PAST 24 HR DATA REVIEWED -----------------------------------------------  I have reviewed today's vital signs, notes, medications, labs and imaging.  I have also seen and examined the patient and agree with the findings and plan as outlined above.   Pt is 53 yo WM with hx of tobacco use and alcohol use with late presentation of IWMI resulting in further biventricular dysfn and severe MR requiring IABP and ECMO support.  Pt sedated and intubated.  Cr 0.8 and WBC downtrending.  Adriane remains elevated in 800s.  Essentially no pulsatility of heart.  Good UO.  Plan for high risk CABG and MVR.  Had long discussion with family and answered all questions.  Pt seen X3 for total critical care time 50 min.      Rachid Reynolds MD, PhD  Professor, Heart Failure and Cardiac Transplantation  [facility]

## 2024-03-26 NOTE — PROGRESS NOTES
[facility]    ECLS Shift Summary: [id number]     ECMO Equipment:  Console Serial Number: [id number]  Circuit Lot Number: [id number]  Oxygenator Lot Number: [id number]    Circuit Assessment: Fibrin, Clot  Fibrin Location: Connectors, oxygenator outlet  Clot Location: Corners of oxygenator, extending from 9-12 positions on oxy    Arterial ECMO Cannula: 17 Fr in the Right Femoral Artery  Venous ECMO Cannula: 25 Fr in the Right Femoral Vein  ECMO Cannula Venous Right femoral vein-Site Assessment: WDL  ECMO Cannula Arterial Right femoral artery-Site Assessment: WDL  Distal Perfusion Catheter Right superficial femoral artery-Site Assessment: Secure, Sutured  ECMO Cannula Venous Right femoral vein-Site Intervention: No intervention needed  ECMO Cannula Arterial Right femoral artery-Site Intervention: No intervention needed  Distal Perfusion Catheter Right superficial femoral artery-Site Intervention: No intervention needed    Patient remains on V-A ECMO, all equipment is functioning and alarms are appropriately set. RPM's: 4350 with Blood Flow (Circuit) LPM  Av.1 LPM  Min: 3.98 LPM  Max: 4.22 LPM L/min. Sweep is at 1.75 LPM and 100 %. Extremities are Cool.     Significant Shift Events: ECMO flows increased as tolerated, from 4LPM to 4.2LPM. Bival stopped at 1730 for concern with neurological exam. IABP catheter advanced 1cm by fellow MD.     Vent settings:  Vent Mode: CMV/AC  (Continuous Mandatory Ventilation/ Assist Control)  FiO2 (%): 60 %  Resp Rate (Set): 12 breaths/min  Tidal Volume (Set, mL): 500 mL  PEEP (cm H2O): 8 cmH2O  Resp: 12      Anticoagulation:  Dose (units/hr) HEParin: 3000 Units/hr  Rate (mL/hr) HEParin: 30 mL/hr  Concentration HEParin: 100 Units/mL     Dose (mg/kg/hr) Bivalirudin: 0 mg/kg/hr  Rate (mL/hr) Bivalirudin: 0 mL/hr  Concentration Bivalirudin: 1 mg/mL  Most recent: ACT  (seconds): 170 seconds    Urine output is Light / Pale.  Blood loss was Scant.  Product given included none.     Intake/Output Summary (Last 24 hours) at 3/26/2024 1826  Last data filed at 3/26/2024 1800  Gross per 24 hour   Intake 4573.36 ml   Output 6215 ml   Net -1641.64 ml       Labs:  Recent Labs   Lab 03/26/24  1604 03/26/24  1602 03/26/24  1450 03/26/24  1156 03/26/24  1002   PH 7.44  --  7.44 7.55* 7.49*   PCO2 51*  --  53* 39 46*   PO2 119*  --  168* 55* 107*   HCO3 35*  --  36* 34* 35*   O2PER 60 60  100 60 60 60       Lab Results   Component Value Date    HGB 10.8 (L) 03/26/2024    PHGB 40 (H) 03/26/2024     (L) 03/26/2024    FIBR 558 (H) 03/26/2024    INR 1.91 (H) 03/26/2024    PTT 60 (H) 03/26/2024    DD >20.00 (HH) 03/26/2024    ANTCH 72 (L) 03/26/2024       Plan is to remain on [facility] ECMO. To OR for CABG and other procedures tomorrow.    Glenis Garcia, RT  ECMO Specialist  3/26/2024 6:26 PM

## 2024-03-26 NOTE — PROGRESS NOTES
ECMO Attending Progress Note  3/26/2024    Giacomo Castro is a 54 year old male who was cannulated for ECMO 3/23/24 due to cardiogenic shock     Cannulation Site:  17 Fr in the R femoral artery  25 Fr in the R femoral vein    Interval events: LA normalized pressors being weaned down     Pulsatility 2 mmHg    Physical Exam:  Temp:  [98.6  F (37  C)-99  F (37.2  C)] 98.6  F (37  C)  Pulse:  [55-77] 63  Resp:  [9-19] 12  MAP:  [57 mmHg-234 mmHg] 65 mmHg  Arterial Line BP: ()/(38-58) 104/42  FiO2 (%):  [50 %-60 %] 60 %  SpO2:  [88 %-100 %] 98 %    Intake/Output Summary (Last 24 hours) at 3/24/2024 1409  Last data filed at 3/24/2024 1400  Gross per 24 hour   Intake 2783.31 ml   Output 1905 ml   Net 878.31 ml    Vent Mode: CMV/AC  (Continuous Mandatory Ventilation/ Assist Control)  FiO2 (%): 60 %  Resp Rate (Set): 12 breaths/min  Tidal Volume (Set, mL): 500 mL  PEEP (cm H2O): 8 cmH2O  Resp: 12       Labs:  Recent Labs   Lab 24  1002 24  0809 24  0613 24  0415   PH 7.49* 7.50* 7.44 7.39   PCO2 46* 46* 51* 53*   PO2 107* 131* 190* 98   HCO3 35* 35* 35* 32*   O2PER 60 60 60 60      Recent Labs   Lab 24  1001 24  0411 24  2209 24  1558   WBC 17.0* 17.7* 18.0* 17.1*   HGB 11.2* 10.7* 10.7* 10.5*     Creatinine   Date Value Ref Range Status   2024 0.81 0.67 - 1.17 mg/dL Final   2024 0.88 0.67 - 1.17 mg/dL Final   2024 0.80 0.67 - 1.17 mg/dL Final   2024 0.78 0.67 - 1.17 mg/dL Final   12/10/2018 0.83 0.66 - 1.25 mg/dL Final       Blood Flow (Circuit) LPM: 4.22 LPM  Sweep LPM: 1 LPM  Sweep FiO2   %: 70 %  ACT  (seconds): 170 seconds  Arterial Blood Temp  (degrees C): 36.8 C  Pulse Oximetry  (SpO2%): 99 %  Arterial Pressure  mmH mmHg      ECMO Issues including assessments and plan on DOS 3/26/2024:  Neuro: Sedated for mechanical ventilation and ECMO.  No acute distress.  NIRS stable  b/l  RASS goal: -1 100 fent 4 versed   CV: Cardiogenic shock.   Hemodynamically stable on norepi 0.08 and epi  0.08 Vaso 1, pulse pressure 2 mmHg will wean pressors but leave on some epi for innate pulsatility. IABP in place will get CAG images from [facility]  Pulm: Keep vent settings at rest settings as above, diamox for alkalosis  FEN/Renal: Electrolytes stable w/ replacement protocols in place, Cr stable, UOP stable- neg 1.6L yesterday now chugging will hold on pulling at this time, pink lady enema   Heme: bival ptt goal 44-88 unable to reach goal despite very high doses of heparn ATIII pending- will start bival, Hemoglobin stable .  Minimal oozing around the ECMO cannulas.  ID: Receiving empiric antibiotics  Cannulae: Position is acceptable on exam and the available imaging.  Distal perfusion cannula is in place and patent.  Extremities are well-perfused    Plan for high risk CABG and bioprothetic MV     I have personally reviewed the ECMO flows, oxygenation and CO2 clearance, anticoagulation, and cannula position.  I have also personally assessed the patient's systemic response with hemodynamics, oxygenation, ventilation, and bleeding.       The patient requires continued ECMO support and management in the ICU.        Cong Escoto MD  Cardiology critical care    March 26, 2024

## 2024-03-26 NOTE — PROGRESS NOTES
[facility]    ECLS Shift Summary: 1900 to 2300     ECMO Equipment:  Console Serial Number: [id number]  Circuit Lot Number: [id number]  Oxygenator Lot Number: [id number]    Circuit Assessment: Fibrin, Clot  Fibrin Location: Connectors and oxygenator outlet  Clot Location: corners of the oxygenator between the 9 o'clock and 12 o'clock positions.    Arterial ECMO Cannula: 17 Fr in the Right Femoral Artery  Venous ECMO Cannula: 25 Fr in the Right Femoral Vein  Distal Reperfusion Cannula: 8 Fr in the RSFA     Patient remains on V-A ECMO, all equipment is functioning and alarms are appropriately set. RPM's: 4000 with Blood Flow (Circuit) LPM  Av LPM  Min: 3.93 LPM  Max: 4.09 LPM L/min. Sweep is at (S) 1 LPM and (S) 100 %. Extremities are cool.     Significant Shift Events: None.    Vent settings:  Vent Mode: CMV/AC  (Continuous Mandatory Ventilation/ Assist Control)  FiO2 (%): 60 %  Resp Rate (Set): 12 breaths/min  Tidal Volume (Set, mL): 500 mL  PEEP (cm H2O): 8 cmH2O  Resp: 12      Anticoagulation:  Dose (mg/kg/hr) Bivalirudin: 0.09 mg/kg/hr  Rate (mL/hr) Bivalirudin: 9.5 mL/hr  Concentration Bivalirudin: 1 mg/mL  Most recent: ACT  (seconds): 170 seconds    Urine output is greater than 400 ml per hour.  Blood loss was minimal. No product was given.     Intake/Output Summary (Last 24 hours) at 3/25/2024 2220  Last data filed at 3/25/2024 2200  Gross per 24 hour   Intake 3474.29 ml   Output 4175 ml   Net -700.71 ml       Labs:  Recent Labs   Lab 24  2033 24  1805 24  1600 24  1558 24  1418   PH 7.51* 7.53* 7.43  --  7.45   PCO2 37 35 44  --  42   PO2 64* 74* 67*  --  51*   HCO3 30* 29* 29*  --  29*   O2PER 60 60 60 75  60  60 50       Lab Results   Component Value Date    HGB 10.5 (L) 2024    PHGB 40 (H) 2024     2024    FIBR 474 2024    INR 2.04 (H) 2024    PTT 59 (H) 2024    DD >20.00 (HH)  03/25/2024    ERWINCH 52 (L) 03/25/2024       Plan is to remain on [facility] ECMO.    CECILY Mercado, RRT  ECMO Specialist  3/25/2024 10:20 PM

## 2024-03-26 NOTE — PLAN OF CARE
PMH HLD, tobacco use    3/12 CP that started on 3/10 --> inferior STEMI, RCA PCI was unsuccessful, unreponsive to POBA or aspiration thrombectomy -->admitted for cardiogenic shock on dobutamine, cath lab course c/b VT leading to shocks. EF 25% w/ moderate MR. PNA s/p CTX course.   3/20 recurrent VT w/ shocks. Intubated, dobutamine stopped and switched to milrinone and levo, amio started. FERNANDEZ: torrential MR  3/22 Given acute decompensation, transferred to [facility] for further evaluation and management  Surgery consult to evaluate for Mitral valve replacement  3/23 Continued decompensation-->VA ECMO, IABP advanced overnight.     Nights  DANG  Shift Summary  Mag citrate (300cc) given at 2400 with no BM still. Diuretic doses overnight with net  neg 1 liter this am.  CVP 10-12, Svo2 61%. ECMO sweep change overnight to keep PaO2 within range.  Pt drops map during  small turns with wedge pillows.  Will have to further bowel regimen but pt not tolerating turns very well.      Neuro: Sedated on fent and versed Pupils PERRL.  Arouses to  pain ROSAS weakly. Not following commands.    CV:   Rate/rhythm:  SR/SB  rare  PAC's, SVO2 61%, CVP 12, PA 49/39. CI Ayleen 2.3    IABP 1:1 100% & ecmo 3.8-4.1 flows, 80% (attempted to decrease) & 2.5 sweep    Pulm: CMV/AC 60% 450 TV 12 RR 8 PEEP    GI: NG (not post-pyloric) TF straight rate of 10, Flush 175 Q4    : mackay 100-500 ml/hr, Bumex given, Diurel.    Skin: pale/warm. R foot/lower leg petechiae. Pulses still present with doppler in bilat feet.    Drips:   Amio 0.5  Fent 100  Bival 0.09   Versed 4  Levo 0.06  Epi 0.06  Vaso 1    Labs:   K+ replaced    Other: IABP has small amount of fluid/blood in sheath, team aware.    Plan:  Surgery consult placed to evaluate patient for MVR.  Cont to treat with laxatives.  Pt hasn't had BM since 3/19.  Family with be in around 12noon to talk with MD about surgery options.

## 2024-03-26 NOTE — PROGRESS NOTES
ECMO Attending Progress Note  3/25/2024    Giacomo Castro is a 54 year old male who was cannulated for ECMO 3/23/24 due to cardiogenic shock     Cannulation Site:  17 Fr in the R femoral artery  25 Fr in the R femoral vein    Interval events: NAEO    Physical Exam:  Temp:  [98.4  F (36.9  C)-99  F (37.2  C)] 98.8  F (37.1  C)  Pulse:  [54-77] 63  Resp:  [9-18] 12  MAP:  [57 mmHg-83 mmHg] 75 mmHg  Arterial Line BP: ()/(38-58) 121/46  FiO2 (%):  [50 %-60 %] 60 %  SpO2:  [88 %-100 %] 99 %    Intake/Output Summary (Last 24 hours) at 3/24/2024 1409  Last data filed at 3/24/2024 1400  Gross per 24 hour   Intake 2783.31 ml   Output 1905 ml   Net 878.31 ml    Vent Mode: CMV/AC  (Continuous Mandatory Ventilation/ Assist Control)  FiO2 (%): 60 %  Resp Rate (Set): 12 breaths/min  Tidal Volume (Set, mL): 500 mL  PEEP (cm H2O): 8 cmH2O  Resp: 12       Labs:  Recent Labs   Lab 24  2033 24  1805 24  1600   PH 7.46*  --  7.51* 7.53* 7.43   PCO2 43  --  37 35 44   PO2 74*  --  64* 74* 67*   HCO3 31*  --  30* 29* 29*   O2PER 60 60 60 60 60      Recent Labs   Lab 24  1558 24  1008 24  0354   WBC 18.0* 17.1* 17.5* 16.6*   HGB 10.7* 10.5* 10.3* 10.7*     Creatinine   Date Value Ref Range Status   2024 0.78 0.67 - 1.17 mg/dL Final   2024 0.74 0.67 - 1.17 mg/dL Final   2024 0.75 0.67 - 1.17 mg/dL Final   2024 0.83 0.67 - 1.17 mg/dL Final   12/10/2018 0.83 0.66 - 1.25 mg/dL Final       Blood Flow (Circuit) LPM: 4.22 LPM  Sweep LPM: 1 LPM  Sweep FiO2   %: 70 %  ACT  (seconds): 170 seconds  Arterial Blood Temp  (degrees C): 36.8 C  Pulse Oximetry  (SpO2%): 99 %  Arterial Pressure  mmH mmHg      ECMO Issues including assessments and plan on DOS 3/25/2024:  Neuro: Sedated for mechanical ventilation and ECMO.  No acute distress.  NIRS stable  b/l  RASS goal: -1  CV: Cardiogenic shock.  Hemodynamically stable on norepi and epi   Vaso. IABP in place   Pulm: Keep vent settings at rest settings as above.  FEN/Renal: Electrolytes stable w/ replacement protocols in place, Cr stable, UOP stable- start diuresis  Heme: On bival for failure to achieve act goal with heparin Hemoglobin stable.  Minimal oozing around the ECMO cannulas.  ID: Receiving empiric antibiotics  Cannulae: Position is acceptable on exam and the available imaging.  Distal perfusion cannula is in place and patent.  Extremities are well-perfused     I have personally reviewed the ECMO flows, oxygenation and CO2 clearance, anticoagulation, and cannula position.  I have also personally assessed the patient's systemic response with hemodynamics, oxygenation, ventilation, and bleeding.       The patient requires continued ECMO support and management in the ICU.        Cong Escoto MD  Cardiology critical care  March 25, 2024

## 2024-03-26 NOTE — PROGRESS NOTES
[facility]  Critical Care Cardiology Consult    Patient Name: Giacomo Castro  Medical Record Number: [id number]  YOB: 1970  Date of Service: 3/23/2024    Consult performed at the request of Rachid Reynolds MD  Reason for consult/Service: ECMO/critical care management    HPI: Giacomo Castro is a 54 year old male with PMH HLD, tobacco use who presented to [facility] on 3/12 with chest pain that started on 3/10 and found to have an inferior STEMI. He was taken to the cath lab, where RCA PCI was unsuccessful, unreponsive to POBA or aspiration thrombectomy. He was then admitted for cardiogenic shock on dobutamine, cath lab course complicated by VT leading to shocks. EF noted to be 25% with moderate MR with posterior restricted leaflet. Course complicated by PNA s/p CTX course. 3/20 patient had RHC which showed CVP 15, RA 52/15, mPA 40, mPCWP 35. Diuresis attempted and continued dobutamine, but then overnight had recurrent VT withc shocks. He was subsequently intubated, dobutamine stopped and switched to milrinone and levo, amiodarone was started. Plan was for FERNANDEZ today, which showed torrential MR with a restricted posterior leaflet, likely related to cMRI findings of concern for infarction of postero-medial papillary muscles. Given acute decompensation, patient was transferred to [facility] for further evaluation and management.      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 Escoto.     Jone Stahl, APRN, CNP  [facility]  Critical Care Cardiology  Text Page     March 23, 2024    Objective:   Vital signs: Pulse 62   Temp 98.6  F (37  C)   Resp 14   Ht 1.905 m (6' 3\")   Wt 113 kg (249 lb 1.9 oz)   SpO2 99%   BMI 31.14 kg/m       Intake/Output Summary (Last 24 hours) at 3/23/2024 1606  Last data filed at 3/23/2024 1300  Gross per 24 hour   Intake 2951.62 ml   Output 5845 ml   Net -2893.38 ml     Wt Readings from Last 1 Encounters:   03/26/24 113 kg (249 lb 1.9 oz)     LABS:  Results for orders placed or performed during the hospital encounter of 03/22/24 (from the past 24 hour(s))   TEG with Platelet Inhibition   Result Value Ref Range    Platelet Inhibition with ADP 28 %    Platelet Inhibition with AA 45 %    TEG Interpretation       Abnormal platelet mapping study. The maximum amplitude (MA) of the Arachadonic acid reaction has a rising MA pattern. As the MA of this reaction is used in the calculation of the percent platelet inhibition, the calculated results may not accurately reflect the degree of platelet inhibition.     The maximum amplitude (MA) of the reactions in the platelet mapping study are activator 23.7 mm, ADP 57.3 mm, and Arachidonic Acid 49.2 mm.    Consider testing for platelet drug response using an alternative method such as VerifyNow testing or platelet aggregation.    Delicia Driscoll MD, PhD  Physicians        Narrative    Platelet Mapping is intended to assess platelet function   in patients who have received antiplatelet therapy, such as aspirin,   clopidogrel, abciximab, etc. Results are reported in a range of 0 to 100% inhibition. A high value indicates a response to the antiplatelet therapy.   Teg with Heparinase (for use with Platelet Inhibition)   Result Value Ref Range    R (Time until clot forms) 5.9 5.0 " - 10.0 Minute    K ( Time to Spec. clot strength) 1.2 1.0 - 3.0 Minute    Angle (Rate of Clot Growth) 70.5 53.0 - 72.0 Degrees    MA ( Maximum Clot Strength) 70.2 (H) 50.0 - 70.0 mm    CI (coagulation index) 1.9 -3.0 - 3.0    G (actual clot strength) 11.8 (H) 4.5 - 11.0 Kd/sc    LY30 (lysis at 30 minutes) 0.0 0.0 - 8.0 %    LY60 (lysis at 60 minutes) 0.2 0.0 - 15.0 %   Glucose by meter   Result Value Ref Range    GLUCOSE BY METER POCT 135 (H) 70 - 99 mg/dL   Respiratory Panel PCR    Specimen: Bronchus; Swab   Result Value Ref Range    Adenovirus Not Detected Not Detected    Coronavirus Not Detected Not Detected    Human Metapneumovirus Not Detected Not Detected    Human Rhin/Enterovirus Not Detected Not Detected    Influenza A Not Detected Not Detected    Influenza A, H1 Not Detected Not Detected    Influenza A 2009 H1N1 Not Detected Not Detected    Influenza A, H3 Not Detected Not Detected    Influenza B Not Detected Not Detected    Parainfluenza Virus 1 Not Detected Not Detected    Parainfluenza Virus 2 Not Detected Not Detected    Parainfluenza Virus 3 Not Detected Not Detected    Parainfluenza Virus 4 Not Detected Not Detected    Respiratory Syncytial Virus A Not Detected Not Detected    Respiratory Syncytial Virus B Not Detected Not Detected    Chlamydia Pneumoniae Not Detected Not Detected    Mycoplasma Pneumoniae Not Detected Not Detected    Narrative    The [employer] Respiratory Panel is a qualitative nucleic acid, multiplex, in vitro diagnostic test for the simultaneous detection and identification of multiple respiratory viral and bacterial nucleic acids in nasopharyngeal swabs collected in viral transport media from individual exhibiting signs and symptoms of respiratory infection. The assay has received FDA approval for the testing of nasopharyngeal (NP) swabs only. The Infectious Diseases Diagnostic Laboratory at [facility] has validated the performance characteristics for bronchial alveolar lavage  specimens. This test is used for clinical purposes and should not be regarded as investigational or for research. This laboratory is certified under the Clinical Laboratory Improvement Amendments of 1988 (CLIA-88) as qualified to perform high complexity clinical laboratory testing.    Lactic acid whole blood   Result Value Ref Range    Lactic Acid 1.5 0.7 - 2.0 mmol/L   Comprehensive metabolic panel   Result Value Ref Range    Sodium 146 (H) 135 - 145 mmol/L    Potassium 3.9 3.4 - 5.3 mmol/L    Carbon Dioxide (CO2) 27 22 - 29 mmol/L    Anion Gap 9 7 - 15 mmol/L    Urea Nitrogen 22.2 (H) 6.0 - 20.0 mg/dL    Creatinine 0.74 0.67 - 1.17 mg/dL    GFR Estimate >90 >60 mL/min/1.73m2    Calcium 8.5 (L) 8.6 - 10.0 mg/dL    Chloride 110 (H) 98 - 107 mmol/L    Glucose 151 (H) 70 - 99 mg/dL    Alkaline Phosphatase 52 40 - 150 U/L     (H) 0 - 45 U/L     (H) 0 - 70 U/L    Protein Total 5.4 (L) 6.4 - 8.3 g/dL    Albumin 2.9 (L) 3.5 - 5.2 g/dL    Bilirubin Total 0.9 <=1.2 mg/dL   Magnesium   Result Value Ref Range    Magnesium 1.8 1.7 - 2.3 mg/dL   Phosphorus   Result Value Ref Range    Phosphorus 2.7 2.5 - 4.5 mg/dL   Blood gas arterial   Result Value Ref Range    pH Arterial 7.42 7.35 - 7.45    pCO2 Arterial 46 (H) 35 - 45 mm Hg    pO2 Arterial 109 (H) 80 - 105 mm Hg    FIO2 50     Bicarbonate Arterial 30 (H) 21 - 28 mmol/L    Base Excess/Deficit Arterial 4.6 (H) -3.0 - 3.0 mmol/L    Wiliam's Test Artline     Oxyhemoglobin Arterial 98 92 - 100 %    O2 Sat, Arterial 99.5 (H) 96.0 - 97.0 %    Narrative    In healthy individuals, oxyhemoglobin (O2Hb) and oxygen saturation (SO2) are approximately equal. In the presence of dyshemoglobins, oxyhemoglobin can be considerably lower than oxygen saturation.   CBC with platelets   Result Value Ref Range    WBC Count 17.5 (H) 4.0 - 11.0 10e3/uL    RBC Count 3.18 (L) 4.40 - 5.90 10e6/uL    Hemoglobin 10.3 (L) 13.3 - 17.7 g/dL    Hematocrit 31.9 (L) 40.0 - 53.0 %     78 -  100 fL    MCH 32.4 26.5 - 33.0 pg    MCHC 32.3 31.5 - 36.5 g/dL    RDW 13.9 10.0 - 15.0 %    Platelet Count 195 150 - 450 10e3/uL   Calcium ionized whole blood   Result Value Ref Range    Calcium Ionized Whole Blood 4.7 4.4 - 5.2 mg/dL   Glucose whole blood   Result Value Ref Range    Glucose 147 (H) 70 - 99 mg/dL   Heparin Unfractionated Anti Xa Level   Result Value Ref Range    Anti Xa Unfractionated Heparin <0.10 For Reference Range, See Comment IU/mL    Narrative    Therapeutic Range: UFH: 0.25-0.50 IU/mL for low intensity dosing,  0.30-0.70 IU/mL for high intensity dosing DVT and PE.  This test is not validated for other direct factor X inhibitors (e.g. rivaroxaban, apixaban, edoxaban, betrixaban, fondaparinux) and should not be used for monitoring of other medications.   INR   Result Value Ref Range    INR 2.10 (H) 0.85 - 1.15   Partial thromboplastin time   Result Value Ref Range    aPTT 58 (H) 22 - 38 Seconds   Troponin T, High Sensitivity   Result Value Ref Range    Troponin T, High Sensitivity 866 (HH) <=22 ng/L   Blood gas arterial   Result Value Ref Range    pH Arterial 7.42 7.35 - 7.45    pCO2 Arterial 46 (H) 35 - 45 mm Hg    pO2 Arterial 87 80 - 105 mm Hg    FIO2 50     Bicarbonate Arterial 30 (H) 21 - 28 mmol/L    Base Excess/Deficit Arterial 4.5 (H) -3.0 - 3.0 mmol/L    Wiliam's Test Artline     Oxyhemoglobin Arterial 95 92 - 100 %    O2 Sat, Arterial 97.3 (H) 96.0 - 97.0 %    Narrative    In healthy individuals, oxyhemoglobin (O2Hb) and oxygen saturation (SO2) are approximately equal. In the presence of dyshemoglobins, oxyhemoglobin can be considerably lower than oxygen saturation.   Glucose by meter   Result Value Ref Range    GLUCOSE BY METER POCT 141 (H) 70 - 99 mg/dL   Blood gas arterial   Result Value Ref Range    pH Arterial 7.45 7.35 - 7.45    pCO2 Arterial 42 35 - 45 mm Hg    pO2 Arterial 51 (L) 80 - 105 mm Hg    FIO2 50     Bicarbonate Arterial 29 (H) 21 - 28 mmol/L    Base Excess/Deficit  Arterial 4.8 (H) -3.0 - 3.0 mmol/L    Wiliam's Test Artline     Oxyhemoglobin Arterial 86 (L) 92 - 100 %    O2 Sat, Arterial 87.9 (L) 96.0 - 97.0 %    Narrative    In healthy individuals, oxyhemoglobin (O2Hb) and oxygen saturation (SO2) are approximately equal. In the presence of dyshemoglobins, oxyhemoglobin can be considerably lower than oxygen saturation.   CBC with Platelets & Differential    Narrative    The following orders were created for panel order CBC with Platelets & Differential.  Procedure                               Abnormality         Status                     ---------                               -----------         ------                     CBC with platelets and d...[[id number]]  Abnormal            Final result                 Please view results for these tests on the individual orders.   Lactic acid whole blood   Result Value Ref Range    Lactic Acid 0.9 0.7 - 2.0 mmol/L   Comprehensive metabolic panel   Result Value Ref Range    Sodium 145 135 - 145 mmol/L    Potassium 4.1 3.4 - 5.3 mmol/L    Carbon Dioxide (CO2) 27 22 - 29 mmol/L    Anion Gap 8 7 - 15 mmol/L    Urea Nitrogen 22.4 (H) 6.0 - 20.0 mg/dL    Creatinine 0.78 0.67 - 1.17 mg/dL    GFR Estimate >90 >60 mL/min/1.73m2    Calcium 8.4 (L) 8.6 - 10.0 mg/dL    Chloride 110 (H) 98 - 107 mmol/L    Glucose 148 (H) 70 - 99 mg/dL    Alkaline Phosphatase 52 40 - 150 U/L     (H) 0 - 45 U/L     (H) 0 - 70 U/L    Protein Total 5.3 (L) 6.4 - 8.3 g/dL    Albumin 2.8 (L) 3.5 - 5.2 g/dL    Bilirubin Total 0.8 <=1.2 mg/dL   Blood gas venous   Result Value Ref Range    pH Venous 7.37 7.32 - 7.43    pCO2 Venous 55 (H) 40 - 50 mm Hg    pO2 Venous 29 25 - 47 mm Hg    Bicarbonate Venous 32 (H) 21 - 28 mmol/L    Base Excess/Deficit Venous 5.4 (H) -3.0 - 3.0 mmol/L    FIO2 60     Oxyhemoglobin Venous 48 (L) 70 - 75 %    O2 Sat, Venous 49.5 (L) 70.0 - 75.0 %    Narrative    In healthy individuals, oxyhemoglobin (O2Hb) and oxygen saturation  (SO2) are approximately equal. In the presence of dyshemoglobins, oxyhemoglobin can be considerably lower than oxygen saturation.   Magnesium   Result Value Ref Range    Magnesium 2.4 (H) 1.7 - 2.3 mg/dL   Phosphorus   Result Value Ref Range    Phosphorus 2.6 2.5 - 4.5 mg/dL   Calcium ionized whole blood   Result Value Ref Range    Calcium Ionized Whole Blood 4.7 4.4 - 5.2 mg/dL   Glucose whole blood   Result Value Ref Range    Glucose 154 (H) 70 - 99 mg/dL   Heparin Unfractionated Anti Xa Level   Result Value Ref Range    Anti Xa Unfractionated Heparin <0.10 For Reference Range, See Comment IU/mL    Narrative    Therapeutic Range: UFH: 0.25-0.50 IU/mL for low intensity dosing,  0.30-0.70 IU/mL for high intensity dosing DVT and PE.  This test is not validated for other direct factor X inhibitors (e.g. rivaroxaban, apixaban, edoxaban, betrixaban, fondaparinux) and should not be used for monitoring of other medications.   INR   Result Value Ref Range    INR 2.04 (H) 0.85 - 1.15   Partial thromboplastin time   Result Value Ref Range    aPTT 63 (H) 22 - 38 Seconds   Troponin T, High Sensitivity   Result Value Ref Range    Troponin T, High Sensitivity 895 (HH) <=22 ng/L   D dimer quantitative   Result Value Ref Range    D-Dimer Quantitative >20.00 (HH) 0.00 - 0.50 ug/mL FEU    Narrative    This D-dimer assay is intended for use in conjunction with a clinical pretest probability assessment model to exclude pulmonary embolism (PE) and deep venous thrombosis (DVT) in outpatients suspected of PE or DVT. The cut-off value is 0.50 ug/mL FEU.    For patients 50 years of age or older, the application of age-adjusted cut-off values for D-Dimer may increase the specificity without significant effect on sensitivity. The literature suggested calculation age adjusted cut-off in ug/L = age in years x 10 ug/L. The results in this laboratory are reported as ug/mL rather than ug/L. The calculation for age adjusted cut off in ug/mL= age  in years x 0.01 ug/mL. For example, the cut off for a 76 year old male is 74 x 0.01 ug/mL = 0.76 ug/mL (760 ug/L).    M Chnatale et al. Age adjusted D-dimer cut-off levels to rule out pulmonary embolism: The ADJUST-PE Study. FAROOQ 2014;311:2596-6526.; HJ Magdalena et al. Diagnostic accuracy of conventional or age adjusted D-dimer cutoff values in older patients with suspected venous thromboembolism. Systemic review and meta-analysis. BMJ 2013:346:f2492.   Blood gas ELS venous   Result Value Ref Range    pH ELS Venous 7.37 7.32 - 7.43    pCO2 ELS Venous 53 (H) 40 - 50 mm Hg    pO2 ELS Venous 38 25 - 47 mm Hg    Bicarbonate ELS Venous 31 (H) 21 - 28 mmol/L    Base Excess/Deficit Venous 4.6 (H) -3.0 - 3.0 mmol/L    FIO2 60     Oxyhemoglobin ELS Venous 68 %    O2 Saturation ELS Venous 69.1 (L) 70.0 - 75.0 %    Narrative    In healthy individuals, oxyhemoglobin (O2Hb) and oxygen saturation (SO2) are approximately equal. In the presence of dyshemoglobins, oxyhemoglobin can be considerably lower than oxygen saturation.   Blood gas ELS arterial   Result Value Ref Range    pH ELS Arterial 7.39 7.35 - 7.45    pCO2 ELS Arterial 49 (H) 35 - 45 mm Hg    pO2 ELS Arterial 130 (H) 80 - 105 mm Hg    Bicarbonate ELS Arterial 30 (H) 21 - 28 mmol/L    Base Excess/Deficit Arterial 3.8 (H) -3.0 - 3.0 mmol/L    FIO2 75     Oxyhemoglobin ELS Arterial 98 75 - 100 %    O2 Saturation ELS Arterial >100.0 (H) 96.0 - 97.0 %    Narrative    In healthy individuals, oxyhemoglobin (O2Hb) and oxygen saturation (SO2) are approximately equal. In the presence of dyshemoglobins, oxyhemoglobin can be considerably lower than oxygen saturation.   Fibrinogen activity   Result Value Ref Range    Fibrinogen Activity 445 170 - 490 mg/dL   CBC with platelets and differential   Result Value Ref Range    WBC Count 17.1 (H) 4.0 - 11.0 10e3/uL    RBC Count 3.18 (L) 4.40 - 5.90 10e6/uL    Hemoglobin 10.5 (L) 13.3 - 17.7 g/dL    Hematocrit 31.9 (L) 40.0 - 53.0 %    MCV  100 78 - 100 fL    MCH 33.0 26.5 - 33.0 pg    MCHC 32.9 31.5 - 36.5 g/dL    RDW 14.1 10.0 - 15.0 %    Platelet Count 184 150 - 450 10e3/uL    % Neutrophils 74 %    % Lymphocytes 11 %    % Monocytes 8 %    % Eosinophils 3 %    % Basophils 1 %    % Immature Granulocytes 3 %    NRBCs per 100 WBC 0 <1 /100    Absolute Neutrophils 12.9 (H) 1.6 - 8.3 10e3/uL    Absolute Lymphocytes 1.9 0.8 - 5.3 10e3/uL    Absolute Monocytes 1.4 (H) 0.0 - 1.3 10e3/uL    Absolute Eosinophils 0.4 0.0 - 0.7 10e3/uL    Absolute Basophils 0.1 0.0 - 0.2 10e3/uL    Absolute Immature Granulocytes 0.5 (H) <=0.4 10e3/uL    Absolute NRBCs 0.1 10e3/uL   Blood gas arterial   Result Value Ref Range    pH Arterial 7.43 7.35 - 7.45    pCO2 Arterial 44 35 - 45 mm Hg    pO2 Arterial 67 (L) 80 - 105 mm Hg    FIO2 60     Bicarbonate Arterial 29 (H) 21 - 28 mmol/L    Base Excess/Deficit Arterial 4.1 (H) -3.0 - 3.0 mmol/L    Wiliam's Test Artline     Oxyhemoglobin Arterial 92 92 - 100 %    O2 Sat, Arterial 94.2 (L) 96.0 - 97.0 %    Peep 8 cm H2O    Narrative    In healthy individuals, oxyhemoglobin (O2Hb) and oxygen saturation (SO2) are approximately equal. In the presence of dyshemoglobins, oxyhemoglobin can be considerably lower than oxygen saturation.   Glucose by meter   Result Value Ref Range    GLUCOSE BY METER POCT 137 (H) 70 - 99 mg/dL   Prepare red blood cells (unit)   Result Value Ref Range    Blood Component Type Red Blood Cells     Product Code I0398O73     Unit Status Ready for issue     Unit Number [id number]     CROSSMATCH Compatible     CODING SYSTEM [id number]    Prepare red blood cells (unit)   Result Value Ref Range    Blood Component Type Red Blood Cells     Product Code K1495Q35     Unit Status Ready for issue     Unit Number [id number]     CROSSMATCH Compatible     CODING SYSTEM [id number]    Partial thromboplastin time   Result Value Ref Range    aPTT 59 (H) 22 - 38 Seconds   Fibrinogen activity   Result Value Ref Range    Fibrinogen  Activity 474 170 - 490 mg/dL   Prealbumin   Result Value Ref Range    Prealbumin 6.2 (L) 20.0 - 40.0 mg/dL   Albumin level   Result Value Ref Range    Albumin 2.8 (L) 3.5 - 5.2 g/dL   Hemoglobin A1c   Result Value Ref Range    Hemoglobin A1C 5.9 (H) <5.7 %   Blood gas arterial   Result Value Ref Range    pH Arterial 7.53 (H) 7.35 - 7.45    pCO2 Arterial 35 35 - 45 mm Hg    pO2 Arterial 74 (L) 80 - 105 mm Hg    FIO2 60     Bicarbonate Arterial 29 (H) 21 - 28 mmol/L    Base Excess/Deficit Arterial 6.1 (H) -3.0 - 3.0 mmol/L    Wiliam's Test Artline     Oxyhemoglobin Arterial 96 92 - 100 %    O2 Sat, Arterial 97.8 (H) 96.0 - 97.0 %    Narrative    In healthy individuals, oxyhemoglobin (O2Hb) and oxygen saturation (SO2) are approximately equal. In the presence of dyshemoglobins, oxyhemoglobin can be considerably lower than oxygen saturation.   Asymptomatic COVID-19 Virus (Coronavirus) by PCR Nasopharyngeal    Specimen: Nasopharyngeal; Swab   Result Value Ref Range    SARS CoV2 PCR Negative Negative    Narrative    Testing was performed using the Xpert Xpress SARS-CoV-2 Assay on the Cepheid Gene-Xpert Instrument Systems. Additional information about this Emergency Use Authorization (EUA) assay can be found via the Lab Guide. This test should be ordered for the detection of SARS-CoV-2 in individuals who meet SARS-CoV-2 clinical and/or epidemiological criteria as well as from individuals without symptoms or other reasons to suspect COVID-19. Test performance for asymptomatic patients has only been established in anterior nasal swab specimens. This test is for in vitro diagnostic use under the FDA EUA for laboratories certified under CLIA to perform high complexity testing. This test has not been FDA cleared or approved. A negative result does not rule out the presence of PCR inhibitors in the specimen or target RNA concentration below the limit of detection for the assay. The possibility of a false negative should be  considered if the patient's recent exposure or clinical presentation suggests COVID-19. This test was validated by [facility]. These Laboratories are certified under the Clinical Laboratory Improvement Amendments (CLIA) as qualified to perform high complexity testing.     Methicillin Resistant Staph Aureus PCR    Specimen: Nose; Swab   Result Value Ref Range    MRSA Target DNA Negative Negative    SA Target DNA Negative     Narrative    The [email or web address]  Xpert SA Nasal Complete assay performed in the [employer]  Dx System is a qualitative in vitro diagnostic test designed for rapid detection of Staphylococcus aureus (SA) and methicillin-resistant Staphylococcus aureus (MRSA) from nasal swabs in patients at risk for nasal colonization. The test utilizes automated real-time polymerase chain reaction (PCR) to detect MRSA/SA DNA. The Xpert SA Nasal Complete assay is intended to aid in the prevention and control of MRSA/SA infections in healthcare settings. The assay is not intended to diagnose, guide or monitor treatment for MRSA/SA infections, or provide results of susceptibility to methicillin. A negative result does not preclude MRSA/SA nasal colonization.    Blood gas arterial   Result Value Ref Range    pH Arterial 7.51 (H) 7.35 - 7.45    pCO2 Arterial 37 35 - 45 mm Hg    pO2 Arterial 64 (L) 80 - 105 mm Hg    FIO2 60     Bicarbonate Arterial 30 (H) 21 - 28 mmol/L    Base Excess/Deficit Arterial 6.2 (H) -3.0 - 3.0 mmol/L    Wiliam's Test Artline     Oxyhemoglobin Arterial 93 92 - 100 %    O2 Sat, Arterial 95.0 (L) 96.0 - 97.0 %    Peep 8 cm H2O    Narrative    In healthy individuals, oxyhemoglobin (O2Hb) and oxygen saturation (SO2) are approximately equal. In the presence of dyshemoglobins, oxyhemoglobin can be considerably lower than oxygen saturation.   Glucose by meter   Result Value Ref Range    GLUCOSE BY METER POCT 134 (H) 70 - 99 mg/dL   Partial thromboplastin time   Result Value Ref Range     aPTT 63 (H) 22 - 38 Seconds   Comprehensive metabolic panel   Result Value Ref Range    Sodium 146 (H) 135 - 145 mmol/L    Potassium 3.8 3.4 - 5.3 mmol/L    Carbon Dioxide (CO2) 28 22 - 29 mmol/L    Anion Gap 10 7 - 15 mmol/L    Urea Nitrogen 19.6 6.0 - 20.0 mg/dL    Creatinine 0.80 0.67 - 1.17 mg/dL    GFR Estimate >90 >60 mL/min/1.73m2    Calcium 8.3 (L) 8.6 - 10.0 mg/dL    Chloride 108 (H) 98 - 107 mmol/L    Glucose 137 (H) 70 - 99 mg/dL    Alkaline Phosphatase 55 40 - 150 U/L     (H) 0 - 45 U/L     (H) 0 - 70 U/L    Protein Total 5.6 (L) 6.4 - 8.3 g/dL    Albumin 3.1 (L) 3.5 - 5.2 g/dL    Bilirubin Total 0.8 <=1.2 mg/dL   Blood gas venous   Result Value Ref Range    pH Venous 7.36 7.32 - 7.43    pCO2 Venous 60 (H) 40 - 50 mm Hg    pO2 Venous 37 25 - 47 mm Hg    Bicarbonate Venous 34 (H) 21 - 28 mmol/L    Base Excess/Deficit Venous 6.9 (H) -3.0 - 3.0 mmol/L    FIO2 60     Oxyhemoglobin Venous 64 (L) 70 - 75 %    O2 Sat, Venous 65.8 (L) 70.0 - 75.0 %    Narrative    In healthy individuals, oxyhemoglobin (O2Hb) and oxygen saturation (SO2) are approximately equal. In the presence of dyshemoglobins, oxyhemoglobin can be considerably lower than oxygen saturation.   Magnesium   Result Value Ref Range    Magnesium 2.2 1.7 - 2.3 mg/dL   Phosphorus   Result Value Ref Range    Phosphorus 3.6 2.5 - 4.5 mg/dL   CBC with platelets   Result Value Ref Range    WBC Count 18.0 (H) 4.0 - 11.0 10e3/uL    RBC Count 3.31 (L) 4.40 - 5.90 10e6/uL    Hemoglobin 10.7 (L) 13.3 - 17.7 g/dL    Hematocrit 32.8 (L) 40.0 - 53.0 %    MCV 99 78 - 100 fL    MCH 32.3 26.5 - 33.0 pg    MCHC 32.6 31.5 - 36.5 g/dL    RDW 14.3 10.0 - 15.0 %    Platelet Count 172 150 - 450 10e3/uL   Calcium ionized whole blood   Result Value Ref Range    Calcium Ionized Whole Blood 4.6 4.4 - 5.2 mg/dL   Glucose whole blood   Result Value Ref Range    Glucose 125 (H) 70 - 99 mg/dL   Heparin Unfractionated Anti Xa Level   Result Value Ref Range    Anti Xa  Unfractionated Heparin <0.10 For Reference Range, See Comment IU/mL    Narrative    Therapeutic Range: UFH: 0.25-0.50 IU/mL for low intensity dosing,  0.30-0.70 IU/mL for high intensity dosing DVT and PE.  This test is not validated for other direct factor X inhibitors (e.g. rivaroxaban, apixaban, edoxaban, betrixaban, fondaparinux) and should not be used for monitoring of other medications.   INR   Result Value Ref Range    INR 1.96 (H) 0.85 - 1.15   Lactic acid whole blood   Result Value Ref Range    Lactic Acid 0.9 0.7 - 2.0 mmol/L   Troponin T, High Sensitivity   Result Value Ref Range    Troponin T, High Sensitivity 844 (HH) <=22 ng/L   Blood gas arterial   Result Value Ref Range    pH Arterial 7.46 (H) 7.35 - 7.45    pCO2 Arterial 43 35 - 45 mm Hg    pO2 Arterial 74 (L) 80 - 105 mm Hg    FIO2 60     Bicarbonate Arterial 31 (H) 21 - 28 mmol/L    Base Excess/Deficit Arterial 6.3 (H) -3.0 - 3.0 mmol/L    Wiliam's Test Artline     Oxyhemoglobin Arterial 94 92 - 100 %    O2 Sat, Arterial 96.0 96.0 - 97.0 %    Peep 8 cm H2O    Narrative    In healthy individuals, oxyhemoglobin (O2Hb) and oxygen saturation (SO2) are approximately equal. In the presence of dyshemoglobins, oxyhemoglobin can be considerably lower than oxygen saturation.   Activated clotting time celite, POCT   Result Value Ref Range    Activated Clotting Time (Celite) POCT 189 (H) 74 - 150 seconds   Blood gas arterial   Result Value Ref Range    pH Arterial 7.44 7.35 - 7.45    pCO2 Arterial 46 (H) 35 - 45 mm Hg    pO2 Arterial 106 (H) 80 - 105 mm Hg    FIO2 60     Bicarbonate Arterial 31 (H) 21 - 28 mmol/L    Base Excess/Deficit Arterial 6.1 (H) -3.0 - 3.0 mmol/L    Wiliam's Test Artline     Oxyhemoglobin Arterial 98 92 - 100 %    O2 Sat, Arterial 99.4 (H) 96.0 - 97.0 %    Peep 8 cm H2O    Narrative    In healthy individuals, oxyhemoglobin (O2Hb) and oxygen saturation (SO2) are approximately equal. In the presence of dyshemoglobins, oxyhemoglobin can be  considerably lower than oxygen saturation.   ABO/Rh type and screen    Narrative    The following orders were created for panel order ABO/Rh type and screen.  Procedure                               Abnormality         Status                     ---------                               -----------         ------                     Adult Type and Screen[[id number]]                            Final result                 Please view results for these tests on the individual orders.   Adult Type and Screen   Result Value Ref Range    ABO/RH(D) A POS     Antibody Screen Negative Negative    SPECIMEN EXPIRATION DATE [id number]    XR Chest Port 1 View    Impression    RESIDENT PRELIMINARY INTERPRETATION  Impression:   1.  Endotracheal tube tip terminates 4.5 cm above the cesia.  Additional support devices are stable.  2.  Stable cardiomegaly with decreased diffuse mixed interstitial and  airspace opacities, favoring pulmonary edema and atelectasis.    Blood gas arterial   Result Value Ref Range    pH Arterial 7.39 7.35 - 7.45    pCO2 Arterial 54 (H) 35 - 45 mm Hg    pO2 Arterial 255 (H) 80 - 105 mm Hg    FIO2 60     Bicarbonate Arterial 33 (H) 21 - 28 mmol/L    Base Excess/Deficit Arterial 6.2 (H) -3.0 - 3.0 mmol/L    Wiliam's Test Artline     Oxyhemoglobin Arterial 99 92 - 100 %    O2 Sat, Arterial >100.0 (H) 96.0 - 97.0 %    Peep 8 cm H2O    Narrative    In healthy individuals, oxyhemoglobin (O2Hb) and oxygen saturation (SO2) are approximately equal. In the presence of dyshemoglobins, oxyhemoglobin can be considerably lower than oxygen saturation.   Activated clotting time celite, POCT   Result Value Ref Range    Activated Clotting Time (Celite) POCT 197 (H) 74 - 150 seconds   Blood gas venous   Result Value Ref Range    pH Venous 7.31 (L) 7.32 - 7.43    pCO2 Venous 70 (H) 40 - 50 mm Hg    pO2 Venous 37 25 - 47 mm Hg    Bicarbonate Venous 35 (H) 21 - 28 mmol/L    Base Excess/Deficit Venous 6.7 (H) -3.0 - 3.0 mmol/L     FIO2 45     Oxyhemoglobin Venous 61 (L) 70 - 75 %    O2 Sat, Venous 62.0 (L) 70.0 - 75.0 %    Narrative    In healthy individuals, oxyhemoglobin (O2Hb) and oxygen saturation (SO2) are approximately equal. In the presence of dyshemoglobins, oxyhemoglobin can be considerably lower than oxygen saturation.   CBC with Platelets & Differential    Narrative    The following orders were created for panel order CBC with Platelets & Differential.  Procedure                               Abnormality         Status                     ---------                               -----------         ------                     CBC with platelets and d...[[id number]]  Abnormal            Final result                 Please view results for these tests on the individual orders.   Lactic acid whole blood   Result Value Ref Range    Lactic Acid 1.2 0.7 - 2.0 mmol/L   Comprehensive metabolic panel   Result Value Ref Range    Sodium 146 (H) 135 - 145 mmol/L    Potassium 4.2 3.4 - 5.3 mmol/L    Carbon Dioxide (CO2) 30 (H) 22 - 29 mmol/L    Anion Gap 9 7 - 15 mmol/L    Urea Nitrogen 19.6 6.0 - 20.0 mg/dL    Creatinine 0.88 0.67 - 1.17 mg/dL    GFR Estimate >90 >60 mL/min/1.73m2    Calcium 8.5 (L) 8.6 - 10.0 mg/dL    Chloride 107 98 - 107 mmol/L    Glucose 145 (H) 70 - 99 mg/dL    Alkaline Phosphatase 55 40 - 150 U/L     (H) 0 - 45 U/L     (H) 0 - 70 U/L    Protein Total 5.7 (L) 6.4 - 8.3 g/dL    Albumin 3.1 (L) 3.5 - 5.2 g/dL    Bilirubin Total 0.9 <=1.2 mg/dL   Magnesium   Result Value Ref Range    Magnesium 2.3 1.7 - 2.3 mg/dL   Phosphorus   Result Value Ref Range    Phosphorus 3.7 2.5 - 4.5 mg/dL   Calcium ionized whole blood   Result Value Ref Range    Calcium Ionized Whole Blood 4.7 4.4 - 5.2 mg/dL   Glucose whole blood   Result Value Ref Range    Glucose 145 (H) 70 - 99 mg/dL   Heparin Unfractionated Anti Xa Level   Result Value Ref Range    Anti Xa Unfractionated Heparin <0.10 For Reference Range, See Comment IU/mL     Narrative    Therapeutic Range: UFH: 0.25-0.50 IU/mL for low intensity dosing,  0.30-0.70 IU/mL for high intensity dosing DVT and PE.  This test is not validated for other direct factor X inhibitors (e.g. rivaroxaban, apixaban, edoxaban, betrixaban, fondaparinux) and should not be used for monitoring of other medications.   INR   Result Value Ref Range    INR 1.89 (H) 0.85 - 1.15   Partial thromboplastin time   Result Value Ref Range    aPTT 59 (H) 22 - 38 Seconds   Lactic acid whole blood   Result Value Ref Range    Lactic Acid 1.0 0.7 - 2.0 mmol/L   Troponin T, High Sensitivity   Result Value Ref Range    Troponin T, High Sensitivity 888 (HH) <=22 ng/L   D dimer quantitative   Result Value Ref Range    D-Dimer Quantitative >20.00 (HH) 0.00 - 0.50 ug/mL FEU    Narrative    This D-dimer assay is intended for use in conjunction with a clinical pretest probability assessment model to exclude pulmonary embolism (PE) and deep venous thrombosis (DVT) in outpatients suspected of PE or DVT. The cut-off value is 0.50 ug/mL FEU.    For patients 50 years of age or older, the application of age-adjusted cut-off values for D-Dimer may increase the specificity without significant effect on sensitivity. The literature suggested calculation age adjusted cut-off in ug/L = age in years x 10 ug/L. The results in this laboratory are reported as ug/mL rather than ug/L. The calculation for age adjusted cut off in ug/mL= age in years x 0.01 ug/mL. For example, the cut off for a 76 year old male is 74 x 0.01 ug/mL = 0.76 ug/mL (760 ug/L).    M Chantale et al. Age adjusted D-dimer cut-off levels to rule out pulmonary embolism: The ADJUST-PE Study. FAROOQ 2014;311:1564-6013.; ED Nichols et al. Diagnostic accuracy of conventional or age adjusted D-dimer cutoff values in older patients with suspected venous thromboembolism. Systemic review and meta-analysis. BMJ 2013:346:f2492.   Blood gas ELS venous   Result Value Ref Range    pH ELS Venous  7.31 (L) 7.32 - 7.43    pCO2 ELS Venous 71 (H) 40 - 50 mm Hg    pO2 ELS Venous 44 25 - 47 mm Hg    Bicarbonate ELS Venous 35 (H) 21 - 28 mmol/L    Base Excess/Deficit Venous 6.8 (H) -3.0 - 3.0 mmol/L    FIO2 60     Oxyhemoglobin ELS Venous 71 %    O2 Saturation ELS Venous 72.5 70.0 - 75.0 %    Narrative    In healthy individuals, oxyhemoglobin (O2Hb) and oxygen saturation (SO2) are approximately equal. In the presence of dyshemoglobins, oxyhemoglobin can be considerably lower than oxygen saturation.   Blood gas ELS arterial   Result Value Ref Range    pH ELS Arterial 7.31 (L) 7.35 - 7.45    pCO2 ELS Arterial 67 (H) 35 - 45 mm Hg    pO2 ELS Arterial 239 (H) 80 - 105 mm Hg    Bicarbonate ELS Arterial 34 (H) 21 - 28 mmol/L    Base Excess/Deficit Arterial 5.7 (H) -3.0 - 3.0 mmol/L    FIO2 80     Oxyhemoglobin ELS Arterial 99 75 - 100 %    O2 Saturation ELS Arterial >100.0 (H) 96.0 - 97.0 %    Narrative    In healthy individuals, oxyhemoglobin (O2Hb) and oxygen saturation (SO2) are approximately equal. In the presence of dyshemoglobins, oxyhemoglobin can be considerably lower than oxygen saturation.   Fibrinogen activity   Result Value Ref Range    Fibrinogen Activity 493 (H) 170 - 490 mg/dL   Ionized Calcium   Result Value Ref Range    Calcium Ionized Whole Blood 4.7 4.4 - 5.2 mg/dL   CRP inflammation   Result Value Ref Range    CRP Inflammation 102.00 (H) <5.00 mg/L   Erythrocyte sedimentation rate auto   Result Value Ref Range    Erythrocyte Sedimentation Rate 28 (H) 0 - 20 mm/hr   Hemoglobin plasma   Result Value Ref Range    Hemoglobin Plasma 40 (H) <30 mg/dL   Lactate Dehydrogenase   Result Value Ref Range    Lactate Dehydrogenase 738 (H) 0 - 250 U/L   CBC with platelets and differential   Result Value Ref Range    WBC Count 17.7 (H) 4.0 - 11.0 10e3/uL    RBC Count 3.31 (L) 4.40 - 5.90 10e6/uL    Hemoglobin 10.7 (L) 13.3 - 17.7 g/dL    Hematocrit 33.1 (L) 40.0 - 53.0 %     78 - 100 fL    MCH 32.3 26.5 - 33.0  pg    MCHC 32.3 31.5 - 36.5 g/dL    RDW 14.5 10.0 - 15.0 %    Platelet Count 166 150 - 450 10e3/uL    % Neutrophils 73 %    % Lymphocytes 12 %    % Monocytes 9 %    % Eosinophils 2 %    % Basophils 1 %    % Immature Granulocytes 3 %    NRBCs per 100 WBC 0 <1 /100    Absolute Neutrophils 13.0 (H) 1.6 - 8.3 10e3/uL    Absolute Lymphocytes 2.1 0.8 - 5.3 10e3/uL    Absolute Monocytes 1.5 (H) 0.0 - 1.3 10e3/uL    Absolute Eosinophils 0.4 0.0 - 0.7 10e3/uL    Absolute Basophils 0.1 0.0 - 0.2 10e3/uL    Absolute Immature Granulocytes 0.6 (H) <=0.4 10e3/uL    Absolute NRBCs 0.0 10e3/uL   Blood gas arterial   Result Value Ref Range    pH Arterial 7.39 7.35 - 7.45    pCO2 Arterial 53 (H) 35 - 45 mm Hg    pO2 Arterial 98 80 - 105 mm Hg    FIO2 60     Bicarbonate Arterial 32 (H) 21 - 28 mmol/L    Base Excess/Deficit Arterial 6.2 (H) -3.0 - 3.0 mmol/L    Wiliam's Test Artline     Oxyhemoglobin Arterial 96 92 - 100 %    O2 Sat, Arterial 98.4 (H) 96.0 - 97.0 %    Peep 8 cm H2O    Narrative    In healthy individuals, oxyhemoglobin (O2Hb) and oxygen saturation (SO2) are approximately equal. In the presence of dyshemoglobins, oxyhemoglobin can be considerably lower than oxygen saturation.   EKG 12-lead, tracing only   Result Value Ref Range    Systolic Blood Pressure  mmHg    Diastolic Blood Pressure  mmHg    Ventricular Rate 68 BPM    Atrial Rate 68 BPM    UT Interval 184 ms    QRS Duration 86 ms     ms    QTc 557 ms    P Axis 44 degrees    R AXIS -47 degrees    T Axis -56 degrees    Interpretation ECG       Sinus rhythm  Left axis deviation  Inferior infarct (cited on or before 22-MAR-2024)  Prolonged QT  Abnormal ECG  When compared with ECG of 25-MAR-2024 04:14,  Nonspecific T wave abnormality no longer evident in Lateral leads  QT has shortened     Blood gas arterial   Result Value Ref Range    pH Arterial 7.44 7.35 - 7.45    pCO2 Arterial 51 (H) 35 - 45 mm Hg    pO2 Arterial 190 (H) 80 - 105 mm Hg    FIO2 60     Bicarbonate  Arterial 35 (H) 21 - 28 mmol/L    Base Excess/Deficit Arterial 8.8 (H) -3.0 - 3.0 mmol/L    Wiliam's Test Artline     Oxyhemoglobin Arterial 98 92 - 100 %    O2 Sat, Arterial >100.0 (H) 96.0 - 97.0 %    Peep 8 cm H2O    Narrative    In healthy individuals, oxyhemoglobin (O2Hb) and oxygen saturation (SO2) are approximately equal. In the presence of dyshemoglobins, oxyhemoglobin can be considerably lower than oxygen saturation.

## 2024-03-26 NOTE — CONSULTS
[facility]  WO Nurse Inpatient Assessment     Consulted for: ECMO    Patient History (according to provider note(s):      Giacomo Castro is a 54 year old male with history of HLD and tobacco use who initially presented to [facility] on 3/10/24 with inferior STEMI s/p unsuccessful attempted RCA revascularization. Course complicated by ADHF with significant volume overload despite diuresis, recurrent episodes of VT, torrential MR with concern for infarction of posteromedial papillary muscles, and pneumonia s/p ceftriaxone+azithromycin x5d. Transferred to [facility] on 3/22/24 for further management. Cannulated for VA-ECMO with IABP (3/23-*).     Assessment:      Areas visualized during today's visit: Complete head to toe     ECMO cannula location: Right groin ECMO cannula  Positioning tolerance: Poor  Date of ECMO cannulation: 3/23/24  Presence of ischemia: No  Location of ischemia: n/a  Pressure Injury Present::No  Pressure Injury Prevention Interventions In Place:  Z flow Positioner under head, TAPs Wedge Positioners in use, Heel off-loading boots, Mepilex Sacral Dressing, and Dressing to sacrum for prevention        Pressure Injury Prevention Interventions Added:  Optifoam Dressing under ECMO Cannula      Treatment Plan:     ECMO pressure injury prevention plan:      Reposition patient every 1-2 hours using TAP Wedges  Position head on Z flow positioner, mold indentation at areas of pressure points. (Around ears) go without pillow whenever possible.   Pad ECMO IJ cannula with Optifoam (#[id number]) along face and scalp between skin and cannula and under Coban head wraps    Pad ECMO groin and chest cannula under rigid connectors with Optifoam or Soft cloth  Heel off-loading Boots at all times  Sacral Mepilex for Prevention, change every 5 days and prn  Low Air loss mattress     Orders: Written    RECOMMEND PRIMARY TEAM ORDER: None, at this time  Education provided: plan of  care  Discussed plan of care with: Nurse  [facility] nurse follow-up plan: weekly  Notify WO if wound(s) deteriorate.  Nursing to notify the Provider(s) and re-consult the [facility] Nurse if new skin concern.    DATA:     Current support surface: Standard  Low air loss (DAMIEN pump, Isolibrium, Pulsate, skin guard, etc)  Containment of urine/stool: Incontinence Protocol and Indwelling catheter  BMI: Body mass index is 31.14 kg/m .   Active diet order: Orders Placed This Encounter      NPO for Medical/Clinical Reasons Except for: No Exceptions      NPO per Anesthesia Guidelines for Procedure/Surgery Except for: Meds     Output: I/O last 3 completed shifts:  In: 4655.49 [I.V.:2657.49; NG/GT:1758]  Out: 7540 [Urine:7540]     Labs:   Recent Labs   Lab 03/26/24  1001 03/25/24  2209 03/25/24  1804   ALBUMIN 3.2*   < > 2.8*   PREALB  --   --  6.2*   HGB 11.2*   < >  --    INR 1.98*   < >  --    WBC 17.0*   < >  --    A1C  --   --  5.9*    < > = values in this interval not displayed.     Pressure injury risk assessment:   Sensory Perception: 3-->slightly limited  Moisture: 4-->rarely moist  Activity: 1-->bedfast  Mobility: 1-->completely immobile  Nutrition: 2-->probably inadequate  Friction and Shear: 2-->potential problem  Nate Score: 13    Melody Napoles RN CWOCN  Please contact through [facility] group: [facility] Nurse  Dept. Office Number: [phone]

## 2024-03-27 NOTE — ANESTHESIA PROCEDURE NOTES
Perioperative FERNANDEZ Procedure Note    Staff -        Anesthesiologist:  Presley Mccauley MD       Resident/Fellow: Oneil Reed DO       Performed By: fellow  Preanesthesia Checklist:  Patient identified, IV assessed, risks and benefits discussed, monitors and equipment assessed, procedure being performed at surgeon's request and anesthesia consent obtained.    FERNANDEZ Probe Insertion    Probe Status PRE Insertion: NO obvious damage  Probe type:  Adult 3D  Bite block used:   Oral Airway  Insertion Technique: Easy, no oropharyngeal manipulation  Insertion complications: None obvious  Billing Report:EFRNANDEZ report by Anesthesiologist (See Separate Report note)  Probe Status POST Removal: NO obvious damage    FERNANDEZ Report  General Procedure Information  Images for this study have been archived.  Modalities: Color flow mapping, PW Doppler, CW Doppler, 3D and 2D  Diagnostic indications for FERNANDEZ:   Cardiomyopathy, other  Non-rheumatic mitral regurgitation  Echocardiographic and Doppler Measurements  Right Ventricle:  Cavity size dilated.     Thrombus not present.    Global function severely impaired.     Left Ventricle:  Cavity size dilated.     Thrombus not present.   Global Function severely impaired.       Ventricular Regional Function:  1- Basal Anteroseptal:  hypokinetic  2- Basal Anterior:  hypokinetic  3- Basal Anterolateral:  hypokinetic  4- Basal Inferolateral:  hypokinetic  5- Basal Inferior:  hypokinetic  6- Basal Inferoseptal:  hypokinetic  7- Mid Anteroseptal:  hypokinetic  8- Mid Anterior:  hypokinetic  9- Mid Anterolateral:  hypokinetic  10- Mid Inferolateral:  hypokinetic  11- Mid Inferior:  hypokinetic  12- Mid Inferoseptal:  hypokinetic  13- Apical Anterior:  hypokinetic  14- Apical Lateral:  hypokinetic  15- Apical Inferior:  hypokinetic  16- Apical Septal:  hypokinetic  17- [place]:  hypokinetic    Valves  Aortic Valve: Annulus normal.  Stenosis not present.  Regurgitation +1.    Mitral Valve: Stenosis not  present.  Regurgitation +4.  Leaflets thickened.  Leaflet motions prolapsed and restricted.  Specific leaflet segments with abnormal motions:  A2, A3, P2 and P3  Tricuspid Valve: Annulus dilated.  Stenosis not present.  Regurgitation +2.  Leaflets normal.    Pulmonic Valve: Annulus normal.  Stenosis not present.  Regurgitation +1.      Aorta: Ascending Aorta: Size normal.  Dissection not present.  Plaque thickness less than 3 mm.  Mobile plaque not present.    Aortic Arch: Size normal.    Dissection not present.   Plaque thickness less than 3 mm.   Mobile plaque not present.      Right Atrium:    Thrombus not present.   Tumor not present.   Device present.   Left Atrium: Thrombus not present.  Tumor not present.  Device not present.    Left atrial appendage normal.     Atrial Septum: Intra-atrial septal morphology normal.       Ventricular Septum: Intra-ventricular septum morphology normal.        Other Findings:    Pleural Effusion:  left.       Post Intervention Findings  Procedure(s) performed:  Mitral Valve Repair/Replace, CABG and Other (see comments). Global function:  Unchanged. Regional wall motion: Unchanged. Surgeon(s) notified of all postintervention findings: Yes (In real time). Mitral Valve: Valve replaced with bioprosthetic valve. No VIVIENNE present. No paravalvular leak.        LVAD Placement:  LV decompressed. PFO not present. Aortic valve not opening.        Echocardiogram Comments  Echocardiogram comments:   Pre-CPB:  Severely dilated LV with increased end diastolic size measuring 7.9 cm with severe reduction in systolic function and EF 15% by 3D assessment with peripheral VA ECMO flows at 4 LPM and epinephrine infusion 0.07 mcg/kg/min. Dilated RV by measurements of 4.4, 4.2, and 7 cm at base, mid, and apex, accordingly. Moderate to severely reduced RV function by TAPSE 11.1 mm. MAILE velocities > 60 cm/s. No PFO by CFD.  Trace-mild cental TR.   Severe MR secondary to flail A2/A3 leaflet and P2  restriction with Regurgitant vol 70 ml and 2D PISA EROA 1.07 cm.   Trileaflet aortic valve with trace eccentric AI and no evidence of stenosis with poor myocardial function. Trace-mild PI. PA catheter visualized in right PA. Peripheral venous cannula visualized RA spanning across bicaval junctions into SVC. IABP visualized in descending aorta with tip terminating 3 cm from L subclavian origin. No pleural or pericardial effusion. No echo evidence of aortic dissection in visualized portions of ascending, arch, and proximal descending. Findings communicated with surgical team in real time.    Post-CPB:  Globally unchanged biventricular size and function on epinephrine infusion at 0.1 mcg/kg/min with VA ECMO at 4.6 LPM and Impella flow at 1LPM. New bovine pericardial patch repair of inferior LV wall. Difficult to assess for new RWMA. New 29 mm bioprosthetic valve present at mitral annulus appears well-seated with no PVL, mean inflow gradient 1 mmHg, but opening approximately once every 6th beat.   Moderate sized pleural effusion on L side. No pericardial effusions. New Impella device visualized spanning aortic valve with associated continuous AI mild in quantity. Difficult to visualize Impella inflow cannula due to shadowing from mitral posts. In transgastric views, appears approximately 3.6 cm from annulus. IABP remains deflated in descending aorta approximately 3 cm from L subclavian takeoff. No echo evidence of aortic dissection. Findings communicated with surgical team in real time.  Cannot rule out clot in Left atrial appendage.  Clot/ low flow seen in aortic root on initiation of CPB and the clot disappeared subsequently  Time stamp on FERNANDEZ images is an hour behind actual time.  .

## 2024-03-27 NOTE — ANESTHESIA CARE TRANSFER NOTE
Patient: Giacomo Castro    Procedure: Procedure(s):  MEDIAN STERNOTOMY, ENDOSCOPIC VEIN HARVEST OF RIGHT GREATER SAPHENOUS VEIN, mitral valve replacement USING 29 MM EPIC STENTED MITRAL TISSUE VALVE, insertion of Impella 5.5 LVAD, patch repair of ischemic left ventricular rupture, ON CARDIOPULMONARY BYPASS, INTRAOPERATIVE TRANSESOPHAGEAL ECHOCARDIOGRAM PER ANESTHESIA  INSERTION Of Impella 5.5 LVAD       Diagnosis: Decompensated heart failure (H) [I50.9]  Nonrheumatic mitral valve regurgitation [I34.0]  Acute ST elevation myocardial infarction (STEMI) involving right coronary artery (H) [I21.11]  Diagnosis Additional Information: No value filed.    Anesthesia Type:   General     Note:    Oropharynx: endotracheal tube in place  Level of Consciousness: iatrogenic sedation    Level of Supplemental Oxygen (L/min / FiO2): 10  Independent Airway: airway patency not satisfactory and stable  Dentition: dentition unchanged  Vital Signs Stable: post-procedure vital signs reviewed and stable  Report to RN Given: handoff report given  Patient transferred to: ICU    ICU Handoff: Call for PAUSE to initiate/utilize ICU HANDOFF, Identified Patient, Identified Responsible Provider, Reviewed the Pertinent Medical History, Discussed Surgical Course, Reviewed Intra-OP Anesthesia Management and Issues during Anesthesia, Set Expectations for Post Procedure Period and Allowed Opportunity for Questions and Acknowledgement of Understanding      Vitals:  Vitals Value Taken Time   BP     Temp     Pulse 74 03/27/24 1754   Resp 12 03/27/24 1754   SpO2 88 % 03/27/24 1745   Vitals shown include unfiled device data.    Electronically Signed By: Oneil Reed DO  March 27, 2024  5:55 PM

## 2024-03-27 NOTE — PROGRESS NOTES
Brief ID Note    Patient in OR today. Sputum 2/23 and 2/24 no growth, all Bcx NGTD. Continue with current ruby-operative antimicrobials. ID will continue to follow and reassess tomorrow.     Dr. Horne will be on call for [facility] ID 3/28/24.    Suzie Diaz PA-C  Pronouns: she/her/hers  Infectious Diseases  Contact via [facility] or [facility] Paging/Directory

## 2024-03-27 NOTE — PROGRESS NOTES
[facility]    ECLS Shift Summary:     ECMO Equipment:  Console Serial Number: [id number]  Circuit Lot Number: [id number]  Oxygenator Lot Number: [id number]    Circuit Assessment: Fibrin, Clot  Fibrin Location: connectors, oxy outlet  Clot Location: corners of oxy    Arterial ECMO Cannula: 17 Fr in the Right Femoral Artery  Venous ECMO Cannula: 25 Fr in the Right Femoral Vein  ECMO Cannula Venous Right femoral vein-Site Assessment: WDL  ECMO Cannula Arterial Right femoral artery-Site Assessment: WDL  Distal Perfusion Catheter Right superficial femoral artery-Site Assessment: Sutured, Secure  ECMO Cannula Venous Right femoral vein-Site Intervention: No intervention needed  ECMO Cannula Arterial Right femoral artery-Site Intervention: No intervention needed  Distal Perfusion Catheter Right superficial femoral artery-Site Intervention: No intervention needed    Patient remains on V-A ECMO, all equipment is functioning and alarms are appropriately set. RPM's: 4350 with Blood Flow (Circuit) LPM  Av.2 LPM  Min: 4.22 LPM  Max: 4.28 LPM L/min. Sweep is at 4 LPM and 80 %. Extremities are cool.     Significant Shift Events: Switched from bival to heparin, bolused x1 and uptitrated throughout night.     Vent settings:  Vent Mode: CMV/AC  (Continuous Mandatory Ventilation/ Assist Control)  FiO2 (%): 80 %  Resp Rate (Set): 12 breaths/min  Tidal Volume (Set, mL): 500 mL  PEEP (cm H2O): 8 cmH2O  Resp: 12      Anticoagulation:  Dose (units/hr) HEParin: 1800 Units/hr  Rate (mL/hr) HEParin: 18 mL/hr  Concentration HEParin: 100 Units/mL     Dose (mg/kg/hr) Bivalirudin: 0 mg/kg/hr  Rate (mL/hr) Bivalirudin: 0 mL/hr  Concentration Bivalirudin: 1 mg/mL  Most recent: ACT  (seconds): 174 seconds    Urine output is Light / Pale.  Blood loss was scant. Product given included none.     Intake/Output Summary (Last 24 hours) at 3/27/2024 0601  Last data filed at 3/27/2024 0500  Gross per 24 hour    Intake 3971.19 ml   Output 2537 ml   Net 1434.19 ml       Labs:  Recent Labs   Lab 03/27/24  0404 03/27/24  0402 03/27/24  0134 03/27/24  0016 03/26/24  2129   PH 7.47*  --  7.42 7.42 7.40   PCO2 48*  --  55* 54* 58*   PO2 235*  --  335* 264* 138*   HCO3 35*  --  36* 35* 36*   O2PER 80 80  90 80 80 80       Lab Results   Component Value Date    HGB 10.4 (L) 03/27/2024    PHGB 30 (H) 03/27/2024     (L) 03/27/2024    FIBR 554 (H) 03/27/2024    INR 1.50 (H) 03/27/2024    PTT 53 (H) 03/27/2024    DD >20.00 (HH) 03/27/2024    ANTCH 72 (L) 03/26/2024       Plan is to go to OR today.    Solange Rodriguez RN  ECMO Specialist  3/27/2024 6:01 AM

## 2024-03-27 NOTE — ANESTHESIA POSTPROCEDURE EVALUATION
Patient: Giacomo Castro    Procedure: Procedure(s):  MEDIAN STERNOTOMY, ENDOSCOPIC VEIN HARVEST OF RIGHT GREATER SAPHENOUS VEIN, mitral valve replacement USING 29 MM EPIC STENTED MITRAL TISSUE VALVE, insertion of Impella 5.5 LVAD, patch repair of ischemic left ventricular rupture, ON CARDIOPULMONARY BYPASS, INTRAOPERATIVE TRANSESOPHAGEAL ECHOCARDIOGRAM PER ANESTHESIA  INSERTION Of Impella 5.5 LVAD       Anesthesia Type:  General    Note:  Disposition: Inpatient; ICU            ICU Sign Out: Anesthesiologist/ICU physician sign out WAS performed   Postop Pain Control: Uneventful            Sign Out: Well controlled pain   PONV: No   Neuro/Psych: Uneventful            Sign Out: Acceptable/Baseline neuro status   Airway/Respiratory:             Sign Out: AIRWAY IN SITU/Resp. Support               Airway in situ/Resp. Support: ETT                 Reason: Unplanned   CV/Hemodynamics: Uneventful            Sign Out: Acceptable CV status; No obvious hypovolemia; No obvious fluid overload   Other NRE: NONE   DID A NON-ROUTINE EVENT OCCUR? No           Last vitals:  Vitals:    03/27/24 1800 03/27/24 1815 03/27/24 1830   BP:      Pulse: 80 80 80   Resp: 12 11 11   Temp: 36.2  C (97.2  F)     SpO2: 100%         Electronically Signed By: Rachid Mckay MD  March 27, 2024  6:51 PM

## 2024-03-27 NOTE — PLAN OF CARE
ICU End of Shift Summary. See flowsheets for vital signs and detailed assessment.    Changes this shift: Decreased responsiveness, sedation stopped, team updated, CT ordered. Pressure support varied. Turning poorly tolerated. Balloon pump advanced by fellow. Tolerating suctioning. 2 BM's, Chua in place with decreased output, team aware. Multiple family visiting.     Plan: Surgery in tomorrow      Goal Outcome Evaluation:      Plan of Care Reviewed With: family    Overall Patient Progress: no changeOverall Patient Progress: no change

## 2024-03-27 NOTE — PROGRESS NOTES
[facility]         Intra-Aortic Balloon Pump Discontinuation:     IABP support discontinued 3/27/2024 at 1642 in OR.    Eugenio Martin, RT  ECMO Specialist  3/27/2024 4:58 PM

## 2024-03-27 NOTE — PLAN OF CARE
PMH HLD, tobacco use    3/12 CP that started on 3/10 --> inferior STEMI, RCA PCI was unsuccessful, unreponsive to POBA or aspiration thrombectomy -->admitted for cardiogenic shock on dobutamine, cath lab course c/b VT leading to shocks. EF 25% w/ moderate MR. PNA s/p CTX course.   3/20 recurrent VT w/ shocks. Intubated, dobutamine stopped and switched to milrinone and levo, amio started. FERNANDEZ: torrential MR  3/22 Given acute decompensation, transferred to [facility] for further evaluation and management  Surgery consult to evaluate for Mitral valve replacement  3/23 Continued decompensation-->VA ECMO, IABP advanced overnight.   3/27 Planned MVR    Nights  DANG     Shift Summary  2 more med BM's.  Pre-op scrubs x2 completed.  Consents are signed by family. Pt down to CT scan early in shift.  Pt nods head to self and sedation continued for comfort.  Family (mom and dad) at bedside this am at 0630.  Pt is first OR case today. Hep gtt for ECMO is increasing but ACT, PTT and Hep Xa is going up.      Neuro: Sedated on fent and versed Pupils PERRL.  Arouses to name and nods head to self.  ROSAS very weakly. Not following commands.    CV:   Rate/rhythm:  SR/SB  rare  PAC's, SVO2 61%, CVP 12, PA 49/39. CI Ayleen 2.3    IABP 1:1 100% & ecmo 4.1 flows, 90% (attempted to decrease) & 2.5 sweep    Pulm: CMV/AC 80% 450 TV 12 RR 8 PEEP    GI: NG (not post-pyloric) TF straight rate of 10, Flush 175 Q4 TF off at 0400 for surgery.    : mackay 30cc/hr.     Skin: pale/warm. R foot/lower leg petechiae. Pulses still present with doppler in bilat feet.    Drips:   Amio 0.5  Fent 100  Hep ECMO  Versed 2  Levo 0.08  Epi 0.07  Vaso 1    Labs:   Lytes normal, lactic normal, Hgb 10.4.    Plan:  MVR, CABG today in OR.

## 2024-03-27 NOTE — H&P
CV ICU H&P  03/27/2024     ASSESSMENT: Giacomo Castro is a 54 year old male with PMH HLD, alcohol abuse, tobacco use who presented to [facility] on 3/12 with late presentation inferior STEMI s/p cath with unsuccessful RCA PCI or thrombectomy, complicated by biventricular failure, severe MR 2/2 infarction of postero-medial papillary muscle, VT x2 episodes requiring cardioversion, pneumonia. Transferred to [facility] for evaluation for MVR, however patient decompensated after arrival requiring peripheral VA ECMO cannulation and IABP placement. Patient now presents to CVICU s/p mitral valve replacement, inferior LV wall rupture patch repair, placement of a direct aortic impella and central cannulation for VA ECMO. Arrived to CVICU with open chest.     Intra-op:   - Arrived at 1730 on 0.05 NE, 0.08 epi, 1u/hr vaso with open chest  - Upon opening chest, entire inferior wall of LV found to be ruptured and emergently repaired via direct patch repair. No issues going on pump. MVR complete, IABP removed. Direct aortic impella placed. Centrally cannulated for ECMO (peripheral cannulas remain in place).  Significant issues bleeding intra-op. Came off bypass onto full ECMO support with full decompression with impella with improved hemostasis. Patch repair was hemostatic at end of case. 3 meds, 2 pleurals, V-wires x 2; V-paced at 80. Received: 1500 cell saver, 5 rbc, 2 plat, 1000 kcentra, 1g fibryga ; 5L IVF       CO-MORBIDITIES:   Patient Active Problem List   Diagnosis    Tobacco abuse    Overweight (BMI 25.0-29.9)    CARDIOVASCULAR SCREENING; LDL GOAL LESS THAN 130    Acute ST elevation myocardial infarction (STEMI) (H)           PLAN:  Neuro/ pain/ sedation:  - Monitor neurological status. Notify the MD for any acute changes in exam.  #Acute postoperative pain  # Acute metabolic encephalopathy 2/2 sedation  # Sedation   - continue prop, fentanyl gtt   - rass goal -2 to -3 with open chest   - add gabapentin 100 mg TID  -  Scheduled: Tylenol  - PRN: Tylenol, oxycodone, Dilaudid  - Will ideally wean sedation for neuro exam overnight     Pulmonary care:   #Mechanical ventilation  # Acute Hypoxic Respiratory Failure  # Pulmonary edema   Vent Mode: CMV/AC  (Continuous Mandatory Ventilation/ Assist Control)  FiO2 (%): 80 %  Resp Rate (Set): 12 breaths/min  Tidal Volume (Set, mL): 350 mL  PEEP (cm H2O): 5 cmH2O  Resp: 13     - Goal SpO2 > 92%  - Encourage IS q15-30 minutes when awake.  - Daily CXR   - Monitor CT output     Cardiovascular:    # Cardiogenic shock  # Inferior STEMI s/p IABP c/b cardiogenic shock and recurrent VT  # Biventricular failure  # Papillary muscle infarction leading to restricted posterior mitral valve leaflet and severe MR  # S/p VA ECMO   # Inferior LV wall rupture s/p patch repair 3/27   # S/P MVR, central ECMO cannulation and impella placement 3/27/24  # Open chest    - Goal MAP >65, SBP <140, monitor hemodynamics  - IABP removed; Fem stop remains on   - inotropes: epinephrine, NE, vasopressin   - diuretics: bumex 4 mg IV BID held    - antiplatelet/anticoag:   - continue ASA  - Plavix held starting 3/21 given possibility of valve intervention  - Continuing Amiodarone infusion for VT   - Hold Statin   - Hold BB   - ASA: start tomorrow  - Will need to discuss starting anticoagulation with CVTS post-op   - If bleeding stable, plan to start straight rate heparin (600u/hr in 6 hours); discuss at 0000    - Overall plan to keep full decompression of LV via ECMO, impella support for LV patch protection   - V-paced (V-wires x 2)   - Hopefully washout in coming days   - Impella flows have been 0.7-1 LPM  - Per CVTS: If he has adequate RV function with this support, we hope that he can wean from ECMO and then proceed with a slow wean of the Impella.     GI care / Nutrition:   # NPO status   # Transaminitis in setting of CHF   # Constipation   - NPO   - PPI  - Bowel regimen: mick Saleh  - RD consult for [place]    - OG tube  for meds    - KUB pending     Renal / Fluids / Electrolytes:   # # Mild JEANE   # Hypernatremia  # Metabolic alkalosis  # Hypophosphatemia  # lactic acidosis   BL creat appears to be ~ < 1.0   - Strict I/O, daily weights, avoid/limit nephrotoxins  - Replete lytes PRN per protocol  -  ml q4h   - Lactate q2h     Endocrine:    #Stress hyperglycemia  - Insulin gtt  - Goal BG <180 for optimal healing    ID / Antibiotics:  #Stress induced leukocytosis  # Aspiration, HAP   - To complete perioperative regimen  - Monitor fever curve, WBC, and inflammatory markers as appropriate    - ID following, appreciate recs  - antibiotics              - Vancomycin: 3/22-3/23              - Meropenem: 3/22, 3/23-3/25              - Cefepime: 3/23, 3/25-TBD  - Plan for open chest prophylaxis (Vanco, Cefepime, Annika)     - Infectious workup              - 3/18 urine legionella, S pneumo: negative              - 3/18 sputum: oropharyngeal monica              - 3/22 MRSA nares: negative              - 3/22 blood culture: NGTD              - 3/23 urine culture: NGTD              - 3/24 respiratory panel: pending    Heme:     # Acute blood loss anemia  # Post CPB thrombocytopenia  - Trend CBC   - Hgb goal > 7.0-8     MSK / Skin:  #Sternotomy  #Surgical Incision  # ECMO sites   # IABP site   # Open chest   - Sternal precautions, postop incision management protocol  - PT/OT/CR    Prophylaxis:     - Mechanical DVT ppx  - Chemical DVT ppx: heparin in 6 hours   - PPI    Lines / Tubes / Drains:  - ETT  - CTs x5  - Chua  - Impella  - VA ECMO cannulas x 4 (peripheral fem and central sites)   - R ulnar art line, RIJON foster, DIMITRI plaza, PIV     Disposition:  - CVICU      Patient seen, findings and plan discussed with CVICU staff and cardiothoracic surgeons and fellow.    I spent a total of 60 minutes providing critical care services at the bedside, and on the critical care unit, evaluating the patient, directing care and reviewing laboratory values  and radiologic reports for the patient.      Louis Colbert PA-C        ====================================    HPI:   Presents to CVICU intubated and sedated.      PAST MEDICAL HISTORY:   Past Medical History:   Diagnosis Date    Tobacco abuse        PAST SURGICAL HISTORY:   Past Surgical History:   Procedure Laterality Date    CV EXTRACORPERAL MEMBRANE OXYGENATION N/A 3/23/2024    Procedure: Extracorporeal Membrance Oxygenation;  Surgeon: Maynor Ochoa MD;  [facility]    CV INTRA AORTIC BALLOON N/A 3/23/2024    Procedure: Intraprocedure Aortic Balloon Pump Insertion;  Surgeon: Maynor Ochoa MD;  [facility]    CV RIGHT HEART CATH MEASUREMENTS RECORDED N/A 3/22/2024    Procedure: Right Heart Catheterization;  Surgeon: Blade Abdul MD;  [facility]    CV SWAN KYRA PROCEDURE N/A 3/22/2024    Procedure: [place] Kyra Procedure;  Surgeon: Blade Abdul MD;  Location:  [facility]    HERNIA REPAIR, UMBILICAL  2016    complete at [facility]    vein surgery Right 2006    Treatment of varicose vein    ZZC ARTHROSCOPIC DECOMPRESSION OF GANGLION CYST Left 2008    completed by [place]       FAMILY HISTORY:   Family History   Problem Relation Age of Onset    Breast Cancer Mother     Coronary Artery Disease Father         MI; possibly in his 40's, smoker    Diabetes No family hx of     Hypertension No family hx of     Hyperlipidemia No family hx of     Cerebrovascular Disease No family hx of     Colon Cancer No family hx of     Prostate Cancer No family hx of     Thyroid Disease No family hx of     Genetic Disorder No family hx of        SOCIAL HISTORY:   Social History     Tobacco Use    Smoking status: Every Day     Packs/day: 1.00     Years: 30.00     Additional pack years: 0.00     Total pack years: 30.00     Types: Cigarettes    Smokeless tobacco: Never   Substance Use Topics    Alcohol use: Yes         OBJECTIVE:  1. VITAL SIGNS:    Temp:  [98.6  F (37  C)-99  F (37.2  C)] 99  F (37.2  C)  Pulse:  [63-77] 64  Resp:  [12-16] 13  BP: (107-122)/(53-56) 107/53  MAP:  [67 mmHg-88 mmHg] 88 mmHg  Arterial Line BP: ()/(46-62) 129/62  FiO2 (%):  [80 %] 80 %  SpO2:  [95 %-100 %] 97 %    Vent Mode: CMV/AC  (Continuous Mandatory Ventilation/ Assist Control)  FiO2 (%): 80 %  Resp Rate (Set): 12 breaths/min  Tidal Volume (Set, mL): 350 mL  PEEP (cm H2O): 5 cmH2O  Resp: 13        2. INTAKE/ OUTPUT:   I/O last 3 completed shifts:  In: 5894.21 [I.V.:2572.21; Other:600; NG/GT:616]  Out: 1187 [Urine:1187]      3. PHYSICAL EXAMINATION:   General: sedated  Neuro: sedated  Resp: intubated, ventilated  CV: S1, S2, V paced, no m/r/g   Abdomen: Soft, non-distended, non-tender  Incisions: c/d/I. Right leg ace wrapped. Fem stop at groin. Cannula sites in groin and chest. Open chest is soft, flush with chest wall.   Extremities: warm. + 2 edema.   CT: To suction, serosang output, no airleak, crepitus      4. INVESTIGATIONS:   Arterial Blood Gases   Recent Labs   Lab 03/27/24  1741 03/27/24  1652 03/27/24  1456 03/27/24  1427   PH 7.48* 7.47* 7.35 7.40   PCO2 37 38 49* 44   PO2 374* 483* 196* 300*   HCO3 27 28 27 27     Complete Blood Count   Recent Labs   Lab 03/27/24  1652 03/27/24  1508 03/27/24  1456 03/27/24  1427 03/27/24  0805 03/27/24  0402 03/26/24  2128 03/26/24  1602   WBC  --  15.4*  --   --   --  16.4* 17.6* 16.9*   HGB 9.9* 7.1* 7.3* 8.4*   < > 10.4* 10.7* 10.8*   PLT  --  134*  --   --   --  114* 130* 141*    < > = values in this interval not displayed.     Basic Metabolic Panel  Recent Labs   Lab 03/27/24  1741 03/27/24  1652 03/27/24  1456 03/27/24  1427 03/27/24  1405 03/27/24  0404 03/27/24  0402 03/26/24  2129 03/26/24  2128 03/26/24  1604 03/26/24  1602 03/26/24  1002 03/26/24  1001   NA  --  145 145 144 142   < > 144  --  144  --  145  --  145   POTASSIUM  --  4.6 4.1 4.3 4.8   < > 4.4  --  4.4  --  4.2  4.2  --  3.4   CHLORIDE  --   --   --    --   --   --  103  --  103  --  104  --  102   CO2  --   --   --   --   --   --  31*  --  32*  --  31*  --  32*   BUN  --   --   --   --   --   --  24.0*  --  23.0*  --  21.3*  --  18.9   CR  --   --   --   --   --   --  0.70  --  0.76  --  0.79  --  0.81   * 133* 147* 150* 169*   < > 147*   < > 142*   < > 146*   < > 155*    < > = values in this interval not displayed.     Liver Function Tests  Recent Labs   Lab 03/27/24  1508 03/27/24  0402 03/26/24 2128 03/26/24  1602 03/26/24  1001   AST  --  113* 108* 101* 103*   ALT  --  123* 136* 143* 160*   ALKPHOS  --  54 56 56 59   BILITOTAL  --  1.7* 1.6* 1.4* 1.2   ALBUMIN  --  2.9* 3.0* 3.0* 3.2*   INR 1.73* 1.50* 1.47* 1.91* 1.98*     Pancreatic Enzymes  No lab results found in last 7 days.  Coagulation Profile  Recent Labs   Lab 03/27/24  1508 03/27/24  0402 03/27/24  0136 03/26/24 2128 03/26/24  1602   INR 1.73* 1.50*  --  1.47* 1.91*   PTT 35 53* 50* 40* 60*         5. RADIOLOGY:       Recent Results (from the past 24 hour(s))   CT Head w/o Contrast   Result Value    Radiologist flags Age-indeterminate infarcts (Urgent)    Narrative    EXAM: CT HEAD W/O CONTRAST  3/26/2024 8:54 PM     HISTORY: unresponsiveness off sedation       COMPARISON: None.    TECHNIQUE: Using multidetector thin collimation helical acquisition  technique, axial, coronal and sagittal CT images from the skull base  to the vertex were obtained without intravenous contrast.   (topogram) image(s) also obtained and reviewed.    FINDINGS:  No acute intracranial hemorrhage, mass effect, or midline shift.  Hypoattenuating left cerebellar (series 3 image 27), and right caudate  head foci (series 3 image 18), as well as questionable  hyperattenuation within the left anterior tigre/midbrain (series 3,  image 26). Subtle hypoattenuation in the anterior left tigre could  represent artifact in this region. There is generalized cortical  volume loss, which seems to particularly involve the  temporal parietal  regions bilaterally. Mild leukoaraiosis. Ventricles are proportionate  to the cerebral sulci. Clear basal cisterns.    The bony calvaria and the bones of the skull base are normal. The  visualized portions of the paranasal sinuses and mastoid air cells are  clear. Grossly normal orbits. Mild bilateral carotid siphon  calcification.      Impression    IMPRESSION: Age-indeterminate foci of hypoattenuation within the right  caudate head, left cerebellum and left superior tigre, which may  represent chronic insults however in these regions a late acute to  subacute insults could have a similar appearance. Repeat CT head could  be considered for further evaluation. MR could be considered when  patient is able.    [Urgent Result: Age-indeterminate infarcts]    Finding was identified on 3/26/2024 9:21 PM.     ICU team was contacted by Dr. Peralta at 3/27/2024 7:37 AM and  verbalized understanding of the urgent finding.     I have personally reviewed the examination and initial interpretation  and I agree with the findings.    STAR PERALTA MD         SYSTEM ID:  [id number]   XR Chest Port 1 View    Narrative    Exam: XR CHEST PORT 1 VIEW, 3/26/2024 9:41 PM    Comparison: Chest radiograph 3/26/2024     History: balloon pump    Technique: Portable AP view of the chest.     Findings:    Right IJ central venous catheter tip over innominate vein SVC  confluence. Left IJ [place] catheter tip over right main pulmonary  artery. Inferior protocol cannula over high right atrium. Endotracheal  tube tip 3.7 cm above cesia. Enteric tube tip projects over the left  upper quadrant. Superior metallic IABP marker projects at the level of  cesia.    Stable cardiomegaly with generally stable diffuse mixed interstitial  and airspace opacities. No pneumothorax, possible small pleural  effusions as well as basilar streaky opacities.       Impression    Impression:   1.  Endotracheal tube tip terminates 3.7 cm above  the cesia. IABP  superior metallic marker at the level of the cesia.  2.  Stable enlarged cardiac silhouette with similar diffuse mixed  interstitial and airspace opacities, favoring pulmonary edema and  atelectasis.     I have personally reviewed the examination and initial interpretation  and I agree with the findings.    MAGDALENA SOLIS MD (Joe)         SYSTEM ID:  [id number]   FERNANDEZ with Report    Narrative    Presley Mccauley MD     3/27/2024  5:59 PM  Perioperative FERNANDEZ Procedure Note    Staff -        Anesthesiologist:  Presley Mccauley MD       Resident/Fellow: Oneil Reed DO       Performed By: fellow  Preanesthesia Checklist:  Patient identified, IV assessed, risks and   benefits discussed, monitors and equipment assessed, procedure being   performed at surgeon's request and anesthesia consent obtained.    FERNANDEZ Probe Insertion    Probe Status PRE Insertion: NO obvious damage  Probe type:  Adult 3D  Bite block used:   Oral Airway  Insertion Technique: Easy, no oropharyngeal manipulation  Insertion complications: None obvious  Billing Report:FERNANDEZ report by Anesthesiologist (See Separate Report note)  Probe Status POST Removal: NO obvious damage    FERNANDEZ Report  General Procedure Information  Images for this study have been archived.  Modalities: Color flow mapping, PW Doppler, CW Doppler, 3D and 2D  Diagnostic indications for FERNANDEZ:   Cardiomyopathy, other  Non-rheumatic mitral regurgitation  Echocardiographic and Doppler Measurements  Right Ventricle:  Cavity size dilated.     Thrombus not present.    Global   function severely impaired.     Left Ventricle:  Cavity size dilated.     Thrombus not present.   Global   Function severely impaired.       Ventricular Regional Function:  1- Basal Anteroseptal:  hypokinetic  2- Basal Anterior:  hypokinetic  3- Basal Anterolateral:  hypokinetic  4- Basal Inferolateral:  hypokinetic  5- Basal Inferior:  hypokinetic  6- Basal Inferoseptal:  hypokinetic  7- Mid  Anteroseptal:  hypokinetic  8- Mid Anterior:  hypokinetic  9- Mid Anterolateral:  hypokinetic  10- Mid Inferolateral:  hypokinetic  11- Mid Inferior:  hypokinetic  12- Mid Inferoseptal:  hypokinetic  13- Apical Anterior:  hypokinetic  14- Apical Lateral:  hypokinetic  15- Apical Inferior:  hypokinetic  16- Apical Septal:  hypokinetic  17- [place]:  hypokinetic    Valves  Aortic Valve: Annulus normal.  Stenosis not present.  Regurgitation +1.    Mitral Valve: Stenosis not present.  Regurgitation +4.  Leaflets   thickened.  Leaflet motions prolapsed and restricted.  Specific leaflet   segments with abnormal motions:  A2, A3, P2 and P3  Tricuspid Valve: Annulus dilated.  Stenosis not present.  Regurgitation   +2.  Leaflets normal.    Pulmonic Valve: Annulus normal.  Stenosis not present.  Regurgitation +1.        Aorta: Ascending Aorta: Size normal.  Dissection not present.  Plaque   thickness less than 3 mm.  Mobile plaque not present.    Aortic Arch: Size normal.    Dissection not present.   Plaque thickness   less than 3 mm.   Mobile plaque not present.      Right Atrium:    Thrombus not present.   Tumor not present.   Device   present.   Left Atrium: Thrombus not present.  Tumor not present.  Device not   present.    Left atrial appendage normal.     Atrial Septum: Intra-atrial septal morphology normal.       Ventricular Septum: Intra-ventricular septum morphology normal.        Other Findings:    Pleural Effusion:  left.       Post Intervention Findings  Procedure(s) performed:  Mitral Valve Repair/Replace, CABG and Other (see   comments). Global function:  Unchanged. Regional wall motion: Unchanged.   Surgeon(s) notified of all postintervention findings: Yes (In real time).   Mitral Valve: Valve replaced with bioprosthetic valve. No VIVIENNE present. No   paravalvular leak.        LVAD Placement:  LV decompressed. PFO not   present. Aortic valve not opening.        Echocardiogram Comments  Echocardiogram comments:    Pre-CPB:  Severely dilated LV with increased end diastolic size measuring 7.9 cm   with severe reduction in systolic function and EF 15% by 3D assessment   with peripheral VA ECMO flows at 4 LPM and epinephrine infusion 0.07   mcg/kg/min. Dilated RV by measurements of 4.4, 4.2, and 7 cm at base, mid,   and apex, accordingly. Moderate to severely reduced RV function by TAPSE   11.1 mm. MAILE velocities > 60 cm/s. No PFO by CFD.  Trace-mild cental TR.   Severe MR secondary to flail A2/A3 leaflet and P2 restriction with   Regurgitant vol 70 ml and 2D PISA EROA 1.07 cm.   Trileaflet aortic valve with trace eccentric AI and no evidence of   stenosis with poor myocardial function. Trace-mild PI. PA catheter   visualized in right PA. Peripheral venous cannula visualized RA spanning   across bicaval junctions into SVC. IABP visualized in descending aorta   with tip terminating 3 cm from L subclavian origin. No pleural or   pericardial effusion. No echo evidence of aortic dissection in visualized   portions of ascending, arch, and proximal descending. Findings   communicated with surgical team in real time.    Post-CPB:  Globally unchanged biventricular size and function on epinephrine infusion   at 0.1 mcg/kg/min with VA ECMO at 4.6 LPM and Impella flow at 1LPM. New   bovine pericardial patch repair of inferior LV wall. Difficult to assess   for new RWMA. New 29 mm bioprosthetic valve present at mitral annulus   appears well-seated with no PVL, mean inflow gradient 1 mmHg, but opening   approximately once every 6th beat.   Moderate sized pleural effusion on L side. No pericardial effusions. New   Impella device visualized spanning aortic valve with associated continuous   AI mild in quantity. Difficult to visualize Impella inflow cannula due to   shadowing from mitral posts. In transgastric views, appears approximately   3.6 cm from annulus. IABP remains deflated in descending aorta   approximately 3 cm from L subclavian  takeoff. No echo evidence of aortic   dissection. Findings communicated with surgical team in real time.  Cannot rule out clot in Left atrial appendage.  Clot/ low flow seen in aortic root on initiation of CPB and the clot   disappeared subsequently  Time stamp on FERNANDEZ images is an hour behind actual time.  .       =========================================

## 2024-03-27 NOTE — PROGRESS NOTES
Cardiac Surgery Attending    Patient was seen and examined. There is no interval change in assessment.     I met with family this PM and spoke with Mr Castro's mother (Chica) this afternoon. We reviewed his current clinical status and our concerns regarding his prognosis. In brief, he is a 54M delayed-presentation STEMI to [facility] ([facility]) with unsuccessful revascularization. Since that time he has had significant reduction in his left ventricular function and severe mitral insufficiency. He arrived here on Friday and was cannulated for peripheral VA ECMO on Saturday. Since that time and despite optimization of both his loading conditions and inotropic support he continues to have severely reduced LVEF and minimal pulsatility observed on ECMO support. In an effort to optimize his physiology and give him the best chance for  from ECMO, after multidisciplinary review we have offered mitral valve replacement, CABG, and placement of a direct aortic impella. We plan to proceed with this tomorrow. If he has adequate RV function with this support, we hope that he can wean from ECMO and then proceed with a slow wean of the Impella. Though his clinical status is not completely reflected in STS measures, we estimate a risk of mortality of approximately 30%. The family understands these risks and wishes to proceed. I understand from them a strong will to live in Mr Castro, and that he would strongly desire that all measures be taken to help him recover from this event.    The nature, risks, and benefits of the operation were explained and all questions were answered.    Consent is on the chart and we will plan for surgery in the morning.    Michael Mulvihill, MD  3/26/2024 @ 8:28 PM

## 2024-03-28 NOTE — CONSULTS
Care Management Initial Consult    General Information  Assessment completed with: VM-chart review, Parents, Pt mother (Chica)  Type of CM/SW Visit: Initial Assessment    Primary Care Provider verified and updated as needed: Yes (Chica believes that pt goes to [facility] [place] [facility] and does not see a specific provider.)   Readmission within the last 30 days: no previous admission in last 30 days      Reason for Consult: length of stay, other (see comments) (elevated risk score)  Advance Care Planning: Advance Care Planning Reviewed: other (see comments) (Chica does not believe that pt has a HCD)          Communication Assessment  Patient's communication style: spoken language (English or Bilingual)    Hearing Difficulty or Deaf: no   Wear Glasses or Blind: no    Cognitive  Cognitive/Neuro/Behavioral: .WDL except  Level of Consciousness: sedated  Arousal Level: unresponsive  Orientation: other (see comments) (elana)  Mood/Behavior: behavior appropriate to situation  Best Language:  (ELANA)  Speech: endotracheal tube    Living Environment:   People in home: alone     Current living Arrangements: house (Pt lives in a townhouse. Per Chica there are 0 KELTON and 2 levels inside home. Main living area is on first floor and pt needs to be able to access flight of stairs to get to bedroom.)      Able to return to prior arrangements: yes       Family/Social Support:  Care provided by: self  Provides care for: no one  Marital Status: Single  Parent(s), Sibling(s), Other (specify) (Group of friends)          Description of Support System: Involved, Supportive         Current Resources:   Patient receiving home care services: No     Community Resources: None  Equipment currently used at home: none  Supplies currently used at home: None    Employment/Financial:  Employment Status: employed full-time     Employment/ Comments: Pt is not a   Financial Concerns: none   Referral to Financial Worker: No       Does the  "patient's insurance plan have a 3 day qualifying hospital stay waiver?  No    Lifestyle & Psychosocial Needs:  Social Determinants of Health     Food Insecurity: Not on file   Depression: Not at risk (12/10/2018)    PHQ-2     PHQ-2 Score: 0   Housing Stability: Not on file   Tobacco Use: High Risk (3/22/2024)    Patient History     Smoking Tobacco Use: Every Day     Smokeless Tobacco Use: Never     Passive Exposure: Not on file   Financial Resource Strain: Not on file   Alcohol Use: Not on file   Transportation Needs: Not on file   Physical Activity: Not on file   Interpersonal Safety: Not on file   Stress: Not on file   Social Connections: Not on file       Functional Status:  Prior to admission patient needed assistance:   Dependent ADLs:: Independent  Dependent IADLs:: Independent       Mental Health Status:  Mental Health Status: No Current Concerns       Chemical Dependency Status:  Chemical Dependency Status: No Current Concerns (No concerns per Chica. Alcohol abuse listed in chart and Chica reported that pt \"likes his rum and cokes\" but not to a level that gives her concern.)             Values/Beliefs:  Spiritual, Cultural Beliefs, Yazdanism Practices, Values that affect care:                 Additional Information:  Per H&P on 3/27/24, \"Giacomo Castro is a 54 year old male with PMH HLD, alcohol abuse, tobacco use who presented to [facility] on 3/[facility] with late presentation inferior STEMI s/p cath with unsuccessful RCA PCI or thrombectomy, complicated by biventricular failure, severe MR 2/2 infarction of postero-medial papillary muscle, VT x2 episodes requiring cardioversion, pneumonia. Transferred to [facility] for evaluation for MVR, however patient decompensated after arrival requiring peripheral VA ECMO cannulation and IABP placement. Patient now presents to CVICU s/p mitral valve replacement, inferior LV wall rupture patch repair, placement of a direct aortic impella and central cannulation for VA ECMO. " "Arrived to CVICU with open chest. \"     DUNCAN spoke with pt mother (Chica) over the phone to complete Care Management Assessment for elevated risk score and length of stay. Pt is currently on ECMO and sedated. Discharge needs are currently TBD while pt remains on ICU. DUNCAN introduced self and role. Chica reported that she was having a bad day but agreed to answer SW questions.     Chica described pt as a \"very happy, single man\". Pt was living alone in a 2 level [facility] with about 0 KELTON and flight of stairs to bedroom. Pt was independent with all I/ADLs and working FT as a  for the [employer]. Chica to check with pt employer to see if they need any [facility] paperwork filled out. DUNCAN shared that the CM team can assist with getting any paperwork completed by provider team if this is needed by pt's employer. Chica denied that pt received any home care, community services or utilized any medical equipment/supplies at home. Pt is not a . Chica also denied that pt had any financial concerns.     Chica reported that pt does not have any children. Pt's parents (Chica and Sami) are both involved and supportive. Pt also has three brothers (Hieu, Juan and Mandeep) and one sister (Bia). Chica described their family as \"close and affectionate\". Chica reported that pt is particularly close to pt brother Juan and had a close group of friends that pt spends time with. Chica denied any mental health or chemical dependency concerns. Alcohol abuse and tobacco abuse listed in chart. Chica reported that pt \"likes his rum and cokes\" but not to a point where she is concerned about pt's drinking. Chica confirmed that pt is a \"heavy smoker\". Chica reports that in pt's free time he enjoys playing bags, relaxing and watching TV. Chica believes that pt receives primary care from [facility] in [place], but does not believe that he sees a particular provider. Chica had no other questions for SW at this time.     Trena Malcolm, PROSPER  Float "   GUILLERMO [facility]   Assisting 4A/E SW   4A/E SW Ph: [phone]

## 2024-03-28 NOTE — BRIEF OP NOTE
[facility]    Brief Operative Note    Pre-operative diagnosis: Decompensated heart failure (H) [I50.9]  Nonrheumatic mitral valve regurgitation [I34.0]  Acute ST elevation myocardial infarction (STEMI) involving right coronary artery (H) [I21.11]  Post-operative diagnosis Same as pre-operative diagnosis    Procedure: MEDIAN STERNOTOMY, ENDOSCOPIC VEIN HARVEST OF RIGHT GREATER SAPHENOUS VEIN, mitral valve replacement USING 29 MM EPIC STENTED MITRAL TISSUE VALVE, insertion of Impella 5.5 LVAD, patch repair of ischemic left ventricular rupture, ON CARDIOPULMONARY BYPASS, INTRAOPERATIVE TRANSESOPHAGEAL ECHOCARDIOGRAM PER ANESTHESIA, N/A - Chest  INSERTION Of Impella 5.5 LVAD, N/A - Chest    Surgeon: Surgeon(s) and Role:     * Mulvihill, Michael, MD - Primary     * Domingo Hernandez MD - Assisting     * Rae Gupta NP - Assisting     * Ibis Brito PA-C  Anesthesia: General   Estimated Blood Loss: 1L    Drains: Bilateral Pleural CT, 3x mediastinal CT    Specimens:   ID Type Source Tests Collected by Time Destination   1 : anterior leaflets of mitral valve and papillary muscle Tissue Heart Valve, Mitral (Bicuspid) SURGICAL PATHOLOGY EXAM Mulvihill, Michael, MD 3/27/2024 10:57 AM    2 : Left Ventricle Wall Tissue Heart SURGICAL PATHOLOGY EXAM Mulvihill, Michael, MD 3/27/2024 11:00 AM      Findings:   LV inferior wall ischemic and necrotic. Rupture requiring Patch repair. MVR 29mm Epic valve. Impella 5.    Complications: None.    Implants:     Implant Name Type Inv. Item Serial No.  Lot No. LRB No. Used Action   GRAFT PATCH VASC XENOSURE BIOLOGIC [id number] E8P14 - [id number]  GRAFT PATCH VASC XENOSURE BIOLOGIC [id number] E8P14  LEMAITRE VASCULAR IN [id number] N/A 1 Implanted   GRAFT PERICARDIUM 6X8CM BOVINE E6P8 - [id number] Bone/Tissue/Biologic GRAFT PERICARDIUM 6X8CM BOVINE E6P8  LEMAITRE VASCULAR IN [id number] N/A 1 Implanted   VALVE MITRAL EPIC  PLUS TISSUE STENTED 29MM [id number] - [id number] Valve VALVE MITRAL EPIC PLUS TISSUE STENTED 29MM [id number]  N/A 1 Implanted   GRAFT VASCUTEK GELWEAVE STRAIGHT [id number] - [id number] Graft GRAFT VASCUTEK GELWEAVE STRAIGHT [id number] VASCUTEK [id number] N/A 1 Implanted

## 2024-03-28 NOTE — PLAN OF CARE
Major Shift Events: Pt sedated, RASS -5, continues on prop and fentanyl. 100% V paced @ 80. Impella increased to P5 and had suction alarm, decreased back down to P4, MD aware. Pt continues on levo, epi, and vaso with increasing pressor needs and 2.75L of LR given for MAP goal >65, MD aware. Pt on VV-AA ecmo, no chugging. 1u of Plts given. CMV settings 50%/12/350/5. OG to LIS with 200 ml of brown output. Rectal tube placed with large amount of output overnight. Insulin gtt started on alg 1. Chua in place UOP 25-55 ml/hr overnight and trending down, MD aware. Lactic 4.0, MD aware.    Plan: Continue with plan of care.    For vital signs and complete assessments, please see documentation flowsheets.

## 2024-03-28 NOTE — PLAN OF CARE
Goal Outcome Evaluation:      Plan of Care Reviewed With: other (see comments) (unable to discuss with pt at this time)    Overall Patient Progress: no changeOverall Patient Progress: no change    Outcome Evaluation: See RD note 3/28

## 2024-03-28 NOTE — PROGRESS NOTES
[facility]    ECLS Shift Summary: 0700 to 2300     ECMO Equipment:  Console Serial Number: [id number]  Circuit Lot Number: [id number]  Oxygenator Lot Number: [id number]    Circuit Assessment: Fibrin, Clot  Fibrin Location: connectors and oxygenator outlet.  Clot Location: corners of the oxygenator.    Arterial ECMO Cannula: 22 Fr EOPA in the Aorta and a 17 Fr. In the Right Femoral Artery.  Venous ECMO Cannula: 28 Fr in the Right Atrium and a 25 Fr. In the Right Femoral Vein.  Distal Perfusion Cannula: 8 Fr. In the Right Superficial Femoral Artery.     Patient remains on V-A (VA-VA) ECMO, all equipment is functioning and alarms are appropriately set. RPM's: 3115 with Blood Flow (Circuit) LPM  Av.3 LPM  Min: 4.14 LPM  Max: 4.37 LPM L/min. Sweep is at 5 LPM and 70 %. Extremities are cool.     Significant Shift Events: MVR done today. Impella placed.    Vent settings:  Vent Mode: CMV/AC  (Continuous Mandatory Ventilation/ Assist Control)  FiO2 (%): 50 %  Resp Rate (Set): 12 breaths/min  Tidal Volume (Set, mL): 350 mL  PEEP (cm H2O): 5 cmH2O  Resp: 12      Anticoagulation:  Dose (units/hr) HEParin: 0 Units/hr  Rate (mL/hr) HEParin: 0 mL/hr  Concentration HEParin: 100 Units/mL     Dose (mg/kg/hr) Bivalirudin: 0 mg/kg/hr  Rate (mL/hr) Bivalirudin: 0 mL/hr  Concentration Bivalirudin: 1 mg/mL  Most recent: ACT  (seconds): 170 seconds    Urine output is around 50 ml per hour.  Blood loss was moderate. Product given included a unit of platelets and 1500 ml of LR post op.     Intake/Output Summary (Last 24 hours) at 3/27/2024 2218  Last data filed at 3/27/2024 2200  Gross per 24 hour   Intake 9748.43 ml   Output 1754 ml   Net 7994.43 ml       Labs:  Recent Labs   Lab 24  2020 24  1841 24  1745 24  1741 24  1739 24  1652   PH 7.46* 7.49*  --  7.48*  --  7.47*   PCO2 39 37  --  37  --  38   PO2 132* 173*  --  374*  --  483*   HCO3 28 28  --     --  28   O2PER 50 50 80 100   < > 100.0    < > = values in this interval not displayed.       Lab Results   Component Value Date    HGB 9.0 (L) 03/27/2024    PHGB 30 (H) 03/27/2024    PLT 88 (L) 03/27/2024    FIBR 265 03/27/2024    INR 1.82 (H) 03/27/2024    PTT 48 (H) 03/27/2024    DD 8.72 (H) 03/27/2024    ANTCH 68 (L) 03/27/2024       Plan is to remain on VV to AA ECMO.    CECILY Mercado, RRT  ECMO Specialist  3/27/2024 10:18 PM

## 2024-03-28 NOTE — PROGRESS NOTES
[facility]    New ECLS Mode Note:     Date: 3/27/2024  Time: 1009  Physician: Mulvihill New Cannula Configuration:  Arterial Cannula: 22 Fr. EOPA in the Aorta  New? Yes      Venous Cannula: 28 Fr. In the Right Atrium  New? Yes    Arterial Cannula: 17 Fr. In the Right Femoral Artery  New? No    Venous Cannula: 25 Fr. In the Right Femoral Vein  New? No      Distal Perfusion Cannula: 8 Fr. In the Right Superficial Femoral Artery  New? No      Conversion was performed in the OR, placement was verified by CXR.        CECILY Mercado, RRT  ECMO Specialist  3/27/2024 9:21 PM

## 2024-03-28 NOTE — PROGRESS NOTES
[facility]    ECLS Shift Summary:     ECMO Equipment:  Console Serial Number: [id number]  Circuit Lot Number: [id number]  Oxygenator Lot Number: [id number]    Circuit Assessment: Fibrin, Clot  Fibrin Location: connectors, oxy outlet  Clot Location: corners of oxy    Arterial ECMO Cannula: 17 Fr in the Right Femoral Artery  Arterial ECMO Cannula: 22 Fr EOPA in the Aorta  Venous ECMO Cannula: 25 Fr in the Right Femoral Vein  Venous ECMO Cannula: 28 Fr in the Right Atrium  Distal Perfusion Cannula: 8 Fr. In the Right Superficial Femoral Artery.     ECMO Cannula Venous Right femoral vein-Site Assessment: WDL  ECMO Cannula Arterial Right femoral artery-Site Assessment: WDL  Distal Perfusion Catheter Right superficial femoral artery-Site Assessment: Secure, Sutured, WDL  ECMO Cannula Arterial Aorta-Site Assessment: WDL  ECMO Cannula Venous Right atrium-Site Assessment: WDL    Patient remains on V-A (VV-AA) ECMO, all equipment is functioning and alarms are appropriately set. RPM's: 3115 with Blood Flow (Circuit) LPM  Av.5 LPM  Min: 4.37 LPM  Max: 4.6 LPM L/min. Sweep is at (S) 4 LPM and 60 %. Extremities are cool.     Significant Shift Events: none    Vent settings:  Vent Mode: CMV/AC  (Continuous Mandatory Ventilation/ Assist Control)  FiO2 (%): 50 %  Resp Rate (Set): 12 breaths/min  Tidal Volume (Set, mL): 350 mL  PEEP (cm H2O): 5 cmH2O  Resp: 12      Anticoagulation:  Dose (units/hr) HEParin: 600 Units/hr  Rate (mL/hr) HEParin: 6 mL/hr  Concentration HEParin: 100 Units/mL     Dose (mg/kg/hr) Bivalirudin: 0 mg/kg/hr  Rate (mL/hr) Bivalirudin: 0 mL/hr  Concentration Bivalirudin: 1 mg/mL  Most recent: ACT  (seconds): 154 seconds    Urine output is  .  Blood loss was (see I+Os). Product given included 1.75L of LR.     Intake/Output Summary (Last 24 hours) at 3/28/2024 0618  Last data filed at 3/28/2024 0600  Gross per 24 hour   Intake 31299.11 ml   Output 2492 ml   Net 9167.11  ml       Labs:  Recent Labs   Lab 03/28/24  0449 03/28/24  0405 03/28/24  0404 03/28/24  0155 03/28/24  0019 03/27/24 2222   PH  --  7.48*  --  7.49* 7.48* 7.49*   PCO2  --  36  --  36 37 37   PO2  --  94  --  112* 165* 120*   HCO3  --  27  --  27 28 28   O2PER 100 50 50  60 50 50 50       Lab Results   Component Value Date    HGB 8.3 (L) 03/28/2024    PHGB 40 (H) 03/28/2024     (L) 03/28/2024    FIBR 331 03/28/2024    INR 1.70 (H) 03/28/2024    PTT 42 (H) 03/28/2024    DD 6.79 (H) 03/28/2024    ANTCH 68 (L) 03/27/2024       Plan is to continue to rest on VV-AA ECMO.    Eugenio Leon RN  ECMO Specialist  3/28/2024 6:18 AM

## 2024-03-28 NOTE — PROGRESS NOTES
Major Shift Events:      Pt arrived from OR at 1745. Maintained on ECMO, flows ~4. Impella at P4. Some chugging - given 500ml LR. Sedated, RASS -5. HR paced at 80. On Vaso, Epi, Levo for MAP goal >65. No pulsatility seen on a-line. FiO2 weaned to 50% on vent. Family updated.     Plan: continue current therapies.       For vital signs and complete assessments, please see documentation flowsheets.

## 2024-03-28 NOTE — PROCEDURES
Small Bowel Feeding Tube Placement Assessment  Reason for Feeding Tube Placement: Tube feeding2  Cortrak Start Time: 1400   Cortrak End Time: 1420  Medicine Delivered During Procedure: none  Placement Successful: yes    Procedure Complications: none  Final Placement Regino at exit of nare 140 cm  Face to Face time with patient: 25    Bridle Placement:   Reason for bridle placement: cortrack securement   Medicine delivered during procedure: lubricating jelly yes   Procedure: Successful-yes  Location of top of clip on FT: @ 140 cm marker   Condition of nose/skin at time of bridle placement: Unremarkable-yes   Face to Face time with patient: 25 minutes.

## 2024-03-28 NOTE — PLAN OF CARE
Major Shift Events:     RASS -4/5, sedation slowly weaned down, now off. Pt blinked/slightly open eyes to pain when turned, otherwise has not woken up/moved any extremities. HR paced at 80, VVI (rhythm is sinus 70s under pacer). ECMO flows increased to 5.4lpm, sweep 4, 60%. Product/fluid given: 500ml LR, 2 PRBCs, 1 FFP, 5g Cyanokit. Pt with increasing vasoplegia this AM- Angiotension II started. Pressors weaned down after giving product/Cyanokit. No chugging from circuit. Chest remains open. Vent: 50% 12/350/5, bloody secretions. NJ placed - trickle feeds started. Pt having loose/watery stools, rectal tube in place. K+ 5.5, shifted x 1.     Plan: potential washout tmrw/Saturday. Start CRRT if K+ >6 per Nephrology, otherwise reassess need tmrw. Family updated.     For vital signs and complete assessments, please see documentation flowsheets.     Problem: Cardiovascular Surgery  Goal: Effective Cardiac Function  Outcome: Not Progressing     Problem: Cardiovascular Surgery  Goal: Absence of Bleeding  Outcome: Progressing  Intervention: Monitor and Manage Bleeding  Recent Flowsheet Documentation  Taken 3/28/2024 1200 by Melody Burgess, RN  Bleeding Management:   blood products administered   dressing monitored  Taken 3/28/2024 0800 by Melody Burgess, RAJANI  Bleeding Management:   blood products administered   dressing monitored   Goal Outcome Evaluation:

## 2024-03-28 NOTE — CONSULTS
Nephrology Initial Consult  March 28, 2024      Giacomo Castro MRN:[id number] YOB: 1970  Date of Admission:3/22/2024  Primary care provider: No Ref-Primary, Physician  Requesting physician: Mulvihill, Michael, MD    ASSESSMENT AND RECOMMENDATIONS:   JEANE: most likely ATN in the setting of heart failure, hypotension and recent surgery. In the OR he had extended on pump time which also increases risk for ATN. UOP has abruptly dropped since the OR and today he is oliguric. He is vastly volume up and has a high obligatory intake and has received significant blood products.   *Currently stable but I do not see a situation where he would not require CRRT. (His obligatory intake is concerning and any volume we could potentially mitigate will be helpful) Can use lokelma tonight for potassium but if creatinine is up again tomorrow  I would start CRRT.  (*I will notify fellow of same plan so if there are issues overnight, we can simply start. I have also spoken to the charge nurse. )    2. Hyperkalemia: lokelma at this time should provide you control. The amount of blood products received yesterday likely contributed. Additional blood products may affect our ability to keep up. Note that the repeat of 4.9 obtained this afternoon is likely temporary. He was shifted, and while this hides the potassium, it is a false security. That shift only lasts a few hours. The only ways to treat the potassium are diuretics, binders or dialysis. Insulin, bicarb etc are only to buy time. Hopefully the lokelma will have been in his system long enough at the time the shift wears off to provide us coverage. If we find ourselves in a worsening potassium issue tonight, administer calcium to stabilize his cardiac status.     3. ABX: Given the rise in his creatinine and drop in his UOP we can presume at this time that his eGFR is likely < 20 mls/min. I would dose adjust accordingly at this time.     4. Acid/Base: stable at this time.  There is no indication for us to start CRRT now.     Estephanie Webster MD MS FNKF    Recommendations were communicated to primary team in person. 70 min was spent reviewing chart, talking to team, assessing patient.         Estephanie Webster MD   Division of Renal Disease and Hypertension  [facility]  [employer]  Vocera Web Console      REASON FOR CONSULT: JEANE, hyperkalemia    HISTORY OF PRESENT ILLNESS:  Admitting provider and nursing notes reviewed  Giacomo Castro is a 54 year old who presented to [facility] on 3/12 with complaints of SOB, abdominal pain and diarrhea. Blood pressures were in the 130's, initial scans revealed no clear abdominal or GI etiology. When an EKG was obtained it was consistent with an inf lateral MI and a STEMI alert was called. Further history revealed that he had been having pain for about 2 days. He is a 1 pk a day smoker. At presentation cr was 0.89. Initial ECHO showed an EF of 24%. Intial attempts at revascularization were unsuccessful (PCI to mid RCA). His initial course resulted in several VT arrests requiring cardioversion. A maddi reealed severe posterior leaflet restriction with severe mitral regurgitation. He continued to develop worsening decompensated heart failure and hypoxia. Ultimately transferred to [facility] on 3/22 for further management.     Since arrive here he was initiallly on ionotropic support with IABP. On 3/23 due to ongoing hypotension he was placed on VA ECMO. On 3/27 he was taken to the OR for MVR, CABG and impella. Upon opening chest he was found to have an inferior LV wall rupture and underwent emergent patch repair as well.     Creatinine trend as follows;    Lab Results   Component Value Date    CR 1.28 (H) 03/28/2024    CR 1.03 03/28/2024    CR 0.84 03/27/2024    CR 0.74 03/27/2024    CR 0.70 03/27/2024    CR 0.76 03/26/2024    CR 0.79 03/26/2024    CR 0.81 03/26/2024     I/O last 3 completed shifts:  In: 87634.22 [I.V.:5887.22; Other:798; NG/GT:360; IV  "Piggyback:2750]  Out: 2733 [Urine:1093; Emesis/NG output:350; Stool:400; Chest Tube:890]    03/23 1500 - 03/28 1459  In: 34070.36 [I.V.:67227.36]  Out: 72426 [Urine:93396]  Net: 96589.36      Exam:  Vital signs:  Temp: 98.8  F (37.1  C) Temp src: Bladder   Pulse: 80   Resp: 12 SpO2: 99 % O2 Device: Mechanical Ventilator   Height: 190.5 cm (6' 3\") Weight: 119.5 kg (263 lb 7.2 oz)  Estimated body mass index is 32.93 kg/m  as calculated from the following:    Height as of this encounter: 1.905 m (6' 3\").    Weight as of this encounter: 119.5 kg (263 lb 7.2 oz).      Gen: intubated and sedated  Chest: open   Card: regular, paced. EKG reviewed. Prlonged QT interval  Abd; quiet  Ext: pitting edema c/w anasarca  Skin: jaundice no other rashes    PAST MEDICAL HISTORY:    Past Medical History:   Diagnosis Date    Tobacco abuse        Past Surgical History:   Procedure Laterality Date    CV EXTRACORPERAL MEMBRANE OXYGENATION N/A 3/23/2024    Procedure: Extracorporeal Membrance Oxygenation;  Surgeon: Maynor Ochoa MD;  [facility]    CV INTRA AORTIC BALLOON N/A 3/23/2024    Procedure: Intraprocedure Aortic Balloon Pump Insertion;  Surgeon: Maynor Ochao MD;  [facility]    CV RIGHT HEART CATH MEASUREMENTS RECORDED N/A 3/22/2024    Procedure: Right Heart Catheterization;  Surgeon: Blade Abdul MD;  [facility]    CV SWAN RICHARD PROCEDURE N/A 3/22/2024    Procedure: Prim Richard Procedure;  Surgeon: Blade Abdul MD;  [facility]    HERNIA REPAIR, UMBILICAL  2016    complete at [facility]    vein surgery Right 2006    Treatment of varicose vein    ZZC ARTHROSCOPIC DECOMPRESSION OF GANGLION CYST Left 2008    completed by [facility]        MEDICATIONS:  PTA Meds  Prior to Admission medications    Not on File      Current Meds   acetaminophen  975 mg Oral or Feeding Tube Q8H    artificial tears   Both Eyes Q8H    aspirin  81 mg Oral or NG " Tube Daily    ceFEPIme  2 g Intravenous Q8H    chlorhexidine  15 mL Swish & Spit BID    dextrose 10%  300 mL Intravenous Once    gabapentin  100 mg Oral TID    hydrocortisone sodium succinate PF  100 mg Intravenous Q8H    lidocaine (viscous)  5 mL Topical Once    metroNIDAZOLE  500 mg Intravenous Q8H    micafungin  150 mg Intravenous Q24H    multivitamins w/minerals  15 mL Per Feeding Tube Daily    pantoprazole  40 mg Intravenous Daily with breakfast    polyethylene glycol  17 g Oral or Feeding Tube Daily    sennosides  8.6 mg Oral or Feeding Tube BID    sodium chloride (PF)  3 mL Intracatheter Q8H    sodium zirconium cyclosilicate  10 g Oral or Feeding Tube TID    Followed by    [START ON 3/31/2024] sodium zirconium cyclosilicate  10 g Oral Daily    vancomycin  1,750 mg Intravenous Q12H     Infusion Meds   amiodarone 0.5 mg/min (24 1500)    angiotensin II (GIAPREZA) ADULT infusion 2.5mg/250 mL NS 30 ng/kg/min (24 1500)    dextrose      EPINEPHrine Stopped (24 1430)    fentaNYL 50 mcg/hr (24 1500)    heparin 2 unit/mL in 0.9% NaCl 3 mL/hr (24 1500)    heparin 600 Units/hr (24 1500)    insulin regular 0.5 Units/hr (24 1500)    propofol 15 mcg/kg/min (24 1500)    And    - MEDICATION INSTRUCTIONS -      norepinephrine 0.08 mcg/kg/min (24 1500)    BETA BLOCKER NOT PRESCRIBED      sodium bicarbonate 25 mEq in 1000 mL D5W LEFT Ventricular IMPELLA cardiac device purge infusion 9.3 mL/hr (24 1500)    vasopressin 3 Units/hr (24 1500)       ALLERGIES:    Allergies   Allergen Reactions    Penicillins Hives     Tolerated ceftriaxone 2024 ([facility])       REVIEW OF SYSTEMS:  Pt is intubated and sedated.     PHYSICAL EXAM:   Temp  Av  F (37.2  C)  Min: 97.2  F (36.2  C)  Max: 101.3  F (38.5  C)  Arterial Line BP  Min: 57/41  Max: 155/76  Arterial Line MAP (mmHg)  Av.6 mmHg  Min: 42 mmHg  Max: 281 mmHg      Pulse  Av.1  Min: 54  Max:  "128 Resp  Av.4  Min: 1  Max: 29  FiO2 (%)  Av.6 %  Min: 45 %  Max: 100 %  SpO2  Av.9 %  Min: 87 %  Max: 100 %    CVP (mmHg): 9 mmHg  /53   Pulse 80   Temp 98.8  F (37.1  C)   Resp 12   Ht 1.905 m (6' 3\")   Wt 119.5 kg (263 lb 7.2 oz)   SpO2 99%   BMI 32.93 kg/m     Date 24 07 - 24 0659   Shift 7375-3379 7163-7153 8541-7563 24 Hour Total   INTAKE   I.V. 1636.91 141.55  1778.46   NG/   210   IV Piggyback 500   500   Blood Components 800   800   Shift Total(mL/kg) 3146.91(26.33) 141.55(1.18)  3288.46(27.52)   OUTPUT   Urine 191 25  216   Emesis/NG output 150   150   Stool 100   100   Chest Tube(mL/kg) 380(3.18)   380(3.18)   Shift Total(mL/kg) 821(6.87) 25(0.21)  846(7.08)   Weight (kg) 119.5 119.5 119.5 119.5      Admit Weight: 105.1 kg (231 lb 11.3 oz)     SOCIAL HISTORY:   Social History     Socioeconomic History    Marital status: Single     Spouse name: Not on file    Number of children: Not on file    Years of education: Not on file    Highest education level: Not on file   Occupational History    Not on file   Tobacco Use    Smoking status: Every Day     Packs/day: 1.00     Years: 30.00     Additional pack years: 0.00     Total pack years: 30.00     Types: Cigarettes    Smokeless tobacco: Never   Substance and Sexual Activity    Alcohol use: Yes    Drug use: No    Sexual activity: Yes     Partners: Female   Other Topics Concern    Not on file   Social History Narrative    Not on file     Social Determinants of Health     Financial Resource Strain: Not on file   Food Insecurity: Not on file   Transportation Needs: Not on file   Physical Activity: Not on file   Stress: Not on file   Social Connections: Not on file   Interpersonal Safety: Not on file   Housing Stability: Not on file         FAMILY MEDICAL HISTORY:   Family History   Problem Relation Age of Onset    Breast Cancer Mother     Coronary Artery Disease Father         MI; possibly in his 40's, smoker    " Diabetes No family hx of     Hypertension No family hx of     Hyperlipidemia No family hx of     Cerebrovascular Disease No family hx of     Colon Cancer No family hx of     Prostate Cancer No family hx of     Thyroid Disease No family hx of     Genetic Disorder No family hx of            LABS:   I have reviewed the following labs:  CMP  Recent Labs   Lab 03/28/24  1421 03/28/24  1357 03/28/24  1354 03/28/24  1342 03/28/24  1323 03/28/24  1204 03/28/24  1021 03/28/24  1018 03/28/24  0405 03/28/24  0404 03/27/24  2226 03/27/24  2222 03/27/24  1741 03/27/24  1739   NA  --   --   --   --   --   --   --  145  --  145  --  145  --  146*   POTASSIUM  --  4.9  --   --   --  5.5*  --  5.5*  --  5.1  --  5.1  --  4.7   CHLORIDE  --   --   --   --   --   --   --  111*  --  110*  --  110*  --  110*   CO2  --   --   --   --   --   --   --  25  --  25  --  25  --  25   ANIONGAP  --   --   --   --   --   --   --  9  --  10  --  10  --  11   *  --  216* 237* 124*  --    < > 160*   < > 153*   < > 162*  169*   < > 150*   BUN  --   --   --   --   --   --   --  35.2*  --  31.0*  --  27.4*  --  24.9*   CR  --   --   --   --   --   --   --  1.28*  --  1.03  --  0.84  --  0.74   GFRESTIMATED  --   --   --   --   --   --   --  66  --  85  --  >90  --  >90   GRICEL  --   --   --   --   --   --   --  7.8*  --  8.0*  --  8.3*  --  8.2*   MAG  --   --   --   --   --   --   --  2.4*  --  2.4*  --  2.5*  --  2.7*   PHOS  --   --   --   --   --   --   --  4.8*  --  3.4  --  3.4  --  3.0   PROTTOTAL  --   --   --   --   --   --   --  3.7*  --  3.7*  --  3.9*  --  3.2*   ALBUMIN  --   --   --   --   --   --   --  2.0*  --  2.1*  --  2.2*  --  1.8*   BILITOTAL  --   --   --   --   --   --   --  2.5*  --  2.6*  --  2.9*  --  3.1*   ALKPHOS  --   --   --   --   --   --   --  31*  --  32*  --  33*  --  30*   AST  --   --   --   --   --   --   --  140*  --  126*  --  131*  --  122*   ALT  --   --   --   --   --   --   --  56  --  55  --  55  --   52    < > = values in this interval not displayed.     CBC  Recent Labs   Lab 03/28/24  1018 03/28/24  0404 03/27/24  2222 03/27/24  1743   HGB 7.5* 8.3* 8.9* 9.0*   WBC 22.6* 16.7* 18.2* 15.8*   RBC 2.36* 2.62* 2.82* 2.81*   HCT 22.4* 24.3* 26.2* 26.2*   MCV 95 93 93 93   MCH 31.8 31.7 31.6 32.0   MCHC 33.5 34.2 34.0 34.4   RDW 17.5* 17.0* 16.5* 16.0*   * 123* 147* 88*     INR  Recent Labs   Lab 03/28/24  1018 03/28/24  0404 03/27/24  2222 03/27/24  1743   INR 1.75* 1.70* 1.60* 1.82*   PTT 40* 42* 32 48*     ABG  Recent Labs   Lab 03/28/24  1357 03/28/24  1203 03/28/24  1017 03/28/24  0758   PH 7.37 7.42 7.42 7.43   PCO2 43 39 40 40   PO2 82 102 91 89   HCO3 25 26 26 26   O2PER 50 50 50 50      URINE STUDIES  Recent Labs   Lab Test 03/23/24  1803 03/23/24  0828   COLOR Light Yellow Orange*   APPEARANCE Clear Slightly Cloudy*   URINEGLC Negative Negative   URINEBILI Negative Negative   URINEKETONE Negative Negative   SG 1.013 1.010   UBLD Trace* Large*   URINEPH 5.0 5.0   PROTEIN Negative 20*   NITRITE Negative Negative   LEUKEST Negative Small*   RBCU 4* >182*   WBCU 4 66*     No lab results found.  PTH  No lab results found.  IRON STUDIES  No lab results found.    IMAGING:       Estephanie Webster MD

## 2024-03-28 NOTE — PLAN OF CARE
Goal Outcome Evaluation:      Plan of Care Reviewed With: parent          Outcome Evaluation: Care Management Assessment completed due to elevated risk score and length of stay. Please see CMA for more information. Discharge needs TBD while pt remains on ECMO in ICU.

## 2024-03-28 NOTE — PROGRESS NOTES
CLINICAL NUTRITION SERVICES - REASSESSMENT NOTE     Nutrition Prescription    RECOMMENDATIONS FOR MDs/PROVIDERS:  Daily fluid adjustments per MD    Malnutrition Status:    Moderate malnutrition in the context of acute illness    Recommendations already ordered by Registered Dietitian (RD):  -Once FT position verified by AXR, initiate trophic feeds only today: TwoCal HN @ 20 ml/hr  -Continue daily MVI    Future/Additional Recommendations:  -Once pressor needs decrease, GOAL TF: TwoCal HN @ 55 mL/hr (1320 mL/day) + 5 pkt Prosource TF20 to provide 3040 kcals (29 kcal/kg/day), 211 g PRO (2 g/kg/day), 924 mL H2O, 289 g CHO and 7 g Fiber daily.  -Monitor TF tolerance/adequacy  -Monitor labs, stool output, weight trends      EVALUATION OF THE PROGRESS TOWARD GOALS   Diet: NPO  Nutrition Support: TFs stopped on 3/27 for OR. Previously running at trophic rate.     3/24-27: TwoCal HN @ 10 ml/hr     Intake: Pt received an average of 158 ml TF/d x 4 days.This provided an average of 316 kcal/d (3 kcal/kg) and 10 g protein/d (0.1 g/kg). This met 12% estimated energy needs and 5% estimated protein needs.      NEW FINDINGS   Overall: Pt s/p mitral valve replacement, inferior LV wall rupture patch repair, placement of a direct aortic impella and central cannulation for VA ECMO. Has open chest.     GI: 300 ml loose stool out of rectal tube so far today.     Skin: Intact     Labs: Reviewed - hypermagnesia, elevated lactic acid     Medications: Reviewed - levo @ 0.05, vaso @ 3    Weights: No weight loss over the past week.  03/28/24 0200 119.5 kg (263 lb 7.2 oz)   03/27/24 0400 115 kg (253 lb 8.5 oz)   03/26/24 0500 113 kg (249 lb 1.9 oz)   03/25/24 0600 115.5 kg (254 lb 10.1 oz)   03/23/24 0400 101.9 kg (224 lb 10.4 oz)   03/22/24 0200 105.1 kg (231 lb 11.3 oz)   03/22/24 0130 105.1 kg (231 lb 11.3 oz)     MALNUTRITION  % Intake: </= 50% for >/= 5 days (severe)  % Weight Loss: None noted  Subcutaneous Fat Loss: None observed  Muscle  Loss: Thoracic region (clavicle, acromium bone, deltoid, trapezius, pectoral):  mild  Fluid Accumulation/Edema: Does not meet criteria (trace)  Malnutrition Diagnosis: Moderate malnutrition in the context of acute illness    Previous Goals   Diet adv v nutrition support within 48 hours   Evaluation: Met    Previous Nutrition Diagnosis  Inadequate oral intake related to inability to take oral PO/vented as evidenced by NPO and meeting 0% nutrition needs  Evaluation: Improving    CURRENT NUTRITION DIAGNOSIS  Inadequate oral intake related to intubation as evidenced by NPO status and need for EN to meet nutrition needs at this time.       INTERVENTIONS  Implementation  --Enteral Nutrition - Initiate  --Multivitamin/mineral supplement therapy  --Received provider consult for nutrition education with comments post op cardiovascular surgery (automatic consult on post-op order set). S/p mitral valve replacement, inferior LV wall rupture patch repair, placement of a direct aortic impella and central cannulation for VA ECMO on 3/27. Nutrition education not indicated.    Goals  Total avg nutritional intake to meet a minimum of 25 kcal/kg and 2 g PRO/kg daily (per dosing wt 105 kg).    Monitoring/Evaluation  Progress toward goals will be monitored and evaluated per protocol.    Tracy Tijerina, MS, RD, LD  Available on [employer]  No longer available by pager

## 2024-03-28 NOTE — PROGRESS NOTES
[facility] GENERAL INFECTIOUS DISEASES: PROGRESS NOTE     Patient:  Giacomo Castro   YOB: 1970, MRN: [id number]  Date of Visit: 03/28/2024  Date of Admission: 3/22/2024  Consult Requester: Mulvihill, Michael, MD          Assessment and Recommendations:     ID Problem List:  Shock  Fever  Bilateral GGO and consolidative lung opacities- pulmonary edema  STEMI (3/10/24) c/b severe MR and ADHF  3/27/2024 s/p CABG, MVR, inferior LV wall repair with impella and open chest  S/p VA-ECMO with IABP (3/23-3/27), peripheral to central ECMO intra-op 3/27  LFT elevation  Allergies: pcn (unknown)    Discussion:  53 yo M with hx of HLD and tobacco use who initially presented to [facility] on 3/10/24 with inferior STEMI s/p unsuccessful attempted RCA revascularization. Course complicated by ADHF with significant volume overload despite diuresis, recurrent episodes of VT, torrential MR with concern for infarction of posteromedial papillary muscles, and pneumonia s/p ceftriaxone+azithromycin x5d. Transferred to [facility] on 3/22/24 for further management. Cannulated for VA-ECMO with IABP (3/23-*).     Concern for pneumonia at OSH with GGO and consolidation on CT chest. Was treated with ceftriaxone and azithromycin (3/18-3/22). Sputum Cx from OSH (3/18) with oropharyngeal monica; Strep pneumo and Legionella urine antigens negative.     Increased pressor requirements and fever to 101.3F on 3/23 and leukocytosis to 27.8K, . Afebrile since cannulation for ECMO with persistent leukocytosis and requiring pressors. Has been on meropenem since arrival.  CT C/A/P with mixed bilateral GGO and consolidation; no sign of intra-abdominal infection. Urine culture 3/23 negative. Volume overload, VAP, viral or atypical pulmonary infection considered as etiology of lung findings. Sputum cultures 3/23 NG, 3/24 NGTD. Negative COVID and respiratory PCR panel. Blood cultures 3/22, 3/23, 3/24 NGTD. Has already received a course of  Azithromycin for atypical coverage at OSH. Does not have significant risk factors for fungal infection. Most recent CXR appears c/w pulmonary edema. Started to de-escalate coverage on 3/25 with transition from meropenem -->cefepime.     Underwent 3/27 CABG, MVR, inferior LV wall repair 2/2 necrotic free wall impending rupture. VA ECMO converted from peripheral to central, impella placement, with open chest. Recommend to continue cefepime, and additional antimicrobial per protocol given open chest.     Recommendations:  Continue iv cefepime   Duration to be determined pending clinical course, RTOR  Continue vancomycin, micafungin, metronidazole, per protocol and pending RTOR      Thank you for the consult. ID team will continue to follow. Please reach out if any questions or concerns.     Total time spent during this encounter (chart review, documentation, MDM, coordination of care): 50 minutes    Silvana Horne MD   Infectious Diseases Staff Physician  Pager: [phone]  [employer] [facility]   03/28/2024         Interval History and Events:     Seen and examined in ICU. Underwent surgery on 3/27. Post operatively admitted to ICU, has open chest.          HPI as adopted from initial ID consult on 3/24/2024:     Giacomo Castro is a 54 year old male with history of HLD and tobacco use who initially presented to [facility] on 3/10/24 with chest pain, found to have an inferior STEMI. Taken to cath lab where RCA revascularization. Course complicated by acute decompensated heart failure with significant volume overload despite diuresis, recurrent episodes of VT leading to shocks, torrential mitral regurgitation with restricted posterior leaflet with concern for infarction of posteromedial papillary muscles, and pneumonia s/p ceftriaxone+azithromycin x5d,intubated at OSH. Transferred to [facility] on 3/22/24 for further management. Increased pressor requirements and fever on 3/23, now on VA-ECMO with IABP (3/23-*).      MRSA nares neg  "(3/21 at OSH). Sputum (3/18) many GPC, many GPB, oropharyngeal monica on cx. Legionella and S.pneumo neg.     Lives in [place], [place]. Per previous notes, parents and sister live in the area. He smokes cigarettes and drinks alcohol regularly at least 2 heard liquor drinks per day.   Brother and sister in law provide additional history: works as a  for an elementary school. No known sick family/friends but does have potential for exposure to illness at work. Previously had a pet cat >1 year ago. No recent travel, more of a home body. Has visited resorts in [place] >5 years ago. Goes to [facility] with family and has stayed in a camper/trailer for family trips. No tent camping, hiking, hunting or fishing.          Review of Systems:     Unable to obtain, intubated, sedated         Antimicrobial history:     Current:  Cefepime 3/25 - present  Vancomycin 3/27 -   Metronidazole 3/27 - present  Micafungin 3/27 - present  Clindamycin 3/27  Cefazolin 3/27     Prior:  - meropenem (3/22 - 3/25)  - Vancomycin (3/22)  - ceftriaxone (3/18-3/22)  - Azithromycin (3/18-3/22)         Physical Examination:     Vital signs:  /53   Pulse 80   Temp 98.8  F (37.1  C)   Resp 25   Ht 1.905 m (6' 3\")   Wt 119.5 kg (263 lb 7.2 oz)   SpO2 97%   BMI 32.93 kg/m      GENERAL: intubated, sedated, on mechanical ventilation  RESP:+vent sounds  CV: deferred due to open chest with cental VA ECMO  ABDOMEN: soft, nontender, no hepatosplenomegaly, no masses and bowel sounds normal  MS: no gross musculoskeletal defects noted, no edema    Lines and devices:    PIV CDI, non-tender, no surrounding erythema.  +impella  +chest tubes   +rectal tube  +NG tube    Labs, Microbiology and Imaging studies reviewed.          Laboratory Data:       Microbiology:    Culture   Date Value Ref Range Status   03/28/2024 No growth, less than 1 day  Preliminary   03/25/2024 No growth after 3 days  Preliminary   03/25/2024 No growth after 3 days  Preliminary "   03/24/2024 No growth after 4 days  Preliminary   03/24/2024 No growth after 4 days  Preliminary   03/24/2024 No growth after 4 days  Preliminary   03/24/2024 No growth after 4 days  Preliminary   03/24/2024 No Growth  Final   03/23/2024 No Growth  Final   03/23/2024 No Growth  Final   03/23/2024 No Growth  Final   03/23/2024 No Growth  Final   03/22/2024 No Growth  Final   03/22/2024 No Growth  Final     Inflammatory Markers:   Recent Labs   Lab Test 03/28/24  0404 03/27/24  0402 03/26/24  0411 03/25/24  0354 03/24/24  0358 03/23/24  1602   SED 19 38* 28* 26* 27* 27*     Urine Studies:    Recent Labs   Lab Test 03/23/24  1803 03/23/24  0828   LEUKEST Negative Small*   WBCU 4 66*     Hematology Studies:    Recent Labs   Lab Test 03/28/24  2205 03/28/24  1558 03/28/24  1018 03/28/24  0404 03/27/24  2222 03/27/24  1743 03/24/24  2158 03/24/24  1614 03/24/24  1019 03/24/24  0358 03/23/24  2147 03/23/24  1602 03/23/24  1516 03/23/24  1230   WBC 27.7* 22.1* 22.6* 16.7* 18.2* 15.8*   < > 24.3*   < > 27.8*   < > 27.2*  --  25.3*   ANEU  --  19.4*  --  15.0*  --   --   --  20.4*  --  22.2*  --  20.4*  --  19.2*   AEOS  --  0.2  --  0.2  --   --   --  0.2  --  0.0  --  0.0  --  0.0   HGB 8.3* 8.4* 7.5* 8.3* 8.9* 9.0*   < > 11.0*   < > 11.7*   < > 12.2*   < > 12.7*   MCV 92 91 95 93 93 93   < > 99   < > 97   < > 96  --  94   PLT 80* 75* 105* 123* 147* 88*   < > 295   < > 336   < > 481*  --  513*    < > = values in this interval not displayed.      Metabolic Studies:   Recent Labs   Lab Test 03/28/24  1604 03/28/24  1357 03/28/24  1204 03/28/24  1018 03/28/24  0404 03/27/24  2222 03/27/24  1739     --   --  145 145 145 146*   POTASSIUM 4.8 4.9 5.5* 5.5* 5.1 5.1 4.7   CHLORIDE 110*  --   --  111* 110* 110* 110*   CO2 24  --   --  25 25 25 25   BUN 38.6*  --   --  35.2* 31.0* 27.4* 24.9*   CR 1.57*  --   --  1.28* 1.03 0.84 0.74   GFRESTIMATED 51*  --   --  66 85 >90 >90     Hepatic Studies:   Recent Labs   Lab Test  03/28/24  1604 03/28/24  1018 03/28/24  0404 03/27/24  2222 03/27/24  1739 03/27/24  0402   BILITOTAL 2.9* 2.5* 2.6* 2.9* 3.1* 1.7*   ALKPHOS 31* 31* 32* 33* 30* 54   ALBUMIN 2.1* 2.0* 2.1* 2.2* 1.8* 2.9*   * 140* 126* 131* 122* 113*   ALT 50 56 55 55 52 123*                 Imaging:     3/28/2024 XR abdomen   IMPRESSION: Feeding tube tip in the distal duodenum/at the ligament of Treitz.    3/28/2024 CXR:  Impression:   1.  No significant change of Impella device, central ECMO cannulae, and endotracheal tube position. Additional support devices are stable.  2.  Stable right greater than left mixed interstitial and airspace opacities.    CXR (3/26/2024)  Impression:   1.  Endotracheal tube tip terminates 4.5 cm above the cesia.  Additional support devices are stable.  2.  Stable enlarged cardiac silhouette with slight decreased diffuse  mixed interstitial and airspace opacities, favoring pulmonary edema  and atelectasis.      CT Chest/Abd/Pelvis (3/23/24)  IMPRESSION:  1.  Bilateral central perihilar bronchovascular groundglass and  consolidative opacities with bilateral basilar consolidations,  concerning for aspiration and/or infection.  2.  Small bilateral pleural effusions.  3.  Circumferential bladder wall thickening, which may be accentuated  by underdistention. Consider correlation with urinalysis and urinary  symptoms.  4.  Ectatic ascending thoracic aorta measuring up to 4.0 cm.  5.  Enlarged pulmonary artery measuring 3.5 cm, which can be seen in  pulmonary hypertension.     --------------------------------------------  FERNANDEZ (OSH 3/21/24)  CONCLUSIONS   Left ventricular ejection fraction is visually estimated at 30%.   Inferior and lateral akinesis   Severe posterior leaflet restriction with overriding anterior leaflet.   Torrential mitral regurgitation.   Vena contracta 1.04 cm.   PISA radius 2.0 cm.      CT Angio chest w/ contrast (OSH- 3/17/24)  IMPRESSION:   1. No pulmonary embolism.   2. Small  bilateral pleural effusions, left greater than right.   3. Bibasilar volume loss and consolidation with patchy peripheral areas of interstitial and groundglass opacity, correlate clinically for acute infectious/inflammatory etiologies.   4. Trace pericardial effusion.   5. Ectasia of the ascending aorta at 4 cm in caliber.   6. Main pulmonary artery is mildly dilated which can be seen in pulmonary arterial hypertension.

## 2024-03-28 NOTE — PROGRESS NOTES
Brief Progress Note     Interval summary note to document updates and changes to care plan/s. Please see daily progress note for full assessment and plan/s.     Interval history:   Initial assessment of Giacomo Castro for this shift:  Neuro- deep RASS due to open chest and hemodynamic instability, pupils R 2.12, NPI 3.2, L 2.14, NPI 3.7  Pulm- CMV 12/350/5/80, unable to  pulse ox due to zero cardiac pulsatility, right foot and face appear dusky/pale.  Cardiac- 100%  temp wire, no pulsatility on art line, ECMO VVAA/ dual inflow, dual outflow, Impella flowing at 1 liter, P4- turned down from P5 due to suction event and MAPs dropping immediately to the 50's during that time.  ECMO flowing at 4.3L with no alarms at this time.  PA catheter crossing midline, pulled back 2 cm, recheck CXR now.  Due to suction event at 2015, 500ml fluid infusing.  Lactic acid 3.7 down from 5.  CT output totals med=160  pler=30  Pressors up recently- epi 0.12, norepi 0.1, vaso 2.  Right leg mildly dusky, able to  signal in PT after removing Ace wrap, unable to find pedal pulse at this time.  GI- Removing small bore NJ due to looping into esophagus, leave large bore, recheck CXR now  - making more than urine 50/hr, CR .74, BUN 25    Plan:   Updated Dr. Mulvihill via phone regarding current status and discussed plan.  Will keep him updated of changes.   Multiple labs drawn and in progress    MARYANN Banuelos CNP   03/27/2024  8:37 PM     0010 Updated Dr. Mulvihill of current status, ok to start heparin at 600u/hr, pause if bleeding increases

## 2024-03-28 NOTE — PROGRESS NOTES
[facility]    Cardiology Progress Note- Cardiology        Date of Admission:  3/22/2024     Assessment & Plan: HVSL   Giacomo Castro is a 54 year old male with PMH HLD, tobacco use who presented to [facility] on 3/12 with late presentation inferior STEMI s/p cath with unsuccessful RCA PCI or thrombectomy, complicated by biventricular failure, severe MR 2/2 infarction of postero-medial papillary muscle, VT x2 episodes requiring cardioversion, pneumonia. Transferred to [facility] for possible mitral valve surgery. Patient was decompensating while on 3 inopressors thus was cannulated for ECMO with IABP 3/23.    Changes today  - s/p Bioprosthetic MVR  - Inferior wall rupture s/p bovine pericardial patch  - VV-AA ECMO with Impella at P4 for LV decompression  - Open chest  - On multiple pressors, adding Angiotensin and Methylene Blue  - Give FFP for volume along with pRBCs and increase total ECMO flow    Neurology   # Acute metabolic encephalopathy 2/2 sedation  - continue versed and fentanyl gtt   - rass goal -2 to -3  - add gabapentin 100 mg TID  - CTH with prior pontine/cerebellum strokes of unknown chronicity    Cardiovascular  # Late presenting Inferior STEMI c/b cardiogenic shock and recurrent VT  # Biventricular failure  # Papillary muscle infarction leading to restricted posterior mitral valve leaflet and severe MR  Patient with symptoms starting on 3/10, but presented to [facility] on 3/12. Found to have inferior STEMI, RCA lesion was unable to be revascularized. Course complicated by cardiogenic shock initially treated with dobutamine, but complicated by VT requiring shock in cath lab and again on 3/20, switched to milrinone, vasopressin, levo. Presenting for [facility] for advanced therapy consideration. 3/22 developed fever, leukocytosis requiring more pressors concerning for septic shock, discussed in ID section.    Date CVP PAP PCWP CO/CI  SVR Inotrope  Vasodilator MCS   3/22/24  ([facility]) 18 55/28/37 32 4.15/1.77 1098 Milrinone, NE, vasopressin none none   3/23/24 17 62/39/46  4.5/1.9 970 Epinephrine, NE, vasopressin none none     ECMO :  Mode: Other (Comment) (VV-AA)   Pump Type: Cardiohelp  RPM's: 3500  Blood Flow (Circuit) LPM: 5.43 LPM  Sweep LPM: 4 LPM  Sweep FiO2   %: 60 %  Venous Pressure  mmHg: -66 mmHg  Arterial Pressure  mmH mmHg  Internal Pressure mmH mmHg  Pressure Change mmH mmHg    - ECMO at R femoral 3/23: O2 70%, flow 4.1, sweep 2.5, pulsatility of 2  - IABP 1:1  - inotropes: epinephrine, NE, vasopressin-- wean epi first; added angiotensin and methylene blue  - diuretics: bumex 4 mg IV BID  - antiplatelet/anticoag:   - continue ASA  - Plavix held starting 3/21 given possibility of valve intervention  - Heparin gtt straight rate; add FFP given low ATIII  - AT III low  - hold statin in setting of worsening LFTs, will consider resume once LFT normalized  - CVTS consult for mitral valve regurgitation  - not transplant/LVAD candidate due to alcohol, smoking    # recurrent VT storm  Had VT storm requiring shock in cath lab. Recurrent requiring shock again on 3/21. Amiodarone increased 0.5->1 for multiple ectopy 3/23  - amiodarone gtt 0.5 mg/hr    RH 3/22  RA: 17/19/16 mmHg  RV: 55/15 mmHg  PA: 55/28/37 mmHg  PCWP: 35/50/32 mmHg. Wedge saturation: 99.7%  PA sat: 52.3%  Ayleen CO/CI:4.2/1.8  Thermo CO/CI: 4.2/1.8 Right sided filling pressures are moderately elevated. Left sided filling pressures are severely elevated. Moderately elevated pulmonary artery hypertension. Reduced cardiac output level.     FERNANDEZ: 3/21  There is coaptation failure of mitral leaflets. Anterior leaflet overrides due to severely restricted posterior leaflet and chordal tethering.  Severe posteriorly directed mitral regurgitation (secondary MR).  Jemal class IIIb.  Pulmonary flow reversal noted in pulmonary veins.  Left ventricular function is decreased. The ejection  fraction is 10-15%  (severely reduced).  Global right ventricular function is severely reduced.  The right ventricle is normal size.    Cath 3/12  1. Delayed presentation acute inferior STEMI.  Persistent symptoms began [facility] 3/10.   2. Unsuccessful PCI to the mid-RCA.  Thrombosed vessel, unresponsive to balloon angioplasty and mechanical aspiration thrombectomy.     Pulmonary  # Acute Hypoxic Respiratory Failiure 2/2 pulmonary edema, HAP  Vent Mode: CMV/AC  (Continuous Mandatory Ventilation/ Assist Control)  FiO2 (%): 50 %  Resp Rate (Set): 12 breaths/min  Tidal Volume (Set, mL): 350 mL  PEEP (cm H2O): 5 cmH2O  Resp: 12    Intubated on 3/21 following cardioversion.   - currently on ventilator as above, good oxygenation and ventilation        Gastrointestinal, Nutrition  #Transaminitis   LFTs <100 today at [facility], on admission here in the high 300's. Likely congestive hepatopathy in the setting of acute HF from severe MR  - treatment of cardiogenic shock as above    # Nutrition  - start TF, TwoCal 10 ml/hr on 3/24, appreciate nutrition assistance    # Constipation  - scheduled senna, miralax while on fentanyl gtt  - bisacodyl    # Stress ulcer prophylaxis  - PPI IV for prophylaxis     Renal, Electrolytes    # Mild JEANE  Cr 1.21, baseline <1  - currently Cr returned to normal    # Hypernatremia  -  ml q4h     # Metabolic alkalosis  - s/p diamox    # Hypophosphatemia  - replete as needed     Infectious Disease  # Possible aspiration HAP  CT chest with GGO 3/17 concerning for PNA. Patient was on CTX/azithro 3/18-3/22, azithro was stopped after VT. Transitioned to Vanc and meropenem on 3/22 for HAP. Patient has been febrile and decompensating requiring more pressors. Possible that this is from myocardial infarction however could not rule out infection. CT chest/abdomen/pelvis with bilateral ground glass and consolidative opacities concerning for aspiration and/or infection.  - ID following, appreciate recs  -  antibiotics   - Vancomycin: 3/22-3/23   - Meropenem: 3/22, 3/23-3/25   - Cefepime: 3/23, 3/25-TBD  - Infectious workup   - 3/18 urine legionella, S pneumo: negative   - 3/18 sputum: oropharyngeal monica   - 3/22 MRSA nares: negative   - 3/22 blood culture: NGTD   - 3/23 urine culture: NGTD   - 3/24 respiratory panel: pending    Hematology  Heparin/bival as discussed above     Endocrinology  - glucose goal 120-180  - medium insulin sliding scale          Diet: Adult Formula Drip Feeding: Continuous TwoCal HN; Nasojejunal; Goal Rate: 20; mL/hr; once FT position verified by AXR, initiate at 20 ml/hr and do not advance; Do not advance tube feeding rate unless K+ is = or > 3.0, Mg++ is = or > 1.5, and Phos ...    DVT Prophylaxis: heparin gtt  Chua Catheter: PRESENT, indication: ICU only: hourly urine output needed for patient care, ICU only: hourly urine output needed for patient care  Cardiac Monitoring: ACTIVE order. Indication: Open heart surgery (72 hours)  Code Status: Full Code          Clinically Significant Risk Factors        # Hyperkalemia: Highest K = 5.5 mmol/L in last 2 days, will monitor as appropriate  # Hypernatremia: Highest Na = 146 mmol/L in last 2 days, will monitor as appropriate  # Hypocalcemia: Lowest iCa = 4 mg/dL in last 2 days, will monitor and replace as appropriate  # Hypercalcemia: Highest iCa = 6.4 mg/dL in last 2 days, will monitor as appropriate    # Hypoalbuminemia: Lowest albumin = 1.8 g/dL at 3/27/2024  5:39 PM, will monitor as appropriate    # Coagulation Defect: INR = 1.65 (Ref range: 0.85 - 1.15) and/or PTT = 49 Seconds (Ref range: 22 - 38 Seconds), will monitor for bleeding  # Thrombocytopenia: Lowest platelets = 75 in last 2 days, will monitor for bleeding  # Acute Kidney Injury, unspecified: based on a >150% or 0.3 mg/dL increase in last creatinine compared to past 90 day average, will monitor renal function   # Acute heart failure with reduced ejection fraction: last echo with EF  "<40% and receiving IV diuretics       # Obesity: Estimated body mass index is 32.93 kg/m  as calculated from the following:    Height as of this encounter: 1.905 m (6' 3\").    Weight as of this encounter: 119.5 kg (263 lb 7.2 oz).   # Moderate Malnutrition: based on nutrition assessment    # Financial/Environmental Concerns: none         Patient care discussed with Dr. Rodolfo Aviles MD  PGY-2 Internal Medicine  Cardiology Service  [facility]  ______________________________________________________________________    Interval History  Overnight, a line at radial was not working. Possible clots distally? A line at ulnar artery was placed. patient received diamox for diuresis and alkalosis (bicarb 32).  ml q6h for hypernatremia (Na 146).     Physical Exam   Vital Signs: Temp: 98.8  F (37.1  C) Temp src: Bladder   Pulse: 80   Resp: 12 SpO2: 96 % O2 Device: Mechanical Ventilator    Weight: 263 lbs 7.2 oz    GENERAL: Intubated, sedated. NAD.  HEENT: No icterus. ETT in place, NG tube in place.  CARDIOVASCULAR: tachycardic. Normal S1 and S2.  RESP: Coarse bilaterally. Mechanical ventilation.    GI Soft, bowel sounds hypoactive but present.  GENITOURINARY: Chua in place.  EXTREMITIES: no pitting edema  NEURO: Sedated and intubated.  INTEGUMENTARY: No rashes    Medical Decision Making       Please see A&P for additional details of medical decision making.      Data   ------------------------- PAST 24 HR DATA REVIEWED -----------------------------------------------    I have reviewed today's vital signs, notes, medications, labs and imaging.  I have also seen and examined the patient and agree with the findings and plan as outlined above.  55 yo WM with hx of tobacco and alcohol use who presented following IWMI without revascularization with ECMO support.  Pt with surgical revascularization, repair of inferior wall, MVR with VV-AA ECMO and Impella 5.5 support " (P4) on heparin receiving blood products and decreasing pressor support.  Pt received Cyano-kit X1.  Pt to receive stress dose steroids and amiodarone.  Pt with open chest and full code.  Pt seen X3 for total critical care time 50 min.       Rachid Reynolds MD, PhD  Professor, Heart Failure and Cardiac Transplantation  [facility]

## 2024-03-28 NOTE — PHARMACY-VANCOMYCIN DOSING SERVICE
Pharmacy Vancomycin Initial Note  Date of Service 2024  Patient's  1970  54 year old, male    Indication:  open chest prophylaxis    Current estimated CrCl = Estimated Creatinine Clearance: 152.5 mL/min (based on SCr of 0.74 mg/dL).    Creatinine for last 3 days  3/24/2024:  9:58 PM Creatinine 0.83 mg/dL  3/25/2024:  3:54 AM Creatinine 0.75 mg/dL; 10:08 AM Creatinine 0.74 mg/dL;  3:58 PM Creatinine 0.78 mg/dL; 10:09 PM Creatinine 0.80 mg/dL  3/26/2024:  4:11 AM Creatinine 0.88 mg/dL; 10:01 AM Creatinine 0.81 mg/dL;  4:02 PM Creatinine 0.79 mg/dL;  9:28 PM Creatinine 0.76 mg/dL  3/27/2024:  4:02 AM Creatinine 0.70 mg/dL;  5:39 PM Creatinine 0.74 mg/dL    Recent Vancomycin Level(s) for last 3 days  No results found for requested labs within last 3 days.      Vancomycin IV Administrations (past 72 hours)                     vancomycin (VANCOCIN) 1,500 mg in 0.9% NaCl 250 mL intermittent infusion (mg) 1,500 mg Given 24 0758                    Nephrotoxins and other renal medications (From now, onward)      Start     Dose/Rate Route Frequency Ordered Stop    24 2130  vancomycin (VANCOCIN) 1,750 mg in sodium chloride 0.9 % 500 mL intermittent infusion         1,750 mg  over 120 Minutes Intravenous EVERY 12 HOURS 24 21024 0600  vasopressin 1 unit/mL MAX Conc (PITRESSIN) infusion         0.5-4 Units/hr  0.5-4 mL/hr  Intravenous CONTINUOUS 24 0548      24 0230  norepinephrine (LEVOPHED) 16 mg in  mL infusion MAX CONC CENTRAL LINE         0.01-0.6 mcg/kg/min × 105.1 kg (Dosing Weight)  1-59.1 mL/hr  Intravenous CONTINUOUS 24 0216              Contrast Orders - past 72 hours (72h ago, onward)      None            InsightRX Prediction of Planned Initial Vancomycin Regimen  Loading dose: N/A  Regimen: 1750 mg IV every 12 hours.  Start time: 19:58 on 2024  Exposure target: AUC24 (range)400-600 mg/L.hr   AUC24,ss: 451 mg/L.hr  Probability of AUC24 >  400: 73 %  Ctrough,ss: 11 mg/L  Probability of Ctrough,ss > 20: 2 %  Probability of nephrotoxicity (Lodise MARCY 2009): 7 %          Plan:  Start vancomycin  1750 mg IV q12h (already got 1500 mg IV x1 in the OR earlier today, 3/27).   Vancomycin monitoring method: AUC  Vancomycin therapeutic monitoring goal: 400-600 mg*h/L  Pharmacy will check vancomycin levels as appropriate in 1-3 Days.    Serum creatinine levels will be ordered daily for the first week of therapy and at least twice weekly for subsequent weeks.      Lety Brantley, [facility]

## 2024-03-28 NOTE — PROGRESS NOTES
CV ICU PROGRESS NOTE  March 28, 2024   Giacomo Castro  [id number]  Admitted: 3/22/2024  1:29 AM      ASSESSMENT: Giacomo Castro is a 54 year old male w/ hx of HLD, alcohol abuse, tobacco use who initially presented to [facility] on 3/12 with inferior STEMI complicated by postero-medial papillary muscle rupture s/p surgical mitral valve replacement found to have necrotic LV free wall impending rupture intra-op and had inferior LV wall patch repair on 3/27 by Dr. Mulvihill.     Prior to surgical intervention decompensated, was peripherally cannulated for VA ECMO and IABP (IABP now removed). Introp was also cannulated for central VA ECMO to reduce cardiac blood flow to heal patch, now on VV-AA ECMO. Impella was directly placed intraoperatively. Arrived in CVICU on mechanical cardiovascular support inlcuding VV-AA ECMO, impella with open chest.     CO-MORBIDITIES:   Decompensated heart failure (H)  (primary encounter diagnosis)  Nonrheumatic mitral valve regurgitation  Acute ST elevation myocardial infarction (STEMI) involving right coronary artery (H)  At high risk for impaired skin integrity [Z91.89]  Mitral valve insufficiency, ischemic  S/P MVR (mitral valve replacement)    PLAN:  Today:  - Cyanokit this AM  - Angiotensin gtt  - Wean pressors as tolerated - preferentially Epi  - 1 unit PRBC, 1 unit FFP this morning  - Stress dose steroids: hydrocortisone 100 mg q8h  - Post pyloric feeding tube placement and start trickle feeds  - RASS goal -4 to -5 given open chest, plan for daily awakening trials    Neuro/ pain/ sedation:  - Monitor neurological status. Notify the MD for any acute changes in exam.  #Acute postoperative pain  # Acute metabolic encephalopathy 2/2 sedation  # Sedation   - continue prop, fentanyl gtt   - rass goal -4 to -5 with open chest, daily awakening trials   - gabapentin 100 mg TID  - Scheduled: Tylenol  - PRN: Tylenol, oxycodone, Dilaudid  - Will ideally wean sedation for neuro exam  overnight      Pulmonary care:   #Mechanical ventilation  # Acute Hypoxic Respiratory Failure  # Pulmonary edema   Vent Mode: CMV/AC  (Continuous Mandatory Ventilation/ Assist Control)  FiO2 (%): 80 %  Resp Rate (Set): 12 breaths/min  Tidal Volume (Set, mL): 350 mL  PEEP (cm H2O): 5 cmH2O  Resp: 13     - Goal SpO2 > 92%  - Encourage IS q15-30 minutes when awake.  - Daily CXR   - Monitor CT output      Cardiovascular:    # Cardiogenic shock  # Inferior STEMI s/p IABP c/b cardiogenic shock and recurrent VT  # Biventricular failure  # Papillary muscle infarction leading to restricted posterior mitral valve leaflet and severe MR  # S/p VA ECMO   # Inferior LV wall rupture s/p patch repair 3/27   # S/P MVR, central ECMO cannulation and impella placement 3/27/24  # Open chest    - Goal MAP >65, SBP <140, monitor hemodynamics  - Cards II following, appreciate recs  - IABP removed; Fem stop remains on   - Hemodynamic gtts: epinephrine, NE, vasopressin, angiotensin, cyanokit    - Preferentially wean epi  - diuretics: bumex 4 mg IV BID held    - antiplatelet/anticoag:   - continue ASA  - Plavix held starting 3/21 given possibility of valve intervention  - Straight rate heparin, anticipate up titrating to reduce risk of cardiopulmonary thrombosis  - Continuing Amiodarone infusion for VT   - Hold Statin   - Hold BB   - ASA: start tomorrow  - Will need to discuss starting anticoagulation with CVTS post-op   - If bleeding stable, plan to start straight rate heparin (600u/hr in 6 hours); discuss at 0000    - Overall plan to keep full decompression of LV via ECMO, impella support for LV patch protection   - V-paced (V-wires x 2)   - Hopefully washout in coming days   - Impella flows have been 0.7-1 LPM  - Per CVTS: If he has adequate RV function with this support, we hope that he can wean from ECMO and then proceed with a slow wean of the Impella.      GI care / Nutrition:   # NPO status   # Transaminitis in setting of CHF   #  Constipation   - NPO   - PPI  - Bowel regimen: MiraLAX, senna  - RD consult for NJ    - Start trickle feeds when NJ placed  - KUB pending      Renal / Fluids / Electrolytes:   # Hypernatremia  # Metabolic alkalosis  # Hypophosphatemia  # lactic acidosis   # At risk for JEANE  BL creat appears to be ~ < 1.0   - Strict I/O, daily weights, avoid/limit nephrotoxins  - Replete lytes PRN per protocol  -  ml q4h   - Lactate q2h   - Blood and FFP this AM as below  - Do not think he will tolerate diuresis in setting of high pressor requirements today, reevaluate in PM after cyanokit     Endocrine:    #Stress hyperglycemia  #Stress Dose Steroids   - Insulin gtt  - Goal BG <180 for optimal healing  - Stress dose steroids: hydrocortisone 100 mg q8h     ID / Antibiotics:  #Stress induced leukocytosis  # Aspiration, HAP   # Open Chest  # Perioperative Antimicrobials   - To complete perioperative regimen  - Monitor fever curve, WBC, and inflammatory markers as appropriate     - ID following, appreciate recs  - antibiotics              - Vancomycin: 3/22-3/23              - Meropenem: 3/22, 3/23-3/25              - Cefepime: 3/23, 3/25-TBD  - Plan for open chest prophylaxis (Vanco, Cefepime, Annika)      - Infectious workup              - 3/18 urine legionella, S pneumo: negative              - 3/18 sputum: oropharyngeal monica              - 3/22 MRSA nares: negative              - 3/22 blood culture: NGTD              - 3/23 urine culture: NGTD              - 3/24 respiratory panel: pending     Heme:     # Acute blood loss anemia  # Post CPB thrombocytopenia  - 1 unit PRBC, 1 unit FFP this AM   - CTM  - Trend CBC   - Hgb goal > 7.0-8      MSK / Skin:  #Sternotomy  #Surgical Incision  # ECMO sites   # IABP site   # Open chest   - Sternal precautions, postop incision management protocol  - PT/OT/CR     Prophylaxis:     - Mechanical DVT ppx  - Chemical DVT ppx: straight rate heparin  - PPI     Lines / Tubes / Drains:  - ETT  - CTs  x5  - Chua  - Impella  - VA ECMO cannulas x 4 (peripheral fem and central sites)   - R ulnar art line, RIJ cordis, LIJ mela, PIV   - NJ - anticipate placing today 3/28    Disposition:  - CVICU    ICU Checklist  F - Feeding:   A - Analgesia:   S - Sedation:   T - Thromboembolic prophylaxis:      H - Head of bed elevated  U - Ulcer prophylaxis:  G - Glycemic control  S - SBT     B - Bowel regimen  I - Indwelling catheter  D - De-escalation of antibiotics    Patient seen, findings and plan discussed with CVICU staff.    Rachid Licea, MS4  [facility] [employer]  March 28, 2024     Resident/Fellow Attestation   I, Mihcael Schulz MD, was present with the medical/BHARATH student who participated in the service and in the documentation of the note.  I have verified the history and personally performed the physical exam and medical decision making.  I agree with the assessment and plan of care as documented in the note.        Michael Schulz MD  PGY2  Date of Service (when I saw the patient): 03/28/24    ====================================    TODAY'S PROGRESS  SUBJECTIVE  Overnight had some impella chugging when turned up to P5, resolved with turning back down to P4. Has continued to have urine output, though slowing this morning.      OBJECTIVE  1. VITAL SIGNS  Temp:  [97.2  F (36.2  C)-99  F (37.2  C)] 99  F (37.2  C)  Pulse:  [64-80] 80  Resp:  [11-14] 12  MAP:  [55 mmHg-95 mmHg] 62 mmHg  Arterial Line BP: ()/(41-94) 65/61  FiO2 (%):  [50 %] 50 %  SpO2:  [88 %-100 %] 100 %  Vent Mode: CMV/AC  (Continuous Mandatory Ventilation/ Assist Control)  FiO2 (%): 50 %  Resp Rate (Set): 12 breaths/min  Tidal Volume (Set, mL): 350 mL  PEEP (cm H2O): 5 cmH2O  Resp: 12      2. INTAKE/ OUTPUT  I/O last 3 completed shifts:  In: 69692.11 [I.V.:5317.11; Other:1398; NG/GT:150; IV Piggyback:2250]  Out: 2492 [Urine:1482; Emesis/NG output:200; Stool:300; Chest Tube:510]    3. PHYSICAL EXAMINATION  General:  sedated  Neuro: sedated  Resp: intubated, ventilated. Symmetric chest rise.   CV: S1, S2, V paced, no m/r/g   Abdomen: Soft, non-distended, non-tender  Incisions: c/d/I. Right leg ace wrapped. Fem stop at groin. Cannula sites in groin and chest. Open chest is soft, flush with chest wall.   Extremities: warm. + 2 edema. Brisk capillary refill in toes.   CT: To suction, serosang output, no airleak, crepitus    4. INVESTIGATIONS  Arterial Blood Gases   Recent Labs   Lab 03/28/24  0607 03/28/24  0405 03/28/24  0155 03/28/24  0019   PH 7.44 7.48* 7.49* 7.48*   PCO2 39 36 36 37   PO2 110* 94 112* 165*   HCO3 27 27 27 28     Complete Blood Count   Recent Labs   Lab 03/28/24  0404 03/27/24  2222 03/27/24  1743 03/27/24  1652 03/27/24  1508   WBC 16.7* 18.2* 15.8*  --  15.4*   HGB 8.3* 8.9* 9.0* 9.9* 7.1*   * 147* 88*  --  134*     Basic Metabolic Panel  Recent Labs   Lab 03/28/24  0607 03/28/24  0458 03/28/24  0409 03/28/24  0405 03/28/24  0404 03/27/24  2226 03/27/24  2222 03/27/24  1741 03/27/24  1739 03/27/24  1652 03/27/24  0404 03/27/24  0402   NA  --   --   --   --  145  --  145  --  146* 145   < > 144   POTASSIUM  --   --   --   --  5.1  --  5.1  --  4.7 4.6   < > 4.4   CHLORIDE  --   --   --   --  110*  --  110*  --  110*  --   --  103   CO2  --   --   --   --  25  --  25  --  25  --   --  31*   BUN  --   --   --   --  31.0*  --  27.4*  --  24.9*  --   --  24.0*   CR  --   --   --   --  1.03  --  0.84  --  0.74  --   --  0.70   * 142* 150* 144* 153*   < > 162*  169*   < > 150* 133*   < > 147*    < > = values in this interval not displayed.     Liver Function Tests  Recent Labs   Lab 03/28/24  0404 03/27/24  2222 03/27/24  1743 03/27/24  1739 03/27/24  1508 03/27/24  0402   * 131*  --  122*  --  113*   ALT 55 55  --  52  --  123*   ALKPHOS 32* 33*  --  30*  --  54   BILITOTAL 2.6* 2.9*  --  3.1*  --  1.7*   ALBUMIN 2.1* 2.2*  --  1.8*  --  2.9*   INR 1.70* 1.60* 1.82*  --  1.73* 1.50*  "    Pancreatic Enzymes  No lab results found in last 7 days.  Coagulation Profile  Recent Labs   Lab 03/28/24  0404 03/27/24  2222 03/27/24  1743 03/27/24  1508   INR 1.70* 1.60* 1.82* 1.73*   PTT 42* 32 48* 35     Lactate  Invalid input(s): \"LACTATE\"    5. RADIOLOGY  Recent Results (from the past 24 hour(s))   XR Chest Port 1 View    Narrative    EXAM: XR CHEST PORT 1 VIEW  3/27/2024 6:55 PM     HISTORY: s/p MVR, impella insertion, and OGT placement       COMPARISON: Radiograph 3/26/2024    TECHNIQUE: AP view of the chest    FINDINGS:   Devices, lines, tubes: Impella device in appropriate positioning.  Central ECMO cannulae in place. Left IJ Selden-Richard catheter tip  projects over the right interlobar pulmonary artery. Right IJ central  venous catheter tip projects over the mid SVC. Endotracheal tube tip  projects 4.8 cm superior to the cesia. Feeding tube tip is coiled in  the stomach with tip projecting over the lower esophagus. Packing  material overlying the mediastinum. Mediastinal drains and bibasilar  chest tubes in place. Gastric tube tip and side-port project over the  stomach.    The trachea is midline. Stable cardiomediastinal silhouette. Slightly  improved right greater than left bilateral mixed interstitial and  airspace opacities. No appreciable pneumothorax. No acute osseous  abnormality.        Impression    IMPRESSION:   1. Impella device tip projects over the left ventricle.  2. Central ECMO cannulae in place.  3. Left IJ Selden-Richard catheter tip projects over the right interlobar  pulmonary artery, consider retraction.  4. Endotracheal tube tip projects 4.8 cm superior to the cesia.  5. Feeding tube coiled in the stomach with tip projecting over the  lower esophagus, recommend positioning.  6. Improved right greater than left diffuse mixed pulmonary opacities.    I have personally reviewed the examination and initial interpretation  and I agree with the findings.    JEF HERNANDEZ MD       "   SYSTEM ID:  [id number]   XR Chest Port 1 View    Impression    RESIDENT PRELIMINARY INTERPRETATION  IMPRESSION:   1. Impella device tip projects over the left ventricle.  2. Central ECMO cannulae in place.  3. Left IJ Lillian-Richard catheter tip projects over distal right main  pulmonary artery  4. Endotracheal tube tip projects over the midthoracic   5. Removal of feeding tube. Stable orogastric tube.  6. Similar left greater than right diffuse mixed pulmonary opacities.     XR Chest Port 1 View    Impression    RESIDENT PRELIMINARY INTERPRETATION  Impression:   1.  No significant change of Impella device, central ECMO cannulae,  and endotracheal tube position. Additional support devices are stable.  2.  Stable right greater than left mixed interstitial and airspace  opacities.

## 2024-03-28 NOTE — OP NOTE
Cardiac Surgery Operative Note    Date of Surgery: 3/27/2024    Preop Diagnosis:  STEMI  Mitral insufficiency  Cardiogenic shock    Postop Diagnosis:   STEMI  Mitral insufficiency due to papillary muscle rupture  Cardiogenic shock  Rupture of the inferior wall of the left ventricle    Surgeon: Michael Mulvihill, MD  Co-Surgeon: Santana Hernandez MD    Assistant(s): Dr. Aureliano Paz MD  Anesthesia: GET    Procedures:  1. Mitral valve replacement (29 mm Epic bioprosthetic valve)  2. Insertion of a direct aortic impella 5.5 temporary left ventricular assist device via tunneled graft  3. Patch repair of left ventricle inferior wall rupture (surgical ventricular restoration)  4. Cannulation for central VA ECMO    Approach: median sternotomy  Drains: 2x mediastinal drains  Pacing Wires: Ventricular    ATTESTATION: NA, Michael Mulvihill, attending co-surgeon, was scrubbed and present for the entire operative procedure. Dr. Hernandez and NA jointly performed the procedure and all of the critical components. Due to the complexity of the procedure and the critical illness of the patient, co-surgeons were required by medical necessity. It should be noted that although there was resident in the operative suite, they didn't have sufficient training and/or expertise in this particular procedure. Specifically, Dr. Hernandez and NA jointly performed emergency repair of the ventricular wall rupture and associated ventricular restoration, as well as replacement of the mitral valve and the associated critical coordination of circuits between full VA ECMO support to cardiopulmonary bypass, and back to full VA ECMO support.    Implant Name Type Inv. Item Serial No.  Lot No. LRB No. Used Action   GRAFT PATCH VASC XENOSURE BIOLOGIC [id number] E8P14 - [id number]  GRAFT PATCH VASC XENOSURE BIOLOGIC [id number] E8P14  [facility] IN [id number] N/A 1 Implanted   GRAFT PERICARDIUM 6X8CM BOVINE E6P8 - [id number] Bone/Tissue/Biologic  GRAFT PERICARDIUM 6X8CM BOVINE E6P8  [facility] VASCULAR IN [id number] N/A 1 Implanted   VALVE MITRAL EPIC PLUS TISSUE STENTED 29MM [id number] - [id number] Valve VALVE MITRAL EPIC PLUS TISSUE STENTED 29MM [id number] [employer]  N/A 1 Implanted   GRAFT VASCUTEK GELWEAVE STRAIGHT [id number] - [id number] Graft GRAFT VASCUTEK GELWEAVE STRAIGHT [id number] VASCUTEK [id number] N/A 1 Implanted       Disposition: To ICU in stable but critical condition    Clinical History: 54M with delayed-presentation STEMI to OSH ([facility]) with unsuccessful revascularization. Since that time he has had significant reduction in his left ventricular function and severe mitral insufficiency. He arrived here on Friday and was cannulated for peripheral VA ECMO on Saturday. Since that time and despite optimization of both his loading conditions and inotropic support he continues to have severely reduced LVEF and minimal pulsatility observed on ECMO support. In an effort to optimize his physiology and give him the best chance for  from ECMO, after multidisciplinary review we have offered mitral valve replacement, CABG, and placement of a direct aortic impella.     Procedure in Detail:  The risks, benefits, complications, treatment options, and expected outcomes were discussed with the patient. The possibilities of reaction to medication, pulmonary aspiration, perforation of viscus, bleeding, recurrent infection, the need for additional procedures, failure to diagnose a condition, and creating a complication requiring transfusion or operation were discussed with the patient. The patient concurred with the proposed plan, giving informed consent. The patient was taken to the operating room, identified as Giacomo Castro and the procedure verified. A Time Out was held and the above information confirmed.     Standard monitoring lines and Chua catheter were placed. General anesthesia was induced. The  patient was prepped and draped in a sterile fashion.    A standard median sternotomy was completed. The pericardial well was created. The patient was fully heparinized and the aorta was cannulated.     The SVC was likewise cannulated following pursestring placement.     Following full heparinization, the peripheral VA ECMO circuit was then switched from peripheral femoral VA ECMO (R femoral venous drainage, R femoral arterial reinfusion) to central cardiopulmonary bypass (SVC and femoral venous drainage, central aortic reinfusion).     At the time of initiation of cardiopulmonary bypass, blood welling up from the diaphragmatic surface of the heart was observed. The heart was delivered out of the pericardial well and a large contained rupture of the left ventricle was observed.     As such, an antegrade vent/cardiplegia needle was placed and we proceeded with immediate crossclamp. We achieved prompt diastolic arrest.    Umbilical tapes were placed for total cardiopulmonary bypass. After opening the right atrium, a retrograde cardioplegia catheter was then placed under direct vision. Cardioplegia was then administered at regular intervals to achieve electrical silence.    We first performed ventricular restoration by excising all nonviable tissue from the diaphragmatic surface of the LV. To reconstruct this 5cm defect, we constructed a two-patch repair with an initial pericardial patch affixed with 2-0 mattress sutures from within the cavity. Thereafter a second patch was then sewn to the exterior surface of the heart to complete the two-patch repair.    Next, the left atrium was entered through the septum, exposing the mitral valve.     Valve inspection was performed with leaflet assessment. We identified a ruptured papillary muscle corresponding to the postero-medial papillary muscle. We elected to proceed with replacement with a biologic prosthesis.    2-0 Ethibond sutures were placed circumferentialy in the mitral  annulus in a supra-annular pledgeted fashion. The mitral annulus was then sized and a 29mm epic valve was put into position. The sutures were then secured with the CoreKnot device. The valve was then tested with saline insufflation and there was no MR. The septotomy was then closed with running 4-0 prolene suture.    An aortotomy was then made. The 10 mm Gelweave dacron graft was then anastomosed to the aorta and the clamp was removed. The graft was then tunneled out above the right clavicle. The peel away sheath was inserted into the graft and secured with the graft locks. The lmpella 5.5 was then brought on to the field and prepped.    The patient was put into Trendelenberg position and after careful de-airing the cross clamp was removed and the heart was reperfused.    The right atriotomy was then closed with a running prolene.     Impella Positioning  The rest of the procedure was completed with FERNANDEZ. The j wire was then placed through the graft and advanced to the ascending aorta. An AL-1 catheter was then used to advance the wire through the aortic valve into the ventricle. The AL-1 was then advanced into the ventricle. The j wire was then exchanged for the 0.018 wire. The lmpella was then advanced over the wire into the ventricle. The wire was removed. The position was checked with FERNANDEZ and the device was started. The position was adjusted for optimal placement. Increasing impella support was employed to support the circulation during separation from cardiopulmonary bypass.    Examination of all surgical sites revealed good hemostasis. Ventricular epicardial pacing wires were then placed.     We then elected to transition back from full CPB to full ECMO support. We spliced Y connectors into the drainage and reinfusion cannulas and transferred our cannulae into VV-AA ECMO support (SVC and femoral venous drainage, femoral arterial and central aortic reinfusion).    Examination of all surgical sites were evaluated  for hemostasis which was satisfactory. A single kerlix packing was left in place.    Given our use of central aortic cannulation and out of concern for ongoing coagulopathic bleeding related to the ventricular rupture, we elected to leave the chest open. A sterile dressing was applied over the skin.     Incision Closure  The graft was cut down and the peel away sheath was removed. The insertion sheath was then advanced and the device was secured in place. The position was again checked. At the end of the case, this incision was then closed in the standard fashion. Sterile dressings were applied.    The patient tolerated the procedure without complication and was transported to the CTICU in stable condition.    I was present and scrubbed for the entire procedure.    Michael Mulvihill, MD  3/29/2024 @ 5:46 PM

## 2024-03-29 PROBLEM — N17.9 ACUTE KIDNEY FAILURE, UNSPECIFIED (H): Status: ACTIVE | Noted: 2024-01-01

## 2024-03-29 NOTE — PROGRESS NOTES
[facility]    ECLS Shift Summary: 0700 to 2300     ECMO Equipment:  Console Serial Number: [id number]  Circuit Lot Number: [id number]  Oxygenator Lot Number: [id number]    Circuit Assessment: Fibrin, Clot  Fibrin Location: connectors and oxygenator outlet  Clot Location: corners of the oxygenator.    Arterial ECMO Cannula: 22 Fr EOPA in the Aorta and a 17 Fr. In the Right Femoral Artery.  Venous ECMO Cannula: 28 Fr in the Right Atrium and a 25 Fr. In the Right Femoral Vein.  Distal Perfusion Cannula: 8 Fr. In the Right Superficial Femoral Artery.    ECMO Cannula Venous Right femoral vein-Site Assessment: WDL  ECMO Cannula Arterial Right femoral artery-Site Assessment: WDL  Distal Perfusion Catheter Right superficial femoral artery-Site Assessment: Secure, Sutured, WDL  ECMO Cannula Arterial Aorta-Site Assessment: WDL  ECMO Cannula Venous Right atrium-Site Assessment: WDL  ECMO Cannula Venous Right femoral vein-Site Intervention: No intervention needed  ECMO Cannula Arterial Right femoral artery-Site Intervention: No intervention needed  Distal Perfusion Catheter Right superficial femoral artery-Site Intervention: No intervention needed  ECMO Cannula Arterial Aorta-Site Intervention: No intervention needed  ECMO Cannula Venous Right atrium-Site Intervention: No intervention needed    Patient remains on Other (Comment) (VV-AA) ECMO, all equipment is functioning and alarms are appropriately set. RPM's: 3500 with Blood Flow (Circuit) LPM  Av.4 LPM  Min: 5.38 LPM  Max: 5.51 LPM L/min. Sweep is at 4 LPM and 60 %. Extremities are cool.     Significant Shift Events: None.    Vent settings:  Vent Mode: CMV/AC  (Continuous Mandatory Ventilation/ Assist Control)  FiO2 (%): 50 %  Resp Rate (Set): 12 breaths/min  Tidal Volume (Set, mL): 350 mL  PEEP (cm H2O): 5 cmH2O  Resp: 25      Anticoagulation:  Dose (units/hr) HEParin: 600 Units/hr  Rate (mL/hr) HEParin: 6 mL/hr  Concentration  HEParin: 100 Units/mL     Dose (mg/kg/hr) Bivalirudin: 0 mg/kg/hr  Rate (mL/hr) Bivalirudin: 0 mL/hr  Concentration Bivalirudin: 1 mg/mL  Most recent: ACT  (seconds): 162 seconds    Urine output is Blue, Miley.  Blood loss was small. Product given included 2 units of PRBCs, one unit of FFP.     Intake/Output Summary (Last 24 hours) at 3/28/2024 2234  Last data filed at 3/28/2024 2200  Gross per 24 hour   Intake 7677.39 ml   Output 2392 ml   Net 5285.39 ml       Labs:  Recent Labs   Lab 03/28/24 2206 03/28/24 2018 03/28/24  1808 03/28/24  1558 03/28/24  1557   PH 7.34* 7.37 7.38  --  7.41   PCO2 47* 44 43  --  39   PO2 61* 83 87  --  99   HCO3 26 25 25  --  25   O2PER 50 50 50 50  60 50       Lab Results   Component Value Date    HGB 8.3 (L) 03/28/2024    PHGB 40 (H) 03/28/2024    PLT 80 (L) 03/28/2024    FIBR 387 03/28/2024    INR 1.67 (H) 03/28/2024    PTT 47 (H) 03/28/2024    DD 4.02 (H) 03/28/2024    ANTCH 53 (L) 03/28/2024       Plan is to remain on VV-AA ECMO with a possible chest washout tomorrow.    CECILY Mercado, RRT  ECMO Specialist  3/28/2024 10:34 PM

## 2024-03-29 NOTE — PLAN OF CARE
Major Shift Events:  7a-7p -  RASS -4/5, propofol/fentanyl restarted d/t pt with open chest, no response/movement to pain but blinks eyes & coughs with oral cares, PERRLA, HR paced at 60, VVI - SR-SB under pacer, ECMO flows 5.5, sweep increased to 6, 70%, pressors titrated anastacio for MAP >65, no chugging from circuit, Impella changed to P5 to decrease pulsatility , chest remains open, CMV 40% 12/350/5, bloody secretions, TF increased to 30 ml/hr, rectal tube in place, CRRT started.     Plan: RTOR for washout & possible closure today or tomorrow     For vital signs and complete assessments, please see documentation flowsheets.

## 2024-03-29 NOTE — PROGRESS NOTES
[facility]    ECLS Shift Summary:     ECMO Equipment:  Console Serial Number: [id number]  Circuit Lot Number: [id number]  Oxygenator Lot Number: [id number]    Circuit Assessment: Fibrin, Clot  Fibrin Location: connectors, oxy outlet  Clot Location: connectors of oxy    Arterial ECMO Cannula: 17 Fr in the Right Femoral Artery  Arterial ECMO- EOPA- 22     Venous ECMO 28 FR RA  Venous ECMO Cannula: 25 Fr in the Right Femoral Vein  Distal Perfusion Cannula: 8 FR RSFA          Patient remains on Other (Comment) (VV-AA) ECMO, all equipment is functioning and alarms are appropriately set. RPM's: 3500 with Blood Flow (Circuit) LPM  Av.6 LPM  Min: 5.5 LPM  Max: 5.72 LPM L/min. Sweep is at 6 LPM and 70 %. Extremities are warm.     Significant Shift Events: none    Vent settings:  Vent Mode: CMV/AC  (Continuous Mandatory Ventilation/ Assist Control)  FiO2 (%): 40 %  Resp Rate (Set): 12 breaths/min  Tidal Volume (Set, mL): 350 mL  PEEP (cm H2O): 5 cmH2O  Resp: 12      Anticoagulation:  Dose (units/hr) HEParin: 400 Units/hr  Rate (mL/hr) HEParin: 4 mL/hr  Concentration HEParin: 100 Units/mL     Dose (mg/kg/hr) Bivalirudin: 0 mg/kg/hr  Rate (mL/hr) Bivalirudin: 0 mL/hr  Concentration Bivalirudin: 1 mg/mL  Most recent: ACT  (seconds): 174 seconds    Urine output is Pink (purple).  Blood loss was none. Product given included none.     Intake/Output Summary (Last 24 hours) at 3/29/2024 1659  Last data filed at 3/29/2024 1600  Gross per 24 hour   Intake 4090.79 ml   Output 1878 ml   Net 2212.79 ml       Labs:  Recent Labs   Lab 24  1557 24  1554 24  1400 24  1210 24  1002   PH 7.42  --  7.39 7.32* 7.33*   PCO2 38  --  39 46* 46*   PO2 118*  --  130* 89 131*   HCO3 25  --  24 23 24   O2PER 40 40  70 50 50 50       Lab Results   Component Value Date    HGB 7.2 (L) 2024    PHGB 90 (H) 2024    PLT 67 (L) 2024    FIBR 553 (H) 2024    INR  1.86 (H) 03/29/2024    PTT 42 (H) 03/29/2024    DD 4.15 (H) 03/29/2024    ANTCH 49 (L) 03/29/2024       Plan is continue on ECMO.    Cely Arellano, RT  ECMO Specialist  3/29/2024 4:59 PM

## 2024-03-29 NOTE — PHARMACY-VANCOMYCIN DOSING SERVICE
Pharmacy Vancomycin Note  Date of Service 2024  Patient's  1970   54 year old, male    Indication:  open chest prophylaxis  Day of Therapy: 2  Current vancomycin regimen:  1750 mg IV q12h  Current vancomycin monitoring method: AUC  Current vancomycin therapeutic monitoring goal: 400-600 mg*h/L    InsightRX Prediction of Current Vancomycin Regimen  Loading dose: N/A  Regimen: 1750 mg IV every 12 hours.  Start time: 20:48 on 2024  Exposure target: AUC24 (range)400-600 mg/L.hr   AUC24,ss: 889 mg/L.hr  Probability of AUC24 > 400: 100 %  Ctrough,ss: 30.3 mg/L  Probability of Ctrough,ss > 20: 96 %  Probability of nephrotoxicity (Lodise MARCY ): 42 %      Current estimated CrCl = Estimated Creatinine Clearance: 73.2 mL/min (A) (based on SCr of 1.57 mg/dL (H)).    Creatinine for last 3 days  3/25/2024: 10:09 PM Creatinine 0.80 mg/dL  3/26/2024:  4:11 AM Creatinine 0.88 mg/dL; 10:01 AM Creatinine 0.81 mg/dL;  4:02 PM Creatinine 0.79 mg/dL;  9:28 PM Creatinine 0.76 mg/dL  3/27/2024:  4:02 AM Creatinine 0.70 mg/dL;  5:39 PM Creatinine 0.74 mg/dL; 10:22 PM Creatinine 0.84 mg/dL  3/28/2024:  4:04 AM Creatinine 1.03 mg/dL; 10:18 AM Creatinine 1.28 mg/dL;  4:04 PM Creatinine 1.57 mg/dL    Recent Vancomycin Levels (past 3 days)  3/28/2024:  8:18 PM Vancomycin 18.3 ug/mL    Vancomycin IV Administrations (past 72 hours)                     vancomycin (VANCOCIN) 1,750 mg in sodium chloride 0.9 % 500 mL intermittent infusion (mg) 1,750 mg New Bag 24 0848     1,750 mg New Bag 24 2148    vancomycin (VANCOCIN) 1,500 mg in 0.9% NaCl 250 mL intermittent infusion (mg) 1,500 mg Given 24 4718                    Nephrotoxins and other renal medications (From now, onward)      Start     Dose/Rate Route Frequency Ordered Stop    24 0700  vancomycin (VANCOCIN) 1,750 mg in sodium chloride 0.9 % 500 mL intermittent infusion         1,750 mg  over 120 Minutes Intravenous EVERY 24 HOURS 24 7426       03/22/24 0600  vasopressin 1 unit/mL MAX Conc (PITRESSIN) infusion         0.5-4 Units/hr  0.5-4 mL/hr  Intravenous CONTINUOUS 03/22/24 0548      03/22/24 0230  norepinephrine (LEVOPHED) 16 mg in  mL infusion MAX CONC CENTRAL LINE         0.01-0.6 mcg/kg/min × 105.1 kg (Dosing Weight)  1-59.1 mL/hr  Intravenous CONTINUOUS 03/22/24 0216                 Contrast Orders - past 72 hours (72h ago, onward)      None            Interpretation of levels and current regimen:  Vancomycin level is reflective of AUC greater than 600    Has serum creatinine changed greater than 50% in last 72 hours: Yes    Urine output:  good urine output    Renal Function: Worsening    InsightRX Prediction of Planned New Vancomycin Regimen  Loading dose: N/A  Regimen: 1750 mg IV every 24 hours.  Start time: 08:48 on 03/29/2024  Exposure target: AUC24 (range)400-600 mg/L.hr   AUC24,ss: 466 mg/L.hr  Probability of AUC24 > 400: 82 %  Ctrough,ss: 13 mg/L  Probability of Ctrough,ss > 20: 8 %  Probability of nephrotoxicity (Lodise MARCY 2009): 8 %      Plan:  Decrease Dose to 1750 mg IV q24h  Vancomycin monitoring method: AUC, will need to switch to traditional trough monitoring if SCr continues to worsen  Vancomycin therapeutic monitoring goal: 400-600 mg*h/L  Pharmacy will check vancomycin levels as appropriate in 1-3 Days.  Serum creatinine levels will be ordered daily for the first week of therapy and at least twice weekly for subsequent weeks.    Lety Brantley [facility]

## 2024-03-29 NOTE — PROGRESS NOTES
Shift Summary:    Pt remained intubated and mechanically ventilated on the following vent settings:    Vent Mode: CMV/AC  (Continuous Mandatory Ventilation/ Assist Control)  FiO2 (%): 40 %  Resp Rate (Set): 12 breaths/min  Tidal Volume (Set, mL): 350 mL  PEEP (cm H2O): 5 cmH2O  Resp: 12    PST: No PS 3/29    Assessment: BS coarse and diminished bilaterally.     Ambu bag, mask, and valve present at bedside.   RT will continue to follow and monitor pt as appropriate.     Yessy Huddleston, RT on 3/29/2024 at 6:19 PM

## 2024-03-29 NOTE — PLAN OF CARE
Neuro:  Pt blinks eyes with suctioning. Cough present.   Flexion present in all extremities with painful stimuli.  Sedation remains off.    Cardiac:   Vpaced at 80.  Levophed, Vasopressin, and Angiotensin 2 titrated to keep MAPS>65.  Amiodarone infusing.  Impella at P4.  On VV-AA ECMO.    Respiratory:   On CMV settings at 50%.    GI/:   NJ infusing with tube feeds.  Rectal tube in place with loose stool present.  Urine output remains 15-20ml/hr.  Insulin drip titrated to blood sugars.    Incisons: Chest remains open. Chest tubes x5 with 10-30ml/hr of serosanguinous output present.     Interventions: 500ml LR given for low urine output. Gwen Salter, AURELIO aware that pt remains mostly unresponsive with sedation still off - head CT ordered. 0420 K = 5.4 - NP notified, continue to monitor.     Plan: Continue to monitor and update MD as needed. Continue plan of care.     Goal Outcome Evaluation:    Plan of Care Reviewed With: patient  Overall Patient Progress: no change

## 2024-03-29 NOTE — PROGRESS NOTES
CRRT INITIATION NOTE    Consent for CRRT Completed:  yes    DATA:  Procedure:  CVVHDF  Start Time:  1210  Machine#:  6  Filter:    Blood Flow:  180  ml/hour    Pre-Replacement Solution Rate:  1000 mL/hr  Pre-Replacement Solution:   Elysia-sol 2/ 3.5    Dialysate Flow Rate: 2000 mL/hr  Dialysate Solution:   Elysia-sol 2/ 3.5    Post-Replacement Solution Rate:  200 mL/hr  Post-Replacement Solution:   Phoxillum BK 4/2.5      Patient Removal Rate:  50 mL/hr      ASSESSMENT:  How Patient Tolerated Initiation:     Vital Signs:  Before: MAP- 75 (no pulsatility) HR 80, Impella @ P5, flow 1.1. A-flow 3.43 L/min, V-flow 3.00 L/min           After: MAP 76 (no pulsatility) HR 80, Impella @ P5, flow 1.2, A-flow 3.39 L/min, V-flow 2.98 L/min    Initial Pressures:  Access:  54  Filter:  125  Effluent: 57  Return:  53  TMP:  18  Pressure Drop:  34      INTERVENTIONS:  Initiated CRRT via ECMO circuit for patient    PLAN:  Continue CRRT per plan of care call/contact CRRT resource RN with questions/concerns

## 2024-03-29 NOTE — PROGRESS NOTES
[facility] GENERAL INFECTIOUS DISEASES: PROGRESS NOTE     Patient:  Giacomo Castro   YOB: 1970, MRN: [id number]  Date of Visit: 03/29/2024  Date of Admission: 3/22/2024  Consult Requester: Mulvihill, Michael, MD          Assessment and Recommendations:     ID Problem List:  Shock  Fever   Bilateral GGO and consolidative lung opacities- pulmonary edema  STEMI (3/10/24) c/b severe MR and ADHF  3/27/2024 s/p CABG, MVR, inferior LV wall repair with impella and open chest  S/p VA-ECMO with IABP (3/23-3/27), peripheral to central ECMO intra-op 3/27  LFT elevation  Allergies: pcn (unknown)    Discussion:  53 yo M with hx of HLD and tobacco use who initially presented to [facility] on 3/10/24 with inferior STEMI s/p unsuccessful attempted RCA revascularization. Course complicated by ADHF with significant volume overload despite diuresis, recurrent episodes of VT, torrential MR with concern for infarction of posteromedial papillary muscles, and pneumonia s/p ceftriaxone+azithromycin x5d. Transferred to [facility] on 3/22/24 for further management. Cannulated for VA-ECMO with IABP (3/23-*).     Concern for pneumonia at OSH with GGO and consolidation on CT chest. Was treated with ceftriaxone and azithromycin (3/18-3/22). Sputum Cx from OSH (3/18) with oropharyngeal monica; Strep pneumo and Legionella urine antigens negative.     Increased pressor requirements and fever to 101.3F on 3/23 and leukocytosis to 27.8K, . Afebrile since cannulation for ECMO with persistent leukocytosis and requiring pressors. Has been on meropenem since arrival.  CT C/A/P with mixed bilateral GGO and consolidation; no sign of intra-abdominal infection. Urine culture 3/23 negative. Volume overload, VAP, viral or atypical pulmonary infection considered as etiology of lung findings. Sputum cultures 3/23 NG, 3/24 NGTD. Negative COVID and respiratory PCR panel. Blood cultures 3/22, 3/23, 3/24 NGTD. Has already received a course of  Azithromycin for atypical coverage at OSH. Does not have significant risk factors for fungal infection. Most recent CXR appears c/w pulmonary edema. Started to de-escalate coverage on 3/25 with transition from meropenem -->cefepime.    Underwent 3/27 CABG, MVR, inferior LV wall repair 2/2 necrotic free wall impending rupture. VA ECMO converted from peripheral to central, impella placement, with open chest. Plan to RTOR 3/30. Recommend to continue cefepime, and additional antimicrobial per protocol given open chest. Will able to adjust/stop antimicrobials post surgery if no concern of infections.     Recommendations:  Continue iv cefepime   Duration to be determined pending clinical course, RTOR 3/30  Continue vancomycin, micafungin, metronidazole, per protocol and pending RTOR    Recommendations are discussed with the primary team.     ID team will continue to follow. Please reach out if any questions or concerns.     For urgent questions over the weekend, please reach out to on-call ID provider per [facility]. Dr. Corey Montgomery will assume [facility] ID service from Monday 4/1/2024 onwards.      Total time spent during this encounter (chart review, documentation, MDM, coordination of care): 50 minutes    Silvana Horne MD   Infectious Diseases Staff Physician  Pager: [phone]  [employer] [place]   03/29/2024         Interval History and Events:     Seen and examined in ICU. Underwent surgery on 3/27. Post operatively admitted to ICU, has open chest. No new concerns are voiced today.          HPI as adopted from initial ID consult on 3/24/2024:     Giacomo Castro is a 54 year old male with history of HLD and tobacco use who initially presented to [facility] on 3/10/24 with chest pain, found to have an inferior STEMI. Taken to cath lab where RCA revascularization. Course complicated by acute decompensated heart failure with significant volume overload despite diuresis, recurrent episodes of VT leading to shocks,  "torrential mitral regurgitation with restricted posterior leaflet with concern for infarction of posteromedial papillary muscles, and pneumonia s/p ceftriaxone+azithromycin x5d,intubated at OSH. Transferred to [facility] on 3/22/24 for further management. Increased pressor requirements and fever on 3/23, now on VA-ECMO with IABP (3/23-*).      MRSA nares neg (3/21 at OSH). Sputum (3/18) many GPC, many GPB, oropharyngeal monica on cx. Legionella and S.pneumo neg.     Lives in [place], [place]. Per previous notes, parents and sister live in the area. He smokes cigarettes and drinks alcohol regularly at least 2 heard liquor drinks per day.   Brother and sister in law provide additional history: works as a  for an elementary school. No known sick family/friends but does have potential for exposure to illness at work. Previously had a pet cat >1 year ago. No recent travel, more of a home body. Has visited resorts in [place] >5 years ago. Goes to [facility] with family and has stayed in a camper/trailer for family trips. No tent camping, hiking, hunting or fishing.          Review of Systems:     Unable to obtain, intubated, sedated         Antimicrobial history:     Current:  Cefepime 3/25 - present  Vancomycin 3/27 -   Metronidazole 3/27 - present  Micafungin 3/27 - present  Clindamycin 3/27  Cefazolin 3/27     Prior:  - meropenem (3/22 - 3/25)  - Vancomycin (3/22)  - ceftriaxone (3/18-3/22)  - Azithromycin (3/18-3/22)         Physical Examination:     Vital signs:  /53   Pulse 80   Temp 98.8  F (37.1  C)   Resp 15   Ht 1.905 m (6' 3\")   Wt 125.5 kg (276 lb 10.8 oz)   SpO2 99%   BMI 34.58 kg/m      GENERAL: intubated, sedated, on mechanical ventilation  RESP:+vent sounds  CV: deferred due to open chest with cental VA ECMO  ABDOMEN: soft, nontender, no hepatosplenomegaly, no masses and bowel sounds normal  MS: no gross musculoskeletal defects noted, no edema    Lines and devices:    PIV CDI, non-tender, no " surrounding erythema.  +impella  +chest tubes   +rectal tube  +NG tube    Labs, Microbiology and Imaging studies reviewed.          Laboratory Data:       Microbiology:    Culture   Date Value Ref Range Status   03/28/2024 Culture in progress  Preliminary   03/25/2024 No growth after 4 days  Preliminary   03/25/2024 No growth after 4 days  Preliminary   03/24/2024 No growth after 4 days  Preliminary   03/24/2024 No growth after 4 days  Preliminary   03/24/2024 No growth after 4 days  Preliminary   03/24/2024 No Growth  Final   03/24/2024 No Growth  Final   03/23/2024 No Growth  Final   03/23/2024 No Growth  Final   03/23/2024 No Growth  Final   03/23/2024 No Growth  Final   03/22/2024 No Growth  Final   03/22/2024 No Growth  Final     Inflammatory Markers:   Recent Labs   Lab Test 03/29/24  0419 03/28/24  0404 03/27/24  0402 03/26/24  0411 03/25/24  0354 03/24/24  0358 03/23/24  1602   SED 31* 19 38* 28* 26* 27* 27*     Urine Studies:    Recent Labs   Lab Test 03/29/24  1113 03/23/24  1803 03/23/24  0828   LEUKEST Negative Negative Small*   WBCU 6* 4 66*     Hematology Studies:    Recent Labs   Lab Test 03/29/24  1002 03/29/24  0419 03/28/24  2205 03/28/24  1558 03/28/24  1018 03/28/24  0404 03/24/24  2158 03/24/24  1614 03/24/24  1019 03/24/24  0358 03/23/24  2147 03/23/24  1602   WBC 30.0* 28.1* 27.7* 22.1* 22.6* 16.7*   < > 24.3*   < > 27.8*   < > 27.2*   ANEU  --  25.9*  --  19.4*  --  15.0*  --  20.4*  --  22.2*  --  20.4*   AEOS  --  0.3  --  0.2  --  0.2  --  0.2  --  0.0  --  0.0   HGB 7.5* 8.1* 8.3* 8.4* 7.5* 8.3*   < > 11.0*   < > 11.7*   < > 12.2*   MCV 94 93 92 91 95 93   < > 99   < > 97   < > 96   PLT 73* 75* 80* 75* 105* 123*   < > 295   < > 336   < > 481*    < > = values in this interval not displayed.      Metabolic Studies:   Recent Labs   Lab Test 03/29/24  1002 03/29/24  0419 03/28/24  2205 03/28/24  1604 03/28/24  1357 03/28/24  1204 03/28/24  1018   * 142 144 143  --   --  145   POTASSIUM  5.1 5.4* 5.1 4.8 4.9   < > 5.5*   CHLORIDE 112* 110* 112* 110*  --   --  111*   CO2 22 22 24 24  --   --  25   BUN 53.1* 48.8* 41.8* 38.6*  --   --  35.2*   CR 2.17* 2.02* 1.74* 1.57*  --   --  1.28*   GFRESTIMATED 35* 38* 45* 51*  --   --  66    < > = values in this interval not displayed.     Hepatic Studies:   Recent Labs   Lab Test 03/29/24  1002 03/29/24  0419 03/28/24  2205 03/28/24  1604 03/28/24  1018 03/28/24  0404   BILITOTAL 1.6* 2.1* 2.6* 2.9* 2.5* 2.6*   ALKPHOS 44 49 34* 31* 31* 32*   ALBUMIN 2.2* 2.2* 2.1* 2.1* 2.0* 2.1*   * 181* 171* 148* 140* 126*   ALT 51 54 52 50 56 55                 Imaging:     3/28/2024 XR abdomen   IMPRESSION: Feeding tube tip in the distal duodenum/at the ligament of Treitz.    3/28/2024 CXR:  Impression:   1.  No significant change of Impella device, central ECMO cannulae, and endotracheal tube position. Additional support devices are stable.  2.  Stable right greater than left mixed interstitial and airspace opacities.    CXR (3/26/2024)  Impression:   1.  Endotracheal tube tip terminates 4.5 cm above the cesia.  Additional support devices are stable.  2.  Stable enlarged cardiac silhouette with slight decreased diffuse  mixed interstitial and airspace opacities, favoring pulmonary edema  and atelectasis.      CT Chest/Abd/Pelvis (3/23/24)  IMPRESSION:  1.  Bilateral central perihilar bronchovascular groundglass and  consolidative opacities with bilateral basilar consolidations,  concerning for aspiration and/or infection.  2.  Small bilateral pleural effusions.  3.  Circumferential bladder wall thickening, which may be accentuated  by underdistention. Consider correlation with urinalysis and urinary  symptoms.  4.  Ectatic ascending thoracic aorta measuring up to 4.0 cm.  5.  Enlarged pulmonary artery measuring 3.5 cm, which can be seen in  pulmonary hypertension.     --------------------------------------------  FERNANDEZ ([facility] 3/21/24)  CONCLUSIONS   Left ventricular  ejection fraction is visually estimated at 30%.   Inferior and lateral akinesis   Severe posterior leaflet restriction with overriding anterior leaflet.   Torrential mitral regurgitation.   Vena contracta 1.04 cm.   PISA radius 2.0 cm.      CT Angio chest w/ contrast (OSH- 3/17/24)  IMPRESSION:   1. No pulmonary embolism.   2. Small bilateral pleural effusions, left greater than right.   3. Bibasilar volume loss and consolidation with patchy peripheral areas of interstitial and groundglass opacity, correlate clinically for acute infectious/inflammatory etiologies.   4. Trace pericardial effusion.   5. Ectasia of the ascending aorta at 4 cm in caliber.   6. Main pulmonary artery is mildly dilated which can be seen in pulmonary arterial hypertension.

## 2024-03-29 NOTE — PROGRESS NOTES
CLINICAL NUTRITION SERVICES - BRIEF NOTE      Reason for RD note: Advancing TFs to goal rate    New Findings/Chart Review:  -Trophic feeds started yesterday  -Pressor requirements decreasing; CVICU team ok with advancing to goal rate    Interventions:  -GOAL TF: TwoCal HN @ 45 mL/hr (1080 mL/day) + 6 pkts prosource TF20 to provide 2640 kcals (25 kcals/kg/day), 211 g PRO (2 g/kg/day), 756 mL H2O, 237 g CHO and 5 g Fiber daily.     --Advance to 30 ml/hr now, then advance by 15 ml/hr q8h to goal rate    Nutrition will follow per protocol or sooner if consulted.    Tracy Tijerina, MS, RD, LD  Available on mSpoke  No longer available by pager

## 2024-03-29 NOTE — PROGRESS NOTES
[facility]    ECLS Shift Summary:     ECMO Equipment:  Console Serial Number: [id number]  Circuit Lot Number: [id number]  Oxygenator Lot Number: [id number]    Circuit Assessment: Fibrin, Clot  Fibrin Location: connectors, oxy outlet  Clot Location: corners of oxy    Arterial ECMO Cannula: 17 Fr in the Right Femoral Artery  Arterial ECMO Cannula: 22 Fr EOPA in the Aorta  Venous ECMO Cannula: 25 Fr in the Right Femoral Vein  Venous ECMO Cannula: 28 Fr in the Right Atrium  Distal Perfusion Cannula: 8 Fr. In the Right Superficial Femoral Artery.     ECMO Cannula Venous Right femoral vein-Site Assessment: WDL, Sutured, Secure  ECMO Cannula Arterial Right femoral artery-Site Assessment: WDL, Sutured, Secure  Distal Perfusion Catheter Right superficial femoral artery-Site Assessment: Secure, Sutured, WDL  ECMO Cannula Arterial Aorta-Site Assessment: WDL, Sutured, Secure  ECMO Cannula Venous Right atrium-Site Assessment: WDL, Secure, Sutured      Patient remains on Other (Comment) (VV-AA) ECMO, all equipment is functioning and alarms are appropriately set. RPM's: 3500 with Blood Flow (Circuit) LPM  Av.4 LPM  Min: 5.29 LPM  Max: 5.47 LPM L/min. Sweep is at 4 LPM and 70 %. Extremities are cool.     Significant Shift Events: Pt off of all sedation, minimal response to stimulation (cough and overbreathing vent).    Vent settings:  Vent Mode: CMV/AC  (Continuous Mandatory Ventilation/ Assist Control)  FiO2 (%): 50 %  Resp Rate (Set): 12 breaths/min  Tidal Volume (Set, mL): 350 mL  PEEP (cm H2O): 5 cmH2O  Resp: 12      Anticoagulation:  Dose (units/hr) HEParin: 600 Units/hr  Rate (mL/hr) HEParin: 6 mL/hr  Concentration HEParin: 100 Units/mL     Most recent: ACT  (seconds): 166 seconds    Urine output is oliguric.  Blood loss was minimal. Product given included none.     Intake/Output Summary (Last 24 hours) at 3/29/2024 0610  Last data filed at 3/29/2024 0600  Gross per 24 hour    Intake 5872.48 ml   Output 1999 ml   Net 3873.48 ml       Labs:  Recent Labs   Lab 03/29/24  0458 03/29/24  0419 03/29/24  0209 03/29/24  0002 03/28/24  2206   PH  --  7.36 7.33* 7.32* 7.34*   PCO2  --  43 47* 49* 47*   PO2  --  125* 249* 59* 61*   HCO3  --  25 25 25 26   O2PER 100 50  50  70 50 50 50       Lab Results   Component Value Date    HGB 8.1 (L) 03/29/2024    PHGB 40 (H) 03/28/2024    PLT 75 (L) 03/29/2024    FIBR 508 (H) 03/29/2024    INR 1.71 (H) 03/29/2024    PTT 44 (H) 03/29/2024    DD 4.15 (H) 03/29/2024    ANTCH 53 (L) 03/28/2024       Plan is to continue to rest on VV-AA ECMO.    Eugenio Leon RN  ECMO Specialist  3/29/2024 6:10 AM

## 2024-03-29 NOTE — PROGRESS NOTES
[facility]    ECLS Shift Summary:     ECMO Equipment:  Console Serial Number: [id number]  Circuit Lot Number: [id number]  Oxygenator Lot Number: [id number]    Circuit Assessment: Fibrin, Clot  Fibrin Location: connectors, oxy outlet  Clot Location: corner of oxy    Arterial ECMO Cannula: 17 Fr in the Right Femoral Artery  Arterial ECMO Cannula: 22 Fr EOPA in the Aorta  Venous ECMO Cannula: 25 Fr in the Right Femoral Vein  Venous ECMO Cannula: 28 Fr in the Right Atrium  Distal Perfusion Cannula: 8 Fr. In the Right Superficial Femoral Artery.           Patient remains on Other (Comment) (VV-AA) ECMO, all equipment is functioning and alarms are appropriately set. RPM's: 3500 with Blood Flow (Circuit) LPM  Av.5 LPM  Min: 5.29 LPM  Max: 5.72 LPM L/min. Sweep is at (S) 6 LPM and 70 %. Extremities are warm.     Significant Shift Events: CRRT initiated    Vent settings:  Vent Mode: CMV/AC  (Continuous Mandatory Ventilation/ Assist Control)  FiO2 (%): 50 %  Resp Rate (Set): 12 breaths/min  Tidal Volume (Set, mL): 350 mL  PEEP (cm H2O): 5 cmH2O  Resp: 12      Anticoagulation:  Dose (units/hr) HEParin: 450 Units/hr  Rate (mL/hr) HEParin: 4.5 mL/hr  Concentration HEParin: 100 Units/mL     Dose (mg/kg/hr) Bivalirudin: 0 mg/kg/hr  Rate (mL/hr) Bivalirudin: 0 mL/hr  Concentration Bivalirudin: 1 mg/mL  Most recent: ACT  (seconds): 155 seconds    Urine output is Pink (purple).  Blood loss was minimal. Product was not given this shift.     Intake/Output Summary (Last 24 hours) at 3/29/2024 1434  Last data filed at 3/29/2024 1400  Gross per 24 hour   Intake 4263.16 ml   Output 1886 ml   Net 2377.16 ml       Labs:  Recent Labs   Lab 24  1400 24  1210 24  1002 24  0758   PH 7.39 7.32* 7.33* 7.35   PCO2 39 46* 46* 43   PO2 130* 89 131* 72*   HCO3 24 23 24 24   O2PER 50 50 50 50       Lab Results   Component Value Date    HGB 7.5 (L) 2024    PHGB 90 (H)  03/29/2024    PLT 73 (L) 03/29/2024    FIBR 508 (H) 03/29/2024    INR 1.88 (H) 03/29/2024    PTT 45 (H) 03/29/2024    DD 4.15 (H) 03/29/2024    ANTCH 49 (L) 03/29/2024       Plan is to OR for chest washout and potential removal of central cannulas.    China Samayoa, RT  ECMO Specialist  3/29/2024 2:34 PM

## 2024-03-29 NOTE — PROGRESS NOTES
[facility]    Cardiology Progress Note- Cardiology        Date of Admission:  3/22/2024     Assessment & Plan: HVSL   Giacomo Castro is a 54 year old male with PMH HLD, tobacco use who presented to [facility] on 3/12 with late presentation inferior STEMI s/p cath with unsuccessful RCA PCI or thrombectomy, complicated by biventricular failure, severe MR 2/2 infarction of postero-medial papillary muscle, VT x2 episodes requiring cardioversion, pneumonia. Transferred to [facility] for possible mitral valve surgery. Patient was decompensating while on 3 inopressors thus was cannulated for ECMO with IABP 3/23.    Changes today  -Ecmo flow at 5.4  -To OR to close chest this pm  - Cts this pm  -Added hemolysis labs  -Increased impella to P5    Neurology   # Acute metabolic encephalopathy 2/2 sedation  - continue versed and fentanyl gtt   - rass goal -2 to -3  - add gabapentin 100 mg TID  - CTH with prior pontine/cerebellum strokes of unknown chronicity    Cardiovascular  # Late presenting Inferior STEMI c/b cardiogenic shock and recurrent VT  # Biventricular failure  # Papillary muscle infarction leading to restricted posterior mitral valve leaflet and severe MR  Patient with symptoms starting on 3/10, but presented to [facility] on 3/12. Found to have inferior STEMI, RCA lesion was unable to be revascularized. Course complicated by cardiogenic shock initially treated with dobutamine, but complicated by VT requiring shock in cath lab and again on 3/20, switched to milrinone, vasopressin, levo. Presenting for [facility] for advanced therapy consideration. 3/22 developed fever, leukocytosis requiring more pressors concerning for septic shock, discussed in ID section.    Date CVP PAP PCWP CO/CI  SVR Inotrope Vasodilator MCS   3/22/24  ([facility]) 18 55/28/37 32 4.15/1.77 1098 Milrinone, NE, vasopressin none none   3/23/24 17 62/39/46  4.5/1.9 970 Epinephrine, NE, vasopressin  none none     ECMO :  Mode: Other (Comment) (VV-AA)   Pump Type: Cardiohelp  RPM's: 3500  Blood Flow (Circuit) LPM: 5.6 LPM  Sweep LPM: 6 LPM  Sweep FiO2   %: 70 %  Venous Pressure  mmH mmHg  Arterial Pressure  mmH mmHg  Internal Pressure mmH mmHg  Pressure Change mmH mmHg  - Impella 5.5 at P5  - inotropes: epinephrine, NE, vasopressin-- wean epi first; added angiotensin and methylene blue  - diuretics: none  - antiplatelet/anticoag:   - hold ASA  - Plavix held starting 3/21  - Heparin gtt straight rate; add FFP given low ATIII  - AT III low  - hold statin in setting of worsening LFTs, will consider resume once LFT normalized  - CVTS consult for mitral valve regurgitation  - not transplant/LVAD candidate due to alcohol, smoking    # recurrent VT storm  Had VT storm requiring shock in cath lab. Recurrent requiring shock again on 3/21. Amiodarone increased 0.5->1 for multiple ectopy 3/23  - amiodarone gtt 0.5 mg/hr    RHC 3/22  RA: 17/19/16 mmHg  RV: 55/15 mmHg  PA: 55/28/37 mmHg  PCWP: 35/50/32 mmHg. Wedge saturation: 99.7%  PA sat: 52.3%  Ayleen CO/CI:4.2/1.8  Thermo CO/CI: 4.2/1.8 Right sided filling pressures are moderately elevated. Left sided filling pressures are severely elevated. Moderately elevated pulmonary artery hypertension. Reduced cardiac output level.     FERNANDEZ: 3/21  There is coaptation failure of mitral leaflets. Anterior leaflet overrides due to severely restricted posterior leaflet and chordal tethering.  Severe posteriorly directed mitral regurgitation (secondary MR).  Jemal class IIIb.  Pulmonary flow reversal noted in pulmonary veins.  Left ventricular function is decreased. The ejection fraction is 10-15%  (severely reduced).  Global right ventricular function is severely reduced.  The right ventricle is normal size.    Cath 3/12  1. Delayed presentation acute inferior STEMI.  Persistent symptoms began [facility] 3/10.   2. Unsuccessful PCI to the mid-RCA.  Thrombosed  vessel, unresponsive to balloon angioplasty and mechanical aspiration thrombectomy.     Pulmonary  # Acute Hypoxic Respiratory Failiure 2/2 pulmonary edema, HAP  Vent Mode: CMV/AC  (Continuous Mandatory Ventilation/ Assist Control)  FiO2 (%): 40 %  Resp Rate (Set): 12 breaths/min  Tidal Volume (Set, mL): 350 mL  PEEP (cm H2O): 5 cmH2O  Resp: 12    Intubated on 3/21 following cardioversion.   - currently on ventilator as above, good oxygenation and ventilation        Gastrointestinal, Nutrition  #Transaminitis   LFTs <100 today at [facility], on admission here in the high 300's. Likely congestive hepatopathy in the setting of acute HF from severe MR  - treatment of cardiogenic shock as above    # Nutrition  - start TF, TwoCal 10 ml/hr on 3/24, appreciate nutrition assistance    # Constipation  - scheduled senna, miralax while on fentanyl gtt  - bisacodyl    # Stress ulcer prophylaxis  - PPI IV for prophylaxis     Renal, Electrolytes    # Mild JEANE  Cr 1.21, baseline <1  - currently Cr returned to normal    # Hypernatremia  -  ml q4h     # Metabolic alkalosis  - s/p diamox    # Hypophosphatemia  - replete as needed     Infectious Disease  # Possible aspiration HAP  CT chest with GGO 3/17 concerning for PNA. Patient was on CTX/azithro 3/18-3/22, azithro was stopped after VT. Transitioned to Vanc and meropenem on 3/22 for HAP. Patient has been febrile and decompensating requiring more pressors. Possible that this is from myocardial infarction however could not rule out infection. CT chest/abdomen/pelvis with bilateral ground glass and consolidative opacities concerning for aspiration and/or infection.  - ID following, appreciate recs  - antibiotics   - Vancomycin: 3/22-3/23   - Meropenem: 3/22, 3/23-3/25   - Cefepime: 3/23, 3/25-TBD  - Infectious workup   - 3/18 urine legionella, S pneumo: negative   - 3/18 sputum: oropharyngeal monica   - 3/22 MRSA nares: negative   - 3/22 blood culture: NGTD   - 3/23 urine  "culture: NGTD   - 3/24 respiratory panel: pending    Hematology  Heparin/bival as discussed above     Endocrinology  - glucose goal 120-180  - medium insulin sliding scale          Diet: Adult Formula Drip Feeding: Continuous TwoCal HN; Nasojejunal; Goal Rate: 45; mL/hr; advance to 30 ml/hr now, then advance by 15 ml/hr q8h to goal rate; Do not advance tube feeding rate unless K+ is = or > 3.0, Mg++ is = or > 1.5, and Phos is = or...    DVT Prophylaxis: heparin gtt  Chua Catheter: PRESENT, indication: ICU only: hourly urine output needed for patient care, ICU only: hourly urine output needed for patient care  Cardiac Monitoring: ACTIVE order. Indication: Open heart surgery (72 hours)  Code Status: Full Code          Clinically Significant Risk Factors        # Hyperkalemia: Highest K = 5.5 mmol/L in last 2 days, will monitor as appropriate  # Hypernatremia: Highest Na = 146 mmol/L in last 2 days, will monitor as appropriate      # Hypoalbuminemia: Lowest albumin = 1.8 g/dL at 3/27/2024  5:39 PM, will monitor as appropriate    # Coagulation Defect: INR = 1.86 (Ref range: 0.85 - 1.15) and/or PTT = 42 Seconds (Ref range: 22 - 38 Seconds), will monitor for bleeding  # Thrombocytopenia: Lowest platelets = 67 in last 2 days, will monitor for bleeding  # Acute Kidney Injury, unspecified: based on a >150% or 0.3 mg/dL increase in last creatinine compared to past 90 day average, will monitor renal function   # Chronic heart failure with reduced ejection fraction: last echo with EF <40%       # Obesity: Estimated body mass index is 34.58 kg/m  as calculated from the following:    Height as of this encounter: 1.905 m (6' 3\").    Weight as of this encounter: 125.5 kg (276 lb 10.8 oz).   # Moderate Malnutrition: based on nutrition assessment    # Financial/Environmental Concerns: none   # History of CABG: noted on surgical history       Patient care discussed with Dr. Rodolfo Aviles MD  PGY-2 Internal " Medicine  Cardiology Service  [facility]  ______________________________________________________________________    Interval History  Overnight, a line at radial was not working. Possible clots distally? A line at ulnar artery was placed. patient received diamox for diuresis and alkalosis (bicarb 32).  ml q6h for hypernatremia (Na 146).     Physical Exam   Vital Signs: Temp: 98.6  F (37  C) Temp src: Bladder   Pulse: 60   Resp: 12 SpO2: 98 % O2 Device: Mechanical Ventilator    Weight: 276 lbs 10.84 oz    GENERAL: Intubated, sedated. NAD.  HEENT: No icterus. ETT in place, NG tube in place.  CARDIOVASCULAR: tachycardic. Normal S1 and S2.  RESP: Coarse bilaterally. Mechanical ventilation.    GI Soft, bowel sounds hypoactive but present.  GENITOURINARY: Chua in place.  EXTREMITIES: no pitting edema  NEURO: Sedated and intubated.  INTEGUMENTARY: No rashes    Medical Decision Making       Please see A&P for additional details of medical decision making.      Data   ------------------------- PAST 24 HR DATA REVIEWED -----------------------------------------------

## 2024-03-29 NOTE — PROGRESS NOTES
Nephrology Progress Note  03/29/2024        Giacomo Castro is a 54 year old who presented to [facility] on 3/12 with complaints of SOB, abdominal pain and diarrhea. Blood pressures were in the 130's, initial scans revealed no clear abdominal or GI etiology. When an EKG was obtained it was consistent with an inf lateral MI and a STEMI alert was called. Further history revealed that he had been having pain for about 2 days. He is a 1 pk a day smoker. At presentation cr was 0.89. Initial ECHO showed an EF of 24%. Intial attempts at revascularization were unsuccessful (PCI to mid RCA). His initial course resulted in several VT arrests requiring cardioversion. A maddi reealed severe posterior leaflet restriction with severe mitral regurgitation. He continued to develop worsening decompensated heart failure and hypoxia. Ultimately transferred to [facility] on 3/22 for further management.      Since arrive here he was initiallly on ionotropic support with IABP. On 3/23 due to ongoing hypotension he was placed on VA ECMO. On 3/27 he was taken to the OR for MVR, CABG and impella. Upon opening chest he was found to have an inferior LV wall rupture and underwent emergent patch repair as well.     Interval History :   Nursing and provider notes from last 24 hours reviewed.  Continues on ECMO, three pressors (AGII, norepi and vasopressin). Potassium remains at 5.4. He was initiated on lokelma yesterday. Of note he is also on a small amount of insulin ggt at this time. UOP has precipitously dropped off and he only had 350 ml's yesterday. Chest tube output was a liter. Cr continues to steadily trend up. Obligatory intake is about 4-5 L a day predicted.       Assessment & Recommendations:   JEANE likely ATN in the setting of cardiogenic shock: Initiate CRRT today. This will correct the potassium and hold it. No other acute eletrolyte abnormalities. May stop the lokelma. Consent is on the chart.     2. ABX: note that persons >120 kg  "on CRRT are sig less likely to achieve drug target. Not infrequently Vanco is required BID. Pharmacy to follow but I often check levels at some midpoint between planned doses once initiated on CRRT to assure we will have adequate drug to cover the full 24 hours.             Time spent: 35 minutes on this date of encounter for chart review, physical exam, medical decision making and co-ordination of care.    Recommendations were communicated to primary team via verbal communication.     Estephanie Webster MD MD, MS, FNKF        Review of Systems:   I reviewed the following systems:  Intubated and sedated    Physical Exam:   I/O last 3 completed shifts:  In: 6400.68 [I.V.:3700.68; NG/GT:600; IV Piggyback:1000]  Out: 1999 [Urine:559; Emesis/NG output:150; Stool:150; Chest Tube:1140]   /53   Pulse 80   Temp 98.6  F (37  C)   Resp 12   Ht 1.905 m (6' 3\")   Wt 125.5 kg (276 lb 10.8 oz)   SpO2 100%   BMI 34.58 kg/m       Intubated and sedated  Chest is open  VA eCMO in place  Sig edema/anasarca    Labs:   All labs reviewed by me  Electrolytes/Renal -   Recent Labs   Lab Test 03/29/24  1212 03/29/24  1209 03/29/24  1002 03/29/24  0423 03/29/24 0419   NA  --  143 146*  --  142   POTASSIUM  --  5.2 5.1  --  5.4*   CHLORIDE  --  112* 112*  --  110*   CO2  --  21* 22  --  22   BUN  --  54.0* 53.1*  --  48.8*   CR  --  2.22* 2.17*  --  2.02*   * 154* 142*  151*   < > 146*  152*   GRICEL  --  7.8* 8.0*  --  7.9*   MAG  --  2.7* 2.7*  --  2.6*   PHOS  --  4.8* 4.6*  --  4.8*    < > = values in this interval not displayed.       CBC -   Recent Labs   Lab Test 03/29/24  1002 03/29/24  0419 03/28/24 2205   WBC 30.0* 28.1* 27.7*   HGB 7.5* 8.1* 8.3*   PLT 73* 75* 80*       LFTs -   Recent Labs   Lab Test 03/29/24  1209 03/29/24  1002 03/29/24  0419 03/28/24 2205   ALKPHOS  --  44 49 34*   BILITOTAL  --  1.6* 2.1* 2.6*   ALT  --  51 54 52   AST  --  190* 181* 171*   PROTTOTAL  --  4.2* 4.1* 4.1*   ALBUMIN 2.0* " 2.2* 2.2* 2.1*       Iron Panel - No lab results found.        Current Medications:   acetaminophen  975 mg Oral or Feeding Tube Q8H    artificial tears   Both Eyes Q8H    aspirin  81 mg Oral or NG Tube Daily    ceFEPIme  2 g Intravenous Q8H    chlorhexidine  15 mL Swish & Spit BID    gabapentin  100 mg Oral TID    hydrocortisone sodium succinate PF  100 mg Intravenous Q8H    lidocaine (viscous)  5 mL Topical Once    metroNIDAZOLE  500 mg Intravenous Q8H    micafungin  150 mg Intravenous Q24H    multivitamins w/minerals  15 mL Per Feeding Tube Daily    pantoprazole  40 mg Intravenous Daily with breakfast    polyethylene glycol  17 g Oral or Feeding Tube Daily    protein modular  2 packet Per Feeding Tube TID    sennosides  8.6 mg Oral or Feeding Tube BID    sodium chloride (PF)  3 mL Intracatheter Q8H    sodium zirconium cyclosilicate  10 g Oral or Feeding Tube TID    Followed by    [START ON 3/31/2024] sodium zirconium cyclosilicate  10 g Oral Daily    [START ON 3/30/2024] vancomycin  2,000 mg (central catheter) Intravenous Q24H      amiodarone 0.5 mg/min (03/29/24 1300)    angiotensin II (GIAPREZA) ADULT infusion 2.5mg/250 mL NS 10 ng/kg/min (03/29/24 1300)    dextrose      CRRT replacement solution 16 mL/kg/hr (03/29/24 1139)    EPINEPHrine Stopped (03/28/24 1430)    fentaNYL 50 mcg/hr (03/29/24 1300)    heparin 2 unit/mL in 0.9% NaCl 3 mL/hr (03/29/24 1300)    HEParin 450 Units/hr (03/29/24 1300)    insulin regular 1 Units/hr (03/29/24 1300)    propofol Stopped (03/28/24 1707)    And    - MEDICATION INSTRUCTIONS -      - MEDICATION INSTRUCTIONS -      norepinephrine 0.04 mcg/kg/min (03/29/24 1320)    CRRT replacement solution 200 mL/hr at 03/29/24 1141    CRRT replacement solution 8 mL/kg/hr (03/29/24 1139)    BETA BLOCKER NOT PRESCRIBED      sodium bicarbonate 25 mEq in 1000 mL D5W LEFT Ventricular IMPELLA cardiac device purge infusion 9.6 mL/hr (03/29/24 1300)    vasopressin 2 Units/hr (03/29/24 1300)      Estephanie Webster MD

## 2024-03-29 NOTE — PROGRESS NOTES
CV ICU PROGRESS NOTE  March 29, 2024   Giacomo Castro  [id number]  Admitted: 3/22/2024  1:29 AM      ASSESSMENT: Giacomo Castro is a 54 year old male w/ hx of HLD, alcohol abuse, tobacco use who initially presented to [facility] on 3/12 with inferior STEMI complicated by postero-medial papillary muscle rupture s/p surgical mitral valve replacement found to have necrotic LV free wall impending rupture intra-op and had inferior LV wall patch repair on 3/27 by Dr. Mulvihill.     Prior to surgical intervention decompensated, was peripherally cannulated for VA ECMO and IABP (IABP now removed). Introp was also cannulated for central VA ECMO to reduce cardiac blood flow to heal patch, now on VV-AA ECMO. Impella was directly placed intraoperatively. Arrived in CVICU on mechanical cardiovascular support inlcuding VV-AA ECMO, impella with open chest.       Plan today:    - RTOR tomorrow  - start CRRT per nephrology  - transitioned to low intensity heparin  - RASS -4 to -5  - Holding off on CT head for now  - down titrating pressors as tolerated  - increase tube feeds to goal  - Goal I=Os  - Free water flushed 30ml q4h    Neuro/ pain/ sedation:  - Monitor neurological status. Notify the MD for any acute changes in exam.  #Acute postoperative pain  # Acute metabolic encephalopathy 2/2 sedation  # Sedation   - continue prop, fentanyl gtt   - rass goal -4 to -5 with open chest,   - gabapentin 100 mg TID  - Scheduled: Tylenol  - PRN: Tylenol, oxycodone, Dilaudid       Pulmonary care:   #Mechanical ventilation  # Acute Hypoxic Respiratory Failure  # Pulmonary edema   Vent Mode: CMV/AC  (Continuous Mandatory Ventilation/ Assist Control)  FiO2 (%): 80 %  Resp Rate (Set): 12 breaths/min  Tidal Volume (Set, mL): 350 mL  PEEP (cm H2O): 5 cmH2O  Resp: 13  - Goal SpO2 > 92%  - Encourage IS q15-30 minutes when awake.  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 and severe MR  # S/p VA ECMO   # Inferior LV wall rupture s/p patch repair 3/27   # S/P MVR, central ECMO cannulation and impella placement 3/27/24  # Open chest    - Goal MAP >65, SBP <140, monitor hemodynamics  - Cards II following, appreciate recs  - Hemodynamic gtts: , NE, vasopressin, angiotensin   - diuretics: bumex 4 mg IV BID held    - antiplatelet/anticoag:   - continue ASA  - Low intensity heparin  - Continuing Amiodarone infusion for VT   - Hold Statin   - Hold BB   - ASA  - Overall plan to keep full decompression of LV via ECMO, impella support for LV patch protection   - V-paced (V-wires x 2)   - Per CVTS: If he has adequate RV function with this support, we hope that he can wean from ECMO and then proceed with a slow wean of the Impella.      GI care / Nutrition:   # NPO status   # Transaminitis in setting of CHF   # Constipation   - NPO   - PPI  - Bowel regimen: MiraLAX, senna  - NJ placed, advancing to goal  - KUB pending      Renal / Fluids / Electrolytes:   # Hypernatremia  # Hypophosphatemia  # lactic acidosis   # JEANE  BL creat appears to be ~ < 1.0   - Strict I/O, daily weights, avoid/limit nephrotoxins  - Replete lytes PRN per protocol  - FWF 30 ml q4h   - Lactate q4h  - CRRT per nephrologyy    #Stress hyperglycemia  #Stress Dose Steroids   - Insulin gtt  - Goal BG <180 for optimal healing  - Stress dose steroids: hydrocortisone 100 mg q8h     ID / Antibiotics:  #Stress induced leukocytosis  # Aspiration, HAP   # Open Chest  # Perioperative Antimicrobials   - Monitor fever curve, WBC, and inflammatory markers as appropriate  - ID following, appreciate recs  - antibiotics              - Vancomycin: 3/22-3/23              - Meropenem: 3/22, 3/23-3/25              - Cefepime: 3/23, 3/25-TBD  - Plan for open chest prophylaxis (Vanco, Cefepime, Annika)      -  Infectious workup              - 3/18 urine legionella, S pneumo: negative              - 3/18 sputum: oropharyngeal monica              - 3/22 MRSA nares: negative              - 3/22 blood culture: NGTD              - 3/23 urine culture: NGTD              - 3/24 respiratory panel: pending     Heme:     # Acute blood loss anemia  # Post CPB thrombocytopenia  - Trend CBC   - Hgb goal > 7.0-8      MSK / Skin:  #Sternotomy  #Surgical Incision  # ECMO sites   # IABP site   # Open chest   - Sternal precautions, postop incision management protocol  - PT/OT/CR     Prophylaxis:     - Mechanical DVT ppx  - Chemical DVT ppx: low intensity heparin  - PPI     Lines / Tubes / Drains:  - ETT  - CTs x5  - Chua  - Impella  - VA ECMO cannulas x 4 (peripheral fem and central sites)   - R ulnar art line, RIJ cordbryce, LIJ mela, PIV   - NJ     Disposition:  - CVICU    ICU Checklist  F - Feeding:   A - Analgesia:   S - Sedation:   T - Thromboembolic prophylaxis:      H - Head of bed elevated  U - Ulcer prophylaxis:  G - Glycemic control  S - SBT     B - Bowel regimen  I - Indwelling catheter  D - De-escalation of antibiotics    Patient seen, findings and plan discussed with CVICU staff.    Michael Schulz MD  Anesthesiology PGY-2    ====================================    TODAY'S PROGRESS  SUBJECTIVE  Remains sedated and intubated    OBJECTIVE  1. VITAL SIGNS  Temp:  [98.4  F (36.9  C)-99  F (37.2  C)] 99  F (37.2  C)  Pulse:  [76-87] 80  Resp:  [12-30] 12  MAP:  [59 mmHg-131 mmHg] 75 mmHg  Arterial Line BP: ()/(48-96) 77/73  FiO2 (%):  [50 %-70 %] 50 %  SpO2:  [86 %-100 %] 100 %  Vent Mode: CMV/AC  (Continuous Mandatory Ventilation/ Assist Control)  FiO2 (%): 50 %  Resp Rate (Set): 12 breaths/min  Tidal Volume (Set, mL): 350 mL  PEEP (cm H2O): 5 cmH2O  Resp: 12      2. INTAKE/ OUTPUT  I/O last 3 completed shifts:  In: 7961.59 [I.V.:3726.59; NG/GT:545; IV Piggyback:2750]  Out: 2392 [Urine:657; Emesis/NG output:350; Stool:400; Chest  Tube:985]    3. PHYSICAL EXAMINATION  General: sedated  Neuro: sedated  Resp: intubated, ventilated. Symmetric chest rise.   CV: S1, S2, V paced, no m/r/g   Abdomen: Soft, non-distended, non-tender  Incisions: c/d/I. Cannula sites in groin and chest. Open chest is soft, flush with chest wall.   Extremities: warm. + 2 edema. Brisk capillary refill in toes.   CT: To suction, serosang output, no airleak, crepitus    4. INVESTIGATIONS  Arterial Blood Gases   Recent Labs   Lab 03/29/24  0603 03/29/24  0419 03/29/24  0209 03/29/24  0002   PH 7.37 7.36 7.33* 7.32*   PCO2 42 43 47* 49*   PO2 152* 125* 249* 59*   HCO3 24 25 25 25     Complete Blood Count   Recent Labs   Lab 03/29/24  0419 03/28/24  2205 03/28/24  1558 03/28/24  1018   WBC 28.1* 27.7* 22.1* 22.6*   HGB 8.1* 8.3* 8.4* 7.5*   PLT 75* 80* 75* 105*     Basic Metabolic Panel  Recent Labs   Lab 03/29/24  0602 03/29/24  0423 03/29/24  0419 03/28/24  2206 03/28/24  2205 03/28/24  1732 03/28/24  1604 03/28/24  1421 03/28/24  1357 03/28/24  1021 03/28/24  1018   NA  --   --  142  --  144  --  143  --   --   --  145   POTASSIUM  --   --  5.4*  --  5.1  --  4.8  --  4.9   < > 5.5*   CHLORIDE  --   --  110*  --  112*  --  110*  --   --   --  111*   CO2  --   --  22  --  24  --  24  --   --   --  25   BUN  --   --  48.8*  --  41.8*  --  38.6*  --   --   --  35.2*   CR  --   --  2.02*  --  1.74*  --  1.57*  --   --   --  1.28*   * 150* 146*  152*   < > 139*   < > 175*   < >  --    < > 160*    < > = values in this interval not displayed.     Liver Function Tests  Recent Labs   Lab 03/29/24  0419 03/28/24  2205 03/28/24  1604 03/28/24  1558 03/28/24  1018   * 171* 148*  --  140*   ALT 54 52 50  --  56   ALKPHOS 49 34* 31*  --  31*   BILITOTAL 2.1* 2.6* 2.9*  --  2.5*   ALBUMIN 2.2* 2.1* 2.1*  --  2.0*   INR 1.71* 1.67*  --  1.65* 1.75*     Pancreatic Enzymes  No lab results found in last 7 days.  Coagulation Profile  Recent Labs   Lab 03/29/24  6229  "03/28/24  2205 03/28/24  1558 03/28/24  1018   INR 1.71* 1.67* 1.65* 1.75*   PTT 44* 47* 49* 40*     Lactate  Invalid input(s): \"LACTATE\"    5. RADIOLOGY  Recent Results (from the past 24 hour(s))   XR Chest Port 1 View    Narrative    EXAM: XR CHEST PORT 1 VIEW  3/27/2024 6:55 PM     HISTORY: s/p MVR, impella insertion, and OGT placement       COMPARISON: Radiograph 3/26/2024    TECHNIQUE: AP view of the chest    FINDINGS:   Devices, lines, tubes: Impella device in appropriate positioning.  Central ECMO cannulae in place. Left IJ [place]-Richard catheter tip  projects over the right interlobar pulmonary artery. Right IJ central  venous catheter tip projects over the mid SVC. Endotracheal tube tip  projects 4.8 cm superior to the cesia. Feeding tube tip is coiled in  the stomach with tip projecting over the lower esophagus. Packing  material overlying the mediastinum. Mediastinal drains and bibasilar  chest tubes in place. Gastric tube tip and side-port project over the  stomach.    The trachea is midline. Stable cardiomediastinal silhouette. Slightly  improved right greater than left bilateral mixed interstitial and  airspace opacities. No appreciable pneumothorax. No acute osseous  abnormality.        Impression    IMPRESSION:   1. Impella device tip projects over the left ventricle.  2. Central ECMO cannulae in place.  3. Left IJ [place]-Rcihard catheter tip projects over the right interlobar  pulmonary artery, consider retraction.  4. Endotracheal tube tip projects 4.8 cm superior to the cesia.  5. Feeding tube coiled in the stomach with tip projecting over the  lower esophagus, recommend positioning.  6. Improved right greater than left diffuse mixed pulmonary opacities.    I have personally reviewed the examination and initial interpretation  and I agree with the findings.    JEF HERNANDEZ MD         SYSTEM ID:  [id number]   XR Chest Port 1 View    Impression    RESIDENT PRELIMINARY INTERPRETATION  IMPRESSION:   1. " Impella device tip projects over the left ventricle.  2. Central ECMO cannulae in place.  3. Left IJ [place]-Richard catheter tip projects over distal right main  pulmonary artery  4. Endotracheal tube tip projects over the midthoracic   5. Removal of feeding tube. Stable orogastric tube.  6. Similar left greater than right diffuse mixed pulmonary opacities.     XR Chest Port 1 View    Impression    RESIDENT PRELIMINARY INTERPRETATION  Impression:   1.  No significant change of Impella device, central ECMO cannulae,  and endotracheal tube position. Additional support devices are stable.  2.  Stable right greater than left mixed interstitial and airspace  opacities.

## 2024-03-30 NOTE — PROGRESS NOTES
Neuro:  Spontaneous waking, Not following commands. Pupils Equal reactive.    Cardiac:  ECMO flow mid 5, sweep 5, O2 65%. ACT goal now 160-180.  Impella unchanged.   Vpaced 60    Respiratory:  /12/5/40%    GI:   TF at goal 45ml/hr     :   Chua oliguric. CRRT /hr    Skin:  Open chest    Other:  2 RBCs 1 Platelet given

## 2024-03-30 NOTE — OP NOTE
Cardiothoracic Surgery - OPERATIVE NOTE    Date of Surgery: 3/30/2024    Preop Diagnosis:   STEMI  Mitral insufficiency due to papillary muscle rupture  Cardiogenic shock  Rupture of the inferior wall of the left ventricle    Postop Diagnosis: same    Surgeon: Michael Mulvihill, MD  Assistant(s): Dr. Garg, Dr. Gomez    Procedures:  Decannulation of central VA ECMO  Mediastinal exploration for postoperative hemorrhage  Chest closure  Conversion to peripheral VA ECMO    Findings of Procedure: Stably diminished LV function, stable RV function, LV remained hemostatic on volume loading of the left ventricle. Concern for clot in the left atrium vs stagnant flow. No intracardiac shunts appreciated.    Estimated Blood Loss: minimal  Disposition: To ICU in stable but critical condition    Indication: Giacomo Castro is a 54 year old male with a history of delayed presentation STEMI complicated by LV rupture and severe MR, he is s/p MVR and LV repair with direct aortic impella placement. The chest was left open postoperatively on account of severe coagulopathy and LV/RV dysfunction. He returns to the operating room today to attempt to decannulate central ECMO and for chest closure.    Procedure in Detail: The patient was placed supine on the operative table. His old dressings were removed and his chest was prepped and drapped in the standard sterile fashion. Surgical time-out was then completed.  The patient's median sternotomy incision was reopened. The sternal retractor was placed and the mediastinum was re-explored. There was a moderate amount of clotted blood. All surgical sites were carefully examined. These were all hemostatic.     We elected to proceed with ECMO decannulation. After bolus heparinization we clamped out the circuit and first decannulated the venous Y connectors, and then subsequently the arterial Y connectors. Full ECMO support (R femoral venous drainage, R femoral arterial reinfusion) was then resumed.  On this temporary volume loading of the LV the patch repair remained hemostatic.     The sternum was reapproximated with steel sternal wires. The subcutaneous tissue was irrgated with sterile saline with vancomycin. The fascial and deep dermal layers were closed with running absorbable suture. The skin was closed with glue. The skin was cleaned and dried and a sterile dressing was applied over the incision.    The patient tolerated the procedure wothout complication. He remained hemodynamically stable throughout.    The patient was transported to the ICU is stable but critical condition.    At the end of the case, the sponge, needle and instrument counts were all correct. A debriefing was held at the end of the case.    I was present and scrubbed for the entire procedure.    Michael Mulvihill, MD  3/30/2024 @ 11:04 AM

## 2024-03-30 NOTE — ANESTHESIA PROCEDURE NOTES
Perioperative FERNANDEZ Procedure Note    Staff -        Anesthesiologist:  Rachid Mckay MD       Performed By: anesthesiologist  Preanesthesia Checklist:  Patient identified, IV assessed, risks and benefits discussed, monitors and equipment assessed, procedure being performed at surgeon's request and anesthesia consent obtained.    FERNANDEZ Probe Insertion    Probe Status PRE Insertion: NO obvious damage  Probe type:  Adult 3D  Bite block used:   Soft  Insertion Technique: Easy, no oropharyngeal manipulation  Insertion complications: None obvious  Billing Report:A FERNANDEZ report is NOT being generated.  Probe Status POST Removal: NO obvious damage    FERNANDEZ Report  General Procedure Information  Images for this study have been archived.    Post Intervention Findings  Procedure(s) performed:  Mitral Valve Repair/Replace. Global function:  Unchanged. Regional wall motion: Unchanged. Surgeon(s) notified of all postintervention findings: Yes. Mitral Valve: Valve replaced with bioprosthetic valve. No VIVIENNE present. No paravalvular leak.                Echocardiogram Comments  Echocardiogram comments: FERNANDEZ was placed to assess the function of the mitral valve, A complete exam was not performed. The mitral valve was not completely opening during the cardiac cycle, there was bulging of the leaflets with no clear evidence of flow going through the leaflets.  In the left atrium there was a large homogenous echodense structure consistent encompassing the entire left atrium. This is consistent with clot but without direct visualization I can not say with certanity. All findings were discussed with the surgeons.

## 2024-03-30 NOTE — ANESTHESIA POSTPROCEDURE EVALUATION
Patient: Giacomo Castro    Procedure: Procedure(s):  Decannulation of central VA ecmo, chest closure       Anesthesia Type:  General    Note:  Disposition: Inpatient; ICU            ICU Sign Out: Anesthesiologist/ICU physician sign out WAS performed   Postop Pain Control: Uneventful            Sign Out: Well controlled pain   PONV: No   Neuro/Psych: Uneventful            Sign Out: Acceptable/Baseline neuro status   Airway/Respiratory: Uneventful            Sign Out: AIRWAY IN SITU/Resp. Support               Airway in situ/Resp. Support: ETT                 Reason: Planned Pre-op   CV/Hemodynamics: Uneventful            Sign Out: Acceptable CV status; No obvious hypovolemia; No obvious fluid overload   Other NRE: NONE   DID A NON-ROUTINE EVENT OCCUR? No           Last vitals:  Vitals:    03/30/24 0715 03/30/24 0730 03/30/24 1106   BP:      Pulse: 60 60    Resp:      Temp: 36.8  C (98.2  F)     SpO2: 99%  98%       Electronically Signed By: Rachid Mckay MD  March 30, 2024  12:14 PM

## 2024-03-30 NOTE — PROGRESS NOTES
Shift Summary [id number]    Major Shift Events:      Patient down to OR @ 0730, up to floor at 1100 w/decannulation of central ECMO and chest closure. Upon arrival, patient generally stable. T/o rest of shift, lactic slowly has climbed up, team has been made aware. ECMO circuit continuously reads low SVO2, patient gases suggest oxygenated well, unable to obtain SPO2 d/t low perfusion/pulsatility. Discussed w/team about potential  blood adminstration to help O2 carrying capacity. New clots formed in Post oxy of ECMO, CRRT clotted off around 1700. Heparin restarted on patient, new ACT goal 180-200.     Neuro:  Spontaneous waking, Not following commands. PERRL.    Cardiac:  ECMO flow mid 4, sweep 5.5, O2 100%. ACT goal now 180-200 (last 183).  Impella P5  Vpaced 70    Respiratory:  /14/5/40%  Blood ETT secretions    GI:   TF at goal 45ml/hr     :   Chua oliguric. CRRT I=O    Skin:  Chest closed    Other:    Labs:      Plan: continue to support   For vital signs and complete assessments, please see documentation flowsheets.

## 2024-03-30 NOTE — PROGRESS NOTES
[facility]    ECLS Shift Summary:     ECMO Equipment:  Console Serial Number: [id number]  Circuit Lot Number: [id number]  Oxygenator Lot Number: [id number]    Circuit Assessment: Fibrin, Clot  Fibrin Location: corners of oxy and connectors  Clot Location: corners of oxy    Arterial ECMO Cannula: 17 Fr in the Right Femoral Artery  Venous ECMO Cannula: 25 Fr in the Right Femoral Vein  ECMO Cannula Venous Right femoral vein-Site Assessment: Sutured, Secure  ECMO Cannula Arterial Right femoral artery-Site Assessment: Sutured, Secure  Distal Perfusion Catheter Right superficial femoral artery-Site Assessment: Secure, Sutured  ECMO Cannula Venous Right femoral vein-Site Intervention: No intervention needed  ECMO Cannula Arterial Right femoral artery-Site Intervention: No intervention needed  Distal Perfusion Catheter Right superficial femoral artery-Site Intervention: No intervention needed    Patient remains on V-A ECMO, all equipment is functioning and alarms are appropriately set. RPM's: 3700 with Blood Flow (Circuit) LPM  Av LPM  Min: 3.71 LPM  Max: 4.2 LPM L/min. Sweep is at 5 LPM and 100 %. Extremities are cool.     Significant Shift Events: Went to OR, removed central cannulas, converted to VA ecmo at 0917. CRRT restarted X2. New clot in oxy    Vent settings:  Vent Mode: CMV/AC  (Continuous Mandatory Ventilation/ Assist Control)  FiO2 (%): 40 %  Resp Rate (Set): (S) 14 breaths/min (Per MD)  Tidal Volume (Set, mL): 350 mL  PEEP (cm H2O): 5 cmH2O  Resp: 14      Anticoagulation:  Dose (units/hr) HEParin: 400 Units/hr  Rate (mL/hr) HEParin: 4 mL/hr  Concentration HEParin: 100 Units/mL  ACT - 166 sec    Urine output is Pink, Red.  Blood loss was as charted per RN from chest tubes. Product given included none.     Intake/Output Summary (Last 24 hours) at 3/30/2024 1746  Last data filed at 3/30/2024 1700  Gross per 24 hour   Intake 3815.65 ml   Output 3054 ml   Net 761.65 ml        Labs:  Recent Labs   Lab 03/30/24  1606 03/30/24  1350 03/30/24  1251 03/30/24  1127   PH 7.35 7.31* 7.35 7.38   PCO2 46* 52* 47* 43   PO2 305* 79* 106* 376*   HCO3 25 26 26 26   O2PER 40  100  40 40 40 40       Lab Results   Component Value Date    HGB 7.5 (L) 03/30/2024    PHGB 110 (H) 03/30/2024    PLT 64 (L) 03/30/2024    FIBR 536 (H) 03/30/2024    INR 1.69 (H) 03/30/2024    PTT 41 (H) 03/30/2024    DD 16.15 (H) 03/30/2024    ANTCH 46 (L) 03/30/2024       Plan is to continue VA ECMO.    Lety Fox, RT  ECMO Specialist  3/30/2024 5:46 PM

## 2024-03-30 NOTE — PROGRESS NOTES
CV ICU PROGRESS NOTE  March 30, 2024   Giacomo Castro  [id number]  Admitted: 3/22/2024  1:29 AM      ASSESSMENT: Giacomo Castro is a 54 year old male w/ hx of HLD, alcohol abuse, tobacco use who initially presented to [facility] on 3/12 with inferior STEMI complicated by postero-medial papillary muscle rupture s/p surgical mitral valve replacement found to have necrotic LV free wall impending rupture intra-op and had inferior LV wall patch repair on 3/27 by Dr. Mulvihill.     Prior to surgical intervention decompensated, was peripherally cannulated for VA ECMO and IABP (IABP now removed). Introp was also cannulated for central VA ECMO to reduce cardiac blood flow to heal patch, now on VV-AA ECMO. Impella was directly placed intraoperatively. Arrived in CVICU on mechanical cardiovascular support inlcuding VV-AA ECMO, impella with open chest.       Plan today:  - RTOR today for chest closure and central ecmo decannulation  - down titrating pressors as tolerated  - Goal I=Os  - switch to oral amiodarone 400mg BID  - added end dates to abx now that chest is closed  - Adjust RASS goal    Neuro/ pain/ sedation:  - Monitor neurological status. Notify the MD for any acute changes in exam.  #Acute postoperative pain  # Acute metabolic encephalopathy 2/2 sedation  # Sedation   - continue prop, fentanyl gtt   - rass goal -0 to -1,   - gabapentin 100 mg TID  - Scheduled: Tylenol  - PRN: Tylenol, oxycodone, Dilaudid       Pulmonary care:   #Mechanical ventilation  # Acute Hypoxic Respiratory Failure  # Pulmonary edema   Vent Mode: CMV/AC  (Continuous Mandatory Ventilation/ Assist Control)  FiO2 (%): 80 %  Resp Rate (Set): 12 breaths/min  Tidal Volume (Set, mL): 350 mL  PEEP (cm H2O): 5 cmH2O  Resp: 13  Anesthesia reported suction of 100ml blood from the airway during RTOR for check closure. Etiology unclear at this time  - Goal SpO2 > 92%  - Daily CXR   - Monitor CT output      Cardiovascular:    # Cardiogenic shock  #  Inferior STEMI s/p IABP c/b cardiogenic shock and recurrent VT  # Biventricular failure  # Papillary muscle infarction leading to restricted posterior mitral valve leaflet and severe MR  # S/p VA ECMO   # Inferior LV wall rupture s/p patch repair 3/27   # S/P MVR, central ECMO cannulation and impella placement 3/27/24  - Goal MAP >65, SBP <140, monitor hemodynamics  - Hemodynamic gtts: , NE, vasopressin  - antiplatelet/anticoag:   - continue ASA  - Low intensity heparin- to be resumed after hgb stable  - Amiodarone 400mg BID for VT   - Hold Statin   - Hold BB   - ASA  - Overall plan to keep full decompression of LV via ECMO, impella support for LV patch protection   - V-paced (V-wires x 2)   - Per CVTS: If he has adequate RV function with this support, we hope that he can wean from ECMO and then proceed with a slow wean of the Impella.   : Anesthesia report from OR during chest closure with concern for large left atrial thrombus. CVTS aware     GI care / Nutrition:   # NPO status   # Transaminitis in setting of CHF   # Constipation   - NPO   - PPI  - Bowel regimen: MiraLAX, senna  - NJ with tube feeds at goal       Renal / Fluids / Electrolytes:   # Hypernatremia  # Hypophosphatemia  # lactic acidosis   # JEANE  BL creat appears to be ~ < 1.0   - Strict I/O, daily weights, avoid/limit nephrotoxins  - Replete lytes PRN per protocol  - FWF 30 ml q4h   - Lactate q4h  - CRRT per nephrologyy    #Stress hyperglycemia  #Stress Dose Steroids   - Insulin gtt  - Goal BG <180 for optimal healing  - Stress dose steroids: hydrocortisone 100 mg q8h- starting to taper today by decresing dose to 50mg q6h     ID / Antibiotics:  #Stress induced leukocytosis  # Aspiration, HAP   # Open Chest- resolved  # Perioperative Antimicrobials   - Monitor fever curve, WBC, and inflammatory markers as appropriate  - ID following, appreciate recs  - antibiotics              - Vancomycin: 3/22-3/23              - Meropenem: 3/22, 3/23-3/25               - Cefepime: 3/23, 3/25-TBD   - Metronidazole- 3/27- TBD  - Continue ABX for 24 hours after chest closure- end date added        Heme:     # Acute blood loss anemia  # Post CPB thrombocytopenia  - Trend CBC   - Hgb goal > 7.0-8      MSK / Skin:  #Sternotomy  #Surgical Incision  # ECMO sites   # IABP site   # Open chest   - Sternal precautions, postop incision management protocol  - PT/OT/CR     Prophylaxis:     - Mechanical DVT ppx  - Chemical DVT ppx: low intensity heparin  - PPI     Lines / Tubes / Drains:  - ETT  - CTs x5  - Chua  - Impella  - VA ECMO cannulas x 4 (peripheral fem and central sites)   - R ulnar art line, RIJ cordbryce, LIJ mela, PIV   - NJ     Disposition:  - CVICU    ICU Checklist  F - Feeding:   A - Analgesia:   S - Sedation:   T - Thromboembolic prophylaxis:      H - Head of bed elevated  U - Ulcer prophylaxis:  G - Glycemic control  S - SBT     B - Bowel regimen  I - Indwelling catheter  D - De-escalation of antibiotics    Patient seen, findings and plan discussed with CVICU staff.    Michael Schulz MD  Anesthesiology PGY-2    ====================================    TODAY'S PROGRESS  SUBJECTIVE  Remains sedated and intubated    OBJECTIVE  1. VITAL SIGNS  Temp:  [98.2  F (36.8  C)-99  F (37.2  C)] 98.2  F (36.8  C)  Pulse:  [58-80] 60  Resp:  [12-30] 15  BP: (66-71)/(62-68) 66/62  MAP:  [62 mmHg-114 mmHg] 79 mmHg  Arterial Line BP: (67-95)/(59-87) 82/77  FiO2 (%):  [40 %-50 %] 40 %  SpO2:  [93 %-100 %] 99 %  Vent Mode: CMV/AC  (Continuous Mandatory Ventilation/ Assist Control)  FiO2 (%): 40 %  Resp Rate (Set): 12 breaths/min  Tidal Volume (Set, mL): 350 mL  PEEP (cm H2O): 5 cmH2O  Resp: 15      2. INTAKE/ OUTPUT  I/O last 3 completed shifts:  In: 3728.13 [I.V.:2183.03; Other:0.1; NG/GT:785]  Out: 1710.3 [Urine:467; Other:113.3; Stool:150; Chest Tube:980]    3. PHYSICAL EXAMINATION  General: sedated  Neuro: sedated  Resp: intubated, ventilated. Symmetric chest rise.   CV: S1, S2, V paced, no  "m/r/g   Abdomen: Soft, non-distended, non-tender  Incisions: c/d/I. Cannula sites in groin and chest. Open chest is soft, flush with chest wall.   Extremities: warm. + 2 edema. Brisk capillary refill in toes.   CT: To suction, serosang output, + airleak    4. INVESTIGATIONS  Arterial Blood Gases   Recent Labs   Lab 03/30/24  0421 03/30/24  0154 03/30/24  0002 03/29/24 2134   PH 7.39 7.41 7.38 7.43   PCO2 42 40 43 37   PO2 130* 144* 70* 139*   HCO3 25 25 25 25     Complete Blood Count   Recent Labs   Lab 03/30/24 0418 03/29/24 2133 03/29/24  1554 03/29/24  1002   WBC 24.6* 27.1* 28.8* 30.0*   HGB 7.3* 7.6* 7.2* 7.5*   PLT 51* 57* 67* 73*     Basic Metabolic Panel  Recent Labs   Lab 03/30/24  0425 03/30/24  0421 03/30/24  0418 03/30/24  0200 03/29/24 2134 03/29/24 2133 03/29/24  1557 03/29/24  1554 03/29/24  1212 03/29/24  1209   NA  --   --  142  --   --  141  141  --  143  --  143   POTASSIUM  --   --  3.7  --   --  4.1  4.1  --  4.3  --  5.2   CHLORIDE  --   --  108*  --   --  109*  109*  --  110*  --  112*   CO2  --   --  23  --   --  23  23  --  22  --  21*   BUN  --   --  43.8*  --   --  45.3*  45.3*  --  49.8*  --  54.0*   CR  --   --  1.62*  --   --  1.73*  1.73*  --  1.98*  --  2.22*   * 159* 171* 152*   < > 137*  137*   < > 142*   < > 154*    < > = values in this interval not displayed.     Liver Function Tests  Recent Labs   Lab 03/30/24 0418 03/29/24 2133 03/29/24  1554 03/29/24  1209 03/29/24  1002   * 187* 185*  --  190*   ALT 43 43 48  --  51   ALKPHOS 47 61 45  --  44   BILITOTAL 1.2 1.5* 1.4*  --  1.6*   ALBUMIN 1.9* 2.0*  2.0* 2.1* 2.0* 2.2*   INR 1.87* 1.89* 1.86*  --  1.88*     Pancreatic Enzymes  No lab results found in last 7 days.  Coagulation Profile  Recent Labs   Lab 03/30/24  0418 03/29/24 2133 03/29/24  1554 03/29/24  1002   INR 1.87* 1.89* 1.86* 1.88*   PTT 42* 41* 42* 45*     Lactate  Invalid input(s): \"LACTATE\"    5. RADIOLOGY  Recent Results (from the past " 24 hour(s))   XR Chest Port 1 View    Narrative    EXAM: XR CHEST PORT 1 VIEW  3/27/2024 6:55 PM     HISTORY: s/p MVR, impella insertion, and OGT placement       COMPARISON: Radiograph 3/26/2024    TECHNIQUE: AP view of the chest    FINDINGS:   Devices, lines, tubes: Impella device in appropriate positioning.  Central ECMO cannulae in place. Left IJ [place] catheter tip  projects over the right interlobar pulmonary artery. Right IJ central  venous catheter tip projects over the mid SVC. Endotracheal tube tip  projects 4.8 cm superior to the cesia. Feeding tube tip is coiled in  the stomach with tip projecting over the lower esophagus. Packing  material overlying the mediastinum. Mediastinal drains and bibasilar  chest tubes in place. Gastric tube tip and side-port project over the  stomach.    The trachea is midline. Stable cardiomediastinal silhouette. Slightly  improved right greater than left bilateral mixed interstitial and  airspace opacities. No appreciable pneumothorax. No acute osseous  abnormality.        Impression    IMPRESSION:   1. Impella device tip projects over the left ventricle.  2. Central ECMO cannulae in place.  3. Left IJ [place] catheter tip projects over the right interlobar  pulmonary artery, consider retraction.  4. Endotracheal tube tip projects 4.8 cm superior to the cesia.  5. Feeding tube coiled in the stomach with tip projecting over the  lower esophagus, recommend positioning.  6. Improved right greater than left diffuse mixed pulmonary opacities.    I have personally reviewed the examination and initial interpretation  and I agree with the findings.    JEF HERNANDEZ MD         SYSTEM ID:  [id number]   XR Chest Port 1 View    Impression    RESIDENT PRELIMINARY INTERPRETATION  IMPRESSION:   1. Impella device tip projects over the left ventricle.  2. Central ECMO cannulae in place.  3. Left IJ [place] catheter tip projects over distal right main  pulmonary artery  4.  Endotracheal tube tip projects over the midthoracic   5. Removal of feeding tube. Stable orogastric tube.  6. Similar left greater than right diffuse mixed pulmonary opacities.     XR Chest Port 1 View    Impression    RESIDENT PRELIMINARY INTERPRETATION  Impression:   1.  No significant change of Impella device, central ECMO cannulae,  and endotracheal tube position. Additional support devices are stable.  2.  Stable right greater than left mixed interstitial and airspace  opacities.

## 2024-03-30 NOTE — BRIEF OP NOTE
[facility]    Brief Operative Note    Pre-operative diagnosis: Acute ST elevation myocardial infarction (STEMI) involving right coronary artery (H) [I21.11]  Post-operative diagnosis Same    Procedure: Decannulation of central VA ecmo, chest closure, N/A - Chest    Surgeon: Surgeon(s) and Role:     * Mulvihill, Michael, MD - Primary     * Nas Garg MD - Assisting     * Alireza Gomez MD - Fellow - Assisting  Anesthesia: General   Estimated Blood Loss: Per Op report    Drains: No new drains  Specimens: None  Findings:  ECMO decannulation, primary repair of RFV and RCFA  Complications: None immediate  Implants: None

## 2024-03-30 NOTE — PROGRESS NOTES
[facility]    ECLS Shift Summary:     ECMO Equipment:  Console Serial Number: [id number]  Circuit Lot Number: [id number]  Oxygenator Lot Number: [id number]    Circuit Assessment: Fibrin, Clot  Fibrin Location: connectors; oxy outlet; oxy corners and left upper quadrant perimeter; stable; no appreciable change overnight  Clot Location: oxy corners stable; no appreciable change overnight    Arterial ECMO Cannula: 17 Fr in the Right Femoral Artery  Arterial ECMO Cannula: 22 Fr EOPA in the Aorta  Venous ECMO Cannula: 25 Fr in the Right Femoral Vein  Venous ECMO Cannula: 28 Fr in the Right Atrium  Distal Perfusion Cannula: 8 Fr In the Right Superficial Femoral Artery.     Patient remains on VV-AA ECMO, all equipment is functioning and alarms are appropriately set. RPM's: 3550 with Blood Flow (Circuit) LPM  Av.5 LPM  Min: 5.44 LPM  Max: 5.55 LPM L/min. Sweep is at 4.5 LPM and 65 %. Extremities are warm to the touch.     Significant Shift Events:     - CRRT restarted at 0547    Vent settings:  Vent Mode: CMV/AC  (Continuous Mandatory Ventilation/ Assist Control)  FiO2 (%): 40 %  Resp Rate (Set): 12 breaths/min  Tidal Volume (Set, mL): 350 mL  PEEP (cm H2O): 5 cmH2O  Resp: 15    Anticoagulation:  Dose (units/hr) HEParin: 400 Units/hr  Rate (mL/hr) HEParin: 4 mL/hr  Concentration HEParin: 100 Units/mL     Most recent  seconds with a new goal range of 160-180    There was no appreciable blood loss. Product given included 2 units of PRBCs and 1 unit of platelets for replacement.     Intake/Output Summary (Last 24 hours) at 3/30/2024 0556  Last data filed at 3/30/2024 0500  Gross per 24 hour   Intake 4008.36 ml   Output 2582.6 ml   Net 1425.76 ml       Labs:  Recent Labs   Lab 24  0421 24  0418 24  0154 24  0002 24  2134   PH 7.39  --  7.41 7.38 7.43   PCO2 42  --  40 43 37   PO2 130*  --  144* 70* 139*   HCO3 25  --  25 25 25   O2PER 40 40   70 40 40 40       Lab Results   Component Value Date    HGB 7.3 (L) 03/30/2024    PHGB 90 (H) 03/29/2024    PLT 51 (L) 03/30/2024    FIBR 603 (H) 03/30/2024    INR 1.87 (H) 03/30/2024    PTT 42 (H) 03/30/2024    DD 7.85 (H) 03/30/2024    ANTCH 49 (L) 03/29/2024       Plan is to remain on VV-AA ECMO.    Kelli Grey, RRT  ECMO Specialist  3/30/2024 5:56 AM

## 2024-03-30 NOTE — PROGRESS NOTES
CRRT STATUS NOTE    DATA:  Time:  1819  Pressures WNL:  NO>>> high return pressures.  Filter Status:  WDL    Problems Reported/Alarms: increasing filter pressures.    Supplies Present:  YES    ASSESSMENT:        Intake/Output Summary (Last 24 hours) at 3/30/2024 1819  Last data filed at 3/30/2024 1800  Gross per 24 hour   Intake 3860.9 ml   Output 3264 ml   Net 596.9 ml       Vital Signs: Temp:  [97.2  F (36.2  C)-98.8  F (37.1  C)] 98.8  F (37.1  C)  Pulse:  [52-71] 70  Resp:  [12-15] 14  BP: (66-71)/(62-68) 66/62  MAP:  [59 mmHg-90 mmHg] 75 mmHg  Arterial Line BP: (60-95)/(55-87) 76/74  FiO2 (%):  [40 %] 40 %  SpO2:  [86 %-100 %] 88 %       Most Recent BMP's:  Recent Labs   Lab Test 03/30/24  1606 03/30/24  1126 03/30/24  0418 03/29/24  2133 03/29/24  1554 03/29/24  1209     139 141 142 141  141 143 143   POTASSIUM 4.0  4.0 3.7 3.7 4.1  4.1 4.3 5.2   CHLORIDE 105  105 107 108* 109*  109* 110* 112*   CO2 23  23 23 23 23  23 22 21*   BUN 45.9*  45.9* 47.2* 43.8* 45.3*  45.3* 49.8* 54.0*   CR 1.84*  1.84* 1.80* 1.62* 1.73*  1.73* 1.98* 2.22*   ANIONGAP 11  11 11 11 9  9 11 10   GRICEL 8.3*  8.3* 8.1* 8.5* 8.3*  8.3* 8.2* 7.8*     Most Recent CBC's:  Recent Labs   Lab Test 03/30/24  1748 03/30/24  1606 03/30/24  1351 03/30/24  1126 03/30/24  0418 03/29/24  2133   WBC  --  27.6*  --  25.3* 24.6* 27.1*   HGB 7.7* 7.5* 8.1* 7.6* 7.3* 7.6*   MCV  --  93  --  92 93 93   PLT  --  64*  --  73* 51* 57*     Most Recent ABG's:  Recent Labs   Lab Test 03/30/24  1747 03/30/24  1606 03/30/24  1350   PH 7.32* 7.35 7.31*   PO2 299* 305* 79*   PCO2 49* 46* 52*   HCO3 25 25 26   ETIENNE -1.0 -0.5  -0.4 -0.9       Goals of Therapy:  Still positive fluid balance, unable to remove volume 2/2 hypotension.     INTERVENTIONS:     Charting reviewed. Rounding completed. Treatment plan discussed with bedside nurse. Circuit restarted x 1 s/p chest closure and restarted this evening for high filter pressures, clotting circuit.       PLAN:  Continue with current plan of care please contact CRRT resource with any questions or concerns via [facility].

## 2024-03-30 NOTE — ANESTHESIA PREPROCEDURE EVALUATION
Anesthesia Pre-Procedure Evaluation    Patient: Giacomo Castro   MRN: [id number] : 1970        Procedure : Procedure(s):  IRRIGATION AND DEBRIDEMENT, CHEST s/p MVR and LV repair, possible decannulation of central VA ecmo, possible chest closure          Past Medical History:   Diagnosis Date    Tobacco abuse       Past Surgical History:   Procedure Laterality Date    BYPASS GRAFT ARTERY CORONARY, REPLACE VALVE MITRAL, COMBINED N/A 3/27/2024    Procedure: MEDIAN STERNOTOMY, ENDOSCOPIC VEIN HARVEST OF RIGHT GREATER SAPHENOUS VEIN, mitral valve replacement USING 29 MM EPIC STENTED MITRAL TISSUE VALVE, insertion of Impella 5.5 LVAD, patch repair of ischemic left ventricular rupture, ON CARDIOPULMONARY BYPASS, INTRAOPERATIVE TRANSESOPHAGEAL ECHOCARDIOGRAM PER ANESTHESIA;  Surgeon: Mulvihill, Michael, MD;  Location: [facility] OR    CV EXTRACORPERAL MEMBRANE OXYGENATION N/A 3/23/2024    Procedure: Extracorporeal Membrance Oxygenation;  Surgeon: Maynor Ochoa MD;  Location:  HEART CARDIAC CATH LAB    CV INTRA AORTIC BALLOON N/A 3/23/2024    Procedure: Intraprocedure Aortic Balloon Pump Insertion;  Surgeon: Maynor Ochoa MD;  Location:  HEART CARDIAC CATH LAB    CV RIGHT HEART CATH MEASUREMENTS RECORDED N/A 3/22/2024    Procedure: Right Heart Catheterization;  Surgeon: Blade Abdul MD;  Location:  HEART CARDIAC CATH LAB    CV SWAN KYRA PROCEDURE N/A 3/22/2024    Procedure: [place] Kyra Procedure;  Surgeon: Blade Abdul MD;  Location:  HEART CARDIAC CATH LAB    HERNIA REPAIR, UMBILICAL  2016    complete at [facility]    INSERT VENTRICULAR ASSIST DEVICE LEFT (IMPELLA) N/A 3/27/2024    Procedure: INSERTION Of Impella 5.5 LVAD;  Surgeon: Mulvihill, Michael, MD;  Location: [facility] OR    vein surgery Right     Treatment of varicose vein    ZZC ARTHROSCOPIC DECOMPRESSION OF GANGLION CYST Left     completed by TRIA      Allergies   Allergen Reactions    Penicillins Hives     Tolerated ceftriaxone 2024  ([facility])      Social History     Tobacco Use    Smoking status: Every Day     Packs/day: 1.00     Years: 30.00     Additional pack years: 0.00     Total pack years: 30.00     Types: Cigarettes    Smokeless tobacco: Never   Substance Use Topics    Alcohol use: Yes      Wt Readings from Last 1 Encounters:   03/30/24 113.5 kg (250 lb 3.6 oz)        Anesthesia Evaluation            ROS/MED HX  ENT/Pulmonary: Comment: - Mechanically ventilated  Vent Mode: CMV/AC  (Continuous Mandatory Ventilation/ Assist Control)  FiO2 (%): 40 %  Resp Rate (Set): 12 breaths/min  Tidal Volume (Set, mL): 350 mL  PEEP (cm H2O): 5 cmH2O  Resp: 15       (+)                tobacco use, Past use,                    (-) asthma, COPD and sleep apnea   Neurologic: Comment: - Fentanyl/propofol for sedation    (-) no seizures, no CVA and migraines   Cardiovascular: Comment: Presented to outside hospital 3/12 with inferior STEMI s/p cath c/b biventricular failure, severe MR 2/2 infarction of postero-medial papillary muscle, VT x2 episodes requiring cardioversion, pneumonia. Transferred to [facility] for possible mitral valve surgery. Patient was decompensating while on 3 inopressors thus was cannulated for ECMO with IABP 3/23.     Recurrent VT storm s/p amiodarone    Pacemaker: VVI, 100% paced, 60 bpm    Currently on NE (0.04 mcg/kg/min), vasopressin (2U/hr)    (+) Dyslipidemia - -   -  - -                                 Previous cardiac testing   Echo: Date: 3/22 Results:  3/22:  Left ventricular function is decreased. The ejection fraction is 15-20%  (severely reduced).  Inferior wall akinesis is present.  Anterolateral wall akinesis is present.  Apical wall akinesis is present.  Moderate left ventricular dilation is present.  LV anterobasal echo density likely thrombus (2x1.2 cm)  The right ventricle is normal size.  Global right ventricular function is severely reduced.  Severe, eccentric posteriorly directed mitral insufficiency is  present.  The cause of the mitral insufficiency appears to be altered left ventricular  size and geometry. Class IIIb.    3/23:  Limited TTE to assess mitral regurgitation pre and post VA-ECMO cannulation.  Pre-VA-ECMO: Severe mitral regurgitation  Post-VA-ECMO: Trivial mitral regurgitation  LV thrombus not seen in this non-contrasted study.  Stress Test:  Date: Results:    ECG Reviewed:  Date: Results:    Cath:  Date: 3/23 Results:   (-) hypertension   METS/Exercise Tolerance:     Hematologic:     (+)      anemia,          Musculoskeletal:  - neg musculoskeletal ROS  (-) arthritis   GI/Hepatic:  - neg GI/hepatic ROS  (-) GERD and liver disease   Renal/Genitourinary:  - neg Renal ROS  (-) renal disease   Endo:  - neg endo ROS  (-) Type II DM and thyroid disease   Psychiatric/Substance Use:  - neg psychiatric ROS  (-) psychiatric history   Infectious Disease: Comment: Concern for aspiration HAP, currently on cefepime and meropenem - neg infectious disease ROS  (-) Recent Fever   Malignancy:  - neg malignancy ROS     Other:            Physical Exam    Airway   unable to assess          Respiratory Devices and Support    ETT:      Dental    unable to assess        Cardiovascular    unable to assess         Pulmonary    Unable to assess               OUTSIDE LABS:  CBC:   Lab Results   Component Value Date    WBC 24.6 (H) 03/30/2024    WBC 27.1 (H) 03/29/2024    HGB 7.3 (L) 03/30/2024    HGB 7.6 (L) 03/29/2024    HCT 22.7 (L) 03/30/2024    HCT 23.0 (L) 03/29/2024    PLT 51 (L) 03/30/2024    PLT 57 (L) 03/29/2024     BMP:   Lab Results   Component Value Date     03/30/2024     03/29/2024     03/29/2024    POTASSIUM 3.7 03/30/2024    POTASSIUM 4.1 03/29/2024    POTASSIUM 4.1 03/29/2024    CHLORIDE 108 (H) 03/30/2024    CHLORIDE 109 (H) 03/29/2024    CHLORIDE 109 (H) 03/29/2024    CO2 23 03/30/2024    CO2 23 03/29/2024    CO2 23 03/29/2024    BUN 43.8 (H) 03/30/2024    BUN 45.3 (H) 03/29/2024    BUN 45.3  "(H) 03/29/2024    CR 1.62 (H) 03/30/2024    CR 1.73 (H) 03/29/2024    CR 1.73 (H) 03/29/2024     (H) 03/30/2024     (H) 03/30/2024     COAGS:   Lab Results   Component Value Date    PTT 42 (H) 03/30/2024    INR 1.87 (H) 03/30/2024    FIBR 603 (H) 03/30/2024     POC: No results found for: \"BGM\", \"HCG\", \"HCGS\"  HEPATIC:   Lab Results   Component Value Date    ALBUMIN 1.9 (L) 03/30/2024    PROTTOTAL 4.3 (L) 03/30/2024    ALT 43 03/30/2024     (H) 03/30/2024    ALKPHOS 47 03/30/2024    BILITOTAL 1.2 03/30/2024     OTHER:   Lab Results   Component Value Date    PH 7.36 03/30/2024    LACT 3.3 (H) 03/30/2024    A1C 5.9 (H) 03/25/2024    GRICEL 8.5 (L) 03/30/2024    PHOS 3.4 03/30/2024    MAG 2.3 03/30/2024    SED 42 (H) 03/30/2024       Anesthesia Plan    ASA Status:  4    NPO Status:  NPO Appropriate    Anesthesia Type: General.     - Airway: ETT      Maintenance: Balanced.   Techniques and Equipment:     - Lines/Monitors: 2nd IV, Arterial Line, Central Line, CVP, PAC, BIS, NIRS, FERNANDEZ            FERNANDEZ Absolute Contra-indication: NONE            FERNANDEZ Relative Contra-indication: NONE     - Blood: T&C, T&S, PRBC     - Drips/Meds: Vasopressin, Norepi     Consents           - Patient is DNR/DNI Status: No          Postoperative Care    Pain management: IV analgesics.        Comments:               Eliud Marcelino MD    I have reviewed the pertinent notes and labs in the chart from the past 30 days and (re)examined the patient.  Any updates or changes from those notes are reflected in this note.             "

## 2024-03-30 NOTE — ANESTHESIA CARE TRANSFER NOTE
Patient: Giacomo Castro    Procedure: Procedure(s):  Decannulation of central VA ecmo, chest closure       Diagnosis: Acute ST elevation myocardial infarction (STEMI) involving right coronary artery (H) [I21.11]  Diagnosis Additional Information: No value filed.    Anesthesia Type:   General     Note:    Oropharynx: endotracheal tube in place and ventilatory support  Level of Consciousness: iatrogenic sedation      Independent Airway: airway patency not satisfactory and stable  Dentition: dentition unchanged  Vital Signs Stable: post-procedure vital signs reviewed and stable  Report to RN Given: handoff report given  Patient transferred to: ICU    ICU Handoff: Call for PAUSE to initiate/utilize ICU HANDOFF, Identified Patient, Identified Responsible Provider, Reviewed the Pertinent Medical History, Discussed Surgical Course, Reviewed Intra-OP Anesthesia Management and Issues during Anesthesia, Set Expectations for Post Procedure Period and Allowed Opportunity for Questions and Acknowledgement of Understanding      Vitals:  Vitals Value Taken Time   BP 89/74    Temp 36.6    Pulse 60    Resp 12    SpO2 98 % 03/30/24 1106       Electronically Signed By: Charles Patrick Schlatter, APRN CRNA  March 30, 2024  11:13 AM

## 2024-03-30 NOTE — PROGRESS NOTES
Nephrology Progress Note  03/30/2024        Giacomo Castro is a 54 year old who presented to [facility] on 3/12 with complaints of SOB, abdominal pain and diarrhea. Blood pressures were in the 130's, initial scans revealed no clear abdominal or GI etiology. When an EKG was obtained it was consistent with an inf lateral MI and a STEMI alert was called. Further history revealed that he had been having pain for about 2 days. He is a 1 pk a day smoker. At presentation cr was 0.89. Initial ECHO showed an EF of 24%. Intial attempts at revascularization were unsuccessful (PCI to mid RCA). His initial course resulted in several VT arrests requiring cardioversion. A maddi reealed severe posterior leaflet restriction with severe mitral regurgitation. He continued to develop worsening decompensated heart failure and hypoxia. Ultimately transferred to [facility] on 3/22 for further management.      Since arrive here he was initiallly on ionotropic support with IABP. On 3/23 due to ongoing hypotension he was placed on VA ECMO. On 3/27 he was taken to the OR for MVR, CABG and impella. Upon opening chest he was found to have an inferior LV wall rupture and underwent emergent patch repair as well.     Interval History :   Nursing and provider notes from last 24 hours reviewed.  He went to OR and central ECMO decannulated and chest closed.  His potassium was high but now improved with CRRT.         Assessment & Recommendations:   JEANE likely ATN in the setting of cardiogenic shock: Initiated CRRT on 3/29, continue for now for volume and solute control.         - will UF as able, up toward I = O if tolerated.  2. Electrolytes / acid base- hyperkalemia improved, now down to 3.7 with CRRT thus will switch to 4K bath. Check CK to ensure no underlying rhabo process  3. Anemia- defer to primary team  4. Bone- calcium and phos- supplement per protocol                Miranda Bravo Newton MD        Review of Systems:   I reviewed the  "following systems:  Intubated and sedated, back from OR, central ECMO decannulated.  CRRT restarted    Physical Exam:   I/O last 3 completed shifts:  In: 3823.36 [I.V.:1812.56; Other:0.8; NG/GT:780]  Out: 2581.9 [Urine:442; Other:599.9; Stool:800; Chest Tube:740]   BP (!) 66/62   Pulse 70   Temp 97.3  F (36.3  C)   Resp 12   Ht 1.905 m (6' 3\")   Wt 113.5 kg (250 lb 3.6 oz)   SpO2 98%   BMI 31.28 kg/m       Intubated and sedated  Chest coarse  VA eCMO in place  Sig edema/anasarca    Labs:   All labs reviewed by me  Electrolytes/Renal -   Recent Labs   Lab Test 03/30/24 1127 03/30/24 1126 03/30/24 0421 03/30/24 0418 03/29/24 2134 03/29/24 2133   NA  --  141  --  142  --  141  141   POTASSIUM  --  3.7  --  3.7  --  4.1  4.1   CHLORIDE  --  107  --  108*  --  109*  109*   CO2  --  23  --  23  --  23  23   BUN  --  47.2*  --  43.8*  --  45.3*  45.3*   CR  --  1.80*  --  1.62*  --  1.73*  1.73*   * 100*  109*   < > 171*   < > 137*  137*   GRICEL  --  8.1*  --  8.5*  --  8.3*  8.3*   MAG  --  2.3  --  2.3  --  2.3   PHOS  --  4.0  --  3.4  --  3.4  3.4    < > = values in this interval not displayed.       CBC -   Recent Labs   Lab Test 03/30/24 1126 03/30/24 0418 03/29/24 2133   WBC 25.3* 24.6* 27.1*   HGB 7.6* 7.3* 7.6*   PLT 73* 51* 57*       LFTs -   Recent Labs   Lab Test 03/30/24 1126 03/30/24 0418 03/29/24 2133   ALKPHOS 49 47 61   BILITOTAL 1.4* 1.2 1.5*   ALT 41 43 43   * 172* 187*   PROTTOTAL 4.2* 4.3* 4.3*   ALBUMIN 2.0* 1.9* 2.0*  2.0*       Iron Panel - No lab results found.        Current Medications:   acetaminophen  975 mg Oral or Feeding Tube Q8H    amiodarone  400 mg Oral or Feeding Tube Daily    artificial tears   Both Eyes Q8H    aspirin  81 mg Oral or NG Tube Daily    ceFEPIme  2 g Intravenous Q8H    chlorhexidine  15 mL Swish & Spit BID    gabapentin  100 mg Oral TID    hydrocortisone sodium succinate PF  50 mg Intravenous Q6H    lidocaine (viscous)  5 mL " Topical Once    metroNIDAZOLE  500 mg Intravenous Q8H    micafungin  150 mg Intravenous Q24H    multivitamins w/minerals  15 mL Per Feeding Tube Daily    pantoprazole  40 mg Intravenous Daily with breakfast    polyethylene glycol  17 g Oral or Feeding Tube Daily    potassium chloride  20 mEq Intravenous Once    protein modular  2 packet Per Feeding Tube TID    sennosides  8.6 mg Oral or Feeding Tube BID    sodium chloride (PF)  3 mL Intracatheter Q8H    vancomycin  2,000 mg (central catheter) Intravenous Q24H      angiotensin II (GIAPREZA) ADULT infusion 2.5mg/250 mL NS Stopped (03/29/24 1815)    dextrose      CRRT replacement solution 16 mL/kg/hr (03/30/24 0617)    EPINEPHrine Stopped (03/28/24 1430)    fentaNYL 100 mcg/hr (03/30/24 1200)    heparin 2 unit/mL in 0.9% NaCl 3 mL/hr (03/30/24 1200)    HEParin Stopped (03/30/24 1100)    insulin regular Stopped (03/29/24 1805)    propofol Stopped (03/30/24 1147)    And    - MEDICATION INSTRUCTIONS -      - MEDICATION INSTRUCTIONS -      norepinephrine 0.06 mcg/kg/min (03/30/24 1200)    CRRT replacement solution 200 mL/hr at 03/29/24 1141    CRRT replacement solution 8 mL/kg/hr (03/30/24 0310)    BETA BLOCKER NOT PRESCRIBED      sodium bicarbonate 25 mEq in 1000 mL D5W LEFT Ventricular IMPELLA cardiac device purge infusion 9.5 mL/hr (03/30/24 0700)    vasopressin 2.5 Units/hr (03/30/24 1200)     Miranda Newton MD

## 2024-03-31 NOTE — PROGRESS NOTES
[facility]    ECLS Shift Summary:     ECMO Equipment:  Console Serial Number: [id number]  Circuit Lot Number: [id number]  Oxygenator Lot Number: [id number]    Circuit Assessment: Fibrin, Clot  Fibrin Location: corners of oxy, connectors  Clot Location: corners of oxy    Arterial ECMO Cannula: 17 Fr in the Right Femoral Artery  Venous ECMO Cannula: 25 Fr in the Right Femoral Vein  Distal Perfusion Cannula: 8 Fr In the Right Superficial Femoral Artery.     ECMO Cannula Venous Right femoral vein-Site Assessment: Sutured, Secure  ECMO Cannula Arterial Right femoral artery-Site Assessment: Sutured, Secure  Distal Perfusion Catheter Right superficial femoral artery-Site Assessment: Secure, Sutured  ECMO Cannula Venous Right femoral vein-Site Intervention: No intervention needed  ECMO Cannula Arterial Right femoral artery-Site Intervention: No intervention needed  Distal Perfusion Catheter Right superficial femoral artery-Site Intervention: No intervention needed    Patient remains on V-A ECMO, all equipment is functioning and alarms are appropriately set. RPM's: 4600 with Blood Flow (Circuit) LPM  Av.4 LPM  Min: 4 LPM  Max: 4.62 LPM L/min. Sweep is at 7 LPM and 100 %. Extremities are warm.     Significant Shift Events: None, delta P's trending down with Bival    Vent settings:  Vent Mode: PCV Plus assist  (Pressure Control Ventilation/ Assist Control)  FiO2 (%): 40 %  Resp Rate (Set): 14 breaths/min  Tidal Volume (Set, mL): 350 mL  PEEP (cm H2O): 5 cmH2O  Inspiratory Pressure (cm H2O) (Drager Nette): 30  Resp: 14      Anticoagulation:  Dose (mg/kg/hr) Bivalirudin: 0.08 mg/kg/hr  Rate (mL/hr) Bivalirudin: 8.4 mL/hr  Concentration Bivalirudin: 1 mg/mL  Most recent: PTT 81    Urine output is Pink, Red.  Blood loss was minimal from chest tubes. Product given included 1 unit of platelets.     Intake/Output Summary (Last 24 hours) at 3/31/2024 9373  Last data filed at 3/31/2024  1700  Gross per 24 hour   Intake 4597.38 ml   Output 3119.8 ml   Net 1477.58 ml       Labs:  Recent Labs   Lab 03/31/24  1553 03/31/24  1551 03/31/24  1408 03/31/24  1136 03/31/24  0949   PH 7.37  --  7.39 7.36 7.38   PCO2 42  --  40 43 42   PO2 269*  --  314* 304* 320*   HCO3 24  --  24 25 25   O2PER 40 40  100 40 40 40       Lab Results   Component Value Date    HGB 7.9 (L) 03/31/2024    PHGB 300 (H) 03/31/2024    PLT 77 (L) 03/31/2024    FIBR 393 03/31/2024    INR 2.54 (H) 03/31/2024    PTT 81 (H) 03/31/2024    DD >20.00 (HH) 03/31/2024    ANTCH 50 (L) 03/31/2024       Plan is to continue VA ECMO.    Lety Fox, RT  ECMO Specialist  3/31/2024 5:57 PM

## 2024-03-31 NOTE — PROGRESS NOTES
Nephrology Progress Note  03/31/2024        Giacomo Castro is a 54 year old who presented to [facility] on 3/12 with complaints of SOB, abdominal pain and diarrhea. Blood pressures were in the 130's, initial scans revealed no clear abdominal or GI etiology. When an EKG was obtained it was consistent with an inf lateral MI and a STEMI alert was called. Further history revealed that he had been having pain for about 2 days. He is a 1 pk a day smoker. At presentation cr was 0.89. Initial ECHO showed an EF of 24%. Intial attempts at revascularization were unsuccessful (PCI to mid RCA). His initial course resulted in several VT arrests requiring cardioversion. A maddi reealed severe posterior leaflet restriction with severe mitral regurgitation. He continued to develop worsening decompensated heart failure and hypoxia. Ultimately transferred to [facility] on 3/22 for further management.      Since arrive here he was initiallly on ionotropic support with IABP. On 3/23 due to ongoing hypotension he was placed on VA ECMO. On 3/27 he was taken to the OR for MVR, CABG and impella. Upon opening chest he was found to have an inferior LV wall rupture and underwent emergent patch repair as well.     Interval History :   Nursing and provider notes from last 24 hours reviewed.  He went to OR and central ECMO decannulated and chest closed yesterday, hemodynamically doing well and now on bivalruin, CRRT was down but now seems better.  His potassium was high but now improved with CRRT.         Assessment & Recommendations:   JEANE likely ATN in the setting of cardiogenic shock: Initiated CRRT on 3/29, continue for now for volume and solute control.         - will UF as able, 0-50cc/hr as tolerated   - off CRRT due to clotting issue, now on bivalrudin, monitor  2. Electrolytes / acid base- hyperkalemia improved, now down to 4 with CRRT thus will switch to 4K bath. Check CK to ensure no underlying rhabo process  3. Anemia- defer to  "primary team  4. Bone- calcium and phos- supplement per protocol                Miranda Bravo Newton MD        Review of Systems:   I reviewed the following systems:  Intubated and sedated, back from OR, central ECMO decannulated.  CRRT restarted    Physical Exam:   I/O last 3 completed shifts:  In: 4336.54 [I.V.:1697.94; Other:0.6; NG/GT:340]  Out: 2882.2 [Urine:78; Other:984.2; Stool:950; Chest Tube:870]   BP (!) 85/82   Pulse 70   Temp 98.6  F (37  C)   Resp 14   Ht 1.905 m (6' 3\")   Wt 117.2 kg (258 lb 6.1 oz)   SpO2 90%   BMI 32.30 kg/m       Intubated and sedated  Chest coarse  VA eCMO in place  Sig edema/anasarca    Labs:   All labs reviewed by me  Electrolytes/Renal -   Recent Labs   Lab Test 03/31/24  0956 03/31/24  0949 03/31/24  0801 03/31/24  0323 03/31/24  0322 03/30/24  2215 03/30/24  2214 03/30/24  1610 03/30/24  1606   NA  --   --   --   --  140  --  140  --  139  139   POTASSIUM  --   --   --   --  4.0  --  3.9  --  4.0  4.0   CHLORIDE  --   --   --   --  106  --  107  --  105  105   CO2  --   --   --   --  21*  --  21*  --  23  23   BUN  --   --   --   --  46.5*  --  47.0*  --  45.9*  45.9*   CR  --   --   --   --  2.01*  --  1.93*  --  1.84*  1.84*   * 125* 148*   < > 160*   < > 160*   < > 173*  180*  180*   GRICEL  --   --   --   --  8.0*  --  8.1*  --  8.3*  8.3*   MAG  --   --   --   --  2.3  --  2.2  --  2.3   PHOS  --   --   --   --  3.7  --  3.9  --  4.2  4.2    < > = values in this interval not displayed.       CBC -   Recent Labs   Lab Test 03/31/24  0949 03/31/24 0322 03/30/24 2214   WBC 27.7* 26.2* 22.2*   HGB 8.8* 8.0* 8.6*   PLT 51* 63* 39*       LFTs -   Recent Labs   Lab Test 03/31/24  0322 03/30/24  2214 03/30/24  1606 03/30/24  1126 03/30/24  0418   ALKPHOS 58 58 55 49 47   BILITOTAL 2.1* 1.7* 1.5* 1.4* 1.2   ALT 45 41 43 41 43   AST  --   --  173* 153* 172*   PROTTOTAL 4.5* 4.2* 4.5* 4.2* 4.3*   ALBUMIN 2.0* 1.9* 2.0*  2.0* 2.0* 1.9*       Iron Panel - " No lab results found.        Current Medications:   amiodarone  400 mg Oral or Feeding Tube Daily    artificial tears   Both Eyes Q8H    aspirin  81 mg Oral or NG Tube Daily    ceFEPIme  2 g Intravenous Q8H    chlorhexidine  15 mL Swish & Spit BID    fiber modular  1 packet Per Feeding Tube Daily    gabapentin  100 mg Oral TID    hydrocortisone sodium succinate PF  50 mg Intravenous Q12H    metroNIDAZOLE  500 mg Intravenous Q8H    micafungin  150 mg Intravenous Q24H    multivitamins w/minerals  15 mL Per Feeding Tube Daily    [START ON 4/1/2024] pantoprazole  40 mg Oral or Feeding Tube QAM    potassium chloride  20 mEq Intravenous Once    protein modular  2 packet Per Feeding Tube TID    sodium chloride (PF)  3 mL Intracatheter Q8H      bivalirudin (ANGIOMAX) 0.02 mg/mL in sodium chloride 0.9 % 1,000 mL for REPERFUSION CATHETER 3 mL/hr at 03/31/24 1100    bivalirudin (ANGIOMAX) 250 mg in sodium chloride 0.9 % 250 mL ANTICOAGULANT infusion 0.08 mg/kg/hr (03/31/24 1100)    dextrose      CRRT replacement solution 12.5 mL/kg/hr (03/31/24 0945)    EPINEPHrine Stopped (03/28/24 1430)    fentaNYL 75 mcg/hr (03/31/24 1100)    insulin regular 2 Units/hr (03/31/24 1100)    propofol Stopped (03/30/24 1147)    And    - MEDICATION INSTRUCTIONS -      - MEDICATION INSTRUCTIONS -      norepinephrine Stopped (03/31/24 0430)    CRRT replacement solution 200 mL/hr at 03/30/24 1806    CRRT replacement solution 12.5 mL/kg/hr (03/31/24 0945)    BETA BLOCKER NOT PRESCRIBED      sodium bicarbonate 25 mEq in 1000 mL D5W LEFT Ventricular IMPELLA cardiac device purge infusion 10.2 mL/hr (03/31/24 1000)    vasopressin Stopped (03/31/24 0615)     Miranda Newton MD

## 2024-03-31 NOTE — PROGRESS NOTES
Critical Care Cross Cover    Called by RN at 1825 to evaluate hemodynamic changes (Increase PA pressure readings in setting of ongoing cardiogenic shock).     Patient's chart reviewed. Patient is in cardiogenic shock s/p MVR and LV wall patch repair. He underwent central ecmo decannulation this morning. Remains on peripheral ECMO with Impella for LV offloading. Requirng vasopressin and norepinephrine infusions to maintain MAP goals. JEANE with CRRT for volume status. Initial PA pressures read in the 30s / 20s, slowly climbed to 50s / 40s. Lactic acid remains elevated, last reading 3.5. Svo2 readings in the low 40s per ECMO circuit. Hgb has been stable, last reading 7.7.     # cardiogenic shock  # lactic acidosis  # coagulopathy  # anemia 2/2 to critical illness  # Acute kidney failure  - Transfuse 1 rbc now, increase Hgb goal to >8 per ECMO orders.   - Query if persistent LA and low Svo2 2/2 to low Hgb. Will increase goal and monitor impact on LA and Svo2.   - ACT goal 180 to 200, aim for higher end if possible. ECMO orders updated.  - Continue to pull net even on CRRT, likely not ready for volume removal at this time.  - IMPELLA to P5 to continue to offload LV.     See note on 3/30 by Dr. Schulz for rest of plan of care.     Discussed with attending surgeon, Dr. Mulvihill.     MARYANN Miller CNP  CC Time: 40 minutes

## 2024-03-31 NOTE — PROGRESS NOTES
Plateau pressures noticed to be 35-36 at 0030 vent check. Suctioned pt to attempt to decrease plats and changed Ti with no apparent help. Placed pt on PCV settings to observe how that would affect tidal volumes. Vts did decrease by about half on PCV, however lung pressures were improved. Notified provider, bedside RN and ECMO specialist about changes, and decision was made to utilize lung protective ventilation overnight and reassess in AM.   Current vent settings:  Vent Mode: PCV Plus assist  (Pressure Control Ventilation/ Assist Control)  FiO2 (%): 40 %  Resp Rate (Set): 14 breaths/min  PEEP (cm H2O): 5 cmH2O  Inspiratory Pressure (cm H2O) (Drager Nette): 30  Plateau: 26  Resp: 14      Jennifer Charlton, JULIO

## 2024-03-31 NOTE — PROGRESS NOTES
[facility]    ECLS Shift Summary:     ECMO Equipment:  Console Serial Number: [id number]  Circuit Lot Number: [id number]  Oxygenator Lot Number: [id number]    Circuit Assessment: Fibrin, Clot  Fibrin Location: connectors; oxy outlet; oxy corners and left upper quadrant perimeter; streaking beginning to form below the deairing port; stable; no appreciable change overnight  Clot Location: oxy corners stable; no appreciable change overnight    Arterial ECMO Cannula: 17 Fr in the Right Femoral Artery  Venous ECMO Cannula: 25 Fr in the Right Femoral Vein  Distal Perfusion Cannula: 8 Fr In the Right Superficial Femoral Artery.     Patient remains on V-A ECMO, all equipment is functioning and alarms are appropriately set. RPM's: 4200 with Blood Flow (Circuit) LPM  Av.3 LPM  Min: 4.14 LPM  Max: 4.45 LPM L/min. Sweep is at 7 LPM and 100 %. Extremities are warm to the touch.     Significant Shift Events:     - CRRT filter clotted off; new set started at 0339   -  increased bloody secretions from ETT; vent switched to pressure control; PEEP increased to +10  - Delta P on ECMO circuit increased from 20s to 40s despite adequate anticoagulation via ACT; heparin gtt to the ECMO circuit and distal perfusion catheter transitioned to Bivalirudin; HIT panel to follow    Vent settings:  Vent Mode: PCV Plus assist  (Pressure Control Ventilation/ Assist Control)  FiO2 (%): 40 %  Resp Rate (Set): 14 breaths/min  Tidal Volume (Set, mL): 350 mL  PEEP (cm H2O): 5 cmH2O  Inspiratory Pressure (cm H2O) (Drager Nette): 30  Resp: 14    Anticoagulation:    Dose (mg/kg/hr) Bivalirudin: 0.08 mg/kg/hr  Rate (mL/hr) Bivalirudin: 8.4 mL/hr  Concentration Bivalirudin: 1 mg/mL  Most recent PTT: 80 with a goal range of 44-88    Blood loss was isolated to ETT secretions. Product given included 4 units of PRBCs and 1 unit of platelets for replacement.     Intake/Output Summary (Last 24 hours) at 3/31/2024  0233  Last data filed at 3/31/2024 0200  Gross per 24 hour   Intake 4495.59 ml   Output 3422.7 ml   Net 1072.89 ml       Labs:  Recent Labs   Lab 03/31/24  0206 03/30/24  2351 03/30/24 2215 03/30/24 1952   PH 7.40 7.41 7.41 7.31*   PCO2 40 38 36 49*   PO2 203* 253* 321* 281*   HCO3 25 24 23 25   O2PER 40 40 40 40       Lab Results   Component Value Date    HGB 8.6 (L) 03/30/2024    PHGB 110 (H) 03/30/2024    PLT 39 (LL) 03/30/2024    FIBR 536 (H) 03/30/2024    INR 1.64 (H) 03/30/2024    PTT 42 (H) 03/30/2024    DD 16.15 (H) 03/30/2024    ANTCH 46 (L) 03/30/2024       Plan is remain on VA ECMO support and wean as tolerated.    Kelli Grey, RRT  ECMO Specialist  3/31/2024 2:33 AM

## 2024-03-31 NOTE — PROGRESS NOTES
CV ICU PROGRESS NOTE  March 31 2024        ASSESSMENT: Giacomo Castro is a 54 year old male w/ hx of HLD, alcohol abuse, tobacco use who initially presented to [facility] on 3/12 with inferior STEMI complicated by postero-medial papillary muscle rupture s/p surgical mitral valve replacement found to have necrotic LV free wall impending rupture intra-op and had inferior LV wall patch repair on 3/27 by Dr. Mulvihill. 3/30 chest closure and decannulation of central ECMO cannulas.     Prior to surgical intervention decompensated, was peripherally cannulated for VA ECMO and IABP (IABP now removed). Introp was also cannulated for central VA ECMO to reduce cardiac blood flow to heal patch, now on VV-AA ECMO. Impella was directly placed intraoperatively. Arrived in CVICU on mechanical cardiovascular support inlcuding VV-AA ECMO, impella with open chest.       Plan today:  - Decrease PEEP to 5  - Wean ECMO  - Change bowel regimen to PRN  - Add banatrol   - Stress dose steroid taper (50mg q12hrs)  - Stop cefepime, flagyl, vancomycin   - Hgb goal > 7-8    Neuro/ pain/ sedation:  - Monitor neurological status. Notify the MD for any acute changes in exam.  #Acute postoperative pain  # Acute metabolic encephalopathy 2/2 sedation  # Sedation   - continue prop, fentanyl gtt   - rass goal -0 to -1,   - gabapentin 100 mg TID  - Scheduled: Tylenol  - PRN: Tylenol, oxycodone, Dilaudid       Pulmonary care:   #Mechanical ventilation  # Acute Hypoxic Respiratory Failure  # Pulmonary edema   Vent Mode: CMV/AC  (Continuous Mandatory Ventilation/ Assist Control)  FiO2 (%): 80 %  Resp Rate (Set): 12 breaths/min  Tidal Volume (Set, mL): 350 mL  PEEP (cm H2O): 5 cmH2O (weaned from 10)  Resp: 13  - Goal SpO2 > 92%  - Daily CXR   - Monitor CT output      Cardiovascular:    # Cardiogenic shock  # Inferior STEMI s/p IABP c/b cardiogenic shock and recurrent VT  # Biventricular failure  # 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 severe MR  # S/p VA ECMO   # Inferior LV wall rupture s/p patch repair 3/27   # S/P MVR, central ECMO cannulation and impella placement 3/27/24  - Goal MAP >65, SBP <140, monitor hemodynamics  - Hemodynamic gtts: weaned off overnight   - Stress steroids as below   - antiplatelet/anticoag:   - continue ASA  - Bivalrudin, HIT pending   - Amiodarone 400mg BID for VT   - Hold Statin   - Hold BB   - ASA  - Overall plan to keep full decompression of LV via ECMO, impella support for LV patch protection    - V-paced (V-wires x 2)   - Per CVTS: If he has adequate RV function with this support, we hope that he can wean from ECMO and then proceed with a slow wean of the Impella.   - Anesthesia report from OR during chest closure 3/30/24 with concern for large left atrial thrombus. CVTS aware, may consider clot evacuation pending clinical course and recover of myocardium      GI care / Nutrition:   # NPO status   # Transaminitis in setting of CHF   # Diarrhea   - NPO   - PPI  - Bowel regimen: MiraLAX, senna, held for diarrhea   - Add dietary fiber   - NJ with tube feeds at goal       Renal / Fluids / Electrolytes:   # Hypernatremia  # Hypophosphatemia  # lactic acidosis   # JEANE  BL creat appears to be ~ < 1.0   - Strict I/O, daily weights, avoid/limit nephrotoxins  - Replete lytes PRN per protocol  - FWF 30 ml q4h   - Lactate q4h  - CRRT per nephrology, tolerating pulling     #Stress hyperglycemia  #Stress Dose Steroids   - Insulin gtt  - Goal BG <180 for optimal healing  - Stress dose steroids: wean to 50 mg Q12 hours      ID / Antibiotics:  #Stress induced leukocytosis  # Aspiration, HAP   # Open Chest- resolved  # Perioperative Antimicrobials   - Monitor fever curve, WBC, and inflammatory markers as appropriate  - ID following, appreciate recs  - antibiotics              - Vancomycin: 3/22-3/23, 3/27 -3/31              - Meropenem: 3/22, 3/23-3/25              - Cefepime: 3/23,  3/25-3/31   - Metronidazole- 3/27- 3/31  - Continue ABX for 24 hours after chest closure- end date added        Heme:     # Acute blood loss anemia  # Post CPB thrombocytopenia  - Trend CBC   - Hgb goal > 7.0-8      MSK / Skin:  #Sternotomy  #Surgical Incision  # ECMO sites   # IABP site   # Open chest   - Sternal precautions, postop incision management protocol  - PT/OT/CR     Prophylaxis:     - Mechanical DVT ppx  - Chemical DVT ppx: low intensity heparin  - PPI     Lines / Tubes / Drains:  - ETT  - CTs x5  - Chua  - Impella  - VA ECMO cannulas x 4 (peripheral fem and central sites)   - R ulnar art line, RIJ cordis, LIJ swan, PIV   - NJ     Disposition:  - CVICU    Patient seen, findings and plan discussed with Dr. Celestina Newell DNP APRN CNP CCRN  I have personally reviewed the daily labs, imaging studies, cultures and discussed the case with referring physician and consulting physicians.    I have personally provided  50 minutes of critical care time exclusive of time spent on separately billable procedures for cardiogenic shock requiring ECMO and LV Impella, acute hypoxic respiratory failure requiring mechanical ventilation. High risk for acute deterioration and death.          ====================================    TODAY'S PROGRESS  SUBJECTIVE  Remains sedated and intubated on VA ECMO. Responds to stimulation.     OBJECTIVE  1. VITAL SIGNS  Temp:  [97.2  F (36.2  C)-99.3  F (37.4  C)] 98.6  F (37  C)  Pulse:  [62-88] 70  Resp:  [14-18] 14  BP: (85)/(82) 85/82  MAP:  [59 mmHg-94 mmHg] 83 mmHg  Arterial Line BP: (60-97)/(55-92) 81/76  FiO2 (%):  [40 %] 40 %  SpO2:  [85 %-99 %] 99 %  Vent Mode: PCV Plus assist  (Pressure Control Ventilation/ Assist Control)  FiO2 (%): 40 %  Resp Rate (Set): 14 breaths/min  Tidal Volume (Set, mL): 350 mL  PEEP (cm H2O): 10 cmH2O  Inspiratory Pressure (cm H2O) (Drager Nette): 30  Resp: 14      2. INTAKE/ OUTPUT  I/O last 3 completed shifts:  In: 4336.54 [I.V.:1697.94;  Other:0.6; NG/GT:340]  Out: 2882.2 [Urine:78; Other:984.2; Stool:950; Chest Tube:870]    3. PHYSICAL EXAMINATION  General: sedated  Neuro: sedated, responds to stimulation   Resp: intubated, ventilated. Symmetric chest rise.   CV: S1, S2, V paced, no m/r/g   Abdomen: Soft, non-distended, non-tender  Incisions: Cannula groin sites c/d/I. Sternal incision dressed, c/d/I   Extremities: warm. + 2 edema. Brisk capillary refill in toes.   CT: To suction, serosang output.     4. INVESTIGATIONS  Arterial Blood Gases   Recent Labs   Lab 03/31/24  0949 03/31/24  0759 03/31/24  0555 03/31/24  0323   PH 7.38 7.37 7.38 7.40   PCO2 42 42 41 38   PO2 320* 298* 271* 238*   HCO3 25 24 24 24     Complete Blood Count   Recent Labs   Lab 03/31/24  0949 03/31/24  0322 03/30/24 2214 03/30/24  1748 03/30/24  1606   WBC 27.7* 26.2* 22.2*  --  27.6*   HGB 8.8* 8.0* 8.6* 7.7* 7.5*   PLT 51* 63* 39*  --  64*     Basic Metabolic Panel  Recent Labs   Lab 03/31/24  0956 03/31/24  0949 03/31/24  0801 03/31/24  0559 03/31/24  0323 03/31/24  0322 03/30/24  2215 03/30/24  2214 03/30/24  1610 03/30/24  1606 03/30/24  1127 03/30/24  1126   NA  --   --   --   --   --  140  --  140  --  139  139  --  141   POTASSIUM  --   --   --   --   --  4.0  --  3.9  --  4.0  4.0  --  3.7   CHLORIDE  --   --   --   --   --  106  --  107  --  105  105  --  107   CO2  --   --   --   --   --  21*  --  21*  --  23  23  --  23   BUN  --   --   --   --   --  46.5*  --  47.0*  --  45.9*  45.9*  --  47.2*   CR  --   --   --   --   --  2.01*  --  1.93*  --  1.84*  1.84*  --  1.80*   * 125* 148* 150*   < > 160*   < > 160*   < > 173*  180*  180*   < > 100*  109*    < > = values in this interval not displayed.     Liver Function Tests  Recent Labs   Lab 03/31/24  0949 03/31/24  0322 03/30/24  2214 03/30/24  1606 03/30/24  1126 03/30/24  0418 03/29/24  2133   AST  --   --   --  173* 153* 172* 187*   ALT  --  45 41 43 41 43 43   ALKPHOS  --  58 58 55 49 47 61  "  BILITOTAL  --  2.1* 1.7* 1.5* 1.4* 1.2 1.5*   ALBUMIN  --  2.0* 1.9* 2.0*  2.0* 2.0* 1.9* 2.0*  2.0*   INR 2.44* 1.56* 1.64* 1.69* 1.90* 1.87* 1.89*     Pancreatic Enzymes  No lab results found in last 7 days.  Coagulation Profile  Recent Labs   Lab 03/31/24  0949 03/31/24  0554 03/31/24  0322 03/30/24  2214 03/30/24  1606   INR 2.44*  --  1.56* 1.64* 1.69*   PTT 78* 80* 41* 42* 41*     Lactate  Invalid input(s): \"LACTATE\"    5. RADIOLOGY  3/31 CXR:  Impression:   1. Ultrasound probe has been removed. The support devices are  otherwise stable.  2. Stable diffuse mixed opacification throughout both lungs.  "

## 2024-03-31 NOTE — PROGRESS NOTES
Shift Summary [id number]    Major Shift Events:      No acute changes during shift    Neuro:  Follows commands in BLE, withdraws in BUE. PERRL.    Cardiac:  ECMO flow mid 4, sweep 7, O2 100%. PTT within goal, bival 0.08  Impella P5  Vpaced 70    Respiratory:  PCV + remains  Blood ETT secretions    GI:   TF at goal 45ml/hr     :   Chua oliguric. CRRT 0-50, tolerated generally well. Around 1600 patient BP dropped slightly, levo started and stopped pulling at this time. At 1800 lactic up to 4.1 provider made aware, pulling 0 still, if lactic continues to rise at 2000 plan to give fluid.     Labs:      Plan: continue to support   For vital signs and complete assessments, please see documentation flowsheets.

## 2024-03-31 NOTE — PROGRESS NOTES
Neuro:  Spontaneous waking, Not following commands. PERRL.    Cardiac:  ECMO flow mid 4, sweep 7, O2 100%. ACT goal now 180-200 (last 183).  Impella P5  Vpaced 70    Respiratory:  PC 40%, 30 insp, peep 10    GI:   TF at goal 45ml/hr     :   Chua oliguric. CRRT I=O    Skin:  Chest closed    Other: 4 units for RBCs and 1 unit.  Concerned for ANGELA

## 2024-03-31 NOTE — PROGRESS NOTES
"CRRT STATUS NOTE    DATA:  Time:  1825  Pressures WNL:  YES  Filter Status:  WDL    Problems Reported/Alarms Noted:  None.    Supplies Present:  Yes    ASSESSMENT:  Patient Net Fluid Balance:      Intake/Output Summary (Last 24 hours) at 3/31/2024 1826  Last data filed at 3/31/2024 1800  Gross per 24 hour   Intake 4427.63 ml   Output 2859.8 ml   Net 1567.83 ml        Net +561 since midnight.     Vital Signs:    BP (!) 85/82   Pulse 75   Temp 99  F (37.2  C)   Resp 14   Ht 1.905 m (6' 3\")   Wt 117.2 kg (258 lb 6.1 oz)   SpO2 92%   BMI 32.30 kg/m         Labs:    Lab Results   Component Value Date    PH 7.35 03/31/2024    PO2 279 (H) 03/31/2024    PCO2 44 03/31/2024    HCO3 24 03/31/2024    ETIENNE -1.6 03/31/2024     Lab Results   Component Value Date    WBC 30.4 (H) 03/31/2024    HGB 7.9 (L) 03/31/2024    HCT 22.1 (L) 03/31/2024    MCV 87 03/31/2024    PLT 77 (L) 03/31/2024     Lab Results   Component Value Date     03/31/2024    POTASSIUM 4.0 03/31/2024    CHLORIDE 105 03/31/2024    CO2 21 (L) 03/31/2024     (H) 03/31/2024     Lab Results   Component Value Date    BUN 43.0 (H) 03/31/2024    CR 1.88 (H) 03/31/2024         Goals of Therapy:   0-50 cc/hr net negative as tolerated (increased from I=O today )    INTERVENTIONS: Continue fluid removal per goals of care as patient tolerates. Check filter daily. Change filter q72 hours and PRN. Please call CRRT Resource RN on Dc with any questions or concerns.       "

## 2024-03-31 NOTE — PROGRESS NOTES
CRRT STATUS NOTE    DATA:  Time:  6:00 AM  Pressures WNL:  YES  Filter Status:  WDL    Problems Reported/Alarms Noted:  None.    Supplies Present:  YES    ASSESSMENT:  Patient Net Fluid Balance:  Net +709 ml @ 0600.  Vital Signs:  HR , BP , MAP   Labs:  K 4.0, Mg 2.3, Phos 3.7, iCa 4.6, Hgb 8.0, Plt 63  Goals of Therapy:  UF as able, I = O for now    INTERVENTIONS:   Filter clotted off. Unable to return blood, new set started @ 0339.    PLAN:  Continue to monitor circuit daily and change set q72 hours or PRN for clotting/clogging. Please call CRRT RN with any quesitons/problems.

## 2024-04-01 NOTE — PROGRESS NOTES
[facility]    ECLS Shift Summary:     ECMO Equipment:  Console Serial Number: [id number]  Circuit Lot Number: [id number]  Oxygenator Lot Number: [id number]    Circuit Assessment: Fibrin, Clot  Fibrin Location: stable  Clot Location: stable    Arterial ECMO Cannula: 17 Fr in the Right Femoral Artery  Venous ECMO Cannula: 25 Fr in the Right Femoral Vein  ECMO Cannula Venous Right femoral vein-Site Assessment: Sutured, Secure  ECMO Cannula Arterial Right femoral artery-Site Assessment: Sutured, Secure  Distal Perfusion Catheter Right superficial femoral artery-Site Assessment: Secure, Sutured  ECMO Cannula Venous Right femoral vein-Site Intervention: No intervention needed  ECMO Cannula Arterial Right femoral artery-Site Intervention: No intervention needed  Distal Perfusion Catheter Right superficial femoral artery-Site Intervention: No intervention needed    Patient remains on V-A ECMO, all equipment is functioning and alarms are appropriately set. RPM's: 4900 with Blood Flow (Circuit) LPM  Av.7 LPM  Min: 4.7 LPM  Max: 4.75 LPM L/min. Sweep is at 8 LPM and 100 %. Extremities are warm.     Significant Shift Events: Many procedures done at the bedside- ECHO, FERNANDEZ, Bronch, CT all done today. Lactate continues to rise along with WBC.     Vent settings:  Vent Mode: PCV Plus assist  (Pressure Control Ventilation/ Assist Control)  FiO2 (%): 40 %  Resp Rate (Set): 14 breaths/min  Tidal Volume (Set, mL): 350 mL  PEEP (cm H2O): 8 cmH2O  Inspiratory Pressure (cm H2O) (Drager Nette): 30  Resp: 14      Anticoagulation:  Dose (units/hr) HEParin: 0 Units/hr  Rate (mL/hr) HEParin: 0 mL/hr  Concentration HEParin: 100 Units/mL     Dose (mg/kg/hr) Bivalirudin: 0.08 mg/kg/hr  Rate (mL/hr) Bivalirudin: 8.4 mL/hr  Concentration Bivalirudin: 1 mg/mL  Most recent: ACT  (seconds): 166 seconds    Urine output is  (purple).  Blood loss was significant from chest tubes. Product given included  none.     Intake/Output Summary (Last 24 hours) at 4/1/2024 1758  Last data filed at 4/1/2024 1700  Gross per 24 hour   Intake 4281.54 ml   Output 1466.5 ml   Net 2815.04 ml       Labs:  Recent Labs   Lab 04/01/24  1712 04/01/24  1611 04/01/24  1610 04/01/24  1403 04/01/24  1225   PH 7.34* 7.33*  --  7.33* 7.34*   PCO2 40 39  --  41 41   PO2 299* 224*  --  227* 220*   HCO3 21 20*  --  22 22   O2PER  --  40 40  100 40 40       Lab Results   Component Value Date    HGB 6.5 (LL) 04/01/2024    PHGB 690 (H) 04/01/2024    PLT 59 (L) 04/01/2024    FIBR 454 04/01/2024    INR 3.30 (H) 04/01/2024    PTT 78 (H) 04/01/2024    DD >20.00 (HH) 04/01/2024    ANTCH 54 (L) 04/01/2024       Plan is for lytics tomorrow in cath lab.    Tracy Mendez, RT  ECMO Specialist  4/1/2024 5:58 PM

## 2024-04-01 NOTE — PROGRESS NOTES
"Bronchoscopy Risk Assessment Guidelines      A. Patient symptoms to consider when assessing pulmonary TB risk are:    I. Cough greater than 3 weeks; and fever, hemoptysis, pleuritic chest    pain, weight loss greater than 10 lbs, night sweats, fatigue, infiltrates on    upper lobes or superior segments of lower lobes, cavitation on chest    x-ray.   B. Patient risk factors to consider when assessing pulmonary TB risk are:    I. Exposure to known TB case, foreign-born persons (within 5 years of    arrival to [place]), residence in a crowded setting (correctional facility,     long-term care center, etc.), persons with HIV or immunosuppression.    Patients with symptoms and risk factors should generally be considered \"suspect risk\" and bronchoscopies should be performed in airborne precautions.    This patient has NO KNOWN RISK of Tuberculosis (proceed with bronchoscopy)    Specimens sent: no  Complications: None  Scope used: #[id number] Adult  Attending Physician: Kylah Saenz MD Abdissa Ambrass, RT on 4/1/2024 at 4:32 PM  "

## 2024-04-01 NOTE — PROGRESS NOTES
Neuro:  Not Follows commands ( Followed during day), withdraws in BUE. PERRL.    Cardiac:  ECMO flow 5, sweep 8, O2 100%. PTT within goal, bival 0.08  Impella P5  Vpaced 70  Maintain little to no pulsatility.     Respiratory:  PCV + remains  Blood ETT secretions    GI:   TF at goal 45ml/hr   No BM since yesterday afternoon  :   Chua anuric now. CRRT not pulling, rise in lactic acid and pressors slightly.      Labs: Trend Hbg and Lactic.  WBC continuously increasing.       Other:  1 unit PRBC given with 1L LR.  Plan for FERNANDEZ this AM.

## 2024-04-01 NOTE — PROGRESS NOTES
Nephrology Progress Note  04/01/2024        Giacomo Castro is a 54 year old who presented to [facility] on 3/12 with complaints of SOB, abdominal pain and diarrhea. Blood pressures were in the 130's, initial scans revealed no clear abdominal or GI etiology. When an EKG was obtained it was consistent with an inf lateral MI and a STEMI alert was called. Further history revealed that he had been having pain for about 2 days. He is a 1 pk a day smoker. At presentation cr was 0.89. Initial ECHO showed an EF of 24%. Intial attempts at revascularization were unsuccessful (PCI to mid RCA). His initial course resulted in several VT arrests requiring cardioversion. A maddi reealed severe posterior leaflet restriction with severe mitral regurgitation. He continued to develop worsening decompensated heart failure and hypoxia. Ultimately transferred to [facility] on 3/22 for further management.      Since arrive here he was initiallly on ionotropic support with IABP. On 3/23 due to ongoing hypotension he was placed on VA ECMO. On 3/27 he was taken to the OR for MVR, CABG and impella. Upon opening chest he was found to have an inferior LV wall rupture and underwent emergent patch repair as well.     Interval History :   Nursing and provider notes from last 24 hours reviewed.  He went to OR and central ECMO decannulated and chest closed Saturday, hemodynamically doing OK but noted with ANGELA,  started on bivalruin, CRRT was down but now seems better now.  His potassium was high but now improved with CRRT.   He has lower clearance than might be expected      Assessment & Recommendations:   JEANE likely ATN in the setting of cardiogenic shock: Initiated CRRT on 3/29, continue for now for volume and solute control.         - will UF as able, 0-50cc/hr as tolerated, his admission weight was 100-105 kg   - off CRRT due to clotting issue, now on bivalrudin, monitor  2. Electrolytes / acid base- hyperkalemia improved, down to 4 with CRRT ,  "switched to 4K bath. CK mildlly elevated. He is having hemolysis and this may increase potassium, may need to switch to 3K or 2K if K >4.7  Metabolic acidosis- AG rising- due to lactic acidosis  - consider albumin for hemodynamic support  - may need increase prescription slightly for better clearance  3. Anemia- defer to primary team  4. Bone- calcium and phos- supplement per protocol                Miranda Bravo Newton MD        Review of Systems:   I reviewed the following systems:  Intubated and sedated, bronch being done. He is on bivalrudin    Physical Exam:   I/O last 3 completed shifts:  In: 3786.39 [I.V.:968.79; Other:1.6; NG/GT:470; IV Piggyback:500]  Out: 1755.1 [Urine:5; Other:1150.1; Stool:400; Chest Tube:200]   BP (!) 85/82   Pulse 74   Temp 98.8  F (37.1  C)   Resp 14   Ht 1.905 m (6' 3\")   Wt 117 kg (257 lb 15 oz)   SpO2 93%   BMI 32.24 kg/m       Intubated and sedated  Chest coarse  VA eCMO in place  Sig edema/anasarca    Labs:   All labs reviewed by me  Electrolytes/Renal -   Recent Labs   Lab Test 04/01/24  1406 04/01/24  1225 04/01/24  1224 04/01/24  1011 04/01/24  1010 04/01/24  0414 04/01/24  0412 03/31/24  2357 03/31/24  2310 03/31/24  0323 03/31/24  0322 03/30/24  2215 03/30/24  2214 03/30/24  1610 03/30/24  1606   NA  --  137  --   --  137  --  138  --  138   < > 140  --  140  --  139  139   POTASSIUM  --  4.5  --   --  4.5  --  4.5  --  4.2   < > 4.0  --  3.9  --  4.0  4.0   CHLORIDE  --  102  --   --  103  --  104  --  105   < > 106  --  107  --  105  105   CO2  --   --   --   --  19*  --  19*  --  19*   < > 21*  --  21*  --  23  23   BUN  --  43.9*  --   --  43.9*  --  44.2*  --  43.6*   < > 46.5*  --  47.0*  --  45.9*  45.9*   CR  --  1.80*  --   --  1.79*  --  1.87*  --  1.84*   < > 2.01*  --  1.93*  --  1.84*  1.84*   * 126* 116*   < > 130*  149*   < > 158*   < > 122*   < > 160*   < > 160*   < > 173*  180*  180*   GRICEL  --  8.4*  --   --  8.3*  --  8.3*  --  " 8.1*   < > 8.0*  --  8.1*  --  8.3*  8.3*   MAG  --  2.3  --   --  2.3  --  2.3  --   --    < > 2.3  --  2.2  --  2.3   PHOS  --   --   --   --   --   --   --   --   --   --  3.7  --  3.9  --  4.2  4.2    < > = values in this interval not displayed.       CBC -   Recent Labs   Lab Test 04/01/24 1225 04/01/24  1010 04/01/24 0412 03/31/24 2214 03/31/24  1551   WBC 47.8* 43.2* 41.2* 37.0* 30.4*   HGB  --   --  8.2* 7.4* 7.9*   PLT 57* 59* 67* 71* 77*       LFTs -   Recent Labs   Lab Test 04/01/24 1225 04/01/24  1010 04/01/24 0412 03/31/24  2310 03/31/24 2214 03/31/24  1131 03/31/24  0322 03/30/24 2214 03/30/24  1606 03/30/24  1126 03/30/24 0418   ALKPHOS  --   --   --   --   --   --  58 58 55 49 47   BILITOTAL  --  3.8* 3.4* 3.1* 3.2*   < > 2.1* 1.7* 1.5* 1.4* 1.2   ALT  --   --   --   --   --   --  45 41 43 41 43   AST  --   --   --   --   --   --   --   --  173* 153* 172*   PROTTOTAL  --   --  4.7* 4.4* 4.6*   < > 4.5* 4.2* 4.5* 4.2* 4.3*   ALBUMIN 1.9* 1.8* 1.9* 1.8* 2.1*  2.1*   < > 2.0* 1.9* 2.0*  2.0* 2.0* 1.9*    < > = values in this interval not displayed.       Iron Panel - No lab results found.        Current Medications:   amiodarone  400 mg Oral or Feeding Tube Daily    aspirin  81 mg Oral or NG Tube Daily    ceFEPIme  2 g Intravenous Q8H    chlorhexidine  15 mL Swish & Spit BID    fiber modular  1 packet Per Feeding Tube Daily    gabapentin  100 mg Oral TID    hydrocortisone sodium succinate PF  50 mg Intravenous Q12H    lidocaine (PF)        metroNIDAZOLE  500 mg Intravenous Q12H    midazolam        multivitamins w/minerals  15 mL Per Feeding Tube Daily    pantoprazole  40 mg Oral or Feeding Tube QAM    potassium chloride  20 mEq Intravenous Once    protein modular  2 packet Per Feeding Tube TID    sodium chloride (PF)  3 mL Intracatheter Q8H    vancomycin  2,250 mg (central catheter) Intravenous Q24H      bivalirudin (ANGIOMAX) 0.02 mg/mL in sodium chloride 0.9 % 1,000 mL for REPERFUSION  CATHETER 3 mL/hr at 04/01/24 1400    bivalirudin (ANGIOMAX) 250 mg in sodium chloride 0.9 % 250 mL ANTICOAGULANT infusion 0.08 mg/kg/hr (04/01/24 1400)    dextrose      CRRT replacement solution 12.5 mL/kg/hr (04/01/24 1424)    fentaNYL 75 mcg/hr (04/01/24 1446)    insulin regular Stopped (04/01/24 1229)    propofol Stopped (03/30/24 1147)    And    - MEDICATION INSTRUCTIONS -      - MEDICATION INSTRUCTIONS -      norepinephrine 0.06 mcg/kg/min (04/01/24 1400)    CRRT replacement solution 200 mL/hr at 03/31/24 2022    CRRT replacement solution 12.5 mL/kg/hr (04/01/24 1424)    BETA BLOCKER NOT PRESCRIBED      sodium bicarbonate 25 mEq in 1000 mL D5W LEFT Ventricular IMPELLA cardiac device purge infusion 8.9 mL/hr (04/01/24 1400)     Miranda Newton MD

## 2024-04-01 NOTE — PROGRESS NOTES
[facility] INFECTIOUS DISEASES PROGRESS NOTE     Patient:  Giacomo Castro   Date of birth 1970, Medical record number [id number]  Date of Visit:  04/01/2024  Date of Admission: 3/22/2024  Consult Requester:Mulvihill, Michael, MD          Assessment and Plan:   D Problem List:  Shock  Fever   Bilateral GGO and consolidative lung opacities- pulmonary edema  STEMI (3/10/24) c/b severe MR and ADHF  3/27/2024 s/p CABG, MVR, inferior LV wall repair with impella and open chest  S/p VA-ECMO with IABP (3/23-3/27), peripheral to central ECMO intra-op 3/27  LFT elevation  Allergies: pcn (unknown)    Recommendations:  Reasonable to continue IV Cefepime, Flagyl, and Vancomycin while monitoring cultures. If no identifiable infection, will try to deescalate in coming days.   If able, could consider CT CAP w/ contrast to evaluate for source of infection given increased WBC count, lactate, and ongoing CRP elevation.   Will follow pending infectious work up.    Assessment:  Giacomo Castro is a 54 year old male with PMHx of HLD and tobacco use who initially presented to [facility] on 3/10/24 with inferior STEMI s/p unsuccessful attempted RCA revascularization. Course complicated by ADHF with significant volume overload despite diuresis, recurrent episodes of VT, torrential MR with concern for infarction of posteromedial papillary muscles, and pneumonia s/p ceftriaxone+azithromycin x5d. Transferred to [facility] on 3/22/24 for further management. Cannulated for VA-ECMO with IABP (3/23-*).     Developed fever to 101.3 and worsened hypotension requiring increasing pressor support 3/23 and leukocytosis to 27K, CRP to 110. Switched to meropenem on arrivial (from ceftriaxone+azithro at OSH). UCx 3/23 negative, BCx NGTD 3/22-24. SCx NG. Had completed atypical coverage for pneumonia with Azithromycin at OSH. No significant risk factors for fungal infection. Chest imaging c/w pulmonary edema. Transitioned to Cefepime 3/25.     S/p  CABG, MVR, inferior LV wall repair patch 2/2 necrotic free wall impending rupture on 3/27. VA ECMO converted to central, impella placement, w/ open chest. RTOR 3/30 for chest closure and VA ECMO. Lg LA thrombus found. Has developed a rise in WBC to 41K (although on stress dose steroids), lactate up to 5, CRP up to 471 (3/30) now 370, on norepinephrine. Continues on broad spectrum abx coverage.     Thank you for this consult. ID will continue to follow with you.     Germania Hernández PA-C  Infectious Diseases  Contact via [employer] or [facility] Paging/Directory    Medical Decision Making     50 MINUTES SPENT BY ME on the date of service doing chart review, history, exam, documentation & further activities per the note.           Interim History and Events:     Intubated and sedated. On ECMO. Afebrile. Leukocytosis uptrending to 40+K.         HPI:   Adopted from initial ID consult note 3/24/24:    Giacomo Castro is a 54 year old male with history of HLD and tobacco use who initially presented to [facility] on 3/10/24 with chest pain, found to have an inferior STEMI. Taken to cath lab where RCA revascularization. Course complicated by acute decompensated heart failure with significant volume overload despite diuresis, recurrent episodes of VT leading to shocks, torrential mitral regurgitation with restricted posterior leaflet with concern for infarction of posteromedial papillary muscles, and pneumonia s/p ceftriaxone+azithromycin x5d,intubated at OSH. Transferred to [facility] on 3/22/24 for further management. Increased pressor requirements and fever on 3/23, now on VA-ECMO with IABP (3/23-*).      MRSA nares neg (3/21 at OSH). Sputum (3/18) many GPC, many GPB, oropharyngeal monica on cx. Legionella and S.pneumo neg.     Lives in [place], [place]. Per previous notes, parents and sister live in the area. He smokes cigarettes and drinks alcohol regularly at least 2 heard liquor drinks per day.   Brother and sister in law provide  additional history: works as a  for an elementary school. No known sick family/friends but does have potential for exposure to illness at work. Previously had a pet cat >1 year ago. No recent travel, more of a home body. Has visited resorts in [place] >5 years ago. Goes to [facility] with family and has stayed in a camper/trailer for family trips. No tent camping, hiking, hunting or fishing.            Antimicrobial history:      Current:  Cefepime 3/25 - present  Vancomycin 3/27 - present  Metronidazole 3/27 - present    Prior:  - meropenem (3/22 - 3/25)  - Vancomycin (3/22)  - ceftriaxone (3/18-3/22)  - Azithromycin (3/18-3/22)  - Clindamycin 3/27  - Cefazolin 3/27  - Micafungin (3/27 - 3/31)  Physical Examination:  Temp: 98.8  F (37.1  C) Temp src: Bladder   Pulse: 73   Resp: 14 SpO2: 93 % O2 Device: Mechanical Ventilator      Vitals:    03/28/24 0200 03/29/24 0030 03/30/24 0400 03/31/24 0400   Weight: 119.5 kg (263 lb 7.2 oz) 125.5 kg (276 lb 10.8 oz) 113.5 kg (250 lb 3.6 oz) 117.2 kg (258 lb 6.1 oz)    04/01/24 0400   Weight: 117 kg (257 lb 15 oz)       Constitutional: Male patient seen lying in bed, in NAD. Sedated.  HEENT: NCAT, MMM. ET tube in place.  Respiratory: Non-labored breathing on vent, lung sounds diminished, no focal crackles.  Cardiovascular: Regular rate and rhythm. ECMO cannulation R groin  GI: Abd is soft, non distended.  Skin: Warm and dry. No rashes or lesions on exposed surfaces.  Musculoskeletal: Extremities grossly normal. 2+ edema of bl lower extremities.  Neurologic: Sedated.  VAD: Impella catheter, ECMO cannulation R femoral, RIJ, LIJ      Medications:   amiodarone  400 mg Oral or Feeding Tube Daily    artificial tears   Both Eyes Q8H    aspirin  81 mg Oral or NG Tube Daily    ceFEPIme  2 g Intravenous Q8H    chlorhexidine  15 mL Swish & Spit BID    fiber modular  1 packet Per Feeding Tube Daily    gabapentin  100 mg Oral TID    hydrocortisone sodium succinate PF  50 mg  "Intravenous Q12H    metroNIDAZOLE  500 mg Intravenous Q12H    multivitamins w/minerals  15 mL Per Feeding Tube Daily    pantoprazole  40 mg Oral or Feeding Tube QAM    potassium chloride  20 mEq Intravenous Once    protein modular  2 packet Per Feeding Tube TID    sodium chloride (PF)  3 mL Intracatheter Q8H    vancomycin  2,250 mg (central catheter) Intravenous Q24H       Infusions/Drips:   bivalirudin (ANGIOMAX) 0.02 mg/mL in sodium chloride 0.9 % 1,000 mL for REPERFUSION CATHETER 3 mL/hr at 04/01/24 1000    bivalirudin (ANGIOMAX) 250 mg in sodium chloride 0.9 % 250 mL ANTICOAGULANT infusion 0.08 mg/kg/hr (04/01/24 1000)    dextrose      CRRT replacement solution 12.5 mL/kg/hr (04/01/24 0659)    EPINEPHrine Stopped (03/28/24 1430)    fentaNYL 75 mcg/hr (04/01/24 1000)    insulin regular 1 Units/hr (04/01/24 1014)    propofol Stopped (03/30/24 1147)    And    - MEDICATION INSTRUCTIONS -      - MEDICATION INSTRUCTIONS -      norepinephrine 0.06 mcg/kg/min (04/01/24 1000)    CRRT replacement solution 200 mL/hr at 03/31/24 2022    CRRT replacement solution 12.5 mL/kg/hr (04/01/24 0659)    BETA BLOCKER NOT PRESCRIBED      sodium bicarbonate 25 mEq in 1000 mL D5W LEFT Ventricular IMPELLA cardiac device purge infusion 8.6 mL/hr (04/01/24 1000)    vasopressin Stopped (03/31/24 0615)       Laboratory Data:   No results found for: \"ACD4\"    Inflammatory Markers    Recent Labs   Lab Test 04/01/24  0412 03/31/24  0322 03/30/24  0418 03/29/24  0419 03/28/24  0404 03/27/24  0402 03/26/24  0411 03/25/24  0354   SED 29* 12 42* 31* 19 38* 28* 26*       Metabolic Studies       Recent Labs   Lab Test 04/01/24  1010 04/01/24  0813 04/01/24  0812 04/01/24  0607 04/01/24  0602 04/01/24  0419 04/01/24  0414 04/01/24  0412 03/31/24  2348 03/31/24  2310 03/31/24  2215 03/31/24  2214 03/31/24  1553 03/31/24  1551 03/31/24  1136 03/31/24  1131 03/31/24  0323 03/31/24  0322 03/30/24  2215 03/30/24  2214 03/30/24  1127 03/30/24  1126 " 03/25/24 2039 03/25/24 1804   NA  --   --   --   --   --   --   --  138  --  138  --  138  138  --  139  --  139  140  --  140  --  140   < > 141   < >  --    POTASSIUM  --   --   --   --   --   --   --  4.5  --  4.2  --  4.2  4.2  --  4.0  --  4.1  4.1  --  4.0  --  3.9   < > 3.7   < >  --    CHLORIDE  --   --   --   --   --   --   --  104  --  105  --  104  104  --  105  --  105  105  --  106  --  107   < > 107   < >  --    CO2  --   --   --   --   --   --   --  19*  --  19*  --  20*  20*  --  21*  --  22  22  --  21*  --  21*   < > 23   < >  --    ANIONGAP  --   --   --   --   --   --   --  15  --  14  --  14  14  --  13  --  12  13  --  13  --  12   < > 11   < >  --    BUN  --   --   --   --   --   --   --  44.2*  --  43.6*  --  44.3*  44.3*  --  43.0*  --  44.5*  44.9*  --  46.5*  --  47.0*   < > 47.2*   < >  --    CR  --   --   --   --   --   --   --  1.87*  --  1.84*  --  1.86*  1.86*  --  1.88*  --  1.88*  1.87*  --  2.01*  --  1.93*   < > 1.80*   < >  --    GFRESTIMATED  --   --   --   --   --   --   --  42*  --  42*  --  42*  42*  --  41*  --  41*  42*  --  38*  --  40*   < > 44*   < >  --    *  --  150* 157*  --  147* 155* 158*   < > 122*   < > 124*  124*   < > 129*   < > 133*  134*   < > 160*   < > 160*   < > 100*  109*   < >  --    A1C  --   --   --   --   --   --   --   --   --   --   --   --   --   --   --   --   --   --   --   --   --   --   --  5.9*   GRICEL  --   --   --   --   --   --   --  8.3*  --  8.1*  --  8.3*  8.3*  --  8.2*  --  8.2*  8.2*  --  8.0*  --  8.1*   < > 8.1*   < >  --    PHOS  --   --   --   --   --   --   --   --   --   --   --   --   --   --   --   --   --  3.7  --  3.9   < > 4.0   < >  --    MAG  --   --   --   --   --   --   --  2.3  --   --   --  2.2  --  2.3  --  2.2  --  2.3  --  2.2   < > 2.3   < >  --    LACT  --  5.6*  --   --  5.7*  --  5.5*  --    < >  --    < >  --    < >  --    < >  --    < >  --    < >  --    < >  --    < >  --    CKT   --   --   --   --   --   --   --   --   --   --   --   --   --   --   --   --   --   --   --   --   --  326*  --   --     < > = values in this interval not displayed.       Hepatic Studies    Recent Labs   Lab Test 04/01/24 0412 03/31/24  2310 03/31/24 2214 03/31/24  1551 03/31/24  1131 03/31/24  0322 03/30/24 2214 03/30/24  1606 03/30/24  1126 03/30/24  0418 03/29/24  2133 03/29/24  1554 03/29/24  1209 03/29/24  1002 03/29/24 0419   BILITOTAL 3.4* 3.1* 3.2* 3.0* 2.8* 2.1* 1.7* 1.5* 1.4* 1.2 1.5* 1.4*  --  1.6* 2.1*   ALKPHOS  --   --   --   --   --  58 58 55 49 47 61 45  --  44 49   ALBUMIN 1.9* 1.8* 2.1*  2.1* 2.0* 2.0*  2.0* 2.0* 1.9* 2.0*  2.0* 2.0* 1.9* 2.0*  2.0* 2.1*   < > 2.2* 2.2*   AST  --   --   --   --   --   --   --  173* 153* 172* 187* 185*  --  190* 181*   ALT  --   --   --   --   --  45 41 43 41 43 43 48  --  51 54   LDH  --   --   --   --   --   --   --   --   --  1,391*  --   --   --   --  1,135*    < > = values in this interval not displayed.       Pancreatitis testing    Recent Labs   Lab Test 03/30/24  0418 12/10/18  0918   TRIG 124 169*       Hematology Studies      Recent Labs   Lab Test 04/01/24 0412 03/31/24 2214 03/31/24  1551 03/31/24  0949 03/31/24  0322 03/30/24 2214 03/30/24  1748 03/30/24  1606 03/30/24  0939 03/30/24  0418 03/29/24  2133 03/29/24  1554   WBC 41.2* 37.0* 30.4* 29.1*  27.7* 26.2* 22.2*  --  27.6*   < > 24.6*   < > 28.8*   ANEU 22.2*  --  19.8*  --  18.9*  --   --  24.3*  --  22.9*  --  25.3*   ALYM 3.7  --  2.7  --  5.2  --   --  0.8  --  1.0  --  1.2   DEYVI 4.1*  --  1.2  --  0.3  --   --  0.8  --  0.0  --  1.4*   AEOS 0.8*  --  0.0  --  0.3  --   --  0.0  --  0.0  --  0.0   HGB 8.2* 7.4* 7.9* 8.4*  8.8* 8.0* 8.6*   < > 7.5*   < > 7.3*   < > 7.2*   HCT 21.9* 20.1* 22.1* 24.4*  24.8* 23.8* 25.2*  --  22.9*   < > 22.7*   < > 21.4*   PLT 67* 71* 77* 48*  51* 63* 39*  --  64*   < > 51*   < > 67*    < > = values in this interval not displayed.       Arterial  "Blood Gas Testing    Recent Labs   Lab Test 04/01/24  0813 04/01/24  0602 04/01/24  0414 04/01/24  0412 04/01/24  0144 03/31/24  2348   PH 7.34* 7.35 7.31*  --  7.34* 7.36   PCO2 41 39 45  --  41 40   PO2 229* 231* 206*  --  249* 256*   HCO3 22 22 22  --  22 23   O2PER 40 40 40 40  100 40 40        Urine Studies     Recent Labs   Lab Test 03/29/24  1113 03/23/24  1803 03/23/24  0828   URINEPH 5.0 5.0 5.0   NITRITE Negative Negative Negative   LEUKEST Negative Negative Small*   WBCU 6* 4 66*       Vancomycin Levels     Recent Labs   Lab Test 03/28/24  2018 03/23/24  1001   VANCOMYCIN 18.3 <4.0       Tobramycin levels     No lab results found.    Gentamicin levels    No lab results found.    CSF testing   No lab results found.      Microbiology:  Culture   Date Value Ref Range Status   03/28/2024 2+ Joselin dubliniensis (A)  Final     Comment:     Susceptibilities not routinely done, refer to antibiogram to view typical susceptibility profiles   03/25/2024 No Growth  Final   03/25/2024 No Growth  Final   03/24/2024 No Growth  Final   03/24/2024 No Growth  Final   03/24/2024 No Growth  Final   03/24/2024 No Growth  Final   03/24/2024 No Growth  Final   03/23/2024 No Growth  Final   03/23/2024 No Growth  Final   03/23/2024 No Growth  Final   03/23/2024 No Growth  Final   03/22/2024 No Growth  Final   03/22/2024 No Growth  Final       Last check of C difficile  No results found for: \"CDBPCT\"    Imaging:    CXR (4/1/24)   Impression:   1. Stable support devices.  2. Increased diffuse opacification of the lungs bilaterally.    3/28/2024 XR abdomen   IMPRESSION: Feeding tube tip in the distal duodenum/at the ligament of Treitz.     3/28/2024 CXR:  Impression:   1.  No significant change of Impella device, central ECMO cannulae, and endotracheal tube position. Additional support devices are stable.  2.  Stable right greater than left mixed interstitial and airspace opacities.     CXR (3/26/2024)  Impression:   1.  " Endotracheal tube tip terminates 4.5 cm above the cesia.  Additional support devices are stable.  2.  Stable enlarged cardiac silhouette with slight decreased diffuse  mixed interstitial and airspace opacities, favoring pulmonary edema  and atelectasis.      CT Chest/Abd/Pelvis (3/23/24)  IMPRESSION:  1.  Bilateral central perihilar bronchovascular groundglass and  consolidative opacities with bilateral basilar consolidations,  concerning for aspiration and/or infection.  2.  Small bilateral pleural effusions.  3.  Circumferential bladder wall thickening, which may be accentuated  by underdistention. Consider correlation with urinalysis and urinary  symptoms.  4.  Ectatic ascending thoracic aorta measuring up to 4.0 cm.  5.  Enlarged pulmonary artery measuring 3.5 cm, which can be seen in  pulmonary hypertension.     --------------------------------------------  FERNANDEZ ([facility] 3/21/24)  CONCLUSIONS   Left ventricular ejection fraction is visually estimated at 30%.   Inferior and lateral akinesis   Severe posterior leaflet restriction with overriding anterior leaflet.   Torrential mitral regurgitation.   Vena contracta 1.04 cm.   PISA radius 2.0 cm.      CT Angio chest w/ contrast (OSH- 3/17/24)  IMPRESSION:   1. No pulmonary embolism.   2. Small bilateral pleural effusions, left greater than right.   3. Bibasilar volume loss and consolidation with patchy peripheral areas of interstitial and groundglass opacity, correlate clinically for acute infectious/inflammatory etiologies.   4. Trace pericardial effusion.   5. Ectasia of the ascending aorta at 4 cm in caliber.   6. Main pulmonary artery is mildly dilated which can be seen in pulmonary arterial hypertension.

## 2024-04-01 NOTE — PROCEDURES
[facility]    Procedure: Bronchoscopy    Date/Time: 4/1/2024 2:45 PM    Performed by: Michael Schulz MD  Authorized by: Kylah Saenz MD      UNIVERSAL PROTOCOL   Site Marked: NA  Prior Images Obtained and Reviewed:  Yes  Required items: Required blood products, implants, devices and special equipment available    Patient identity confirmed:  Arm band  NA - No sedation, light sedation, or local anesthesia  Confirmation Checklist:  Relevant allergies and procedure was appropriate and matched the consent or emergent situation  Time out: Immediately prior to the procedure a time out was called    Universal Protocol: the Joint Commission Universal Protocol was followed    Preparation: Patient was prepped and draped in usual sterile fashion    ESBL (mL):  2    SEDATION  Patient Sedated: Yes    Sedation Type:  Moderate (conscious) sedation  Sedation:  Fentanyl and midazolam  Vital signs: Vital signs monitored during sedation      PROCEDURE  Describe Procedure: Left and right bronchus explored. Serial sanguinous fluid suctioned from bilateral broncus. 50ml saline was injected and subsequently aspirated via syringe three different times to check for active bleeding/ diffuse alveolar hemorrhage. This exam was negative for active bleeding. No significant secretions noted in airways. BAL was not performed.  Patient Tolerance:  Patient tolerated the procedure well with no immediate complications  Length of time physician/provider present for 1:1 monitoring during sedation: 20     Dr Kylah Saenz was present and assisted with the procedure. Dr Hathaway was available during the procedure.    Michael Schulz MD  Anesthesiology PGY-2  04/01/24

## 2024-04-01 NOTE — PROGRESS NOTES
CV ICU PROGRESS NOTE        ASSESSMENT: Giacomo Castro is a 54 year old male w/ hx of HLD, alcohol abuse, tobacco use who initially presented to [facility] on 3/12 with inferior STEMI complicated by postero-medial papillary muscle rupture s/p surgical mitral valve replacement found to have necrotic LV free wall impending rupture intra-op and had inferior LV wall patch repair on 3/27 by Dr. Mulvihill. 3/30 chest closure and decannulation of central ECMO cannulas.     Prior to surgical intervention decompensated, was peripherally cannulated for VA ECMO and IABP (IABP now removed). Introp was also cannulated for central VA ECMO to reduce cardiac blood flow to heal patch, now on VV-AA ECMO. Impella was directly placed intraoperatively. Arrived in CVICU on mechanical cardiovascular support inlcuding VV-AA ECMO, impella with open chest. RTOR on 3/30 for chest closure and central ecmo decannulaion.      Plan today:    - Plan for FERNANDEZ at bedside- attempt maneuvers to assess left atrium, mitral valve  - Bronch for diagnostic purposes  - plan I = Os, pull if we can pull   - Trend lactate  - C diff testing  - stress dose steroids Q12H  - restart cefepime, vanc, flagyl         Neuro/ pain/ sedation:  - Monitor neurological status. Notify the MD for any acute changes in exam.  #Acute postoperative pain  # Acute metabolic encephalopathy 2/2 sedation  # Sedation   - continue prop, fentanyl gtt   - rass goal -0 to -1,   - gabapentin 100 mg TID  - Scheduled: Tylenol  - PRN: Tylenol, oxycodone, Dilaudid       Pulmonary care:   #Mechanical ventilation  # Acute Hypoxic Respiratory Failure  # Pulmonary edema   # Likely ARDS vs Diffuse Alveolar Hemorrhage  Vent Mode: CMV/AC  (Continuous Mandatory Ventilation/ Assist Control)  FiO2 (%): 80 %  Resp Rate (Set): 12 breaths/min  Tidal Volume (Set, mL): 350 mL  PEEP (cm H2O): 5 cmH2O  Resp: 13  Plan to perform bronch today to eval lungs and potential bleeding   - Goal SpO2 > 92%  -  Daily CXR   - Monitor CT output      Cardiovascular:    # Cardiogenic shock  # Inferior STEMI s/p IABP c/b cardiogenic shock and recurrent VT  # Biventricular failure  # Papillary muscle infarction leading to restricted posterior mitral valve leaflet and severe MR  # S/p VA ECMO   # Inferior LV wall rupture s/p patch repair 3/27   # S/P MVR, central ECMO cannulation and impella placement 3/27/24  - Goal MAP >65, SBP <140, monitor hemodynamics  - Hemodynamic gtts: weaned off overnight   - Stress steroids as below   - antiplatelet/anticoag:   - continue ASA  - Bivalrudin, HIT pending   - Amiodarone 400mg BID for VT   - Hold Statin   - Hold BB   - ASA  - Overall plan to keep full decompression of LV via ECMO, impella support for LV patch protection    - V-paced (V-wires x 2)   - Per CVTS: If he has adequate RV function with this support, we hope that he can wean from ECMO and then proceed with a slow wean of the Impella.   - Anesthesia report from OR during chest closure 3/30/24 with concern for large left atrial thrombus. CVTS aware, may consider clot evacuation pending clinical course and recover of myocardium      GI care / Nutrition:   # NPO status   # Transaminitis in setting of CHF- improving  # Diarrhea   - NPO   - PPI  - Bowel regimen: MiraLAX, senna, held for diarrhea   - Add dietary fiber   - NJ with tube feeds at goal       Renal / Fluids / Electrolytes:   # Hypernatremia  # Hypophosphatemia  # lactic acidosis   # JEANE  BL creat appears to be ~ < 1.0   - Strict I/O, daily weights, avoid/limit nephrotoxins  - Replete lytes PRN per protocol  - FWF 30 ml q4h   - Lactate q4h  - CRRT per nephrology, not pulling due to lactic acidosis    #Stress hyperglycemia  #Stress Dose Steroids   - Insulin gtt  - Goal BG <180 for optimal healing  - Stress dose steroids: keep at 50 mg Q12 hours      ID / Antibiotics:  #Stress induced leukocytosis  # Aspiration, HAP   # Open Chest- resolved  # Perioperative Antimicrobials   -  Monitor fever curve, WBC, and inflammatory markers as appropriate  - ID following, appreciate recs  - antibiotics              - Vancomycin: 3/22-3/23, 3/27 - tbd              - Meropenem: 3/22, 3/23-3/25              - Cefepime: 3/23, 3/25-tbd   - Metronidazole- 3/27-tbd          Heme:     # Acute blood loss anemia  # Post CPB thrombocytopenia  - Trend CBC   - Hgb goal > 8      MSK / Skin:  #Sternotomy  #Surgical Incision  # ECMO sites   # IABP site   # Open chest   - Sternal precautions, postop incision management protocol  - PT/OT/CR     Prophylaxis:     - Mechanical DVT ppx  - Chemical DVT ppx: bivalrudin  - PPI     Lines / Tubes / Drains:  - ETT  - CTs x5  - Chua  - Impella  - VA ECMO cannulas x 4 (peripheral fem and central sites)   - R ulnar art line, RIJ cristian, DIMITRI plaza, PIV   - NJ     Disposition:  - CVICU    Patient seen, findings and plan discussed with Dr. Mag Schulz MD  Anesthesiology CA-2  04/01/24    ====================================    TODAY'S PROGRESS  SUBJECTIVE  Remains sedated and intubated on VA ECMO. Responds to stimulation.     OBJECTIVE  1. VITAL SIGNS  Temp:  [98.4  F (36.9  C)-99  F (37.2  C)] 98.8  F (37.1  C)  Pulse:  [62-88] 70  Resp:  [0-16] 14  MAP:  [62 mmHg-106 mmHg] 68 mmHg  Arterial Line BP: (65-93)/(57-89) 68/65  FiO2 (%):  [40 %] 40 %  SpO2:  [88 %-99 %] 93 %  Vent Mode: PCV Plus assist  (Pressure Control Ventilation/ Assist Control)  FiO2 (%): 40 %  Resp Rate (Set): 14 breaths/min  Tidal Volume (Set, mL): 350 mL  PEEP (cm H2O): 5 cmH2O  Inspiratory Pressure (cm H2O) (Drager Nette): 30  Resp: 14      2. INTAKE/ OUTPUT  I/O last 3 completed shifts:  In: 3966.99 [I.V.:1384.59; Other:1.4; NG/GT:470]  Out: 2690.3 [Urine:13; Other:1707.3; Stool:700; Chest Tube:270]    3. PHYSICAL EXAMINATION  General: sedated  Neuro: sedated, responds to stimulation   Resp: intubated, ventilated. Symmetric chest rise.   CV: S1, S2, V paced, no m/r/g   Abdomen: Soft,  non-distended, non-tender  Incisions: Cannula groin sites c/d/I. Sternal incision dressed, c/d/I   Extremities: warm. + 2 edema. Brisk capillary refill in toes.   CT: To suction, serosang output.     4. INVESTIGATIONS  Arterial Blood Gases   Recent Labs   Lab 04/01/24 0414 04/01/24  0144 03/31/24  2348 03/31/24 2215   PH 7.31* 7.34* 7.36 7.38   PCO2 45 41 40 38   PO2 206* 249* 256* 246*   HCO3 22 22 23 23     Complete Blood Count   Recent Labs   Lab 04/01/24 0412 03/31/24 2214 03/31/24  1551 03/31/24  0949   WBC 41.2* 37.0* 30.4* 29.1*  27.7*   HGB 8.2* 7.4* 7.9* 8.4*  8.8*   PLT 67* 71* 77* 48*  51*     Basic Metabolic Panel  Recent Labs   Lab 04/01/24 0419 04/01/24 0414 04/01/24 0412 04/01/24  0150 03/31/24 2357 03/31/24 2310 03/31/24 2215 03/31/24 2214 03/31/24  1553 03/31/24  1551   NA  --   --  138  --   --  138  --  138  138  --  139   POTASSIUM  --   --  4.5  --   --  4.2  --  4.2  4.2  --  4.0   CHLORIDE  --   --  104  --   --  105  --  104  104  --  105   CO2  --   --  19*  --   --  19*  --  20*  20*  --  21*   BUN  --   --  44.2*  --   --  43.6*  --  44.3*  44.3*  --  43.0*   CR  --   --  1.87*  --   --  1.84*  --  1.86*  1.86*  --  1.88*   * 155* 158* 125*   < > 122*   < > 124*  124*   < > 129*    < > = values in this interval not displayed.     Liver Function Tests  Recent Labs   Lab 04/01/24 0412 03/31/24 2310 03/31/24  2307 03/31/24  2214 03/31/24  1551 03/31/24  0949 03/31/24  0322 03/30/24  2214 03/30/24  1606 03/30/24  1126 03/30/24  0418 03/29/24  2133   AST  --   --   --   --   --   --   --   --  173* 153* 172* 187*   ALT  --   --   --   --   --   --  45 41 43 41 43 43   ALKPHOS  --   --   --   --   --   --  58 58 55 49 47 61   BILITOTAL 3.4* 3.1*  --  3.2* 3.0*   < > 2.1* 1.7* 1.5* 1.4* 1.2 1.5*   ALBUMIN 1.9* 1.8*  --  2.1*  2.1* 2.0*   < > 2.0* 1.9* 2.0*  2.0* 2.0* 1.9* 2.0*  2.0*   INR 2.90*  --  2.96* 2.85* 2.54*   < > 1.56* 1.64* 1.69* 1.90* 1.87* 1.89*    <  "> = values in this interval not displayed.     Pancreatic Enzymes  No lab results found in last 7 days.  Coagulation Profile  Recent Labs   Lab 04/01/24  0412 03/31/24  2307 03/31/24  2214 03/31/24  1551   INR 2.90* 2.96* 2.85* 2.54*   PTT 74* 83* 77* 81*     Lactate  Invalid input(s): \"LACTATE\"    5. RADIOLOGY  3/31 CXR:  Impression:   1. Ultrasound probe has been removed. The support devices are  otherwise stable.  2. Stable diffuse mixed opacification throughout both lungs.  "

## 2024-04-01 NOTE — PROGRESS NOTES
CRRT STATUS NOTE    DATA:  Time:  6:21 PM  Pressures WNL:  Yes  Filter Status:  WDL    Problems Reported/Alarms Noted:  None    Supplies Present:  Yes    ASSESSMENT:  Patient Net Fluid Balance:  Net IO Since Admission: 15,819.32 mL [04/01/24 1821]     Intake/Output Summary (Last 24 hours) at 4/1/2024 1821  Last data filed at 4/1/2024 1800  Gross per 24 hour   Intake 4126.27 ml   Output 1466.5 ml   Net 2659.77 ml      Vitals:  Temp:  [98.4  F (36.9  C)-99  F (37.2  C)] 99  F (37.2  C)  Pulse:  [69-81] 81  Resp:  [14-16] 14  MAP:  [63 mmHg-96 mmHg] 82 mmHg  Arterial Line BP: (65-91)/(57-84) 91/79  FiO2 (%):  [40 %] 40 %  SpO2:  [93 %-98 %] 93 %   Labs:    Lab Results   Component Value Date     04/01/2024    POTASSIUM 4.7 04/01/2024    CR 1.80 (H) 04/01/2024    BUN 43.9 (H) 04/01/2024    MAG 2.4 (H) 04/01/2024    PHOS  04/01/2024      Comment:      Unsatisfactory specimen - hemolyzed     WBC 51.3 (HH) 04/01/2024    HGB 6.5 (LL) 04/01/2024    HCT 19 (L) 04/01/2024    PLT 59 (L) 04/01/2024       Goals of Therapy: Net negative 0-50 cc/hr net negative as tolerated     INTERVENTIONS:   Review flow sheets and discuss plan of care with bedside nurse and nephrology team.    PLAN:  Continue fluid removal as tolerated per goals of therapy.  Check filter daily and change filter q 72 hrs and PRN.  Please contact the CRRT resource RN with any questions/concerns.

## 2024-04-01 NOTE — PLAN OF CARE
CRRT STATUS NOTE    DATA:  Time:  1454  Pressures WNL:  YES  Filter Status:  WDL    Problems Reported/Alarms Noted:  None    Supplies Present:  YES    ASSESSMENT:  Patient Net Fluid Balance:  +1,787cc/0000    Vital Signs:  Temp: 98.8  F (37.1  C) Temp src: Bladder   Pulse: 74   Resp: 14 SpO2: 93 % O2 Device: Mechanical Ventilator        Labs:   Recent Labs   Lab 04/01/24  1406 04/01/24  1225 04/01/24  1224 04/01/24  1011 04/01/24  1010 04/01/24  0414 04/01/24  0412 03/31/24  2357 03/31/24  2310 03/31/24  2307 03/31/24  2215 03/31/24  2214 03/31/24  1553 03/31/24  1551 03/31/24  0323 03/31/24  0322 03/30/24  2215 03/30/24  2214 03/30/24  1610 03/30/24  1606 03/30/24  1127 03/30/24  1126   WBC  --  47.8*  --   --  43.2*  --  41.2*  --   --   --   --  37.0*  --  30.4*   < > 26.2*  --  22.2*  --  27.6*  --  25.3*   HGB  --   --   --   --   --   --  8.2*  --   --   --   --  7.4*  --  7.9*   < > 8.0*  --  8.6*   < > 7.5*   < > 7.6*   MCV  --  86  --   --  86  --  87  --   --   --   --  86  --  87   < > 90  --  91  --  93  --  92   PLT  --  57*  --   --  59*  --  67*  --   --   --   --  71*  --  77*   < > 63*  --  39*  --  64*  --  73*   INR  --   --   --   --  3.30*  --  2.90*  --   --  2.96*  --  2.85*  --  2.54*   < > 1.56*  --  1.64*  --  1.69*  --  1.90*   NA  --  137  --   --  137  --  138  --  138  --   --  138  138  --  139   < > 140  --  140  --  139  139  --  141   POTASSIUM  --  4.5  --   --  4.5  --  4.5  --  4.2  --   --  4.2  4.2  --  4.0   < > 4.0  --  3.9  --  4.0  4.0  --  3.7   CHLORIDE  --  102  --   --  103  --  104  --  105  --   --  104  104  --  105   < > 106  --  107  --  105  105  --  107   CO2  --   --   --   --  19*  --  19*  --  19*  --   --  20*  20*  --  21*   < > 21*  --  21*  --  23  23  --  23   BUN  --  43.9*  --   --  43.9*  --  44.2*  --  43.6*  --   --  44.3*  44.3*  --  43.0*   < > 46.5*  --  47.0*  --  45.9*  45.9*  --  47.2*   CR  --  1.80*  --   --  1.79*  --  1.87*  --   1.84*  --   --  1.86*  1.86*  --  1.88*   < > 2.01*  --  1.93*  --  1.84*  1.84*  --  1.80*   ANIONGAP  --   --   --   --  15  --  15  --  14  --   --  14  14  --  13   < > 13  --  12  --  11  11  --  11   GRICEL  --  8.4*  --   --  8.3*  --  8.3*  --  8.1*  --   --  8.3*  8.3*  --  8.2*   < > 8.0*  --  8.1*  --  8.3*  8.3*  --  8.1*   * 126* 116*   < > 130*  149*   < > 158*   < > 122*  --    < > 124*  124*   < > 129*   < > 160*   < > 160*   < > 173*  180*  180*   < > 100*  109*   ALBUMIN  --  1.9*  --   --  1.8*  --  1.9*  --  1.8*  --   --  2.1*  2.1*  --  2.0*   < > 2.0*  --  1.9*  --  2.0*  2.0*  --  2.0*   PROTTOTAL  --   --   --   --   --   --  4.7*  --  4.4*  --   --  4.6*  --  4.7*   < > 4.5*  --  4.2*  --  4.5*  --  4.2*   BILITOTAL  --   --   --   --  3.8*  --  3.4*  --  3.1*  --   --  3.2*  --  3.0*   < > 2.1*  --  1.7*  --  1.5*  --  1.4*   ALKPHOS  --   --   --   --   --   --   --   --   --   --   --   --   --   --   --  58  --  58  --  55  --  49   ALT  --   --   --   --   --   --   --   --   --   --   --   --   --   --   --  45  --  41  --  43  --  41   AST  --   --   --   --   --   --   --   --   --   --   --   --   --   --   --   --   --   --   --  173*  --  153*    < > = values in this interval not displayed.     Goals of Therapy:  0-50cc/hr net negative as tolerated; per CVICU team keep I=O d/t rising lactic    INTERVENTIONS:   None    PLAN:  Continue goals of therapy. Questions/concerns call CRRT resource on Vocera [phone].    Juan Corado RN

## 2024-04-01 NOTE — PROGRESS NOTES
[facility]    ECLS Shift Summary:     ECMO Equipment:  Console Serial Number: [id number]  Circuit Lot Number: [id number]  Oxygenator Lot Number: [id number]    Circuit Assessment: Fibrin, Clot  Fibrin Location: connectors; oxy outlet; oxy corners and left upper quadrant perimeter; streaking beginning to form below the deairing port; stable; no appreciable change overnight  Clot Location: oxy corners stable; no appreciable change overnight     Arterial ECMO Cannula: 17 Fr in the Right Femoral Artery  Venous ECMO Cannula: 25 Fr in the Right Femoral Vein  Distal Perfusion Cannula: 8 Fr In the Right Superficial Femoral Artery.     Patient remains on V-A ECMO, all equipment is functioning and alarms are appropriately set. RPM's 4900 with Blood Flow Av.7 LPM  Min: 4.57 LPM  Max: 4.88 LPM L/min. Sweep is at 8 LPM and 100 %. Extremities are warm and edematous.     Significant Shift Events:     - lactic continues to rise despite near max ECMO flow, volume administration, and not pulling CRRT    Vent settings:  Vent Mode: PCV Plus assist  (Pressure Control Ventilation/ Assist Control)  FiO2 (%): 40 %  Resp Rate (Set): 14 breaths/min  Tidal Volume (Set, mL): 350 mL  PEEP (cm H2O): 5 cmH2O  Inspiratory Pressure (cm H2O) (Drager Nette): 30  Resp: 14    Anticoagulation:     Dose (mg/kg/hr) Bivalirudin: 0.08 mg/kg/hr  Rate (mL/hr) Bivalirudin: 8.4 mL/hr  Concentration Bivalirudin: 1 mg/mL  Most recent PTT: 74 with a goal range of 44-88    Blood loss was isolated to ETT secretions/pulmonary hemorrhage. Product given included 2 unit of PRBCs and 1 L of LR.     Intake/Output Summary (Last 24 hours) at 2024 0517  Last data filed at 2024 0500  Gross per 24 hour   Intake 3781.89 ml   Output 1797.3 ml   Net 1984.59 ml       Labs:  Recent Labs   Lab 24  0414 24  0412 24  0144 24  2348 24  2215   PH 7.31*  --  7.34* 7.36 7.38   PCO2 45  --  41 40 38   PO2  206*  --  249* 256* 246*   HCO3 22  --  22 23 23   O2PER 40 100 40 40 40       Lab Results   Component Value Date    HGB 8.2 (L) 04/01/2024    PHGB 300 (H) 03/31/2024    PLT 67 (L) 04/01/2024    FIBR 394 03/31/2024    INR 2.96 (H) 03/31/2024    PTT 83 (H) 03/31/2024    DD >20.00 (HH) 03/31/2024    ANTCH 50 (L) 03/31/2024       Plan is to remain on VA ECMO support and wean as tolerated.    Kelli Grey, RRT  ECMO Specialist  4/1/2024 5:17 AM

## 2024-04-01 NOTE — PHARMACY-VANCOMYCIN DOSING SERVICE
Pharmacy Vancomycin Initial Note  Date of Service 2024  Patient's  1970  54 year old, male    Indication: Sepsis    Current estimated CrCl = CRRT    Creatinine for last 3 days  3/29/2024: 10:02 AM Creatinine 2.17 mg/dL; 12:09 PM Creatinine 2.22 mg/dL;  3:54 PM Creatinine 1.98 mg/dL;  9:33 PM Creatinine 1.73 mg/dL;  9:33 PM Creatinine 1.73 mg/dL  3/30/2024:  4:18 AM Creatinine 1.62 mg/dL; 11:26 AM Creatinine 1.80 mg/dL;  4:06 PM Creatinine 1.84 mg/dL;  4:06 PM Creatinine 1.84 mg/dL; 10:14 PM Creatinine 1.93 mg/dL  3/31/2024:  3:22 AM Creatinine 2.01 mg/dL; 11:31 AM Creatinine 1.87 mg/dL; 11:31 AM Creatinine 1.88 mg/dL;  3:51 PM Creatinine 1.88 mg/dL; 10:14 PM Creatinine 1.86 mg/dL; 10:14 PM Creatinine 1.86 mg/dL; 11:10 PM Creatinine 1.84 mg/dL  2024:  4:12 AM Creatinine 1.87 mg/dL    Recent Vancomycin Level(s) for last 3 days  No results found for requested labs within last 3 days.      Vancomycin IV Administrations (past 72 hours)                     vancomycin (VANCOCIN) 2,000 mg in sodium chloride 0.9 % 250 mL intermittent infusion (mg) 2,000 mg New Bag 24 2348     2,000 mg New Bag  0032                    Nephrotoxins and other renal medications (From now, onward)      Start     Dose/Rate Route Frequency Ordered Stop    24 0730  vancomycin (VANCOCIN) 2,250 mg in sodium chloride 0.9 % 250 mL intermittent infusion         2,250 mg (central catheter)  over 90 Minutes Intravenous EVERY 24 HOURS 24 0714      24 0600  vasopressin 1 unit/mL MAX Conc (PITRESSIN) infusion         0.5-4 Units/hr  0.5-4 mL/hr  Intravenous CONTINUOUS 24 0548      24 0230  norepinephrine (LEVOPHED) 16 mg in  mL infusion MAX CONC CENTRAL LINE         0.01-0.6 mcg/kg/min × 105.1 kg (Dosing Weight)  1-59.1 mL/hr  Intravenous CONTINUOUS 24 0216              Contrast Orders - past 72 hours (72h ago, onward)      None                  Plan:  Start vancomycin  2250 mg IV q24h.    Vancomycin monitoring method: Renal Replacement Therapy  Vancomycin therapeutic monitoring goal: 15-20 mg/L  Pharmacy will check vancomycin levels as appropriate in 3-5 Days.    Serum creatinine levels will be ordered daily for the first week of therapy and at least twice weekly for subsequent weeks.      Vicky Booker RPH

## 2024-04-01 NOTE — PRE-PROCEDURE
GENERAL PRE-PROCEDURE:   Procedure:  Transesophageal echocardiogram  Date/Time:  4/1/2024 1:59 PM    Verbal consent obtained?: Yes    Written consent obtained?: No    Risks and benefits: Risks, benefits and alternatives were discussed    Consent given by:  Parent  Expected level of sedation:  Deep  Appropriately NPO:  Yes  ASA Class:  4  History & Physical reviewed:  History and physical reviewed and no updates needed  Statement of review:  I have reviewed the lab findings, diagnostic data, medications, and the plan for sedation

## 2024-04-01 NOTE — OP NOTE
CO-SURGEON NOTE    Date of Surgery: 3/27/2024     Preop Diagnosis:  STEMI  Mitral insufficiency  Cardiogenic shock     Postop Diagnosis:   STEMI  Mitral insufficiency due to papillary muscle rupture  Cardiogenic shock  Rupture of the inferior wall of the left ventricle     Surgeon: Michael Mulvihill, MD  Co-Surgeon: Domingo Hernandez MD. The role of a co-surgeon was necessary due to the high risk nature and complexity of the procedure to be performed.      Assistant(s): Dr. Aureliano Paz MD  Anesthesia: GET     Procedures:  1. Mitral valve replacement (29 mm Epic bioprosthetic valve)  2. Insertion of a direct aortic impella 5.5 temporary left ventricular assist device via tunneled graft  3. Patch repair of left ventricle inferior wall rupture (surgical ventricular restoration)  4. Cannulation for central VA ECMO     Approach: median sternotomy  Drains: 2x mediastinal drains  Pacing Wires: Ventricular     ATTESTATION: NA, Michael Mulvihill, attending co-surgeon, was scrubbed and present for the entire operative procedure. Dr. Hernandez and AN jointly performed the procedure and all of the critical components. Due to the complexity of the procedure and the critical illness of the patient, co-surgeons were required by medical necessity. It should be noted that although there was resident in the operative suite, they didn't have sufficient training and/or expertise in this particular procedure. Specifically, Dr. Hernandez and NA jointly performed emergency repair of the ventricular wall rupture and associated ventricular restoration, as well as replacement of the mitral valve and the associated critical coordination of circuits between full VA ECMO support to cardiopulmonary bypass, and back to full VA ECMO support.     Implant Name Type Inv. Item Serial No.  Lot No. LRB No. Used Action   GRAFT PATCH VASC XENOSURE BIOLOGIC [id number] E8P14 - [id number]   GRAFT PATCH VASC XENOSURE BIOLOGIC [id number] E8P14    LEMAITRE VASCULAR IN [id number] N/A 1 Implanted   GRAFT PERICARDIUM 6X8CM BOVINE E6P8 - [id number] Bone/Tissue/Biologic GRAFT PERICARDIUM 6X8CM BOVINE E6P8   LEMAITRE VASCULAR IN [id number] N/A 1 Implanted   VALVE MITRAL EPIC PLUS TISSUE STENTED 29MM [id number] - [id number] Valve VALVE MITRAL EPIC PLUS TISSUE STENTED 29MM [id number] Mindie   N/A 1 Implanted   GRAFT VASCUTEK GELWEAVE STRAIGHT [id number] - [id number] Graft GRAFT VASCUTEK GELWEAVE STRAIGHT [id number] VASCUTEK [id number] N/A 1 Implanted         Disposition: To ICU in stable but critical condition     Clinical History: 54M with delayed-presentation STEMI to OSH ([facility]) with unsuccessful revascularization. Since that time he has had significant reduction in his left ventricular function and severe mitral insufficiency. He arrived here on Friday and was cannulated for peripheral VA ECMO on Saturday. Since that time and despite optimization of both his loading conditions and inotropic support he continues to have severely reduced LVEF and minimal pulsatility observed on ECMO support. In an effort to optimize his physiology and give him the best chance for  from ECMO, after multidisciplinary review we have offered mitral valve replacement, CABG, and placement of a direct aortic impella.      Procedure in Detail:I was called emergently to the operating room when an inferior wall free wall rupture, a mechanical complication of recent myocardial infarction, was noted. I specifically directed the double patch repair technique and tied half the knots. Please see the operative report by Dr. Mulvihill for additional operative details.    Domingo Hernandez MD

## 2024-04-02 PROBLEM — I21.3 ACUTE ST ELEVATION MYOCARDIAL INFARCTION (STEMI) (H): Status: RESOLVED | Noted: 2024-01-01 | Resolved: 2024-01-01

## 2024-04-02 NOTE — DISCHARGE SUMMARY
[facility]    Death Summary  [facility] Intensive Care    Date of Admission:  3/22/2024  Date of Death:         4/2/2024  Provider Completing Death Summary: Michael Schulz MD    Discharge Diagnoses   Patient Active Problem List   Diagnosis    Tobacco abuse    Overweight (BMI 25.0-29.9)    CARDIOVASCULAR SCREENING; LDL GOAL LESS THAN 130    Acute ST elevation myocardial infarction (STEMI) (H)    Acute kidney failure, unspecified (H)       Hospital Course  Giacomo Castro is a 54 year old male with PMH HLD, alcohol abuse, tobacco use who presented to [facility] on 3/12 with late presentation inferior STEMI s/p cath with unsuccessful RCA PCI or thrombectomy, complicated by biventricular failure, severe MR 2/2 infarction of postero-medial papillary muscle, VT x2 episodes requiring cardioversion, pneumonia. Transferred to [facility] for evaluation for MVR, however patient decompensated after arrival requiring peripheral VA ECMO cannulation and IABP placement. Was admitted to the  CVICU s/p mitral valve replacement, inferior LV wall rupture patch repair, placement of a direct aortic impella and central cannulation for VA ECMO. He initially arrived to CVICU with an open chest. His chest was closed and central ECMO dc'd on 3/30. After chest closure he continued to have significant vasopressor requirement and lactic acidosis. He developed likely HIT and was found to have multiple clots and infarcts including pulmonary emboli, splenic infarcts and a large left atrial thrombus. He developed significant ARDS due to the LA thrombus. He was also found to have a likely embolic cerebellar infarct. Given his degree of illness and low chance of improvement without significant morbidity, his family made the decision to withdraw care on 4/2/23. He was extubated after which the ECMO circuit was turned off. Time of death was called at 1329    Cause of death: Multiple Organ  Failure    Michael Schulz MD  Anesthesiology PGY-2  04/02/24      Pending Results   Unresulted Labs Ordered in the Past 30 Days of this Admission       Date and Time Order Name Status Description    4/1/2024  1:57 PM Heparin Dependent Plt Rosa BENJAMIN In process     4/1/2024  5:57 AM CONDITIONAL Prepare red blood cells (unit) Preliminary     4/1/2024  5:07 AM Blood Culture Hand, Right Preliminary     4/1/2024  5:07 AM Blood Culture Hand, Right Preliminary     4/1/2024  5:06 AM Respiratory Aerobic Bacterial Culture with Gram Stain Preliminary     3/30/2024  8:49 AM Prepare red blood cells (unit) Preliminary     3/30/2024  8:49 AM Prepare red blood cells (unit) Preliminary     3/30/2024  8:49 AM Prepare red blood cells (unit) Preliminary     3/30/2024  8:49 AM Prepare red blood cells (unit) Preliminary     3/26/2024  5:07 PM Prepare plasma (unit) Preliminary     3/26/2024  5:07 PM Prepare plasma (unit) Preliminary     3/25/2024  4:17 PM Prepare red blood cells (unit) Preliminary     3/25/2024  4:17 PM Prepare red blood cells (unit) Preliminary             Primary Care Physician   Physician No Ref-Primary    Consultations This Hospital Stay   PHARMACY TO DOSE VANCO  PHARMACY IP CONSULT  CARDIOTHORACIC SURGERY ADULT IP CONSULT  PHARMACY TO DOSE VANCO  INFECTIOUS DISEASE GENERAL ADULT IP CONSULT  WOUND OSTOMY CONTINENCE NURSE  IP CONSULT  PHARMACY IP CONSULT  NUTRITION SERVICES ADULT IP CONSULT  PHARMACY IP CONSULT  CARDIOLOGY INTERVENTIONAL (CATH LAB) IP CONSULT  CARDIOTHORACIC SURGERY ADULT IP CONSULT  PHARMACY IP CONSULT  CARE MANAGEMENT / SOCIAL WORK IP CONSULT  PHARMACY TO DOSE VANCO  NUTRITION SERVICES ADULT IP CONSULT  CARDIAC REHAB IP CONSULT  NUTRITION SERVICES ADULT IP CONSULT  NEPHROLOGY ICU IP CONSULT  PHARMACY CRRT IP CONSULT  PHARMACY CRRT IP CONSULT  PHARMACY IP CONSULT  PHARMACY IP CONSULT  PHARMACY TO DOSE VANCO  NEUROLOGY CRITICAL CARE ADULT IP CONSULT  PHYSICAL THERAPY ADULT IP CONSULT  OCCUPATIONAL  THERAPY ADULT IP CONSULT  SMOKING CESSATION PROGRAM IP CONSULT        Data   Most Recent 3 CBC's:  Recent Labs   Lab Test 04/02/24  1108 04/02/24  0339 04/02/24 0330 04/01/24 2308 04/01/24 2133 04/01/24 2014   WBC 54.4*  --  52.5*  --  50.7*  --    HGB  --  7.1*  --  7.8*  --  6.8*   MCV 84  --  84  --  85  --    PLT 49*  --  51*  --  50*  --       Most Recent 3 BMP's:  Recent Labs   Lab Test 04/02/24  1118 04/02/24  1111 04/02/24  1108 04/02/24  0600 04/02/24 0339 04/02/24 0337 04/02/24 0330 04/01/24 2134 04/01/24 2133   NA  --   --  136  --  135  --  138   < > 136  136   POTASSIUM  --   --  5.0  --  4.9  --  4.9   < > 4.8  4.8   CHLORIDE  --   --  100  --   --   --  102  --  102  102   CO2  --   --  15*  --   --   --  16*  --  15*  15*   BUN  --   --  43.8*  --   --   --  43.6*  --  44.3*  44.3*   CR  --   --  1.76*  --   --   --  1.81*  --  1.88*  1.88*   ANIONGAP  --   --  21*  --   --   --  20*  --  19*  19*   GRICEL  --   --  8.3*  --   --   --  8.3*  --  8.2*  8.2*   * 119* 125*   < > 100*   < > 105*  109*   < > 109*  109*    < > = values in this interval not displayed.     Most Recent 2 LFT's:  Recent Labs   Lab Test 04/02/24 1108 04/02/24 0330 04/01/24 2133 03/31/24  1131 03/31/24  0322 03/30/24 2214 03/30/24  1606 03/30/24  1126   AST  --   --   --   --   --   --  173* 153*   ALT  --   --  <5  --  45   < > 43 41   ALKPHOS  --  98 93  --  58   < > 55 49   BILITOTAL 4.1* 3.7* 3.8*   < > 2.1*   < > 1.5* 1.4*    < > = values in this interval not displayed.     Most Recent INR's and Anticoagulation Dosing History:  Anticoagulation Dose History  More data exists         Latest Ref Rng & Units 3/27/2024 3/28/2024 3/29/2024 3/30/2024 3/31/2024 4/1/2024 4/2/2024   Recent Dosing and Labs   INR 0.85 - 1.15 1.60  1.82  1.73  1.50  1.67  1.65  1.75  1.70  1.89  1.86  1.88  1.71  1.64  1.69  1.90  1.87  2.96  2.85  2.54  2.44  1.56  4.01  3.57  3.30  2.90  5.02  4.45      Most Recent 3  Troponin's:No lab results found.  Most Recent Cholesterol Panel:  Recent Labs   Lab Test 04/01/24  1710 03/30/24  0418 12/10/18  0918   CHOL  --   --  219*   LDL  --   --  145*   HDL  --   --  40   TRIG 304*   < > 169*    < > = values in this interval not displayed.     Most Recent 6 Bacteria Isolates From Any Culture (See EPIC Reports for Culture Details):No lab results found.  Most Recent TSH, T4 and A1c Labs:  Recent Labs   Lab Test 03/25/24  1804   A1C 5.9*

## 2024-04-02 NOTE — PROGRESS NOTES
[facility]    ECLS Shift Summary:     ECMO Equipment:  Console Serial Number: [id number]  Circuit Lot Number: [id number]  Oxygenator Lot Number: [id number]    Circuit Assessment: Fibrin, Clot  Fibrin Location: connectors  Clot Location: oxygenator    Arterial ECMO Cannula: 17 Fr in the Right Femoral Artery  Venous ECMO Cannula: 25 Fr in the Right Femoral Vein  Distal Perfusion Cannula: 8 Fr In the Right Superficial Femoral Artery.     ECMO Cannula Venous Right femoral vein-Site Assessment: Sutured, Secure  ECMO Cannula Arterial Right femoral artery-Site Assessment: Sutured, Secure  Distal Perfusion Catheter Right superficial femoral artery-Site Assessment: Sutured, Secure  ECMO Cannula Venous Right femoral vein-Site Intervention: Dressing changed/new dressing  ECMO Cannula Arterial Right femoral artery-Site Intervention: Dressing changed/new dressing  Distal Perfusion Catheter Right superficial femoral artery-Site Intervention: No intervention needed    Patient remains on V-A ECMO, all equipment is functioning and alarms are appropriately set. RPM's: 4295 with Blood Flow (Circuit) LPM  Av.6 LPM  Min: 4.36 LPM  Max: 4.86 LPM L/min. Sweep is at 9 LPM and 100 %. Extremities are warm to the touch.     Significant Shift Events:     Vent settings:  Vent Mode: PCV Plus assist  (Pressure Control Ventilation/ Assist Control)  FiO2 (%): 40 %  Resp Rate (Set): 14 breaths/min  PEEP (cm H2O): 8 cmH2O  Inspiratory Pressure (cm H2O) (Drager Nette): 30  Resp: 14      Anticoagulation:  Dose (units/hr) HEParin: 0 Units/hr  Rate (mL/hr) HEParin: 0 mL/hr  Concentration HEParin: 100 Units/mL     Dose (mg/kg/hr) Bivalirudin: 0.08 mg/kg/hr  Rate (mL/hr) Bivalirudin: 8.4 mL/hr  Concentration Bivalirudin: 1 mg/mL  Most recent: ACT  (seconds): 166 seconds    Patient is on CRRT and not currently pulling fluid.   Blood loss was minimal. Product given included 2 units of PRBCs and 2L of LR.      Intake/Output Summary (Last 24 hours) at 4/2/2024 0508  Last data filed at 4/2/2024 0500  Gross per 24 hour   Intake 5245.4 ml   Output 2724.8 ml   Net 2520.6 ml       Labs:  Recent Labs   Lab 04/02/24  0339 04/02/24  0330 04/02/24  0208 04/02/24  0021 04/01/24  2134   PH 7.34* 7.30* 7.29* 7.31* 7.31*   PCO2 36 38 36 35 36   PO2 339* 224* 227* 244* 245*   HCO3 20* 19* 17* 18* 18*   O2PER  --  100  40  40 40 40 40       Lab Results   Component Value Date    HGB 7.1 (L) 04/02/2024    PHGB 690 (H) 04/01/2024    PLT 51 (L) 04/02/2024    FIBR 426 04/02/2024    INR 4.45 (H) 04/02/2024    PTT 69 (H) 04/02/2024    DD >20.00 (HH) 04/02/2024    ANTCH 54 (L) 04/01/2024       Plan is to continue V-A ECMO support.    Oscar Schmitz, RT  ECMO Specialist  4/2/2024 5:08 AM

## 2024-04-02 NOTE — DEATH PRONOUNCEMENT
MD DEATH PRONOUNCEMENT    Called to pronounce Giacomo Castro dead.    Physical Exam: Unresponsive to noxious stimuli, Spontaneous respirations absent, Breath sounds absent, Carotid pulse absent, Heart sounds absent, and Pupillary light reflex absent    Patient was pronounced dead at 1329 PM, 2024.    Preliminary Cause of Death: Multiple Organ Failure    Principal Problem:    Acute ST elevation myocardial infarction (STEMI) (H)  Active Problems:    Acute kidney failure, unspecified (H)       Infectious disease present?: NO    Communicable disease present? (examples: HIV, chicken pox, TB, Ebola, CJD) :  NO    Multi-drug resistant organism present? (example: MRSA): NO    Please consider an autopsy if any of the following exist:  NO Unexpected or unexplained death during or following any dental, medical, or surgical diagnostic treatment procedures.   NO Death of mother at or up to seven days after delivery.     NO All  and pediatric deaths.     NO Death where the cause is sufficiently obscure to delay completion of the death certificate.   NO Deaths in which autopsy would confirm a suspected illness/condition that would affect surviving family members or recipients of transplanted organs.     The following deaths must be reported to the 's Office:  NO A death that may be due entirely or in part to any factors other than natural disease (recent surgery, recent trauma, suspected abuse/neglect).   NO A death that may be an accident, suicide, or homicide.     NO Any sudden, unexpected death in which there is no prior history of significant heart disease or any other condition associated with sudden death.   NO A death under suspicious, unusual, or unexpected circumstances.    NO Any death which is apparently due to natural causes but in which the  does not have a personal physician familiar with the patient s medical history, social, or environmental situation or the circumstances of the  terminal event.   NO Any death apparently due to Sudden Infant Death Syndrome.     NO Deaths that occur during, in association with, or as consequences of a diagnostic, therapeutic, or anesthetic procedure.   NO Any death in which a fracture of a major bone has occurred within the past (6) six months.   NO A death of persons note seen by their physician within 120 days of demise.     NO Any death in which the  was an inmate of a public institution or was in the custody of Law Enforcement personnel.   NO  All unexpected deaths of children   YES Solid organ donors   NO Unidentified bodies   NO Deaths of persons whose bodies are to be cremated or otherwise disposed of so that the bodies will later be unavailable for examination;   NO Deaths unattended by a physician outside of a licensed healthcare facility or licensed residential hospice program   NO Deaths occurring within 24 hours of arrival to a health care facility if death is unexpected.    NO Deaths associated with the decedent s employment.   NO Deaths attributed to acts of terrorism.   NO Any death in which there is uncertainty as to whether it is a medical examiner s care should be discussed with the medical investigator.        Body disposition: Patient had indicated organ donation on their 's license.  Body released to the [facility].      Michael Schulz MD  Anesthesiology PGY-2  24

## 2024-04-02 NOTE — PROGRESS NOTES
Neuro:  Not Follows commands ( confirmed with neurocrit) , withdraws. PERRL.    Cardiac:  ECMO flow 4.5, sweep 9, O2 100%. PTT within goal, bival 0.08  Impella P4  Afib 80's 90's  Maintain little to no pulsatility.     Respiratory:  PCV + remains  Blood ETT secretions    GI:   NJT is clamped and clotted. May be in stomach as well.  OGT to LIS with 200 output  TF off   150 output in rectal tube  :   Anuric. CRRT not pulling, rise in lactic acid and pressors slightly.      Labs: Trend Hbg and Lactic.        Other:  2 unit PRBC given with 2L LR.

## 2024-04-02 NOTE — PROGRESS NOTES
D: Withdrawal of care per family after multiple conversations between family and the care team. Comfort care order set in place. CRRT stopped, ECMO circuit clamped, Impella removed, pressors discontinued per orders.  I: As above. Family at bedside.  A/P: Body transported to [facility] per security.

## 2024-04-02 NOTE — PROGRESS NOTES
CV ICU PROGRESS NOTE        ASSESSMENT: Giacomo Castro is a 54 year old male w/ hx of HLD, alcohol abuse, tobacco use who initially presented to [facility] on 3/12 with inferior STEMI complicated by postero-medial papillary muscle rupture s/p surgical mitral valve replacement found to have necrotic LV free wall impending rupture intra-op and had inferior LV wall patch repair on 3/27 by Dr. Mulvihill. 3/30 chest closure and decannulation of central ECMO cannulas.     Prior to surgical intervention decompensated, was peripherally cannulated for VA ECMO and IABP (IABP now removed). Introp was also cannulated for central VA ECMO to reduce cardiac blood flow to heal patch, now on VV-AA ECMO. Impella was directly placed intraoperatively. Arrived in CVICU on mechanical cardiovascular support inlcuding VV-AA ECMO, impella with open chest. RTOR on 3/30 for chest closure and central ecmo decannulaion.      Plan today:    Patient with increasing pressor requirements overnight as well as rising lactate. CT obtained yesterday with evidence of splenic infarcts and new cerebellar infarcts and FERNANDEZ showing large left atrial thrombus. Extensive discussion was had with the family  regarding his prognosis and ultimately the decision was made to  transition to comfort cares with withdrawal of care this afternoon after the rest of his family arrives.      Neuro/ pain/ sedation:  - Monitor neurological status. Notify the MD for any acute changes in exam.  #Acute postoperative pain  # Acute metabolic encephalopathy 2/2 sedation  # Sedation   - continue prop, fentanyl gtt   - rass goal -0 to -1,   - gabapentin 100 mg TID  - Scheduled: Tylenol  - PRN: Tylenol, oxycodone, Dilaudid  - Comfort cares order set    # New hypodensity right and left cerebellem  # Old ischemic insult left cerebellum, right caudate  # Encephalopathy  - Neurocritical care consult, await recs. Appreciate input  - Continue anticoagulation per ANGELA / ECMO needs  at this time. Infarcts likely embolic in nature   - Limited neuro exam findings. Per RN, reported to have moved lower extremities to command this AM. Unable to replicate this morning      Pulmonary care:   #Mechanical ventilation  # Acute Hypoxic Respiratory Failure  # Pulmonary edema   # ARDS  Vent Mode: CMV/AC  (Continuous Mandatory Ventilation/ Assist Control)  FiO2 (%): 80 %  Resp Rate (Set): 12 breaths/min  Tidal Volume (Set, mL): 350 mL  PEEP (cm H2O): 5 cmH2O  Resp: 13  - Goal SpO2 > 92%  - Daily CXR   - Monitor CT output      Cardiovascular:    # Cardiogenic shock  # Inferior STEMI s/p IABP c/b cardiogenic shock and recurrent VT  # Biventricular failure  # Papillary muscle infarction leading to restricted posterior mitral valve leaflet and severe MR  # S/p VA ECMO   # Inferior LV wall rupture s/p patch repair 3/27   # S/P MVR, central ECMO cannulation and impella placement 3/27/24  # Left atrial thrombus  # C/F hemopericardium, pseudoaneurysm at intersection of Impella and Aorta   - Goal MAP >65, SBP <140, monitor hemodynamics  - Hemodynamic gtts: levo and vaso as needed  - Stress steroids as below   - antiplatelet/anticoag:   - continue ASA  - Bivalrudin, HIT postitive  - Amiodarone 400mg BID for VT   - Overall plan to keep full decompression of LV via ECMO, impella support for LV patch protection    - V-paced (V-wires x 2)   - Per CVTS: If he has adequate RV function with this support, we hope that he can wean from ECMO and then proceed with a slow wean of the Impella.   - Anesthesia report from OR during chest closure 3/30/24 with concern for large left atrial thrombus. CVTS aware,repeat FERNANDEZ on 4/1 with re-demonstration of large atrial thrombus with no opening of the mitral valve and obvious backup of blood into the lungs.      GI care / Nutrition:   # NPO status   # Transaminitis in setting of CHF- improving  # Diarrhea   - NPO   - PPI  - Bowel regimen: MiraLAX, senna, held for diarrhea   - Add dietary  fiber   - NJ with tube feeds at goal       Renal / Fluids / Electrolytes:   # Hypernatremia  # Hypophosphatemia  # lactic acidosis   # JEANE  BL creat appears to be ~ < 1.0   - Strict I/O, daily weights, avoid/limit nephrotoxins  - Replete lytes PRN per protocol  - FWF 30 ml q4h   - Lactate q4h  - CRRT per nephrology,     #Hypoglycemia  #Stress Dose Steroids   - Insulin gtt- held  - D10 drip for hypoglycemia  - Goal BG <180 for optimal healing     ID / Antibiotics:  #Stress induced leukocytosis  # Aspiration, HAP   # Open Chest- resolved  # Perioperative Antimicrobials   - Monitor fever curve, WBC, and inflammatory markers as appropriate  - ID following, appreciate recs  - antibiotics              - Vancomycin: 3/22-3/23, 3/27 - tbd              - Meropenem: 3/22, 3/23-3/25              - Cefepime: 3/23, 3/25-tbd   - Metronidazole- 3/27-tbd       Heme:     # Likely Heparin Induced Thrombocytopenia  # Post CPB thrombocytopenia  # pulmonary infarcts  # splenic infarcts  - Trend CBC   - Hgb goal > 8   - continue bival     MSK / Skin:  #Sternotomy  #Surgical Incision  # ECMO sites   # IABP site   - Sternal precautions, postop incision management protocol  - PT/OT/CR     Prophylaxis:     - Mechanical DVT ppx  - Chemical DVT ppx: bivalrudin  - PPI     Lines / Tubes / Drains:  - ETT  - CTs x5  - Chua  - Impella  - VA ECMO cannulas x 2   - R ulnar art line, ANTONINA foster, DIMITRI plaza, PIV   - NJ     Disposition:  - CVICU    Patient seen, findings and plan discussed with Dr. Mag Schulz MD  Anesthesiology CA-2  04/01/24    ====================================    TODAY'S PROGRESS  SUBJECTIVE  Remains sedated and intubated on VA ECMO. No meaningful response to stimulation    OBJECTIVE  1. VITAL SIGNS  Temp:  [97.5  F (36.4  C)-99  F (37.2  C)] 98.4  F (36.9  C)  Pulse:  [] 89  Resp:  [14] 14  MAP:  [0 mmHg-281 mmHg] 76 mmHg  Arterial Line BP: ()/() 79/74  FiO2 (%):  [40 %] 40 %  SpO2:  [92 %-95 %] 95  %  Vent Mode: PCV Plus assist  (Pressure Control Ventilation/ Assist Control)  FiO2 (%): 40 %  Resp Rate (Set): 14 breaths/min  PEEP (cm H2O): 8 cmH2O  Inspiratory Pressure (cm H2O) (Drager Nette): 30  Resp: 14      2. INTAKE/ OUTPUT  I/O last 3 completed shifts:  In: 5202.8 [I.V.:1577.7; Other:2.6; NG/GT:110; IV Piggyback:2500]  Out: 2705.1 [Urine:4; Emesis/NG output:900; Other:1021.1; Stool:400; Chest Tube:380]    3. PHYSICAL EXAMINATION  General: sedated  Neuro: sedated, responds to stimulation   Resp: intubated, ventilated. Symmetric chest rise.   CV: S1, S2, V paced, no m/r/g   Abdomen: Soft, non-distended, non-tender  Incisions: Cannula groin sites c/d/I. Sternal incision dressed, c/d/I   Extremities: mottled hands bilaterally with delayed capillary refill in all extremities  CT: To suction, serosang output.     4. INVESTIGATIONS  Arterial Blood Gases   Recent Labs   Lab 04/02/24  1111 04/02/24  0845 04/02/24  0555 04/02/24  0339   PH 7.32* 7.34* 7.29* 7.34*   PCO2 34* 32* 37 36   PO2 218* 239* 244* 339*   HCO3 18* 17* 18* 20*     Complete Blood Count   Recent Labs   Lab 04/02/24  0339 04/02/24  0330 04/01/24  2308 04/01/24  2133 04/01/24  2014 04/01/24  1712 04/01/24  1610 04/01/24  1225   WBC  --  52.5*  --  50.7*  --   --  51.3* 47.8*   HGB 7.1*  --  7.8*  --  6.8* 6.5*  --   --    PLT  --  51*  --  50*  --   --  59* 57*     Basic Metabolic Panel  Recent Labs   Lab 04/02/24  1118 04/02/24  1111 04/02/24  0856 04/02/24  0650 04/02/24  0600 04/02/24  0339 04/02/24  0337 04/02/24  0330 04/02/24  0027 04/01/24  2308 04/01/24 2134 04/01/24  2133 04/01/24  1406 04/01/24  1225 04/01/24  1011 04/01/24  1010 04/01/24 0414 04/01/24 0412   NA  --   --   --   --   --  135  --  138  --  136  --  136  136   < > 137  --  137  --  138   POTASSIUM  --   --   --   --   --  4.9  --  4.9  --  4.6  --  4.8  4.8   < > 4.5  --  4.5  --  4.5   CHLORIDE  --   --   --   --   --   --   --  102  --   --   --  102  102  --  102   "--  103  --  104   CO2  --   --   --   --   --   --   --  16*  --   --   --  15*  15*  --   --   --  19*  --  19*   BUN  --   --   --   --   --   --   --  43.6*  --   --   --  44.3*  44.3*  --  43.9*  --  43.9*  --  44.2*   CR  --   --   --   --   --   --   --  1.81*  --   --   --  1.88*  1.88*  --  1.80*  --  1.79*  --  1.87*   * 119* 103* 112*   < > 100*   < > 105*  109*   < > 96   < > 109*  109*   < > 126*   < > 130*  149*   < > 158*    < > = values in this interval not displayed.     Liver Function Tests  Recent Labs   Lab 04/02/24  0330 04/01/24  2133 04/01/24  1610 04/01/24  1225 04/01/24  1010 04/01/24  0412 03/31/24  0949 03/31/24  0322 03/30/24  2214 03/30/24  1606 03/30/24  1126 03/30/24  0418 03/29/24 2133   AST  --   --   --   --   --   --   --   --   --  173* 153* 172* 187*   ALT  --  <5  --   --   --   --   --  45 41 43 41 43 43   ALKPHOS 98 93  --   --   --   --   --  58 58 55 49 47 61   BILITOTAL 3.7* 3.8*  --   --  3.8* 3.4*   < > 2.1* 1.7* 1.5* 1.4* 1.2 1.5*   ALBUMIN 1.8* 1.7*  1.7*  --  1.9* 1.8* 1.9*   < > 2.0* 1.9* 2.0*  2.0* 2.0* 1.9* 2.0*  2.0*   INR 4.45* 4.01* 3.57*  --  3.30* 2.90*   < > 1.56* 1.64* 1.69* 1.90* 1.87* 1.89*    < > = values in this interval not displayed.     Pancreatic Enzymes  No lab results found in last 7 days.  Coagulation Profile  Recent Labs   Lab 04/02/24  1108 04/02/24  0330 04/01/24  2133 04/01/24  1610 04/01/24  1010   INR  --  4.45* 4.01* 3.57* 3.30*   PTT 79* 69* 56* 78* 77*     Lactate  Invalid input(s): \"LACTATE\"    5. RADIOLOGY  3/31 CXR:  Impression:   1. Ultrasound probe has been removed. The support devices are  otherwise stable.  2. Stable diffuse mixed opacification throughout both lungs.  " -Pt. with no s&s of injury  -PT eval appreciated, recc BETSY  -CM for arrangements  -Fall precautions.

## 2024-04-02 NOTE — PROGRESS NOTES
Critical Care Cross Cover    Called by RN for rising pressor needs, elevated lactate level, and increased gastric residual seen on CT images obtained this afternoon. Chart reviewed. Patient examined.     # New hypodensity right and left cerebellem  # Old ischemic insult left cerebellum, right caudate  # Encephalopathy  - Neurocritical care consult, await recs. Appreciate input  - Continue anticoagulation per ANGELA / ECMO needs at this time. Infarcts likely embolic in nature   - Limited neuro exam findings. Per RN, reported to have moved lower extremities to command this AM. Unable to replicate this evening  - Consider further imaging when able    # Lactic acidosis  # Cardiogenic shock  # Cardiac thrombus  # c/f hemopericardium, pseudoaneurysm at intersection of Impella and Aorta  - Continue to trend LA Q2H  - Gentle fluid resuscitation as needed  - Discussed imaging with CVTS team, no plans for intervention tonight. Plan for thrombectomy in AM of intracardiac thrombus in left atrium    # pulmonary infarcts  # splenic infarcts  - Seen on imaging obtained 4/1  - ANGELA positive, Continue bival per protocol    # delayed gastric mobility  - Place decompressive NG, place on LIS.    - Immediate output of 800 mL  - Hold TF. Will re-[place] placement tomorrow. Seen as in distal stomach on CT scan today.        This is to supplement the daily progress note, see full note signed by Dr. Schulz on 4/1 for rest of plan of care.     Discussed with CVTS staff, Dr. Mulvihill and CVTS fellow, Dr. Castaneda.     MARYANN Miller CNP  CC time: 45 minutes.

## 2024-04-02 NOTE — PROGRESS NOTES
CRRT STATUS NOTE    DATA:  Time:  0621      Pressures WNL:  YES    Filter Status:  WDL    Problems Reported/Alarms Noted:  No    Supplies Present:  YES    ASSESSMENT:  Patient Net Fluid Balance:    04/01/2024 I/O= +2048.83cc    Vital Signs 0000:   T=97.9 (esophageal), HR=77, RR=14, BP=78/74 (MAP=75), SPO2=unable to obtain    Patient has an impella in place (see flowsheets).    Gtts-  Levophed  Angiomax    Boluses given overnight-  LR 500cc IV x2   LR 1 liter IV x1     Blood products given overnight-  PRBC x2    Labs:    Labs monitored and reviewed.    ROUTINE ICU LABS (Last four results)  CMP  Recent Labs   Lab 04/02/24  0600 04/02/24  0339 04/02/24  0337 04/02/24  0330 04/02/24  0027 04/01/24  2308 04/01/24  2134 04/01/24  2133 04/01/24  1611 04/01/24  1610 04/01/24  1406 04/01/24  1225 04/01/24  1011 04/01/24  1010 04/01/24  0414 04/01/24  0412 03/31/24  2357 03/31/24  2310 03/31/24  0323 03/31/24  0322 03/30/24  2215 03/30/24  2214 03/30/24  1610 03/30/24  1606 03/30/24  1127 03/30/24  1126 03/30/24  0421 03/30/24  0418 03/29/24  2134 03/29/24  2133   NA  --  135  --  138  --  136  --  136  136   < >  --   --  137  --  137  --  138  --  138   < > 140  --  140  --  139  139  --  141   < > 142  --  141  141   POTASSIUM  --  4.9  --  4.9  --  4.6  --  4.8  4.8   < >  --   --  4.5  --  4.5  --  4.5  --  4.2   < > 4.0  --  3.9  --  4.0  4.0  --  3.7   < > 3.7  --  4.1  4.1   CHLORIDE  --   --   --  102  --   --   --  102  102  --   --   --  102  --  103  --  104  --  105   < > 106  --  107  --  105  105  --  107  --  108*  --  109*  109*   CO2  --   --   --  16*  --   --   --  15*  15*  --   --   --   --   --  19*  --  19*  --  19*   < > 21*  --  21*  --  23  23  --  23  --  23  --  23  23   ANIONGAP  --   --   --  20*  --   --   --  19*  19*  --   --   --   --   --  15  --  15  --  14   < > 13  --  12  --  11  11  --  11  --  11  --  9  9   GLC 76 100* 97 105*  109*   < > 96   < > 109*  109*   < >   --    < > 126*   < > 130*  149*   < > 158*   < > 122*   < > 160*   < > 160*   < > 173*  180*  180*   < > 100*  109*   < > 171*   < > 137*  137*   BUN  --   --   --  43.6*  --   --   --  44.3*  44.3*  --   --   --  43.9*  --  43.9*  --  44.2*  --  43.6*   < > 46.5*  --  47.0*  --  45.9*  45.9*  --  47.2*  --  43.8*  --  45.3*  45.3*   CR  --   --   --  1.81*  --   --   --  1.88*  1.88*  --   --   --  1.80*  --  1.79*  --  1.87*  --  1.84*   < > 2.01*  --  1.93*  --  1.84*  1.84*  --  1.80*  --  1.62*  --  1.73*  1.73*   GFRESTIMATED  --   --   --  43*  --   --   --  41*  41*  --   --   --  44*  --  44*  --  42*  --  42*   < > 38*  --  40*  --  42*  42*  --  44*  --  50*  --  46*  46*   GRICEL  --   --   --  8.3*  --   --   --  8.2*  8.2*  --   --   --  8.4*  --  8.3*  --  8.3*  --  8.1*   < > 8.0*  --  8.1*  --  8.3*  8.3*  --  8.1*  --  8.5*  --  8.3*  8.3*   MAG  --   --   --  2.4*  --   --   --  2.3  --  2.4*  --  2.3  --  2.3  --  2.3  --   --    < > 2.3  --  2.2  --  2.3  --  2.3  --  2.3  --  2.3   PHOS  --   --   --   --   --   --   --   --   --   --   --   --   --   --   --   --   --   --   --  3.7  --  3.9  --  4.2  4.2  --  4.0  --  3.4  --  3.4  3.4   PROTTOTAL  --   --   --  4.7*  --   --   --  4.8*  --   --   --   --   --   --   --  4.7*  --  4.4*   < > 4.5*  --  4.2*  --  4.5*  --  4.2*  --  4.3*  --  4.3*   ALBUMIN  --   --   --  1.8*  --   --   --  1.7*  1.7*  --   --   --  1.9*  --  1.8*  --  1.9*  --  1.8*   < > 2.0*  --  1.9*  --  2.0*  2.0*  --  2.0*  --  1.9*  --  2.0*  2.0*   BILITOTAL  --   --   --  3.7*  --   --   --  3.8*  --   --   --   --   --  3.8*  --  3.4*  --  3.1*   < > 2.1*  --  1.7*  --  1.5*  --  1.4*  --  1.2  --  1.5*   ALKPHOS  --   --   --  98  --   --   --  93  --   --   --   --   --   --   --   --   --   --   --  58  --  58  --  55  --  49  --  47  --  61   AST  --   --   --   --   --   --   --   --   --   --   --   --   --   --   --   --   --   --   --   --    --   --   --  173*  --  153*  --  172*  --  187*   ALT  --   --   --   --   --   --   --  <5  --   --   --   --   --   --   --   --   --   --   --  45  --  41  --  43  --  41  --  43  --  43    < > = values in this interval not displayed.     CBC  Recent Labs   Lab 04/02/24  0339 04/02/24  0330 04/01/24  2308 04/01/24  2133 04/01/24 2014 04/01/24  1712 04/01/24  1610 04/01/24  1225 04/01/24  1010 04/01/24  0412 03/31/24  2214 03/31/24  1551 03/31/24  0949 03/30/24  0939   WBC  --  52.5*  --  50.7*  --   --  51.3* 47.8*   < > 41.2* 37.0* 30.4* 29.1*  27.7*  --    RBC  --  2.37*  --  2.47*  --   --  2.37* 2.49*   < > 2.53* 2.33* 2.53* 2.81*  2.85*  --    HGB 7.1*  --  7.8*  --  6.8* 6.5*  --   --   --  8.2* 7.4* 7.9* 8.4*  8.8*  --    HCT 21* 19.9* 23* 21.0* 20* 19* 20.0* 21.3*   < > 21.9* 20.1* 22.1* 24.4*  24.8*   < >   MCV  --  84  --  85  --   --  84 86   < > 87 86 87 87  87  --    MCH  --   --   --   --   --   --   --   --   --  32.4 31.8 31.2 29.9  30.9  --    MCHC  --   --   --   --   --   --   --   --   --  37.4* 36.8* 35.7 34.4  35.5  --    RDW  --  19.6*  --  19.0*  --   --  19.4* 18.5*   < > 18.9* 19.7* 19.0* 18.6*  18.3*  --    PLT  --  51*  --  50*  --   --  59* 57*   < > 67* 71* 77* 48*  51*  --     < > = values in this interval not displayed.     INR  Recent Labs   Lab 04/02/24  0330 04/01/24  2133 04/01/24  1610 04/01/24  1010   INR 4.45* 4.01* 3.57* 3.30*     Arterial Blood Gas  Recent Labs   Lab 04/02/24  0555 04/02/24  0339 04/02/24  0330 04/02/24  0208 04/02/24  0021   PH 7.29* 7.34* 7.30* 7.29* 7.31*   PCO2 37 36 38 36 35   PO2 244* 339* 224* 227* 244*   HCO3 18* 20* 19* 17* 18*   O2PER 40  --  100  40  40 40 40     0339 ICa=4.5, LA=9.1  0555 LA=9.0    Goals of Therapy:   Net negative 0-50cc/hr as tolerated.  Goals discussed with bedside RN.    INTERVENTIONS:   None    PLAN:  Continue to monitor.  Change CRRT set q72hrs and PRN.  Call CRRT RN on [employer] phone with questions.  See  flowsheets for details.

## 2024-04-02 NOTE — CONSULTS
Neurocritical Care Consultation    Reason for critical care admission: ECMO s/p inferior STEMI with muscle rupture   Admitting Team: [facility]  Date of Service:  04/01/2024  Date of Admission:  3/22/2024  Hospital Day: 11    Assessment/Plan  Giacomo Castro is a 54 year old male who presented to [facility] on 3/12 [facility] inferior STEMI complicated by postero-medial papillary muscle rupture, transferred to [facility] for evaluation for MVR, however patient decompensated after arrival requiring peripheral VA ECMO cannulation and IABP placement. Chest closure and decannulation of central ECMO cannulas 3/30.  CT head obtained 4/1 for altered mentation and NCC consulted for CT head revealing new intraparenchymal hypoattenuation in the right cerebellum. On my review, when comparing CT head from 3/26 and 4/1 there is subtle evidence of grey white loss in the region of the newly identified on the right cerebellum. These strokes appear to be cardoiembolic and remain throughout b/l cerebellum, and R caudate. His examination tonight is more impaired than described by the day team such as that he was noted to follow commands previously, but he did not follow any commands for me tonight and only opened eyes, but possible this was related to recent sedating medication of midazolam given earlier in the evening and can continue to monitor examinations.       Neuro  #new hypodensity R and L cerebellum  #Old ischemic insult L cerebellum, R caudate  -Could consider CTA, but appears cardio embolic source clearly and unable to get MRI  -Continue anti-thrombotic as per primary team, and further recommendations per day team evaluation and examination    #Encephalopathy   -limit CNS acting medications including opioids, benzodiazepines, anticholinergics; consider utilizing local measures or scheduled pain regimens that minimize opioids for pain.  -treat underlying infections, correct metabolic derangements as able and provide supportive medical  "cares, including correction of any electrolyte abnormalities, consider checking TSH, ammonia, thiamine levels  -utilize delirium precautions including frequent reorientation, normal day night cycles; blinds up and awake during day and sleeping at night, minimize frequent interruptions, clutter, and loud noises. Encourage family visitations and therapeutic interactions. Lastly, consider melatonin at 1900 nightly  -avoid sensory deprivation (provide patient with eyeglasses or hearing aids if using)    Clinically Significant Risk Factors           # Hypercalcemia: corrected calcium is >10.1, will monitor as appropriate    # Hypoalbuminemia: Lowest albumin = 1.8 g/dL at 4/1/2024 10:10 AM, will monitor as appropriate    # Coagulation Defect: INR = 3.57 (Ref range: 0.85 - 1.15) and/or PTT = 78 Seconds (Ref range: 22 - 38 Seconds), will monitor for bleeding  # Thrombocytopenia: Lowest platelets = 48 in last 2 days, will monitor for bleeding  # Acute Kidney Injury, unspecified: based on a >150% or 0.3 mg/dL increase in last creatinine compared to past 90 day average, will monitor renal function         # Obesity: Estimated body mass index is 32.24 kg/m  as calculated from the following:    Height as of this encounter: 1.905 m (6' 3\").    Weight as of this encounter: 117 kg (257 lb 15 oz).   # Moderate Malnutrition: based on nutrition assessment      # Financial/Environmental Concerns: none   # History of CABG: noted on surgical history              The patient was discussed with Dr. Calderon, [facility] fellow, who agrees with my assessment and plan.    Alea Ortega DO, OC  Neurology Resident, PGY-4      History of Present Illness:  Giacomo Castro is a 54 year old male with a past medical history of HLD, alcohol abuse, tobacco use who initially presented to [facility] on 3/12 FTH inferior STEMI complicated by postero-medial papillary muscle rupture and so now s/p surgical mitral valve replacement but also FTH necrotic LV " free wall impending rupture and had undergone LV wall patch repair on 3/27.  he was transferred to [facility] for evaluation for MVR, however patient decompensated after arrival requiring peripheral VA ECMO cannulation and IABP placement. Chest closure and decannulation of central ECMO cannulas 3/30.      CT head obtained today for altered mentation, last as of 3/26. NCC consulted for CT head revealing new intraparenchymal hypoattenuation in the right cerebellum. Additionally CT revealed possible subacute infarct in the medial aspect of the  left cerebellum and demonstrated stable old infarct in the inferior lateral aspect of the left cerebellum and questionable small old infarct in the right caudate nucleus head.    In chart review:     He is on asa 81 mg  A1c as of 3/25 at 5.9%  LDL as of 3/13 at 80    FERNANDEZ completed today for MR valve assessment and noted to not be a complete study, but showed the left atrium there was a large homogenous echodense structure   consistent encompassing the entire left atrium. This is consistent with clot but without direct visualization.    TTE completed  4/1:  The ejection fraction is 5-10% (severely reduced). Atria not assessed Compared to Previous Study  This study was compared with the study from 3/23/24 . The patient now has a  pVAD/Impella and a bioprosthetic mitral valve. The LV cavity is smaller. The AI is newly noted. The LV function/RV function is similar.      Allergies   Allergen Reactions    Heparin (Porcine) Heparin Induced Thrombocytopenia    Penicillins Hives     Tolerated ceftriaxone March 2024 ([facility])       Current Medications:   amiodarone  400 mg Oral or Feeding Tube Daily    aspirin  81 mg Oral or NG Tube Daily    ceFEPIme  2 g Intravenous Q8H    chlorhexidine  15 mL Swish & Spit BID    fiber modular  1 packet Per Feeding Tube Daily    gabapentin  100 mg Oral TID    hydrocortisone sodium succinate PF  50 mg Intravenous Q12H    metroNIDAZOLE  500 mg  Intravenous Q12H    multivitamins w/minerals  15 mL Per Feeding Tube Daily    pantoprazole  40 mg Oral or Feeding Tube QAM    potassium chloride  20 mEq Intravenous Once    protein modular  2 packet Per Feeding Tube TID    sodium chloride (PF)  3 mL Intracatheter Q8H    vancomycin  2,250 mg (central catheter) Intravenous Q24H       PRN Medications:  acetaminophen, artificial tears, bisacodyl, calcium gluconate, calcium gluconate, dextrose, glucose **OR** dextrose **OR** glucagon, hydrALAZINE, HYDROmorphone **OR** HYDROmorphone, lidocaine 4%, lidocaine 4%, lidocaine (buffered or not buffered), lidocaine (buffered or not buffered), magnesium hydroxide, magnesium sulfate, propofol **AND** propofol **AND** CK total **AND** Triglycerides **AND** - MEDICATION INSTRUCTIONS - **AND** Notify Physician, naloxone **OR** naloxone **OR** naloxone **OR** naloxone, - MEDICATION INSTRUCTIONS -, ondansetron **OR** ondansetron, oxyCODONE **OR** oxyCODONE, polyethylene glycol, potassium chloride, BETA BLOCKER NOT PRESCRIBED, sennosides, sodium chloride (PF), sodium chloride (PF), sodium phosphate    Infusions:   bivalirudin (ANGIOMAX) 0.02 mg/mL in sodium chloride 0.9 % 1,000 mL for REPERFUSION CATHETER 3 mL/hr at 04/01/24 2200    bivalirudin (ANGIOMAX) 250 mg in sodium chloride 0.9 % 250 mL ANTICOAGULANT infusion 0.08 mg/kg/hr (04/01/24 2200)    dextrose      CRRT replacement solution 12.5 mL/kg/hr (04/01/24 1927)    fentaNYL 50 mcg/hr (04/01/24 2200)    insulin regular Stopped (04/01/24 1229)    propofol Stopped (03/30/24 1147)    And    - MEDICATION INSTRUCTIONS -      - MEDICATION INSTRUCTIONS -      norepinephrine 0.06 mcg/kg/min (04/01/24 2200)    CRRT replacement solution 200 mL/hr at 03/31/24 2022    CRRT replacement solution 12.5 mL/kg/hr (04/01/24 1927)    BETA BLOCKER NOT PRESCRIBED      sodium bicarbonate 25 mEq in 1000 mL D5W LEFT Ventricular IMPELLA cardiac device purge infusion 9.3 mL/hr (04/01/24 2100)       Physical  "Examination:  Vitals: BP (!) 85/82   Pulse 70   Temp 98.1  F (36.7  C)   Resp 14   Ht 1.905 m (6' 3\")   Wt 117 kg (257 lb 15 oz)   SpO2 92%   BMI 32.24 kg/m    General: Adult male patient, lying in bed, critically-ill  HEENT: Normocephalic, atraumatic  Cardiac: distal extremities cool to the touch and edematous  Pulm: on mech ventilation  Abdomen: Soft, minimally distended,   Extremities: no edema,   Skin: No rash or lesion  Neuro: intubated and on only fentanyl sedition at 50 mcg/hr. He does arouse to voice  but he follows no commands, he does not open eyes.  pupils 3mm and briskly reactive, weak cough,  corneal reflex present. No roving eye movements, does not track  Normal bulk and decreased tone. No abnormal movements. Does not w/drawal to noxious x4. Toes down going.        NIHSS  Interval     1a. Level of Consciousness 2-->Not alert, requires repeated stimulation to attend, or is obtunded and requires strong or painful stimulation to make movements (not stereotyped)   1b. LOC Questions 2-->Answers neither question correctly   1c. LOC Commands 2-->Performs neither task correctly   2.   Best Gaze 0-->Normal   3.   Visual 0-->No visual loss   4.   Facial Palsy 0-->Normal symmetrical movements   5a. Motor Arm, Left 4-->No movement   5b. Motor Arm, Right 4-->No movement   6a. Motor Leg, Left 4-->No movement   6b. Motor Leg, right 4-->No movement   7.   Limb Ataxia 0-->Absent   8.   Sensory 2-->Severe to total sensory loss, patient is not aware of being touched in the face, arm, and leg   9.   Best Language 3-->Mute, global aphasia, no usable speech or auditory comprehension   10. Dysarthria (UN) Intubated or other physical barrier   11. Extinction and Inattention  0-->No abnormality   Total 27 (04/01/24 7687)       Labs and Imaging:    [facility] 4/1  IMPRESSION:  1. New focal area of intraparenchymal hypoattenuation in the right  cerebellum, the density suggestive of subacute infarct. Another small  new focus in " the left cerebellum, could be same aged infarct.  2. Stable small old infarct in the left cerebellum and right caudate  lobe.  All relevant imaging and laboratory values personally reviewed.

## 2024-04-02 NOTE — PROGRESS NOTES
Major Shift Events: Remains on fent, intermittently follows commands in BLEs, w/d in BUE, PERRLA. ECMO unchanged at 4900RPM, Impella now at P4 per CVTS. 1 unit PRBCs given for hgb in the 6s on ECMO, hgbs not resulting from lab. Levo requirements stable. Vent unchanged. LA increasing thru day, brought to CT for head and C/A/P, images pending. CRRT stable, no electrolyte replacements required. Chua removed. FERNANDEZ, TTE, and bronch completed at bedside. Upon return from CT, NJ not flushing, OG placed with large output, CVTS aware. Insulin gtts on, bival remains, HIT positive per lab. Multiple labs unable to result, teams aware. All abnormal lab and assessment findings relayed to team. Family at bedside throughout the day, updated.  Plan: discuss imaging results with team, monitor and trend labs.   For vital signs and complete assessments, please see documentation flowsheets.

## 2024-04-02 NOTE — PROGRESS NOTES
[facility]    ECLS Discontinuation Note:     ECLS was discontinued 4/2/2024 at 1329.    Cely Arellano, RT  ECMO Specialist  4/2/2024 2:10 PM

## 2024-04-03 LAB
BACTERIA ASPIRATE CULT: NO GROWTH
GRAM STAIN RESULT: NORMAL
GRAM STAIN RESULT: NORMAL

## 2024-04-04 LAB — SCANNED LAB RESULT: ABNORMAL

## 2024-04-06 LAB
BACTERIA BLD CULT: NO GROWTH
BACTERIA BLD CULT: NO GROWTH

## (undated) DEVICE — SU ETHIBOND 2-0 SHDA 30" X563H

## (undated) DEVICE — SU ETHIBOND 0 CT-1 CR 8X18" CX21D

## (undated) DEVICE — BNDG ESMARK 6" STERILE LF 820-3612

## (undated) DEVICE — TOURNIQUET VASCULAR KIT ARGYLE 8888-585000

## (undated) DEVICE — SPONGE LAP 18X18" X8435

## (undated) DEVICE — KIT HAND CONTROL ACIST 016795

## (undated) DEVICE — BLADE KNIFE BEAVER MINI STR BEAVER6900

## (undated) DEVICE — LINEN TOWEL PACK X6 WHITE 5487

## (undated) DEVICE — SU PROLENE 5-0 RB-2DA 30" 8710H

## (undated) DEVICE — ESU HOLSTER PLASTIC DISP E2400

## (undated) DEVICE — DRSG ABDOMINAL 07 1/2X8" 7197D

## (undated) DEVICE — DRSG BIOPATCH GERMICIDAL SPLIT SPONGE 4MM MED 4150

## (undated) DEVICE — KIT ENDO VASOVIEW HEMOPRO 2 VH-4000

## (undated) DEVICE — SU STEEL 6 CCS 4X18" M654G

## (undated) DEVICE — SOL NACL 0.9% IRRIG 3000ML BAG 2B7477

## (undated) DEVICE — DRAIN CHEST TUBE 32FR STR 8032

## (undated) DEVICE — TAPE MEDIPORE 4"X2YD 2864

## (undated) DEVICE — SU ETHIBOND 3-0 BBDA 36" X588H

## (undated) DEVICE — SU SILK 0 TIE 6X30" A306H

## (undated) DEVICE — TOURNIQUET VASCULAR KIT 7 1/2" 79012

## (undated) DEVICE — CLIP HORIZON SM RED WIDE SLOT 001201

## (undated) DEVICE — DRSG TELFA 3X8" 1238

## (undated) DEVICE — SUCTION CATH AIRLIFE TRI-FLO W/CONTROL PORT 14FR  T60C

## (undated) DEVICE — SURGICEL HEMOSTAT 4X8" 1952

## (undated) DEVICE — WAND SUCTION LP SOFT 15.2CM SU-22702

## (undated) DEVICE — SPECIMEN CONTAINER 5OZ STERILE 2600SA

## (undated) DEVICE — SU DEVICE ENDO COR KNOT QUICK LOAD RELOAD 030874

## (undated) DEVICE — DRAPE IOBAN INCISE 23X17" 6650EZ

## (undated) DEVICE — ESU ELEC BLADE 6" COATED/INSULATED E1455-6

## (undated) DEVICE — POSITIONER ASSIST ESSTECH 3S T401210S

## (undated) DEVICE — SU MYOSTERNAL WIRE  046-267

## (undated) DEVICE — SU VICRYL 3-0 FS-1 27" J442H

## (undated) DEVICE — SU PROLENE 7-0 BV175-6 24" 8735H

## (undated) DEVICE — PITCHER STERILE 1000ML  SSK9004A

## (undated) DEVICE — SYR 10ML LL W/O NDL 302995

## (undated) DEVICE — SU DEVICE COR-KNOT MINI 4X14MM 031350

## (undated) DEVICE — SU PROLENE 5-0 RB-1DA 36"  8556H

## (undated) DEVICE — DRAIN CHEST TUBE RIGHT ANGLED 28FR 8128

## (undated) DEVICE — DRAPE FLUID WARMING 52 X 60" ORS-321

## (undated) DEVICE — SYR 01ML 27GA 0.5" NDL TBC 309623

## (undated) DEVICE — LEAD PACER MYOCARDIAL BIPOLAR TEMPORARY 53CM 6495F

## (undated) DEVICE — SU PROLENE 6-0 C-1DA 30" 8706H

## (undated) DEVICE — SOL NACL 0.9% IRRIG 1000ML BOTTLE 2F7124

## (undated) DEVICE — TIES BANDING T50R

## (undated) DEVICE — CANNULA ART 17FR 3/8IN 23CM 2 SH BIOLINE 70105.3082

## (undated) DEVICE — INTRO SHEATH 8FRX10CM PINNACLE RSS802

## (undated) DEVICE — Device

## (undated) DEVICE — SPONGE RAY-TEC 4X8" 7318

## (undated) DEVICE — BNDG ELASTIC 6"X5YDS STERILE 6611-6S

## (undated) DEVICE — DRAPE SHEET REV FOLD 3/4 9349

## (undated) DEVICE — INTRO SHEATH MICRO PLATINUM TIP 4FRX40CM 7274

## (undated) DEVICE — LINEN GOWN XLG 5407

## (undated) DEVICE — WIRE GUIDE 0.035"X145CM AMPLATZ XSTIFF J THSCF-35-145-3-AES

## (undated) DEVICE — SUCTION MANIFOLD NEPTUNE 2 SYS 4 PORT 0702-020-000

## (undated) DEVICE — BLADE KNIFE BEAVER MICROSHARP GREEN 377515

## (undated) DEVICE — CANNULA VENOUS 25FR LONG

## (undated) DEVICE — KIT ARTERIAL LINE 2GA 12CM INTRO NDL ASK-04510-HF

## (undated) DEVICE — SU PROLENE 4-0 RB-1DA 36" 8557H

## (undated) DEVICE — ESU GROUND PAD ADULT REM W/15' CORD E7507DB

## (undated) DEVICE — PATCH SURGICAL EVARREST FIBRIN SEALANT 4X2IN EVT5024

## (undated) DEVICE — SOL NACL 0.9% 10ML VIAL 0409-4888-02

## (undated) DEVICE — SU PROLENE 3-0 SHDA 36" 8522H

## (undated) DEVICE — PROTECTOR ARM ONE-STEP TRENDELENBURG 40418

## (undated) DEVICE — CLIP SPRING FOGARTY SOFTJAW CSOFT6

## (undated) DEVICE — CATH ANGIO KUMPE SOFT-VU 5FRX65CM CVD H787107327015

## (undated) DEVICE — INSERT FOGARTY 33MM TRACTION HYDRAJAW HYDRA33

## (undated) DEVICE — DRSG DRAIN 4X4" 7086

## (undated) DEVICE — PACK HEART LEFT CUSTOM

## (undated) DEVICE — CANNULA VESSEL DLP  30001

## (undated) DEVICE — DRSG TEGADERM 4X4 3/4" 1626W

## (undated) DEVICE — CONNECTOR SIMS TUBING FOR CHEST TUBES 361

## (undated) DEVICE — DRAPE C-ARM W/STRAPS 42X72" 07-CA104

## (undated) DEVICE — KIT RIGHT HEART CATH 60130719

## (undated) DEVICE — TUBING INSUFFLATION PNEUMOCLEAR 0620050100

## (undated) DEVICE — BLADE CLIPPER SGL USE 9680

## (undated) DEVICE — LABEL MEDICATION SYSTEM 3303-P

## (undated) DEVICE — SUCTION DRY CHEST DRAIN OASIS 3600-100

## (undated) DEVICE — PREP SKIN SCRUB TRAY 4461A

## (undated) DEVICE — BNDG ELASTIC 4"X5YDS STERILE 6611-4S

## (undated) DEVICE — SU DERMABOND ADVANCED .7ML DNX12

## (undated) DEVICE — CLIP HORIZON LG ORANGE 004200

## (undated) DEVICE — WIPES FOLEY CARE SURESTEP PROVON DFC100

## (undated) DEVICE — INTRO SHEATH 9FRX10CM PINNACLE RSS902

## (undated) DEVICE — GUIDEWIRE AMPLATZ SUPER STIFF 0.035"X180CM M001465250

## (undated) DEVICE — SU VICRYL 0 CTX 36" J370H

## (undated) DEVICE — BONE WAX 2.5GM W31G

## (undated) DEVICE — INTRODUCER SHEATH FAST-CATH CATH-LOCK 7FRX12CM 406702

## (undated) DEVICE — PREP CHLORAPREP 26ML TINTED HI-LITE ORANGE 930815

## (undated) DEVICE — LINEN TOWEL PACK X30 5481

## (undated) DEVICE — DEFIB PRO-PADZ LVP LQD GEL ADULT 8900-2105-01

## (undated) DEVICE — SU PROLENE 4-0 SHDA 36" 8521H

## (undated) DEVICE — PACK ADULT HEART UMMC PV15CG92D

## (undated) DEVICE — CLIP HORIZON MED BLUE 002200

## (undated) DEVICE — PREP DURAPREP 26ML APL 8630

## (undated) DEVICE — DRSG GAUZE FLUFTEX RADIOPAQUE 4.5"X4.1YD 11-020

## (undated) DEVICE — SU VICRYL 2-0 CT-1 27" UND J259H

## (undated) DEVICE — ESU PENCIL SMOKE EVAC W/ROCKER SWITCH 0703-047-000

## (undated) DEVICE — SU PLEDGET SOFT TFE 3/8"X3/26"X1/16" PCP40

## (undated) DEVICE — INTRO SHEATH 7FRX10CM PINNACLE RSS702

## (undated) DEVICE — TUBING SUCTION DRAINAGE PLEURAL DUAL 8884714200

## (undated) DEVICE — INTRODUCER SHEATH 8FRX24CM ARROW FLEX CL-07824

## (undated) DEVICE — DEVICE TISSUE STABILIZATION OCTOBASE 28707

## (undated) DEVICE — ANTIFOG SOLUTION W/FOAM PAD 31142527

## (undated) DEVICE — ESU ELEC BLADE E-SEP INSULATED NEPTUNE 70MM 0703-070-002

## (undated) DEVICE — PACK HEART RIGHT CUSTOM SAN32RHF18

## (undated) DEVICE — SU PLEDGET SOFT TFE 13MMX7MMX1.5MM D7044

## (undated) DEVICE — SYR BIOGLUE PREFILLED 5ML BG3515-5-US

## (undated) DEVICE — SLEEVE REPOSITIONING W/CATH LOCK 60CM 406503

## (undated) DEVICE — BLADE SAW STRK STERNAL 6207-97-101

## (undated) DEVICE — TUBING PRESSURE 30"

## (undated) DEVICE — RX SURGIFLO HEMOSTATIC MATRIX W/THROMBIN 8ML 2994

## (undated) DEVICE — DRAIN CHEST TUBE RIGHT ANGLED 32FR 8132

## (undated) DEVICE — GLOVE BIOGEL PI MICRO SZ 7.5 48575

## (undated) RX ORDER — HEPARIN SODIUM 1000 [USP'U]/ML
INJECTION, SOLUTION INTRAVENOUS; SUBCUTANEOUS
Status: DISPENSED
Start: 2024-01-01

## (undated) RX ORDER — FENTANYL CITRATE 50 UG/ML
INJECTION, SOLUTION INTRAMUSCULAR; INTRAVENOUS
Status: DISPENSED
Start: 2024-01-01

## (undated) RX ORDER — HYDROMORPHONE HYDROCHLORIDE 1 MG/ML
INJECTION, SOLUTION INTRAMUSCULAR; INTRAVENOUS; SUBCUTANEOUS
Status: DISPENSED
Start: 2024-01-01

## (undated) RX ORDER — ESMOLOL HYDROCHLORIDE 10 MG/ML
INJECTION INTRAVENOUS
Status: DISPENSED
Start: 2024-01-01

## (undated) RX ORDER — VANCOMYCIN HYDROCHLORIDE 1 G/20ML
INJECTION, POWDER, LYOPHILIZED, FOR SOLUTION INTRAVENOUS
Status: DISPENSED
Start: 2024-01-01

## (undated) RX ORDER — PROPOFOL 10 MG/ML
INJECTION, EMULSION INTRAVENOUS
Status: DISPENSED
Start: 2024-01-01

## (undated) RX ORDER — PAPAVERINE HYDROCHLORIDE 30 MG/ML
INJECTION INTRAMUSCULAR; INTRAVENOUS
Status: DISPENSED
Start: 2024-01-01